# Patient Record
Sex: FEMALE | Race: WHITE | NOT HISPANIC OR LATINO | Employment: OTHER | URBAN - METROPOLITAN AREA
[De-identification: names, ages, dates, MRNs, and addresses within clinical notes are randomized per-mention and may not be internally consistent; named-entity substitution may affect disease eponyms.]

---

## 2018-05-07 ENCOUNTER — TRANSCRIBE ORDERS (OUTPATIENT)
Dept: LAB | Facility: HOSPITAL | Age: 83
End: 2018-05-07

## 2018-05-07 DIAGNOSIS — E03.9 HYPOTHYROIDISM (ACQUIRED): ICD-10-CM

## 2018-05-07 DIAGNOSIS — E55.9 VITAMIN D DEFICIENCY: Primary | ICD-10-CM

## 2018-05-07 DIAGNOSIS — I15.8 OTHER SECONDARY HYPERTENSION: ICD-10-CM

## 2018-05-08 ENCOUNTER — APPOINTMENT (OUTPATIENT)
Dept: LAB | Facility: HOSPITAL | Age: 83
End: 2018-05-08
Payer: MEDICARE

## 2018-05-08 DIAGNOSIS — E55.9 VITAMIN D DEFICIENCY: ICD-10-CM

## 2018-05-08 DIAGNOSIS — E03.9 HYPOTHYROIDISM (ACQUIRED): ICD-10-CM

## 2018-05-08 DIAGNOSIS — I15.8 OTHER SECONDARY HYPERTENSION: ICD-10-CM

## 2018-05-08 LAB
25(OH)D3 SERPL-MCNC: 14.2 NG/ML (ref 30–100)
ALBUMIN SERPL BCP-MCNC: 3.6 G/DL (ref 3.5–5)
ALP SERPL-CCNC: 83 U/L (ref 46–116)
ALT SERPL W P-5'-P-CCNC: 17 U/L (ref 12–78)
ANION GAP SERPL CALCULATED.3IONS-SCNC: 9 MMOL/L (ref 4–13)
AST SERPL W P-5'-P-CCNC: 13 U/L (ref 5–45)
BASOPHILS # BLD AUTO: 0.02 THOUSANDS/ΜL (ref 0–0.1)
BASOPHILS NFR BLD AUTO: 0 % (ref 0–1)
BILIRUB SERPL-MCNC: 0.48 MG/DL (ref 0.2–1)
BUN SERPL-MCNC: 18 MG/DL (ref 5–25)
CALCIUM SERPL-MCNC: 9 MG/DL (ref 8.3–10.1)
CHLORIDE SERPL-SCNC: 110 MMOL/L (ref 100–108)
CO2 SERPL-SCNC: 25 MMOL/L (ref 21–32)
CREAT SERPL-MCNC: 1.24 MG/DL (ref 0.6–1.3)
EOSINOPHIL # BLD AUTO: 0.1 THOUSAND/ΜL (ref 0–0.61)
EOSINOPHIL NFR BLD AUTO: 2 % (ref 0–6)
ERYTHROCYTE [DISTWIDTH] IN BLOOD BY AUTOMATED COUNT: 14.7 % (ref 11.6–15.1)
GFR SERPL CREATININE-BSD FRML MDRD: 39 ML/MIN/1.73SQ M
GLUCOSE P FAST SERPL-MCNC: 79 MG/DL (ref 65–99)
HCT VFR BLD AUTO: 39.2 % (ref 34.8–46.1)
HGB BLD-MCNC: 12.7 G/DL (ref 11.5–15.4)
LDLC SERPL DIRECT ASSAY-MCNC: 112 MG/DL (ref 0–100)
LYMPHOCYTES # BLD AUTO: 1.57 THOUSANDS/ΜL (ref 0.6–4.47)
LYMPHOCYTES NFR BLD AUTO: 32 % (ref 14–44)
MCH RBC QN AUTO: 31.2 PG (ref 26.8–34.3)
MCHC RBC AUTO-ENTMCNC: 32.4 G/DL (ref 31.4–37.4)
MCV RBC AUTO: 96 FL (ref 82–98)
MONOCYTES # BLD AUTO: 0.44 THOUSAND/ΜL (ref 0.17–1.22)
MONOCYTES NFR BLD AUTO: 9 % (ref 4–12)
NEUTROPHILS # BLD AUTO: 2.72 THOUSANDS/ΜL (ref 1.85–7.62)
NEUTS SEG NFR BLD AUTO: 57 % (ref 43–75)
NRBC BLD AUTO-RTO: 0 /100 WBCS
PLATELET # BLD AUTO: 241 THOUSANDS/UL (ref 149–390)
PMV BLD AUTO: 10.5 FL (ref 8.9–12.7)
POTASSIUM SERPL-SCNC: 4.3 MMOL/L (ref 3.5–5.3)
PROT SERPL-MCNC: 6.5 G/DL (ref 6.4–8.2)
RBC # BLD AUTO: 4.07 MILLION/UL (ref 3.81–5.12)
SODIUM SERPL-SCNC: 144 MMOL/L (ref 136–145)
TSH SERPL DL<=0.05 MIU/L-ACNC: 1.39 UIU/ML (ref 0.36–3.74)
VENIPUNCTURE: NORMAL
WBC # BLD AUTO: 4.85 THOUSAND/UL (ref 4.31–10.16)

## 2018-05-08 PROCEDURE — 85025 COMPLETE CBC W/AUTO DIFF WBC: CPT

## 2018-05-08 PROCEDURE — 36415 COLL VENOUS BLD VENIPUNCTURE: CPT

## 2018-05-08 PROCEDURE — 83721 ASSAY OF BLOOD LIPOPROTEIN: CPT

## 2018-05-08 PROCEDURE — 82306 VITAMIN D 25 HYDROXY: CPT

## 2018-05-08 PROCEDURE — 80053 COMPREHEN METABOLIC PANEL: CPT

## 2018-05-08 PROCEDURE — 84443 ASSAY THYROID STIM HORMONE: CPT

## 2018-06-28 ENCOUNTER — HOSPITAL ENCOUNTER (INPATIENT)
Facility: HOSPITAL | Age: 83
LOS: 3 days | Discharge: HOME HEALTH | DRG: 064 | End: 2018-07-02
Attending: EMERGENCY MEDICINE | Admitting: FAMILY MEDICINE
Payer: MEDICARE

## 2018-06-28 ENCOUNTER — APPOINTMENT (EMERGENCY)
Dept: RADIOLOGY | Facility: HOSPITAL | Age: 83
DRG: 064 | End: 2018-06-28
Payer: MEDICARE

## 2018-06-28 ENCOUNTER — APPOINTMENT (EMERGENCY)
Dept: CT IMAGING | Facility: HOSPITAL | Age: 83
DRG: 064 | End: 2018-06-28
Payer: MEDICARE

## 2018-06-28 ENCOUNTER — APPOINTMENT (EMERGENCY)
Dept: ULTRASOUND IMAGING | Facility: HOSPITAL | Age: 83
DRG: 064 | End: 2018-06-28
Payer: MEDICARE

## 2018-06-28 DIAGNOSIS — G25.81 RESTLESS LEG SYNDROME: Chronic | ICD-10-CM

## 2018-06-28 DIAGNOSIS — R47.89 GARBLED SPEECH: Primary | ICD-10-CM

## 2018-06-28 DIAGNOSIS — R79.89 ELEVATED TSH: ICD-10-CM

## 2018-06-28 DIAGNOSIS — I63.9 ACUTE EMBOLIC STROKE (HCC): ICD-10-CM

## 2018-06-28 DIAGNOSIS — R41.81 AGE-RELATED COGNITIVE DECLINE: ICD-10-CM

## 2018-06-28 LAB
ALBUMIN SERPL BCP-MCNC: 3.7 G/DL (ref 3.5–5)
ALP SERPL-CCNC: 88 U/L (ref 46–116)
ALT SERPL W P-5'-P-CCNC: 21 U/L (ref 12–78)
ANION GAP BLD CALC-SCNC: 17 MMOL/L (ref 4–13)
ANION GAP SERPL CALCULATED.3IONS-SCNC: 10 MMOL/L (ref 4–13)
APTT PPP: 32 SECONDS (ref 24–36)
AST SERPL W P-5'-P-CCNC: 16 U/L (ref 5–45)
BASOPHILS # BLD AUTO: 0.03 THOUSANDS/ΜL (ref 0–0.1)
BASOPHILS NFR BLD AUTO: 1 % (ref 0–1)
BILIRUB SERPL-MCNC: 0.4 MG/DL (ref 0.2–1)
BILIRUB UR QL STRIP: NEGATIVE
BUN BLD-MCNC: 16 MG/DL (ref 5–25)
BUN SERPL-MCNC: 15 MG/DL (ref 5–25)
CA-I BLD-SCNC: 1.13 MMOL/L (ref 1.12–1.32)
CALCIUM SERPL-MCNC: 9.1 MG/DL (ref 8.3–10.1)
CHLORIDE BLD-SCNC: 104 MMOL/L (ref 100–108)
CHLORIDE SERPL-SCNC: 105 MMOL/L (ref 100–108)
CLARITY UR: NORMAL
CO2 SERPL-SCNC: 27 MMOL/L (ref 21–32)
COLOR UR: YELLOW
CREAT BLD-MCNC: 1.3 MG/DL (ref 0.6–1.3)
CREAT SERPL-MCNC: 1.22 MG/DL (ref 0.6–1.3)
EOSINOPHIL # BLD AUTO: 0.04 THOUSAND/ΜL (ref 0–0.61)
EOSINOPHIL NFR BLD AUTO: 1 % (ref 0–6)
ERYTHROCYTE [DISTWIDTH] IN BLOOD BY AUTOMATED COUNT: 14.6 % (ref 11.6–15.1)
GFR SERPL CREATININE-BSD FRML MDRD: 37 ML/MIN/1.73SQ M
GFR SERPL CREATININE-BSD FRML MDRD: 40 ML/MIN/1.73SQ M
GLUCOSE SERPL-MCNC: 100 MG/DL (ref 65–140)
GLUCOSE SERPL-MCNC: 91 MG/DL (ref 65–140)
GLUCOSE SERPL-MCNC: 94 MG/DL (ref 65–140)
GLUCOSE UR STRIP-MCNC: NEGATIVE MG/DL
HCT VFR BLD AUTO: 39.3 % (ref 34.8–46.1)
HCT VFR BLD CALC: 37 % (ref 34.8–46.1)
HGB BLD-MCNC: 13 G/DL (ref 11.5–15.4)
HGB BLDA-MCNC: 12.6 G/DL (ref 11.5–15.4)
HGB UR QL STRIP.AUTO: NEGATIVE
INR PPP: 1.03 (ref 0.86–1.17)
KETONES UR STRIP-MCNC: NEGATIVE MG/DL
LACTATE SERPL-SCNC: 1.2 MMOL/L (ref 0.5–2)
LEUKOCYTE ESTERASE UR QL STRIP: NEGATIVE
LYMPHOCYTES # BLD AUTO: 1.83 THOUSANDS/ΜL (ref 0.6–4.47)
LYMPHOCYTES NFR BLD AUTO: 32 % (ref 14–44)
MAGNESIUM SERPL-MCNC: 1.9 MG/DL (ref 1.6–2.6)
MCH RBC QN AUTO: 31.6 PG (ref 26.8–34.3)
MCHC RBC AUTO-ENTMCNC: 33.1 G/DL (ref 31.4–37.4)
MCV RBC AUTO: 95 FL (ref 82–98)
MONOCYTES # BLD AUTO: 0.61 THOUSAND/ΜL (ref 0.17–1.22)
MONOCYTES NFR BLD AUTO: 11 % (ref 4–12)
NEUTROPHILS # BLD AUTO: 3.21 THOUSANDS/ΜL (ref 1.85–7.62)
NEUTS SEG NFR BLD AUTO: 56 % (ref 43–75)
NITRITE UR QL STRIP: NEGATIVE
NT-PROBNP SERPL-MCNC: 3205 PG/ML
PCO2 BLD: 24 MMOL/L (ref 21–32)
PH UR STRIP.AUTO: 7 [PH] (ref 4.5–8)
PLATELET # BLD AUTO: 184 THOUSANDS/UL (ref 149–390)
PMV BLD AUTO: 11.1 FL (ref 8.9–12.7)
POTASSIUM BLD-SCNC: 4.5 MMOL/L (ref 3.5–5.3)
POTASSIUM SERPL-SCNC: 4.5 MMOL/L (ref 3.5–5.3)
PROT SERPL-MCNC: 6.6 G/DL (ref 6.4–8.2)
PROT UR STRIP-MCNC: NEGATIVE MG/DL
PROTHROMBIN TIME: 13 SECONDS (ref 11.8–14.2)
RBC # BLD AUTO: 4.12 MILLION/UL (ref 3.81–5.12)
SODIUM BLD-SCNC: 140 MMOL/L (ref 136–145)
SODIUM SERPL-SCNC: 142 MMOL/L (ref 136–145)
SP GR UR STRIP.AUTO: 1.01 (ref 1–1.03)
SPECIMEN SOURCE: ABNORMAL
TROPONIN I SERPL-MCNC: 0.05 NG/ML
TSH SERPL DL<=0.05 MIU/L-ACNC: 4.87 UIU/ML (ref 0.36–3.74)
UROBILINOGEN UR QL STRIP.AUTO: 0.2 E.U./DL
WBC # BLD AUTO: 5.72 THOUSAND/UL (ref 4.31–10.16)

## 2018-06-28 PROCEDURE — 84484 ASSAY OF TROPONIN QUANT: CPT | Performed by: EMERGENCY MEDICINE

## 2018-06-28 PROCEDURE — 70496 CT ANGIOGRAPHY HEAD: CPT

## 2018-06-28 PROCEDURE — 83735 ASSAY OF MAGNESIUM: CPT | Performed by: EMERGENCY MEDICINE

## 2018-06-28 PROCEDURE — 99292 CRITICAL CARE ADDL 30 MIN: CPT

## 2018-06-28 PROCEDURE — 36415 COLL VENOUS BLD VENIPUNCTURE: CPT | Performed by: EMERGENCY MEDICINE

## 2018-06-28 PROCEDURE — 84443 ASSAY THYROID STIM HORMONE: CPT | Performed by: EMERGENCY MEDICINE

## 2018-06-28 PROCEDURE — 71045 X-RAY EXAM CHEST 1 VIEW: CPT

## 2018-06-28 PROCEDURE — 80053 COMPREHEN METABOLIC PANEL: CPT | Performed by: EMERGENCY MEDICINE

## 2018-06-28 PROCEDURE — 70498 CT ANGIOGRAPHY NECK: CPT

## 2018-06-28 PROCEDURE — 81003 URINALYSIS AUTO W/O SCOPE: CPT | Performed by: EMERGENCY MEDICINE

## 2018-06-28 PROCEDURE — 83605 ASSAY OF LACTIC ACID: CPT | Performed by: EMERGENCY MEDICINE

## 2018-06-28 PROCEDURE — 85730 THROMBOPLASTIN TIME PARTIAL: CPT | Performed by: EMERGENCY MEDICINE

## 2018-06-28 PROCEDURE — 82948 REAGENT STRIP/BLOOD GLUCOSE: CPT

## 2018-06-28 PROCEDURE — 85610 PROTHROMBIN TIME: CPT | Performed by: EMERGENCY MEDICINE

## 2018-06-28 PROCEDURE — 83880 ASSAY OF NATRIURETIC PEPTIDE: CPT | Performed by: EMERGENCY MEDICINE

## 2018-06-28 PROCEDURE — 85014 HEMATOCRIT: CPT

## 2018-06-28 PROCEDURE — 93005 ELECTROCARDIOGRAM TRACING: CPT

## 2018-06-28 PROCEDURE — 80047 BASIC METABLC PNL IONIZED CA: CPT

## 2018-06-28 PROCEDURE — 93970 EXTREMITY STUDY: CPT

## 2018-06-28 PROCEDURE — 85025 COMPLETE CBC W/AUTO DIFF WBC: CPT | Performed by: EMERGENCY MEDICINE

## 2018-06-28 PROCEDURE — 70450 CT HEAD/BRAIN W/O DYE: CPT

## 2018-06-28 RX ORDER — OXYCODONE HYDROCHLORIDE AND ACETAMINOPHEN 5; 325 MG/1; MG/1
1 TABLET ORAL EVERY 4 HOURS PRN
COMMUNITY
End: 2019-03-20

## 2018-06-28 RX ORDER — ASPIRIN 81 MG/1
81 TABLET ORAL DAILY
COMMUNITY
End: 2018-12-21

## 2018-06-28 RX ORDER — FUROSEMIDE 20 MG/1
40 TABLET ORAL DAILY
COMMUNITY
End: 2019-03-20

## 2018-06-28 RX ORDER — ASPIRIN 81 MG/1
324 TABLET, CHEWABLE ORAL ONCE
Status: COMPLETED | OUTPATIENT
Start: 2018-06-28 | End: 2018-06-28

## 2018-06-28 RX ADMIN — ASPIRIN 81 MG 324 MG: 81 TABLET ORAL at 23:50

## 2018-06-28 RX ADMIN — SODIUM CHLORIDE 500 ML: 0.9 INJECTION, SOLUTION INTRAVENOUS at 23:11

## 2018-06-28 RX ADMIN — IOHEXOL 100 ML: 350 INJECTION, SOLUTION INTRAVENOUS at 22:00

## 2018-06-29 ENCOUNTER — APPOINTMENT (OUTPATIENT)
Dept: MRI IMAGING | Facility: HOSPITAL | Age: 83
DRG: 064 | End: 2018-06-29
Payer: MEDICARE

## 2018-06-29 ENCOUNTER — APPOINTMENT (OUTPATIENT)
Dept: NON INVASIVE DIAGNOSTICS | Facility: HOSPITAL | Age: 83
DRG: 064 | End: 2018-06-29
Payer: MEDICARE

## 2018-06-29 PROBLEM — R79.89 ELEVATED TSH: Status: ACTIVE | Noted: 2018-06-29

## 2018-06-29 PROBLEM — R47.01 APHASIA: Status: ACTIVE | Noted: 2018-06-29

## 2018-06-29 PROBLEM — R41.82 ALTERED MENTAL STATUS: Status: ACTIVE | Noted: 2018-06-29

## 2018-06-29 PROBLEM — R41.81 AGE-RELATED COGNITIVE DECLINE: Status: ACTIVE | Noted: 2018-06-29

## 2018-06-29 LAB
ALBUMIN SERPL BCP-MCNC: 3.4 G/DL (ref 3.5–5)
ALP SERPL-CCNC: 82 U/L (ref 46–116)
ALT SERPL W P-5'-P-CCNC: 18 U/L (ref 12–78)
ANION GAP SERPL CALCULATED.3IONS-SCNC: 11 MMOL/L (ref 4–13)
APTT PPP: 30 SECONDS (ref 24–36)
AST SERPL W P-5'-P-CCNC: 17 U/L (ref 5–45)
BILIRUB SERPL-MCNC: 0.4 MG/DL (ref 0.2–1)
BUN SERPL-MCNC: 13 MG/DL (ref 5–25)
CALCIUM SERPL-MCNC: 8.8 MG/DL (ref 8.3–10.1)
CHLORIDE SERPL-SCNC: 106 MMOL/L (ref 100–108)
CHOLEST SERPL-MCNC: 214 MG/DL (ref 50–200)
CO2 SERPL-SCNC: 24 MMOL/L (ref 21–32)
CREAT SERPL-MCNC: 1.27 MG/DL (ref 0.6–1.3)
ERYTHROCYTE [DISTWIDTH] IN BLOOD BY AUTOMATED COUNT: 14.7 % (ref 11.6–15.1)
EST. AVERAGE GLUCOSE BLD GHB EST-MCNC: 108 MG/DL
GFR SERPL CREATININE-BSD FRML MDRD: 38 ML/MIN/1.73SQ M
GLUCOSE SERPL-MCNC: 86 MG/DL (ref 65–140)
HBA1C MFR BLD: 5.4 % (ref 4.2–6.3)
HCT VFR BLD AUTO: 37.6 % (ref 34.8–46.1)
HDLC SERPL-MCNC: 80 MG/DL (ref 40–60)
HGB BLD-MCNC: 12.1 G/DL (ref 11.5–15.4)
INR PPP: 1.04 (ref 0.86–1.17)
LDLC SERPL CALC-MCNC: 118 MG/DL (ref 0–100)
MAGNESIUM SERPL-MCNC: 1.9 MG/DL (ref 1.6–2.6)
MCH RBC QN AUTO: 30.9 PG (ref 26.8–34.3)
MCHC RBC AUTO-ENTMCNC: 32.2 G/DL (ref 31.4–37.4)
MCV RBC AUTO: 96 FL (ref 82–98)
PHOSPHATE SERPL-MCNC: 3.5 MG/DL (ref 2.3–4.1)
PLATELET # BLD AUTO: 180 THOUSANDS/UL (ref 149–390)
PLATELET # BLD AUTO: 185 THOUSANDS/UL (ref 149–390)
PMV BLD AUTO: 11 FL (ref 8.9–12.7)
PMV BLD AUTO: 11.1 FL (ref 8.9–12.7)
POTASSIUM SERPL-SCNC: 3.8 MMOL/L (ref 3.5–5.3)
PROT SERPL-MCNC: 6.2 G/DL (ref 6.4–8.2)
PROTHROMBIN TIME: 13.1 SECONDS (ref 11.8–14.2)
RBC # BLD AUTO: 3.91 MILLION/UL (ref 3.81–5.12)
SODIUM SERPL-SCNC: 141 MMOL/L (ref 136–145)
T3FREE SERPL-MCNC: 2.4 PG/ML (ref 2.3–4.2)
T4 FREE SERPL-MCNC: 1.12 NG/DL (ref 0.76–1.46)
TRIGL SERPL-MCNC: 78 MG/DL
TROPONIN I SERPL-MCNC: 0.05 NG/ML
TROPONIN I SERPL-MCNC: 0.06 NG/ML
WBC # BLD AUTO: 6.05 THOUSAND/UL (ref 4.31–10.16)

## 2018-06-29 PROCEDURE — G8978 MOBILITY CURRENT STATUS: HCPCS | Performed by: PHYSICAL THERAPIST

## 2018-06-29 PROCEDURE — G8996 SWALLOW CURRENT STATUS: HCPCS | Performed by: SPEECH-LANGUAGE PATHOLOGIST

## 2018-06-29 PROCEDURE — G8997 SWALLOW GOAL STATUS: HCPCS | Performed by: SPEECH-LANGUAGE PATHOLOGIST

## 2018-06-29 PROCEDURE — 97535 SELF CARE MNGMENT TRAINING: CPT

## 2018-06-29 PROCEDURE — 93306 TTE W/DOPPLER COMPLETE: CPT | Performed by: INTERNAL MEDICINE

## 2018-06-29 PROCEDURE — 97167 OT EVAL HIGH COMPLEX 60 MIN: CPT

## 2018-06-29 PROCEDURE — G8987 SELF CARE CURRENT STATUS: HCPCS

## 2018-06-29 PROCEDURE — 99219 PR INITIAL OBSERVATION CARE/DAY 50 MINUTES: CPT | Performed by: FAMILY MEDICINE

## 2018-06-29 PROCEDURE — 83036 HEMOGLOBIN GLYCOSYLATED A1C: CPT | Performed by: PHYSICIAN ASSISTANT

## 2018-06-29 PROCEDURE — 83735 ASSAY OF MAGNESIUM: CPT | Performed by: PHYSICIAN ASSISTANT

## 2018-06-29 PROCEDURE — G8998 SWALLOW D/C STATUS: HCPCS | Performed by: SPEECH-LANGUAGE PATHOLOGIST

## 2018-06-29 PROCEDURE — 92610 EVALUATE SWALLOWING FUNCTION: CPT | Performed by: SPEECH-LANGUAGE PATHOLOGIST

## 2018-06-29 PROCEDURE — 84484 ASSAY OF TROPONIN QUANT: CPT | Performed by: PHYSICIAN ASSISTANT

## 2018-06-29 PROCEDURE — 84100 ASSAY OF PHOSPHORUS: CPT | Performed by: PHYSICIAN ASSISTANT

## 2018-06-29 PROCEDURE — 80061 LIPID PANEL: CPT | Performed by: PHYSICIAN ASSISTANT

## 2018-06-29 PROCEDURE — 97116 GAIT TRAINING THERAPY: CPT | Performed by: PHYSICAL THERAPIST

## 2018-06-29 PROCEDURE — 70551 MRI BRAIN STEM W/O DYE: CPT

## 2018-06-29 PROCEDURE — 84481 FREE ASSAY (FT-3): CPT | Performed by: PHYSICIAN ASSISTANT

## 2018-06-29 PROCEDURE — G8979 MOBILITY GOAL STATUS: HCPCS | Performed by: PHYSICAL THERAPIST

## 2018-06-29 PROCEDURE — 85610 PROTHROMBIN TIME: CPT | Performed by: PHYSICIAN ASSISTANT

## 2018-06-29 PROCEDURE — 93306 TTE W/DOPPLER COMPLETE: CPT

## 2018-06-29 PROCEDURE — 97163 PT EVAL HIGH COMPLEX 45 MIN: CPT | Performed by: PHYSICAL THERAPIST

## 2018-06-29 PROCEDURE — 80053 COMPREHEN METABOLIC PANEL: CPT | Performed by: PHYSICIAN ASSISTANT

## 2018-06-29 PROCEDURE — 93970 EXTREMITY STUDY: CPT | Performed by: SURGERY

## 2018-06-29 PROCEDURE — 85049 AUTOMATED PLATELET COUNT: CPT | Performed by: PHYSICIAN ASSISTANT

## 2018-06-29 PROCEDURE — 99291 CRITICAL CARE FIRST HOUR: CPT

## 2018-06-29 PROCEDURE — G8988 SELF CARE GOAL STATUS: HCPCS

## 2018-06-29 PROCEDURE — 85730 THROMBOPLASTIN TIME PARTIAL: CPT | Performed by: PHYSICIAN ASSISTANT

## 2018-06-29 PROCEDURE — 84439 ASSAY OF FREE THYROXINE: CPT | Performed by: PHYSICIAN ASSISTANT

## 2018-06-29 PROCEDURE — 85027 COMPLETE CBC AUTOMATED: CPT | Performed by: PHYSICIAN ASSISTANT

## 2018-06-29 RX ORDER — ASPIRIN 81 MG/1
81 TABLET, CHEWABLE ORAL DAILY
Status: DISCONTINUED | OUTPATIENT
Start: 2018-06-29 | End: 2018-06-30

## 2018-06-29 RX ORDER — OXYCODONE HYDROCHLORIDE AND ACETAMINOPHEN 5; 325 MG/1; MG/1
1 TABLET ORAL EVERY 4 HOURS PRN
Status: DISCONTINUED | OUTPATIENT
Start: 2018-06-29 | End: 2018-07-02 | Stop reason: HOSPADM

## 2018-06-29 RX ORDER — ONDANSETRON 2 MG/ML
4 INJECTION INTRAMUSCULAR; INTRAVENOUS EVERY 6 HOURS PRN
Status: DISCONTINUED | OUTPATIENT
Start: 2018-06-29 | End: 2018-07-02 | Stop reason: HOSPADM

## 2018-06-29 RX ORDER — ROPINIROLE 1 MG/1
2 TABLET, FILM COATED ORAL 2 TIMES DAILY
Status: DISCONTINUED | OUTPATIENT
Start: 2018-06-29 | End: 2018-06-30

## 2018-06-29 RX ORDER — FUROSEMIDE 40 MG/1
40 TABLET ORAL DAILY
Status: DISCONTINUED | OUTPATIENT
Start: 2018-06-29 | End: 2018-07-02 | Stop reason: HOSPADM

## 2018-06-29 RX ORDER — ASPIRIN 81 MG/1
81 TABLET ORAL DAILY
Status: DISCONTINUED | OUTPATIENT
Start: 2018-06-29 | End: 2018-06-29

## 2018-06-29 RX ORDER — ATORVASTATIN CALCIUM 40 MG/1
40 TABLET, FILM COATED ORAL EVERY EVENING
Status: DISCONTINUED | OUTPATIENT
Start: 2018-06-29 | End: 2018-07-02 | Stop reason: HOSPADM

## 2018-06-29 RX ORDER — PREGABALIN 50 MG/1
100 CAPSULE ORAL 3 TIMES DAILY
Status: DISCONTINUED | OUTPATIENT
Start: 2018-06-29 | End: 2018-07-02 | Stop reason: HOSPADM

## 2018-06-29 RX ORDER — LABETALOL HYDROCHLORIDE 5 MG/ML
5 INJECTION, SOLUTION INTRAVENOUS ONCE
Status: COMPLETED | OUTPATIENT
Start: 2018-06-29 | End: 2018-06-29

## 2018-06-29 RX ADMIN — LABETALOL 20 MG/4 ML (5 MG/ML) INTRAVENOUS SYRINGE 5 MG: at 02:18

## 2018-06-29 RX ADMIN — ROPINIROLE HYDROCHLORIDE 2 MG: 1 TABLET, FILM COATED ORAL at 17:28

## 2018-06-29 RX ADMIN — ASPIRIN 81 MG 81 MG: 81 TABLET ORAL at 08:21

## 2018-06-29 RX ADMIN — PREGABALIN 100 MG: 50 CAPSULE ORAL at 21:38

## 2018-06-29 RX ADMIN — PREGABALIN 100 MG: 50 CAPSULE ORAL at 08:21

## 2018-06-29 RX ADMIN — PREGABALIN 100 MG: 50 CAPSULE ORAL at 17:28

## 2018-06-29 RX ADMIN — FUROSEMIDE 40 MG: 40 TABLET ORAL at 08:21

## 2018-06-29 RX ADMIN — ENOXAPARIN SODIUM 30 MG: 30 INJECTION SUBCUTANEOUS at 08:21

## 2018-06-29 RX ADMIN — ATORVASTATIN CALCIUM 40 MG: 40 TABLET, FILM COATED ORAL at 17:28

## 2018-06-29 RX ADMIN — ROPINIROLE HYDROCHLORIDE 2 MG: 1 TABLET, FILM COATED ORAL at 08:21

## 2018-06-29 NOTE — CASE MANAGEMENT
Initial Clinical Review    Admission: Date/Time/Statement: 06/28/2018 @  23:23  Observation    Orders Placed This Encounter   Procedures    Place in Observation (expected length of stay for this patient is less than two midnights)     Standing Status:   Standing     Number of Occurrences:   1     Order Specific Question:   Admitting Physician     Answer:   Marie Noel     Order Specific Question:   Level of Care     Answer:   Med Surg [16]         ED: Date/Time/Mode of Arrival:   ED Arrival Information     Expected Arrival Acuity Means of Arrival Escorted By Service Admission Type    - 6/28/2018 21:01 Emergent Wheelchair Family Member General Medicine Emergency    Arrival Complaint    shortness of breath, altered mental status          Chief Complaint:   Chief Complaint   Patient presents with    Altered Mental Status     Patient was seen by family around 36pm and was speaking words that were not making sense  Patient refused to come to the ED at this time  Patient is shaking more than usual  Patient was then found by neighbor to be trying to get out of her house and not making sense  History of Illness: 72-year-old female last known well at 1730 hours today right-hand-dominant had her family bring over dinner which they do on a daily basis she was "talking funny"  She had no slurred speech but they could not understand her words it was as if she was speaking [place]  (word salad) She refused come to the hospital   The notice no problems with her gait she mostly uses a wheelchair and then occasionally a walker there is no history of any falls or trauma she had no facial droop she she currently is denying any numbness or tingling she was brought in by family now because neighbors found her wandering outside  She is slightly confused    Has been shakey - new symptom this evening    ED Vital Signs:   ED Triage Vitals [06/28/18 2108]   Temperature Pulse Respirations Blood Pressure SpO2   99 1 °F (37 3 °C) 78 20 (!) 193/85 98 %      Temp Source Heart Rate Source Patient Position - Orthostatic VS BP Location FiO2 (%)   Temporal Monitor Lying Left arm --      Pain Score       No Pain        Wt Readings from Last 1 Encounters:   06/29/18 63 5 kg (139 lb 15 9 oz)       Vital Signs (abnormal):   Date and Time Temp Pulse SpO2 Resp BP   06/28/18 2350 -- 82 97 % 20  179/71   06/28/18 2320 -- 80 98 % 18  179/71   06/28/18 2250 -- 76 98 % 22  174/72   06/28/18 2235 -- 77 97 % 22  180/74   06/28/18 2220 -- 80 99 % 17  205/87   06/28/18 2205 -- 71 99 % 16  175/79   06/28/18 2150 -- 70 100 % 14  176/77   06/28/18 2135 -- 84 98 % 12  199/84   06/28/18 2120 -- 78 98 % 20  193/85         Abnormal Labs/Diagnostic Test Results: Troponin 0 05, TSH 3rd Generation 4 874, NT-pro BNP 3,205    CT Stroke Alert Brain: 1   Chronic left parietal infarct and multiple chronic subcortical lacunar infarcts, limiting sensitivity of the exam for detection of acute stroke   Within this limitation, no evidence of acute territorial infarction  ED Treatment:   Medication Administration from 06/28/2018 2101 to 06/29/2018 0059       Date/Time Order Dose Route Action Action by Comments     06/28/2018 2200 iohexol (OMNIPAQUE) 350 MG/ML injection (SINGLE-DOSE) 100 mL 100 mL Intravenous Given Bay Cerrato      06/29/2018 0028 sodium chloride 0 9 % bolus 500 mL 0 mL Intravenous Stopped Kristy Grimes RN      06/28/2018 2311 sodium chloride 0 9 % bolus 500 mL 500 mL Intravenous Lamont Espinal RN      06/28/2018 2350 aspirin chewable tablet 324 mg 324 mg Oral Given Kristy Grimes RN         Physical Exam  Physical Exam   Constitutional: She is oriented to person, place, and time  She appears well-developed  No distress  Slight shakey   TM pale   Cardiovascular: Normal rate, regular rhythm, normal heart sounds and intact distal pulses  Pulmonary/Chest: Effort normal and breath sounds normal  No respiratory distress     Musculoskeletal: Normal range of motion  She exhibits edema (bilateral )  She exhibits no tenderness or deformity  Past Medical/Surgical History: Active Ambulatory Problems     Diagnosis Date Noted    Syncope 08/23/2016    Renal failure, acute ([facility] [address] ) 08/23/2016    Peripheral edema 08/23/2016    Cardiac disease 08/23/2016    Elevated troponin 08/23/2016    Restless leg syndrome 08/23/2016    Traumatic rhabdomyolysis ([facility] [address] ) 08/23/2016    Anemia 08/23/2016     Resolved Ambulatory Problems     Diagnosis Date Noted    No Resolved Ambulatory Problems     Past Medical History:   Diagnosis Date    Cardiac disease     Dropfoot     Restless leg syndrome        Admitting Diagnosis: Altered mental status [R41 82]  Elevated TSH [R94 6]  Garbled speech [R47 89]    Age/Sex: 80 y o  female    Assessment/Plan:     * Altered mental status   Assessment & Plan     -Sudden onset while at home  -Was found by her neighbor trying to leave the house and appeared confused  -Episodes of aphasia  Son reports being unable to understand what she was saying "she seem t be speaking in [place]"  -Son reports that she became confused and was refusing to come to hospital  -Stroke alert called upon arrival in ER  NIH of zero  -Dr Scottie Temple (neurology) determined  patient is not TPA candidate  -CTA head and neck- No hemodynamically significant stenosis, dissection or occlusion of the carotid or vertebral arteries  No hemodynamic significant stenosis or occlusion of the major vessels of the [place]   -Chest X-ray shows- No acute cardiopulmonary disease  -CT head shows-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           Aphasia   Assessment & Plan     -Onset while at home  -reported by her son  -Improved upon arrival to ER          Elevated troponin   Assessment & Plan     -Troponin elevate at 0 05  -No reports of chest pain or discomfort  -Review of troponin  dating back to 2016 shows levels of 0 06-0 07  -EKG shows -sinus rhythm at rate of 69, left axis deviation  -Continue telemetry monitoring  -Will repeat troponin through 3 sets          Elevated TSH   Assessment & Plan     -TSH at 4 874  -Will check free T3,T4             Restless leg syndrome   Assessment & Plan     -Continue Requip            VTE Prophylaxis: Enoxaparin (Lovenox)  / sequential compression device   Code Status: Level 1 - full code  POLST: POLST is not applicable to this patient  Discussion with family: None     Anticipated Length of Stay:  Patient will be admitted on an Observation basis with an anticipated length of stay of  < 2 midnights     Justification for Hospital Stay: Altered mental status, with possible aphasia R/O TIA ,          Admission Orders:  Observation/Tele  Continuous Cardiac Monitoring  Serial Cardiac Enzymes q3h x 3   Bilateral Sequential Compression Device  OT/PT Consult  Echocardiogram  MRI of Brain  Carotid Dopplar  Dysphagia evaluation  Neuro Check q4h    Scheduled Meds:   Current Facility-Administered Medications:  aspirin 81 mg Oral Daily   atorvastatin 40 mg Oral QPM   enoxaparin 30 mg Subcutaneous Daily   furosemide 40 mg Oral Daily   pregabalin 100 mg Oral TID   rOPINIRole 2 mg Oral BID

## 2018-06-29 NOTE — ED PROVIDER NOTES
History  Chief Complaint   Patient presents with    Altered Mental Status     Patient was seen by family around 36pm and was speaking words that were not making sense  Patient refused to come to the ED at this time  Patient is shaking more than usual  Patient was then found by neighbor to be trying to get out of her house and not making sense  43-year-old female last known well at 1730 hours today right-hand-dominant had her family bring over dinner which they do on a daily basis she was "talking funny"  She had no slurred speech but they could not understand her words it was as if she was speaking [place]  (word salad) She refused come to the hospital   The notice no problems with her gait she mostly uses a wheelchair and then occasionally a walker there is no history of any falls or trauma she had no facial droop she she currently is denying any numbness or tingling she was brought in by family now because neighbors found her wandering outside  She is slightly confused  She is denying any headache or visual disturbance there is no further word salad on or slurred speech no facial droop no numbness tingling no weakness on 1 side or the other she has had no fever chills no cough or upper respiratory complaints no abdominal or back pain  No chest pain no dysuria or increased urinary frequency she has chronic edema to the lower extremities  There is no prior history of strokes or TIAs  Has been shakey - new symptom this evening  No new medications            Prior to Admission Medications   Prescriptions Last Dose Informant Patient Reported?  Taking?   aspirin (ECOTRIN LOW STRENGTH) 81 mg EC tablet  Family Member Yes Yes   Sig: Take 81 mg by mouth daily   furosemide (LASIX) 20 mg tablet  Family Member Yes Yes   Sig: Take 40 mg by mouth daily   oxyCODONE-acetaminophen (PERCOCET) 5-325 mg per tablet  Family Member Yes Yes   Sig: Take 1 tablet by mouth every 4 (four) hours as needed for moderate pain pregabalin (LYRICA) 25 mg capsule   Yes Yes   Sig: Take 100 mg by mouth 3 (three) times a day  rOPINIRole (REQUIP) 0 5 mg tablet   Yes Yes   Sig: Take 2 mg by mouth 2 (two) times a day Indications: Restless Leg Syndrome  Facility-Administered Medications: None       Past Medical History:   Diagnosis Date    Cardiac disease     valve replacement    Dropfoot     right    Restless leg syndrome        Past Surgical History:   Procedure Laterality Date    AORTIC VALVULOPLASTY  11/25/2002    Bovine Pericardial heart valve    BACK SURGERY      FRACTURE SURGERY      right hand    HAND SURGERY      right hand    HYSTERECTOMY      ROTATOR CUFF REPAIR Right        History reviewed  No pertinent family history  I have reviewed and agree with the history as documented  Social History   Substance Use Topics    Smoking status: Never Smoker    Smokeless tobacco: Never Used    Alcohol use No        Review of Systems   Constitutional: Positive for activity change  Negative for appetite change, chills and fever  HENT: Negative for congestion, ear pain, rhinorrhea, sneezing and sore throat  Eyes: Negative for discharge  Respiratory: Negative for cough and shortness of breath  Cardiovascular: Negative for chest pain and leg swelling  Gastrointestinal: Negative for abdominal pain, blood in stool, diarrhea, nausea and vomiting  Endocrine: Negative for polyuria  Genitourinary: Negative for dysuria, frequency and urgency  Musculoskeletal: Negative for back pain and myalgias  Skin: Negative for rash  Neurological: Positive for tremors (shakey) and speech difficulty (at 1730 resovled)  Negative for dizziness, weakness, light-headedness, numbness and headaches  Hematological: Negative for adenopathy  Psychiatric/Behavioral: Positive for confusion  All other systems reviewed and are negative  Physical Exam  Physical Exam   Constitutional: She is oriented to person, place, and time  She appears well-developed  No distress  Slight shakey   HENT:   Head: Normocephalic and atraumatic  Right Ear: External ear normal    Left Ear: External ear normal    Nose: Nose normal    Mouth/Throat: Oropharynx is clear and moist  No oropharyngeal exudate  TM pale   Eyes: Conjunctivae and EOM are normal  Pupils are equal, round, and reactive to light  Right eye exhibits no discharge  Left eye exhibits no discharge  Neck: Normal range of motion  Neck supple  No midline or paraspinous tenderness   Cardiovascular: Normal rate, regular rhythm, normal heart sounds and intact distal pulses  Pulmonary/Chest: Effort normal and breath sounds normal  No respiratory distress  Abdominal: Soft  Bowel sounds are normal  She exhibits no distension and no mass  There is no tenderness  There is no rebound and no guarding  Back no midline or CVA tenderness   Musculoskeletal: Normal range of motion  She exhibits edema (bilateral )  She exhibits no tenderness or deformity  Lymphadenopathy:     She has no cervical adenopathy  Neurological: She is alert and oriented to person, place, and time  She displays normal reflexes  No cranial nerve deficit or sensory deficit  She exhibits normal muscle tone  Coordination normal    GCS 15   Skin: Skin is warm and dry  She is not diaphoretic  Psychiatric: She has a normal mood and affect  Vitals reviewed        Vital Signs  ED Triage Vitals [06/28/18 2108]   Temperature Pulse Respirations Blood Pressure SpO2   99 1 °F (37 3 °C) 78 20 (!) 193/85 98 %      Temp Source Heart Rate Source Patient Position - Orthostatic VS BP Location FiO2 (%)   Temporal Monitor Lying Left arm --      Pain Score       No Pain           Vitals:    06/28/18 2235 06/28/18 2250 06/28/18 2320 06/28/18 2350   BP: (!) 180/74 (!) 174/72 (!) 179/71 (!) 179/71   Pulse: 77 76 80 82   Patient Position - Orthostatic VS: Lying Lying Lying Lying       Visual Acuity  Visual Acuity      Most Recent Value   L Pupil Size (mm)  3   R Pupil Size (mm)  3          ED Medications  Medications   iohexol (OMNIPAQUE) 350 MG/ML injection (SINGLE-DOSE) 100 mL (100 mL Intravenous Given 6/28/18 2200)   sodium chloride 0 9 % bolus 500 mL (500 mL Intravenous New Bag 6/28/18 2311)   aspirin chewable tablet 324 mg (324 mg Oral Given 6/28/18 2350)       Diagnostic Studies  Results Reviewed     Procedure Component Value Units Date/Time    UA w Reflex to Microscopic w Reflex to Culture [[id number]] Collected:  06/28/18 2322    Lab Status:  Final result Specimen:  Urine from Urine, Other Updated:  06/28/18 2328     Color, UA Yellow     Clarity, UA Slightly Cloudy     Specific [place], UA 1 010     pH, UA 7 0     Leukocytes, UA Negative     Nitrite, UA Negative     Protein, UA Negative mg/dl      Glucose, UA Negative mg/dl      Ketones, UA Negative mg/dl      Urobilinogen, UA 0 2 E U /dl      Bilirubin, UA Negative     Blood, UA Negative    Troponin I [[id number]]  (Abnormal) Collected:  06/28/18 2153    Lab Status:  Final result Specimen:  Blood from Arm, Right Updated:  06/28/18 2246     Troponin I 0 05 (HH) ng/mL     [place] draw [[id number]] Collected:  06/28/18 2157    Lab Status:  Final result Specimen:  Blood Updated:  06/28/18 2236    Narrative: The following orders were created for panel order [place] draw  Procedure                               Abnormality         Status                     ---------                               -----------         ------                     Glendell Geraldine Top 7ml on MXTT[[id number]]                          Final result                 Please view results for these tests on the individual orders      TSH [[id number]]  (Abnormal) Collected:  06/28/18 2153    Lab Status:  Final result Specimen:  Blood from Arm, Right Updated:  06/28/18 2235     TSH 3RD GENERATON 4 874 (H) uIU/mL     Narrative:         Patients undergoing fluorescein dye angiography may retain small amounts of fluorescein in the body for 48-72 hours post procedure  Samples containing fluorescein can produce falsely depressed TSH values  If the patient had this procedure,a specimen should be resubmitted post fluorescein clearance  The recommended reference ranges for TSH during pregnancy are as follows:  First trimester 0 1 to 2 5 uIU/mL  Second trimester  0 2 to 3 0 uIU/mL  Third trimester 0 3 to 3 0 uIU/m      Comprehensive metabolic panel [[id number]] Collected:  06/28/18 2153    Lab Status:  Final result Specimen:  Blood from Arm, Right Updated:  06/28/18 2235     Sodium 142 mmol/L      Potassium 4 5 mmol/L      Chloride 105 mmol/L      CO2 27 mmol/L      Anion Gap 10 mmol/L      BUN 15 mg/dL      Creatinine 1 22 mg/dL      Glucose 91 mg/dL      Calcium 9 1 mg/dL      AST 16 U/L      ALT 21 U/L      Alkaline Phosphatase 88 U/L      Total Protein 6 6 g/dL      Albumin 3 7 g/dL      Total Bilirubin 0 40 mg/dL      eGFR 40 ml/min/1 73sq m     Narrative:         National Kidney Disease Education Program recommendations are as follows:  GFR calculation is accurate only with a steady state creatinine  Chronic Kidney disease less than 60 ml/min/1 73 sq  meters  Kidney failure less than 15 ml/min/1 73 sq  meters  B-type natriuretic peptide [[id number]]  (Abnormal) Collected:  06/28/18 2153    Lab Status:  Final result Specimen:  Blood from Arm, Right Updated:  06/28/18 2235     NT-proBNP 3,205 (H) pg/mL     Magnesium [[id number]]  (Normal) Collected:  06/28/18 2153    Lab Status:  Final result Specimen:  Blood from Arm, Right Updated:  06/28/18 2235     Magnesium 1 9 mg/dL     Lactic acid, plasma [[id number]]  (Normal) Collected:  06/28/18 2153    Lab Status:  Final result Specimen:  Blood from Arm, Right Updated:  06/28/18 2230     LACTIC ACID 1 2 mmol/L     Narrative:         Result may be elevated if tourniquet was used during collection      Lyndsey Espinal [[id number]]  (Normal) Collected:  06/28/18 2153    Lab Status:  Final result Specimen:  Blood from Arm, Right Updated:  06/28/18 2213     Protime 13 0 seconds      INR 1 03    APTT [[id number]]  (Normal) Collected:  06/28/18 2153    Lab Status:  Final result Specimen:  Blood from Arm, Right Updated:  06/28/18 2213     PTT 32 seconds     CBC and differential [[id number]]  (Normal) Collected:  06/28/18 2153    Lab Status:  Final result Specimen:  Blood from Arm, Right Updated:  06/28/18 2205     WBC 5 72 Thousand/uL      RBC 4 12 Million/uL      Hemoglobin 13 0 g/dL      Hematocrit 39 3 %      MCV 95 fL      MCH 31 6 pg      MCHC 33 1 g/dL      RDW 14 6 %      MPV 11 1 fL      Platelets 220 Thousands/uL      Neutrophils Relative 56 %      Lymphocytes Relative 32 %      Monocytes Relative 11 %      Eosinophils Relative 1 %      Basophils Relative 1 %      Neutrophils Absolute 3 21 Thousands/µL      Lymphocytes Absolute 1 83 Thousands/µL      Monocytes Absolute 0 61 Thousand/µL      Eosinophils Absolute 0 04 Thousand/µL      Basophils Absolute 0 03 Thousands/µL     POCT Chem 8+ [[id number]]  (Abnormal) Collected:  06/28/18 2127    Lab Status:  Final result Updated:  06/28/18 2132     SODIUM, I-STAT 140 mmol/l      Potassium, i-STAT 4 5 mmol/L      Chloride, istat 104 mmol/L      CO2, i-STAT 24 mmol/L      Anion Gap, Istat 17 (H) mmol/L      Calcium, Ionized i-STAT 1 13 mmol/L      BUN, I-STAT 16 mg/dl      Creatinine, i-STAT 1 3 mg/dl      eGFR 37 ml/min/1 73sq m      Glucose, i-STAT 94 mg/dl      Hct, i-STAT 37 %      Hgb, i-STAT 12 6 g/dl      Specimen Type VENOUS    Fingerstick Glucose (POCT) [[id number]]  (Normal) Collected:  06/28/18 2122    Lab Status:  Final result Updated:  06/28/18 2125     POC Glucose 100 mg/dl                  CT stroke alert brain   Final Result by Jolynn Gowers, MD (06/28 2228)         1  Chronic left parietal infarct and multiple chronic subcortical lacunar infarcts, limiting sensitivity of the exam for detection of acute stroke    Within this limitation, no evidence of acute territorial infarction  2   No acute intracranial hemorrhage, mass effect or extra-axial collection  I personally discussed this study with Garry Collin on 6/28/2018 at 9:46 PM             Workstation performed: [id number]         CTA stroke alert (head/neck)   Final Result by Sky Mclean MD (06/28 2227)         1  No hemodynamically significant stenosis, dissection or occlusion of the carotid or vertebral arteries  2   No hemodynamic significant stenosis or occlusion of the major vessels of the [place]  I personally discussed this study with Marco Antoniohu Polanco on 6/28/2018 at 9:58 PM                      Workstation performed: [id number]         XR stroke alert portable chest   Final Result by Tere Perez MD (06/28 2215)      No acute cardiopulmonary disease  Workstation performed: [id number]         VAS lower limb venous duplex study, complete bilateral    (Results Pending)              Procedures  ECG 12 Lead Documentation  Date/Time: 6/28/2018 10:12 PM  Performed by: Chetan Lemus  Authorized by: Chetan Lemus     Indications / Diagnosis:  Garbled speech  ECG reviewed by me, the ED Provider: yes    Patient location:  ED  Previous ECG:     Previous ECG:  Compared to current    Comparison ECG info:  8/22/16    Similarity:  No change  Rate:     ECG rate:  79  Rhythm:     Rhythm: sinus rhythm    QRS:     QRS axis:  Left  Comments:      LAFB; RBBB no acute ischemic changes           Phone Contacts  ED Phone Contact    ED Course  ED Course as of Jun 29 0023   Thu Jun 28, 2018 2129 2125 Dr Krupa Zheng returns page secondary to NIH zero not a TPA candiate    2242 Prelim bilateral lower ext doppler u/s no DVT    2303 Prior trop 1 year ago 0 06-0 07 todays value c/w baseline    2319 Case reviewed with Homa Yanes, PAC will admin aspirin for possible TIA  U/A is pending   Recommends Obs tele          HEART Risk Score      Most Recent Value   History  0 Filed at: 06/28/2018 2320   ECG  1 Filed at: 06/28/2018 2320   Age  2 Filed at: 06/28/2018 2320   Risk Factors  1 Filed at: 06/28/2018 2320   Troponin  1 Filed at: 06/28/2018 2320   Heart Score Risk Calculator   History  0 Filed at: 06/28/2018 2320   ECG  1 Filed at: 06/28/2018 2320   Age  2 Filed at: 06/28/2018 2320   Risk Factors  1 Filed at: 06/28/2018 2320   Troponin  1 Filed at: 06/28/2018 2320   HEART Score  5 Filed at: 06/28/2018 2320   HEART Score  5 Filed at: 06/28/2018 2320                Stroke Assessment     Row Name 06/28/18 2115             NIH Stroke Scale    Interval Baseline      Level of Consciousness (1a ) 0      LOC Questions (1b ) 0      LOC Commands (1c ) 0      Best Gaze (2 ) 0      Visual (3 ) 0      Facial Palsy (4 ) 0      Motor Arm, Left (5a ) 0      Motor Arm, Right (5b ) 0      Motor Leg, Left (6a ) 0      Motor Leg, Right (6b ) 0      Limb Ataxia (7 ) 0      Sensory (8 ) 0      Best Language (9 ) 0      Dysarthria (10 ) 0      Extinction and Inattention (11 ) (Formerly Neglect) 0      Total 0                          MDM  Number of Diagnoses or Management Options  Diagnosis management comments: Mdm: stroke alert called secondary to h/o expressive aphasia within 4 hours   accucheck 100 will also iniate workup for infectious cause, metabolic abnormality    CritCare Time    Disposition  Final diagnoses:   Garbled speech - resolved; TIA vs  other   Elevated TSH     Time reflects when diagnosis was documented in both MDM as applicable and the Disposition within this note     Time User Action Codes Description Comment    6/28/2018 11:22 PM Александр Sell Add [R47 89] Garbled speech     6/28/2018 11:23 PM Александр Sell Modify [R47 89] Garbled speech resolved; TIA vs  other    6/29/2018 12:13 AM [place], [address] [R94 6] Elevated TSH       ED Disposition     ED Disposition Condition Comment    Admit  Case was discussed with Ardyth Nageotte, PAC  and the patient's admission status was agreed to be Admission Status: observation status to the service of Dr Velasco Prior   Follow-up Information    None         Patient's Medications   Discharge Prescriptions    No medications on file     No discharge procedures on file      ED Provider  Electronically Signed by           Nash Dubin, MD  06/29/18 5384       Nash Dubin, MD  06/29/18 2943

## 2018-06-29 NOTE — PLAN OF CARE
Problem: Potential for Falls  Goal: Patient will remain free of falls  INTERVENTIONS:  - Assess patient frequently for physical needs  -  Identify cognitive and physical deficits and behaviors that affect risk of falls    -  [place] fall precautions as indicated by assessment   - Educate patient/family on patient safety including physical limitations  - Instruct patient to call for assistance with activity based on assessment  - Modify environment to reduce risk of injury  - Consider OT/PT consult to assist with strengthening/mobility   Outcome: Progressing      Problem: Prexisting or High Potential for Compromised Skin Integrity  Goal: Skin integrity is maintained or improved  INTERVENTIONS:  - Identify patients at risk for skin breakdown  - Assess and monitor skin integrity  - Assess and monitor nutrition and hydration status  - Monitor labs (i e  albumin)  - Assess for incontinence   - Turn and reposition patient  - Assist with mobility/ambulation  - Relieve pressure over bony prominences  - Avoid friction and shearing  - Provide appropriate hygiene as needed including keeping skin clean and dry  - Evaluate need for skin moisturizer/barrier cream  - Collaborate with interdisciplinary team (i e  Nutrition, Rehabilitation, etc )   - Patient/family teaching   Outcome: Progressing      Problem: PAIN - ADULT  Goal: Verbalizes/displays adequate comfort level or baseline comfort level  Interventions:  - Encourage patient to monitor pain and request assistance  - Assess pain using appropriate pain scale  - Administer analgesics based on type and severity of pain and evaluate response  - Implement non-pharmacological measures as appropriate and evaluate response  - Consider cultural and social influences on pain and pain management  - Notify physician/advanced practitioner if interventions unsuccessful or patient reports new pain  Outcome: Progressing      Problem: INFECTION - ADULT  Goal: Absence or prevention of progression during hospitalization  INTERVENTIONS:  - Assess and monitor for signs and symptoms of infection  - Monitor lab/diagnostic results  - [place] appropriate cooling/warming therapies per order  - Administer medications as ordered  - Instruct and encourage patient and family to use good hand hygiene technique  - Identify and instruct in appropriate isolation precautions for identified infection/condition  Outcome: Progressing      Problem: SAFETY ADULT  Goal: Maintain or return to baseline ADL function  INTERVENTIONS:  -  Assess patient's ability to carry out ADLs; assess patient's baseline for ADL function and identify physical deficits which impact ability to perform ADLs (bathing, care of mouth/teeth, toileting, grooming, dressing, etc )  - Assess/evaluate cause of self-care deficits   - Assess range of motion  - Assess patient's mobility; develop plan if impaired  - Assess patient's need for assistive devices and provide as appropriate  - Encourage maximum independence but intervene and supervise when necessary  ¯ Involve family in performance of ADLs  ¯ Assess for home care needs following discharge   ¯ Request OT consult to assist with ADL evaluation and planning for discharge  ¯ Provide patient education as appropriate  Outcome: Progressing    Goal: Maintain or return mobility status to optimal level  INTERVENTIONS:  - Assess patient's baseline mobility status (ambulation, transfers, stairs, etc )    - Identify cognitive and physical deficits and behaviors that affect mobility  - Identify mobility aids required to assist with transfers and/or ambulation (gait belt, sit-to-stand, lift, walker, cane, etc )  - [place] fall precautions as indicated by assessment  - Record patient progress and toleration of activity level on Mobility SBAR; progress patient to next Phase/Stage  - Instruct patient to call for assistance with activity based on assessment  - Request Rehabilitation consult to assist with strengthening/weightbearing, etc   Outcome: Progressing      Problem: DISCHARGE PLANNING  Goal: Discharge to home or other facility with appropriate resources  INTERVENTIONS:  - Identify barriers to discharge w/patient and caregiver  - Arrange for needed discharge resources and transportation as appropriate  - Identify discharge learning needs (meds, wound care, etc )  - Arrange for interpretive services to assist at discharge as needed  - Refer to Case Management Department for coordinating discharge planning if the patient needs post-hospital services based on physician/advanced practitioner order or complex needs related to functional status, cognitive ability, or social support system  Outcome: Progressing      Problem: Knowledge Deficit  Goal: Patient/family/caregiver demonstrates understanding of disease process, treatment plan, medications, and discharge instructions  Complete learning assessment and assess knowledge base    Interventions:  - Provide teaching at level of understanding  - Provide teaching via preferred learning methods  Outcome: Progressing

## 2018-06-29 NOTE — SPEECH THERAPY NOTE
Speech Language/Pathology  Speech/Language Pathology  Assessment    Patient Name: Whitney CHAN Date: 6/29/2018     Problem List  Patient Active Problem List   Diagnosis    Syncope    Renal failure, acute ([address] )    Peripheral edema    Cardiac disease    Elevated troponin    Restless leg syndrome    Traumatic rhabdomyolysis (HCC)    Anemia    Aphasia    Altered mental status    Elevated TSH    Age-related cognitive decline     Past Medical History  Past Medical History:   Diagnosis Date    Cardiac disease     valve replacement    Dropfoot     right    Restless leg syndrome      Past Surgical History  Past Surgical History:   Procedure Laterality Date    AORTIC VALVULOPLASTY  11/25/2002    Bovine Pericardial heart valve    BACK SURGERY      FRACTURE SURGERY      right hand    HAND SURGERY      right hand    HYSTERECTOMY      ROTATOR CUFF REPAIR Right         06/29/18 1259   Swallow Information   Current Risks for Dysphagia & Aspiration New Neuro event   Current Symptoms/Concerns Difficulty chewing   Current Diet Regular; Thin liquid   Baseline Diet Dyphagia advanced; Thin liquids   Baseline Assessment   Behavior/Cognition Alert; Cooperative; Interactive   Speech/Language Status WFL   Patient Positioning Upright in bed   Swallow Mechanism Exam   Labial Symmetry WFL   Labial Strength WFL   Labial ROM WFL   Labial Sensation WFL   Facial Symmetry WFL   Facial Strength WFL   Facial ROM WFL   Facial Sensation WFL   Lingual Symmetry WFL   Lingual Strength WFL   Lingual ROM WFL   Lingual Sensation WFL   Velum WFL   Gag WFL   Mandible Impaired  (mild level weakness)   Dentition Dentures top;Dentures bottom   Volitional Cough Strong   Consistencies Assessed and Performance   Materials Admnistered Puree/Level 1;Regular/Solid;Soft/Level 3; Thin liquid  (patient reports she would like her meat ground up )   Materials Adminstered Comment Nursing and pt report she has no difficulty with whole pills with thin liquids  Oral Stage Mild impaired; With limited texture   Oral Stage Comment Mildly reduced mastication with hard solid (meat)  Otherwise WFL with puree and soft food and thin liquids via straw  Phargngeal Stage WFL   Pharyngeal Stage Comment No s/s or suspect   Swallow Mechanics WFL;Swallow initation; Appears prompt;Good [place] rise   Esophageal Concerns No s/s reported   Summary   Swallow Summary Patient is seen for dysphagia evaluation after admission with altered mental status and "garbled" speech  The patient is verbally clear and interactive with the SLP  She reports that the only difficulty she has is with chewing meats  Her oral mech is only positive for mildly weak mastication with regular solids  The patient is [facility] with soft foods and mechanical soft meats  She is able to receive thin liquids via straw and without oral or pharyngeal dysphagia  Patient would benefit from level 3 soft diet, but ground meats, and thin liquids  She will need help with set up  Recommendations   Recommendations Consider oral diet   Diet Solid Recommendation Level 3 Dysphagia/ advanced/ soft to chew  (ground meats)   Diet Liquid Recommendation Thin liquid   Recommended Form of Meds As desired; As tolerated   General Precautions Feed only when alert;Upright as possible for all oral intake;Assist with feeding   Further Evaluations Dietician   Results Reviewed with RN;PT/Family/Caregiver

## 2018-06-29 NOTE — OCCUPATIONAL THERAPY NOTE
Occupational Therapy Evaluation     Patient Name: Hitesh Steinberg  [id number] Date: 6/29/2018  Problem List  Patient Active Problem List   Diagnosis    Syncope    Renal failure, acute ([address] )    Peripheral edema    Cardiac disease    Elevated troponin    Restless leg syndrome    Traumatic rhabdomyolysis (HCC)    Anemia    Aphasia    Altered mental status    Elevated TSH    Age-related cognitive decline     Past Medical History  Past Medical History:   Diagnosis Date    Cardiac disease     valve replacement    Dropfoot     right    Restless leg syndrome      Past Surgical History  Past Surgical History:   Procedure Laterality Date    AORTIC VALVULOPLASTY  11/25/2002    Bovine Pericardial heart valve    BACK SURGERY      FRACTURE SURGERY      right hand    HAND SURGERY      right hand    HYSTERECTOMY      ROTATOR CUFF REPAIR Right            06/29/18 0956   Note Type   Note type Eval/Treat   Restrictions/Precautions   Weight Bearing Precautions Per Order No   Other Precautions Multiple lines;Telemetry; Fall Risk;Bed Alarm   Pain Assessment   Pain Assessment No/denies pain   Home Living   Additional Comments see PT evaluation for details    Prior Function   Level of [place] Independent with ADLs and functional mobility; Needs assistance with IADLs   Lives With Alone   Receives Help From Family   ADL Assistance Independent   IADLs Needs assistance   Falls in the last 6 months 0   Comments pt's family drives    Psychosocial   Psychosocial (WDL) WDL   ADL   Where Assessed Other (Comment)  (bathroom)   LB Dressing Assistance 4  Minimal Assistance   LB Dressing Deficit Don/doff R sock; Don/doff L sock   Toileting Assistance  5  Supervision/Setup   Toileting Deficit Verbal cueing;Supervison/safety; Increased time to complete   Bed Mobility   Supine to Sit 5  Supervision   Additional items Bedrails; Increased time required   Sit to Supine (seated in w/c at end of session for transport)   Transfers   Sit to Stand 5  Supervision   Additional items (RW)   Stand to Sit 5  Supervision   Additional items (RW)   Functional Mobility   Functional Mobility 5  Supervision   Additional Comments pt performed FM in hallway with RW at (S) level; pt limited this session by fatigue   Additional items Rolling walker   Balance   Static Sitting Good   Dynamic Sitting Good   Static Standing Fair +   Dynamic Standing Fair   Ambulatory Fair   Activity Tolerance   Activity Tolerance Patient limited by fatigue   RUE Assessment   RUE Assessment X  (3+/5 grossly; WFL AROM )   LUE Assessment   LUE Assessment X  (3+/5 grossly; WFL AROM)   Hand Function   Gross Motor Coordination Functional   Fine Motor Coordination Functional   Sensation   Light Touch No apparent deficits   Sharp/Dull No apparent deficits   Cognition   Overall Cognitive Status Impaired   Arousal/Participation Alert   Attention Within functional limits   Orientation Level Oriented to person;Oriented to place;Oriented to situation   Memory Decreased short term memory;Decreased long term memory   Following Commands Follows all commands and directions without difficulty   Assessment   Limitation Decreased ADL status; Decreased UE strength;Decreased Safe judgement during ADL;Decreased endurance;Decreased self-care trans;Decreased high-level ADLs; Decreased cognition   Assessment pt presents at evaluation perforrming at overall (S) level with use of RW during session; recommend continued OT intervention and home with home health on d/c   Goals   Short Term Goal  pt will perform UE strengthening exercises    Long Term Goal #1 pt will perform UB/LB bathing and grooming tasks at min (A) level    Long Term Goal #2 pt will perform FM c RW at mod (I) level    Long Term Goal pt will perform toilet transfers and dominik hygiene at (I) level   Plan   Treatment Interventions ADL retraining;Functional transfer training;UE strengthening/ROM; Endurance training;Patient/family training; Activityengagement   Goal Expiration Date 07/13/18   OT Frequency 3-5x/wk   Recommendation   OT Discharge Recommendation Home Health Agency   OT - OK to Discharge No   [place] Index   Feeding 10   Bathing 0   Grooming Score 0   Dressing Score 5   Bladder Score 10   Bowels Score 10   Toilet Use Score 5   Transfers (Bed/Chair) Score 10   Mobility (Level Surface) Score 10   Stairs Score 0   B[place] Index Score 60       Pt will benefit from continued OT services in order to maximize (I) c ADL performance, FM c RW, and improve overall endurance/strength required to complete functional tasks in preparation for d/c

## 2018-06-29 NOTE — ASSESSMENT & PLAN NOTE
-Sudden onset while at home  -Was found by her neighbor trying to leave the house and appeared confused  -Episodes of aphasia  Son reports being unable to understand what she was saying "she seem t be speaking in [place]"  -Son reports that she became confused and was refusing to come to hospital  -Stroke alert called upon arrival in ER  NIH of zero  -Dr Pasha Almodovar (neurology) determined  patient is not TPA candidate  -CTA head and neck- No hemodynamically significant stenosis, dissection or occlusion of the carotid or vertebral arteries  No hemodynamic significant stenosis or occlusion of the major vessels of the [place]   -Chest X-ray shows- No acute cardiopulmonary disease  -CT head shows-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

## 2018-06-29 NOTE — PHYSICAL THERAPY NOTE
PT Evaluation     06/29/18 1022   Note Type   Note type Eval/Treat   Pain Assessment   Pain Assessment No/denies pain   Pain Score No Pain   Home Living   Type of Home House   Home Layout Multi-level; Able to live on main level with bedroom/bathroom; Performs ADLs on one level;Stairs to enter without rails  (3 REENA, hospital bed on 1st floor)   Bathroom Shower/Tub Walk-in shower   [facility] Walker;Cane;Wheelchair-manual;Hospital bed   Additional Comments pt states her son lives nearby and checks on her regularly   Prior Function   Level of [place] Independent with ADLs and functional mobility  ((I) ambulation with RW)   Lives With Alone   Receives Help From Family   ADL Assistance Independent   IADLs Needs assistance   Comments family drives   Restrictions/Precautions   Other Precautions Telemetry;Multiple lines; Fall Risk;Bed Alarm   General   Family/Caregiver Present No   Cognition   Arousal/Participation Alert   Orientation Level Oriented to person;Oriented to place  (verbal cues for month, pt did know year)   Following Commands Follows all commands and directions without difficulty   RLE Assessment   RLE Assessment WFL  (except R ankle foot drop, 4- to 4/5, pf 4-/5)   LLE Assessment   LLE Assessment WFL  (4/5)   Coordination   Sensation WFL   Light Touch   RLE Light Touch Grossly intact   LLE Light Touch Grossly intact   Bed Mobility   Supine to Sit 5  Supervision   Additional items Bedrails; Increased time required;Verbal cues   Sit to Supine (seated in w/c for transport to MRI)   Additional Comments Pt requiring verbal cues for safety and direction throughout session  Pt with no complaint of lightheadedness dizziness or SOB during evaluation and ambulation  Transfers   Sit to Stand (CGA with RW)   Additional items Verbal cues; Bedrails   Stand to Sit (CGA with RW)   Additional items Verbal cues   Stand pivot (CGA with RW)   Additional items Verbal cues   Additional Comments Pt with fair balance and stability during transfers and ambulation  Pt with increased R hip flex during gait due to R foot drop with decreased foot clearance but no LOB  Pt with limited ambulation tolerance due to fatigue  Ambulation/Elevation   Gait pattern Decreased foot clearance; Forward Flexion  (increased hip flex R LE due to R foot drop)   Gait Assistance (CGA)   Assistive Device Rolling walker   Distance 80ft with RW CGA limited by fatigue and altered gait  No LOB during ambulation but verbal cues required for safety  Balance   Static Sitting Good   Dynamic Sitting Good   Static Standing Fair +  (with RW)   Dynamic Standing Fair  (with RW)   Ambulatory Fair  (with RW)   Endurance Deficit   Endurance Deficit Yes   Endurance Deficit Description Pt with limited ambulation tolerance due to fatigue    Activity Tolerance   Activity Tolerance Patient limited by fatigue   Assessment   Prognosis Good   Problem List Decreased strength;Decreased endurance; Impaired balance;Decreased mobility; Decreased safety awareness   Assessment Pt is an 80year old female presenting with decreased strength balance endurance and functional mobility performing all bed mobility transfers and ambulation at a CGA to (S) level with RW  Pt requiring verbal cues for safety and  direction  Pt is in need of continued activity in PT to improve strength balance endurance mobility transfers ambulation and stair negotiation  Pt will be safe to return home with assist of family and home health care services  Goals   Patient Goals To feel better and return home   LTG Expiration Date 07/13/18   Long Term Goal #1 (I) with all bed mobility and transfers with RW   Long Term Goal #2 Pt will ambulate 200ft with RW (S) and will ascend/descend 4 steps with HR (S)   Plan   Treatment/Interventions Functional transfer training;LE strengthening/ROM; Elevations; Therapeutic exercise; Endurance training;Patient/family training;Bed mobility;Gait training   PT Frequency (3-5x/wk)   Recommendation   Recommendation Home PT; Home with family support   PT - OK to Discharge Yes  (when medically stable for discharge)   Pt with SCD's on when PT entered room  SCD's not reapplied due to pt seated in w/c for transport to MRI

## 2018-06-29 NOTE — H&P
[facility] Internal Medicine  H&P- Hitesh Steinberg 7/3/1931, 80 y o  female MRN: [id number]    Unit/Bed#: 593-25 Encounter: [id number]    Primary Care Provider: Minerva Tatum DO   Date and time admitted to hospital: 6/28/2018  9:04 PM        * Altered mental status   Assessment & Plan    -Sudden onset while at home  -Was found by her neighbor trying to leave the house and appeared confused  -Episodes of aphasia  Son reports being unable to understand what she was saying "she seem t be speaking in [place]"  -Son reports that she became confused and was refusing to come to hospital  -Stroke alert called upon arrival in ER  NIH of zero  -Dr Trinh Otero (neurology) determined  patient is not TPA candidate  -CTA head and neck- No hemodynamically significant stenosis, dissection or occlusion of the carotid or vertebral arteries  No hemodynamic significant stenosis or occlusion of the major vessels of the [place]   -Chest X-ray shows- No acute cardiopulmonary disease  -CT head shows-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         Aphasia   Assessment & Plan    -Onset while at home  -reported by her son  -Improved upon arrival to ER        Elevated troponin   Assessment & Plan    -Troponin elevate at 0 05  -No reports of chest pain or discomfort  -Review of troponin  dating back to 2016 shows levels of 0 06-0 07  -EKG shows -sinus rhythm at rate of 69, left axis deviation  -Continue telemetry monitoring  -Will repeat troponin through 3 sets        Elevated TSH   Assessment & Plan    -TSH at 4 874  -Will check free T3,T4          Restless leg syndrome   Assessment & Plan    -Continue Requip                      VTE Prophylaxis: Enoxaparin (Lovenox)  / sequential compression device   Code Status: Level 1 - full code  POLST: POLST is not applicable to this patient  Discussion with family: None    Anticipated Length of Stay:  Patient will be admitted on an Observation basis with an anticipated length of stay of  < 2 midnights  Justification for Hospital Stay: Altered mental status, with possible aphasia R/O TIA ,     Total Time for Visit, including Counseling / Coordination of Care: 30 minutes  Greater than 50% of this total time spent on direct patient counseling and coordination of care  Chief Complaint:   Altered mental status    History of Present Illness:    Norman Quiroz is a 80 y o  female who presents to the ER brought in by her son with complaints of altered mental status and reports of slurred speech while at home  Patient's son reports that patient was found by neighbor trying to leave the house and appeared confused  Son states that patient appeared more confused and was not able to understand what she was saying  Upon arrival to ER her reported speech impairment had resolved  She had no facial droop, no numbness or tingling, no headaches or visual disturbances  Labs completed in ER with results as shown below  CTA head and neck, CT head  with results as shown below  Stroke alert called in ER  NIH score of Zero DR Renae Das (neurology ) determined that patient is not TPA candidate  At bedside patient appears comfortable, states that she feels better than when she came to the ER  Patient is being admitted on observation status med surg level of care for further workup and management of Altered mental status with episode of aphasia R/O TIA           Review of Systems:    Review of Systems   Constitutional: Negative for activity change, appetite change, fatigue and fever  HENT: Negative for congestion, sore throat, tinnitus and trouble swallowing  Eyes: Negative for photophobia, pain, discharge and visual disturbance  Respiratory: Negative for choking, chest tightness, wheezing and stridor  Cardiovascular: Negative for chest pain, palpitations and leg swelling  Gastrointestinal: Negative for abdominal distention, abdominal pain, diarrhea, nausea and vomiting  Genitourinary: Negative for flank pain, hematuria and urgency  Musculoskeletal: Negative for arthralgias, back pain, gait problem, myalgias and neck stiffness  Skin: Negative for color change, pallor and rash  Allergic/Immunologic: Negative  Neurological: Negative for dizziness, syncope, weakness and headaches  Hematological: Negative  Psychiatric/Behavioral: Positive for confusion  Negative for agitation and sleep disturbance  The patient is not nervous/anxious  Past Medical and Surgical History:     Past Medical History:   Diagnosis Date    Cardiac disease     valve replacement    Dropfoot     right    Restless leg syndrome        Past Surgical History:   Procedure Laterality Date    AORTIC VALVULOPLASTY  11/25/2002    Bovine Pericardial heart valve    BACK SURGERY      FRACTURE SURGERY      right hand    HAND SURGERY      right hand    HYSTERECTOMY      ROTATOR CUFF REPAIR Right        Meds/Allergies:    Prior to Admission medications    Medication Sig Start Date End Date Taking?  Authorizing Provider   aspirin (ECOTRIN LOW STRENGTH) 81 mg EC tablet Take 81 mg by mouth daily   Yes Historical Provider, MD   furosemide (LASIX) 20 mg tablet Take 40 mg by mouth daily   Yes Historical Provider, MD   oxyCODONE-acetaminophen (PERCOCET) 5-325 mg per tablet Take 1 tablet by mouth every 4 (four) hours as needed for moderate pain   Yes Historical Provider, MD   pregabalin (LYRICA) 25 mg capsule Take 100 mg by mouth 3 (three) times a day  Yes Historical Provider, MD   rOPINIRole (REQUIP) 0 5 mg tablet Take 2 mg by mouth 2 (two) times a day Indications: Restless Leg Syndrome  Yes Historical Provider, MD     I have reviewed home medications with patient personally  Allergies: No Known Allergies    Social History:     Marital Status:    Occupation: Retired   Patient Pre-hospital Living Situation: lives alone  Patient Pre-hospital Level of Mobility: active   Patient Pre-hospital Diet Restrictions: None reported  Substance Use History:   History   Alcohol Use No     History   Smoking Status    Never Smoker   Smokeless Tobacco    Never Used     History   Drug Use No       Family History:    non-contributory    Physical Exam:     Vitals:   Blood Pressure: 142/80 (06/29/18 0415)  Pulse: 70 (06/29/18 0415)  Temperature: (!) 97 3 °F (36 3 °C) (06/29/18 0415)  Temp Source: Temporal (06/29/18 0415)  Respirations: 18 (06/29/18 0415)  Height: 5' 1" (154 9 cm) (06/29/18 0114)  Weight - Scale: 63 5 kg (139 lb 15 9 oz) (06/29/18 0114)  SpO2: 97 % (06/29/18 0200)    Physical Exam   Constitutional: She is oriented to person, place, and time  No distress  HENT:   Head: Normocephalic and atraumatic  Eyes: Conjunctivae and EOM are normal  Pupils are equal, round, and reactive to light  Right eye exhibits no discharge  Left eye exhibits no discharge  Neck: Normal range of motion  Neck supple  No JVD present  Cardiovascular: Normal rate, regular rhythm and intact distal pulses  Exam reveals no friction rub  No murmur heard  Pulmonary/Chest: Effort normal  No stridor  No respiratory distress  She has no wheezes  She has no rales  Abdominal: Soft  Bowel sounds are normal  She exhibits no distension  There is no tenderness  There is no rebound  Musculoskeletal: Normal range of motion  She exhibits edema  She exhibits no tenderness or deformity     Bilateral lower extremity edema   Lymphadenopathy: She has no cervical adenopathy  Neurological: She is oriented to person, place, and time  No cranial nerve deficit  Coordination normal    Skin: Skin is warm and dry  No rash noted  She is not diaphoretic  No erythema  No pallor  Psychiatric: She has a normal mood and affect  Vitals reviewed  Additional Data:     Lab Results: I have personally reviewed pertinent reports  Results from last 7 days  Lab Units 06/29/18  0435 06/28/18  2153   WBC Thousand/uL 6 05 5 72   HEMOGLOBIN g/dL 12 1 13 0   HEMATOCRIT % 37 6 39 3   PLATELETS Thousands/uL 180 184   NEUTROS PCT %  --  56   LYMPHS PCT %  --  32   MONOS PCT %  --  11   EOS PCT %  --  1       Results from last 7 days  Lab Units 06/29/18  0435   SODIUM mmol/L 141   POTASSIUM mmol/L 3 8   CHLORIDE mmol/L 106   CO2 mmol/L 24   BUN mg/dL 13   CREATININE mg/dL 1 27   CALCIUM mg/dL 8 8   TOTAL PROTEIN g/dL 6 2*   BILIRUBIN TOTAL mg/dL 0 40   ALK PHOS U/L 82   ALT U/L 18   AST U/L 17   GLUCOSE RANDOM mg/dL 86       Results from last 7 days  Lab Units 06/29/18  0434   INR  1 04       Results from last 7 days  Lab Units 06/28/18  2122   POC GLUCOSE mg/dl 100           Imaging: I have personally reviewed pertinent reports  CT stroke alert brain   Final Result by Manjula Philippe MD (06/28 2228)         1  Chronic left parietal infarct and multiple chronic subcortical lacunar infarcts, limiting sensitivity of the exam for detection of acute stroke  Within this limitation, no evidence of acute territorial infarction  2   No acute intracranial hemorrhage, mass effect or extra-axial collection  I personally discussed this study with Michelle Hernandez on 6/28/2018 at 9:46 PM             Workstation performed: [id number]         CTA stroke alert (head/neck)   Final Result by Manjula Philippe MD (06/28 2227)         1  No hemodynamically significant stenosis, dissection or occlusion of the carotid or vertebral arteries     2   No hemodynamic significant stenosis or occlusion of the major vessels of the [place]  I personally discussed this study with Keisha Crowder on 6/28/2018 at 9:58 PM                      Workstation performed: [id number]         XR stroke alert portable chest   Final Result by Hiwot Shannon MD (06/28 2215)      No acute cardiopulmonary disease  Workstation performed: [id number]         VAS lower limb venous duplex study, complete bilateral    (Results Pending)   MRI Inpatient Order    (Results Pending)       EKG, Pathology, and Other Studies Reviewed on Admission:   · EKG: Sinus rhythm at rate of 69 bpm, left axis deviation    Allscripts / Epic Records Reviewed: Yes     ** Please Note: This note has been constructed using a voice recognition system   **

## 2018-06-29 NOTE — ASSESSMENT & PLAN NOTE
-Troponin elevate at 0 05  -No reports of chest pain or discomfort  -Review of troponin  dating back to 2016 shows levels of 0 06-0 07  -EKG shows -sinus rhythm at rate of 69, left axis deviation  -Continue telemetry monitoring  -Will repeat troponin through 3 sets

## 2018-06-29 NOTE — SOCIAL WORK
Met with pt to discuss role as  in helping pt to develop discharge plan and to help pt carry out their plan  Pt lives in a house alone  Pt has 3 REENA with a railing  Pt has everything set up on the first floor  Pt has a bedside commode, hospital bed, walker, cane ,wheel chair  Pt is independent with Adl's  Pt uses the rolling walker to ambulate short distances then gets in the wheel chair  Pt's son lives a half block away  Pt does the cooking  Pt's son and grand daughter [facility] [employer]  Pt has never had home care or services in the home  Pt might benefit from home care or short term rehab on discharge  I spoke with pt's son Eder Pratt ([phone])  And his plan is that  Pt is going to go  and live at pt's sons house when she is discharged

## 2018-06-30 ENCOUNTER — APPOINTMENT (INPATIENT)
Dept: CT IMAGING | Facility: HOSPITAL | Age: 83
DRG: 064 | End: 2018-06-30
Payer: MEDICARE

## 2018-06-30 PROBLEM — I16.1 HYPERTENSIVE EMERGENCY: Status: ACTIVE | Noted: 2018-06-30

## 2018-06-30 PROBLEM — I63.9 ACUTE EMBOLIC STROKE (HCC): Status: ACTIVE | Noted: 2018-06-29

## 2018-06-30 LAB
ALBUMIN SERPL BCP-MCNC: 2.9 G/DL (ref 3.5–5)
ALP SERPL-CCNC: 72 U/L (ref 46–116)
ALT SERPL W P-5'-P-CCNC: 18 U/L (ref 12–78)
ANION GAP SERPL CALCULATED.3IONS-SCNC: 10 MMOL/L (ref 4–13)
ANION GAP SERPL CALCULATED.3IONS-SCNC: 9 MMOL/L (ref 4–13)
ARTERIAL PATENCY WRIST A: YES
AST SERPL W P-5'-P-CCNC: 20 U/L (ref 5–45)
ATRIAL RATE: 79 BPM
BASE EXCESS BLDA CALC-SCNC: 0.1 MMOL/L
BASOPHILS # BLD AUTO: 0.04 THOUSANDS/ΜL (ref 0–0.1)
BASOPHILS # BLD AUTO: 0.04 THOUSANDS/ΜL (ref 0–0.1)
BASOPHILS NFR BLD AUTO: 1 % (ref 0–1)
BASOPHILS NFR BLD AUTO: 1 % (ref 0–1)
BILIRUB SERPL-MCNC: 0.4 MG/DL (ref 0.2–1)
BUN SERPL-MCNC: 15 MG/DL (ref 5–25)
BUN SERPL-MCNC: 16 MG/DL (ref 5–25)
CALCIUM SERPL-MCNC: 8.1 MG/DL (ref 8.3–10.1)
CALCIUM SERPL-MCNC: 8.3 MG/DL (ref 8.3–10.1)
CHLORIDE SERPL-SCNC: 105 MMOL/L (ref 100–108)
CHLORIDE SERPL-SCNC: 106 MMOL/L (ref 100–108)
CO2 SERPL-SCNC: 22 MMOL/L (ref 21–32)
CO2 SERPL-SCNC: 23 MMOL/L (ref 21–32)
CREAT SERPL-MCNC: 1.3 MG/DL (ref 0.6–1.3)
CREAT SERPL-MCNC: 1.34 MG/DL (ref 0.6–1.3)
EOSINOPHIL # BLD AUTO: 0.12 THOUSAND/ΜL (ref 0–0.61)
EOSINOPHIL # BLD AUTO: 0.17 THOUSAND/ΜL (ref 0–0.61)
EOSINOPHIL NFR BLD AUTO: 2 % (ref 0–6)
EOSINOPHIL NFR BLD AUTO: 3 % (ref 0–6)
ERYTHROCYTE [DISTWIDTH] IN BLOOD BY AUTOMATED COUNT: 15 % (ref 11.6–15.1)
ERYTHROCYTE [DISTWIDTH] IN BLOOD BY AUTOMATED COUNT: 15.1 % (ref 11.6–15.1)
GFR SERPL CREATININE-BSD FRML MDRD: 36 ML/MIN/1.73SQ M
GFR SERPL CREATININE-BSD FRML MDRD: 37 ML/MIN/1.73SQ M
GLUCOSE SERPL-MCNC: 70 MG/DL (ref 65–140)
GLUCOSE SERPL-MCNC: 80 MG/DL (ref 65–140)
HCO3 BLDA-SCNC: 24.7 MMOL/L (ref 22–28)
HCT VFR BLD AUTO: 40 % (ref 34.8–46.1)
HCT VFR BLD AUTO: 41.2 % (ref 34.8–46.1)
HGB BLD-MCNC: 12.9 G/DL (ref 11.5–15.4)
HGB BLD-MCNC: 13.3 G/DL (ref 11.5–15.4)
LYMPHOCYTES # BLD AUTO: 1.73 THOUSANDS/ΜL (ref 0.6–4.47)
LYMPHOCYTES # BLD AUTO: 2.13 THOUSANDS/ΜL (ref 0.6–4.47)
LYMPHOCYTES NFR BLD AUTO: 28 % (ref 14–44)
LYMPHOCYTES NFR BLD AUTO: 43 % (ref 14–44)
MCH RBC QN AUTO: 31.4 PG (ref 26.8–34.3)
MCH RBC QN AUTO: 31.6 PG (ref 26.8–34.3)
MCHC RBC AUTO-ENTMCNC: 32.3 G/DL (ref 31.4–37.4)
MCHC RBC AUTO-ENTMCNC: 32.3 G/DL (ref 31.4–37.4)
MCV RBC AUTO: 97 FL (ref 82–98)
MCV RBC AUTO: 98 FL (ref 82–98)
MONOCYTES # BLD AUTO: 0.52 THOUSAND/ΜL (ref 0.17–1.22)
MONOCYTES # BLD AUTO: 0.69 THOUSAND/ΜL (ref 0.17–1.22)
MONOCYTES NFR BLD AUTO: 10 % (ref 4–12)
MONOCYTES NFR BLD AUTO: 11 % (ref 4–12)
NEUTROPHILS # BLD AUTO: 2.14 THOUSANDS/ΜL (ref 1.85–7.62)
NEUTROPHILS # BLD AUTO: 3.52 THOUSANDS/ΜL (ref 1.85–7.62)
NEUTS SEG NFR BLD AUTO: 43 % (ref 43–75)
NEUTS SEG NFR BLD AUTO: 58 % (ref 43–75)
NON VENT TYPE- NRB: ABNORMAL
O2 CT BLDA-SCNC: 18.4 ML/DL (ref 16–23)
OXYHGB MFR BLDA: 99 % (ref 94–97)
P AXIS: -12 DEGREES
PCO2 BLDA: 39.7 MM HG (ref 36–44)
PH BLDA: 7.41 [PH] (ref 7.35–7.45)
PLATELET # BLD AUTO: 168 THOUSANDS/UL (ref 149–390)
PLATELET # BLD AUTO: 209 THOUSANDS/UL (ref 149–390)
PMV BLD AUTO: 10.7 FL (ref 8.9–12.7)
PMV BLD AUTO: 10.8 FL (ref 8.9–12.7)
PO2 BLDA: 420.9 MM HG (ref 75–129)
POTASSIUM SERPL-SCNC: 4.5 MMOL/L (ref 3.5–5.3)
POTASSIUM SERPL-SCNC: 4.8 MMOL/L (ref 3.5–5.3)
PR INTERVAL: 224 MS
PROLACTIN SERPL-MCNC: 4.8 NG/ML
PROT SERPL-MCNC: 5.9 G/DL (ref 6.4–8.2)
QRS AXIS: -59 DEGREES
QRSD INTERVAL: 138 MS
QT INTERVAL: 408 MS
QTC INTERVAL: 467 MS
RBC # BLD AUTO: 4.11 MILLION/UL (ref 3.81–5.12)
RBC # BLD AUTO: 4.21 MILLION/UL (ref 3.81–5.12)
SODIUM SERPL-SCNC: 137 MMOL/L (ref 136–145)
SODIUM SERPL-SCNC: 138 MMOL/L (ref 136–145)
SPECIMEN SOURCE: ABNORMAL
T WAVE AXIS: 10 DEGREES
TROPONIN I SERPL-MCNC: 0.02 NG/ML
VENTRICULAR RATE: 79 BPM
WBC # BLD AUTO: 5 THOUSAND/UL (ref 4.31–10.16)
WBC # BLD AUTO: 6.1 THOUSAND/UL (ref 4.31–10.16)

## 2018-06-30 PROCEDURE — 84146 ASSAY OF PROLACTIN: CPT | Performed by: FAMILY MEDICINE

## 2018-06-30 PROCEDURE — 36600 WITHDRAWAL OF ARTERIAL BLOOD: CPT

## 2018-06-30 PROCEDURE — 85025 COMPLETE CBC W/AUTO DIFF WBC: CPT | Performed by: FAMILY MEDICINE

## 2018-06-30 PROCEDURE — 70450 CT HEAD/BRAIN W/O DYE: CPT

## 2018-06-30 PROCEDURE — 99233 SBSQ HOSP IP/OBS HIGH 50: CPT | Performed by: FAMILY MEDICINE

## 2018-06-30 PROCEDURE — 82805 BLOOD GASES W/O2 SATURATION: CPT | Performed by: FAMILY MEDICINE

## 2018-06-30 PROCEDURE — 93010 ELECTROCARDIOGRAM REPORT: CPT | Performed by: INTERNAL MEDICINE

## 2018-06-30 PROCEDURE — 80053 COMPREHEN METABOLIC PANEL: CPT | Performed by: FAMILY MEDICINE

## 2018-06-30 PROCEDURE — 93005 ELECTROCARDIOGRAM TRACING: CPT

## 2018-06-30 PROCEDURE — 84484 ASSAY OF TROPONIN QUANT: CPT | Performed by: FAMILY MEDICINE

## 2018-06-30 PROCEDURE — 80048 BASIC METABOLIC PNL TOTAL CA: CPT | Performed by: FAMILY MEDICINE

## 2018-06-30 RX ADMIN — PREGABALIN 100 MG: 50 CAPSULE ORAL at 10:16

## 2018-06-30 RX ADMIN — ATORVASTATIN CALCIUM 40 MG: 40 TABLET, FILM COATED ORAL at 18:56

## 2018-06-30 RX ADMIN — FUROSEMIDE 40 MG: 40 TABLET ORAL at 10:17

## 2018-06-30 RX ADMIN — APIXABAN 2.5 MG: 2.5 TABLET, FILM COATED ORAL at 18:57

## 2018-06-30 RX ADMIN — APIXABAN 2.5 MG: 2.5 TABLET, FILM COATED ORAL at 10:16

## 2018-06-30 RX ADMIN — PREGABALIN 100 MG: 50 CAPSULE ORAL at 18:57

## 2018-06-30 RX ADMIN — LEVETIRACETAM 1000 MG: 100 INJECTION, SOLUTION, CONCENTRATE INTRAVENOUS at 13:10

## 2018-06-30 RX ADMIN — ROPINIROLE HYDROCHLORIDE 2 MG: 1 TABLET, FILM COATED ORAL at 10:17

## 2018-06-30 NOTE — ASSESSMENT & PLAN NOTE
Sudden onset while at home for which she was initially admitted with bizarre behavior and garbled speech  The patient had an episode of unresponsiveness today during lunch, a rapid response was called and the patient is transferred to critical care unit  There is evidence of acute versus subacute strokes on MRI, evidence of old strokes, suspicious for embolic etiology  Patient received Eliquis this morning  Proceed with neuro checks with vital signs

## 2018-06-30 NOTE — ASSESSMENT & PLAN NOTE
-patient has chronically elevated troponin mildly 0 06-0 07  -this time troponins are flat and at baseline at 0 05  -she has history of aortic valve replacement  -troponin level is repeated due to episode of unresponsiveness status post rapid response today

## 2018-06-30 NOTE — ASSESSMENT & PLAN NOTE
Per family patient has been having gradually worsening memory issues mostly with orientation to time  Patient currently with sudden mental status change status post rapid response  Patient was admitted initially with sudden mental status changes, confusion and garbled speech, was found to have acute versus subacute stroke with evidence of prior strokes  She may have to be discharged to an SNF versus family care

## 2018-06-30 NOTE — RAPID RESPONSE
Progress Note - Rapid Response   Kelly Ramos 80 y o  female MRN: [id number]    Time Called ( Time): 3370  Date Called: 6/30  Level of Care: MS  Room#: 803  [id number] Time ( Time): 6922  Event End Time ( Time): 8373  MEWS score at time of Rapid Response: 3 (unresponsive)  Primary reason for call: Acute change in mental status  Interventions:  Airway/Breathing:  O2 Mask/Nasal  Circulation: N/A  Other Treatments: refer to plan below       Assessment:   1  Acute mental status change in setting of acute stroke with prior multiple strokes a most likely embolic etiology  -vital signs obtained in stable  -patient initially unresponsive but after several seconds started following verbal command  -need to rule out recurrence of stroke, versus seizure, versus metabolic disturbance    Plan:   · ABG  · Troponin, CBC, BMP, Prolactin  · STAT CT brain without contrast  · Keppra 1000 mg x1       HPI/Chief Complaint (Background/Situation):   Kelly Ramos is a 80y o  year old female who presents with sudden unresponsiveness during consumption of her lunch  She was eating and having a conversation with her family when suddenly dropped her fork and was unable to pick it up and then became unresponsive per family  Rapid response was called by CNA  On arrival VS were: T 97 3, /57, HR 56  On recycling SBP was 137 then 150's  Patient regained consciousness in several seconds after she was repositioned from chair back to bed  She was following command such as moving her and and toes    She was answering questions but did not open her eyes on request    Historical Information   Past Medical History:   Diagnosis Date    Cardiac disease     valve replacement    Dropfoot     right    Restless leg syndrome      Past Surgical History:   Procedure Laterality Date    AORTIC VALVULOPLASTY  11/25/2002    Bovine Pericardial heart valve    BACK SURGERY      FRACTURE SURGERY      right hand    HAND SURGERY right hand    HYSTERECTOMY      ROTATOR CUFF REPAIR Right      Social History   History   Alcohol Use No     History   Drug Use No     History   Smoking Status    Never Smoker   Smokeless Tobacco    Never Used     Family History: History reviewed  No pertinent family history      Meds/Allergies     Current Facility-Administered Medications:  apixaban 2 5 mg Oral BID Kelly Donis MD   atorvastatin 40 mg Oral QPM Gatito Schwarz PA-C   furosemide 40 mg Oral Daily Gatito Schwarz PA-C   ondansetron 4 mg Intravenous Q6H PRN Gatito Schwarz PA-C   oxyCODONE-acetaminophen 1 tablet Oral Q4H PRN Gatito Schwarz PA-C   pregabalin 100 mg Oral TID Gatito Schwarz PA-C   rOPINIRole 2 mg Oral BID Gatito Schwarz PA-C            No Known Allergies    ROS: unable to obtain due to altered mental status    Physical Exam:  Gen:  Initially unresponsive then followed commands; the end of rapid response was able to speak  HEENT:  Pupils reactive  Neck:  No JVD, no stridor  Chest:  Clear to auscultation  Cor:  Regular rate and rhythm, bradycardia  Abd:  Soft, nondistended, normal bowel sounds  Ext:  No pitting edema  Neuro:  Initially unresponsive, then able to follow commands, then became responsive, and alert and oriented to self, confused to place and time, baseline right foot drop  Skin:  Warm and dry      Intake/Output Summary (Last 24 hours) at 06/30/18 1329  Last data filed at 06/30/18 1130   Gross per 24 hour   Intake              540 ml   Output             1750 ml   Net            -1210 ml       Respiratory    Lab Data (Last 4 hours)      06/30 1251            pH, Arterial       7 411           pCO2, Arterial       39 7           pO2, Arterial       ([phone] 9           HCO3, Arterial       24 7           Base Excess, Arterial       0 1                O2/Vent Data     None              Invasive Devices     Peripheral Intravenous Line            Peripheral IV 06/28/18 Right Antecubital 1 day                DIAGNOSTIC DATA:    Lab: I have personally reviewed pertinent lab results  CBC:     Results from last 7 days  Lab Units 06/30/18  0653   WBC Thousand/uL 5 00   HEMOGLOBIN g/dL 12 9   HEMATOCRIT % 40 0   PLATELETS Thousands/uL 168     CMP:     Results from last 7 days  Lab Units 06/30/18  0653 06/29/18  0435 06/28/18  2153   SODIUM mmol/L 137 141 142   POTASSIUM mmol/L 4 5 3 8 4 5   CHLORIDE mmol/L 105 106 105   CO2 mmol/L 23 24 27   BUN mg/dL 15 13 15   CREATININE mg/dL 1 34* 1 27 1 22   CALCIUM mg/dL 8 3 8 8 9 1   TOTAL PROTEIN g/dL 5 9* 6 2* 6 6   BILIRUBIN TOTAL mg/dL 0 40 0 40 0 40   ALK PHOS U/L 72 82 88   ALT U/L 18 18 21   AST U/L 20 17 16   GLUCOSE RANDOM mg/dL 80 86 91     PT/INR:   No results found for: PT, INR,   Magnesium: No results found for: MAG,   Phosphorous: No results found for: PHOS    Microbiology:  No results found for: [place], SPUTUMCULTUR      OUTCOME:   Transferred to Critical Care Unit  Family notified of transfer: yes  Family member contacted: Son and daughter-in-law  Code Status: Level 1 - Full Code  Critical Care Time: Total Critical Care time spent 30 minutes excluding procedures, teaching and family updates

## 2018-06-30 NOTE — ASSESSMENT & PLAN NOTE
In setting of acute versus subacute stroke, resolved  No medication was given due to stroke pathway allowing for permissive hypertension  Given recurrence of mental status change and sudden drop in blood pressure status post rapid response patient transferred to critical care unit

## 2018-06-30 NOTE — PROGRESS NOTES
Progress Note - Walter Jauregui 7/3/1931, 80 y o  female MRN: [id number]    Unit/Bed#:  Encounter: [id number]    Primary Care Provider: Pan Way DO   Date and time admitted to hospital: 6/28/2018  9:04 PM        Acute embolic stroke [facility])   Assessment & Plan    Per MRI report "Foci of acute to subacute infarct in the right occipital lobe and right parietal lobe"  Evidence of old strokes on CT scan of the brain, CTA negative  Findings concerning for an embolic event  2D echo unremarkable, patient may eventually need a transesophageal cardiogram  Patient was started on Eliquis today, age adjusted at 2 5 mg b i d    Due to recurrence of sudden unresponsiveness which is now resolved, which occurred today status post rapid response, patient transferred to critical care  Will repeat a CT scan of the brain without contrast  Proceed with frequent neuro checks        * Altered mental status   Assessment & Plan    Sudden onset while at home for which she was initially admitted with bizarre behavior and garbled speech  The patient had an episode of unresponsiveness today during lunch, a rapid response was called and the patient is transferred to critical care unit  There is evidence of acute versus subacute strokes on MRI, evidence of old strokes, suspicious for embolic etiology  Patient received Eliquis this morning  Proceed with neuro checks with vital signs          Hypertensive emergency   Assessment & Plan    In setting of acute versus subacute stroke, resolved  No medication was given due to stroke pathway allowing for permissive hypertension  Given recurrence of mental status change and sudden drop in blood pressure status post rapid response patient transferred to critical care unit        Age-related cognitive decline   Assessment & Plan    Per family patient has been having gradually worsening memory issues mostly with orientation to time  Patient currently with sudden mental status change status post rapid response  Patient was admitted initially with sudden mental status changes, confusion and garbled speech, was found to have acute versus subacute stroke with evidence of prior strokes  She may have to be discharged to an SNF versus family care        Elevated TSH   Assessment & Plan    -TSH at 4 874  -Normal T3-T4  -recommend repeating TSH in 4 weeks          Restless leg syndrome   Assessment & Plan    -Question if Requip contributed to episode of unresponsiveness as patient had dose this morning and potential side effects include syncope and hypotension  -This medication may need to be discontinued, will stop for now        Elevated troponin   Assessment & Plan    -patient has chronically elevated troponin mildly 0 06-0 07  -this time troponins are flat and at baseline at 0 05  -she has history of aortic valve replacement  -troponin level is repeated due to episode of unresponsiveness status post rapid response today          VTE Pharmacologic Prophylaxis:   Pharmacologic: Apixaban (Eliquis)  Mechanical VTE Prophylaxis in Place: Yes    Patient Centered Rounds: I have performed bedside rounds with nursing staff today  Discussions with Specialists or Other Care Team Provider: Neurology    Education and Discussions with Family / Patient: Son and daughter-in-law    Time Spent for Care: 1 hour  More than 50% of total time spent on counseling and coordination of care as described above  Current Length of Stay: 1 day(s)    Current Patient Status: Inpatient   Certification Statement: The patient will continue to require additional inpatient hospital stay due to need for ICU monitoring, frequent neurochecks, diagnostic workup    Discharge Plan: TBD    Code Status: Level 1 - Full Code      Subjective:   Patient seen and examined at and after Rapid Response called at [phone] today   The patient was apparently doing well this morning, during lunch consumption she dropped her fork becoming unresponsive for at least several seconds  After being placed on bed she started being able to follow commands but did not open her eyes until about 10 minutes later  She was oriented to self and understood that she was in the hospital but appeared confused to situation  She did not have specific complaints  No o/n events  Objective:     Vitals:   Temp (24hrs), Av 5 °F (36 4 °C), Min:97 °F (36 1 °C), Max:98 °F (36 7 °C)    HR:  [49-73] 73  Resp:  [18-24] 24  BP: (110-159)/(52-73) 159/69  SpO2:  [92 %-100 %] 94 %  Body mass index is 24 54 kg/m²  Input and Output Summary (last 24 hours): Intake/Output Summary (Last 24 hours) at 18 1401  Last data filed at 18 1130   Gross per 24 hour   Intake              540 ml   Output             1500 ml   Net             -960 ml       Physical Exam:     Physical Exam   HENT:   Head: Normocephalic and atraumatic  Eyes: Conjunctivae are normal    Neck: No JVD present  Cardiovascular: Normal rate and regular rhythm  No murmur heard  Pulmonary/Chest: Effort normal  No respiratory distress  She has no wheezes  She has no rales  Abdominal: Soft  She exhibits no distension  There is no tenderness  There is no guarding  Musculoskeletal: She exhibits no edema  Neurological: She exhibits normal muscle tone  Chronic R foot drop   Skin: Skin is warm and dry  She is not diaphoretic  Psychiatric: Her mood appears anxious  Cognition and memory are impaired         Additional Data:     Labs:      Results from last 7 days  Lab Units 18  1321   WBC Thousand/uL 6 10   HEMOGLOBIN g/dL 13 3   HEMATOCRIT % 41 2   PLATELETS Thousands/uL 209   NEUTROS PCT % 58   LYMPHS PCT % 28   MONOS PCT % 11   EOS PCT % 2       Results from last 7 days  Lab Units 18  1310 18  0653   SODIUM mmol/L 138 137   POTASSIUM mmol/L 4 8 4 5   CHLORIDE mmol/L 106 105   CO2 mmol/L 22 23   BUN mg/dL 16 15   CREATININE mg/dL 1 30 1 34*   CALCIUM mg/dL 8 1* 8 3   TOTAL PROTEIN g/dL  -- 5 9*   BILIRUBIN TOTAL mg/dL  --  0 40   ALK PHOS U/L  --  72   ALT U/L  --  18   AST U/L  --  20   GLUCOSE RANDOM mg/dL 70 80       Results from last 7 days  Lab Units 06/29/18  0434   INR  1 04       Results from last 7 days  Lab Units 06/28/18  2122   POC GLUCOSE mg/dl 100       Results from last 7 days  Lab Units 06/29/18  0435   HEMOGLOBIN A1C % 5 4         * I Have Reviewed All Lab Data Listed Above  * Additional Pertinent Lab Tests Reviewed: [address] Admission Reviewed    Imaging:    Imaging Reports Reviewed Today Include: MRI brain     Recent Cultures (last 7 days):           Last 24 Hours Medication List:     Current Facility-Administered Medications:  apixaban 2 5 mg Oral BID Durga Ramsey MD   atorvastatin 40 mg Oral QPM Gatito Schwarz PA-C   furosemide 40 mg Oral Daily Gatito Schwarz PA-C   ondansetron 4 mg Intravenous Q6H PRN Gatito Schwarz PA-C   oxyCODONE-acetaminophen 1 tablet Oral Q4H PRN Gatito Schwarz PA-C   pregabalin 100 mg Oral TID Gatito Schwarz PA-C   rOPINIRole 2 mg Oral BID Gatito Schwarz PA-C        Today, Patient Was Seen By: Pan Koch MD    ** Please Note: Dictation voice to text software may have been used in the creation of this document   **

## 2018-06-30 NOTE — ASSESSMENT & PLAN NOTE
-Question if Requip contributed to episode of unresponsiveness as patient had dose this morning and potential side effects include syncope and hypotension  -This medication may need to be discontinued, will stop for now

## 2018-06-30 NOTE — ASSESSMENT & PLAN NOTE
Per MRI report "Foci of acute to subacute infarct in the right occipital lobe and right parietal lobe"  Evidence of old strokes on CT scan of the brain, CTA negative  Findings concerning for an embolic event  2D echo unremarkable, patient may eventually need a transesophageal cardiogram  Patient was started on Eliquis today, age adjusted at 2 5 mg b i d    Due to recurrence of sudden unresponsiveness which is now resolved, which occurred today status post rapid response, patient transferred to critical care  Will repeat a CT scan of the brain without contrast  Proceed with frequent neuro checks

## 2018-06-30 NOTE — CODE DOCUMENTATION
EKG, labwork, ABGs Ct Scan Head completed  Placed on non-rebreather mask then N/C at 3L/min  Transferred to CCU with monitor and accompanied by RN

## 2018-06-30 NOTE — CASE MANAGEMENT
Initial Clinical Review    Admission: Date/Time/Statement: OBS 06/28/2018 @  23:23  UPGRADED TO INPT [email or web address] D/T POSSIBLE embolic etiology in which case would need AC, continue telemetry monitoring, MRI BRAIN SHOWS Foci of acute to subacute infarct in the right occipital lobe and right parietal lobe         ED: Date/Time/Mode of Arrival:   ED Arrival Information     Expected Arrival Acuity Means of Arrival Escorted By Service Admission Type    - 6/28/2018 21:01 Emergent Wheelchair Family Member General Medicine Emergency    Arrival Complaint    shortness of breath, altered mental status          Chief Complaint:   Chief Complaint   Patient presents with    Altered Mental Status     Patient was seen by family around 36pm and was speaking words that were not making sense  Patient refused to come to the ED at this time  Patient is shaking more than usual  Patient was then found by neighbor to be trying to get out of her house and not making sense  History of Illness: 49-year-old female last known well at 1730 hours today right-hand-dominant had her family bring over dinner which they do on a daily basis she was "talking funny"  She had no slurred speech but they could not understand her words it was as if she was speaking [place]  (word salad) She refused come to the hospital   The notice no problems with her gait she mostly uses a wheelchair and then occasionally a walker there is no history of any falls or trauma she had no facial droop she she currently is denying any numbness or tingling she was brought in by family now because neighbors found her wandering outside  She is slightly confused    Has been shakey - new symptom this evening    ED Vital Signs:   ED Triage Vitals [06/28/18 2108]   Temperature Pulse Respirations Blood Pressure SpO2   99 1 °F (37 3 °C) 78 20 (!) 193/85 98 %      Temp Source Heart Rate Source Patient Position - Orthostatic VS BP Location FiO2 (%)   Temporal Monitor Lying Left arm --      Pain Score       No Pain          Wt Readings from Last 1 Encounters:   06/30/18 58 9 kg (129 lb 13 6 oz)       Vital Signs (abnormal):   Date and Time Temp Pulse SpO2 Resp BP   06/28/18 2350 -- 82 97 % 20  179/71   06/28/18 2320 -- 80 98 % 18  179/71   06/28/18 2250 -- 76 98 % 22  174/72   06/28/18 2235 -- 77 97 % 22  180/74   06/28/18 2220 -- 80 99 % 17  205/87   06/28/18 2205 -- 71 99 % 16  175/79   06/28/18 2150 -- 70 100 % 14  176/77   06/28/18 2135 -- 84 98 % 12  199/84   06/28/18 2120 -- 78 98 % 20  193/85         Abnormal Labs/Diagnostic Test Results: Troponin 0 05, TSH 3rd Generation 4 874, NT-pro BNP 3,205    CT Stroke Alert Brain: 1   Chronic left parietal infarct and multiple chronic subcortical lacunar infarcts, limiting sensitivity of the exam for detection of acute stroke   Within this limitation, no evidence of acute territorial infarction  MRI BRAIN:Foci of acute to subacute infarct in the right occipital lobe and right parietal lobe as above      Volume loss/atrophy and microangiopathy          ED Treatment:   Medication Administration from 06/28/2018 2101 to 06/29/2018 0059       Date/Time Order Dose Route Action Action by Comments     06/28/2018 2200 iohexol (OMNIPAQUE) 350 MG/ML injection (SINGLE-DOSE) 100 mL 100 mL Intravenous Given Javan Longo      06/29/2018 0028 sodium chloride 0 9 % bolus 500 mL 0 mL Intravenous Stopped Kristy Grimes RN      06/28/2018 2311 sodium chloride 0 9 % bolus 500 mL 500 mL Intravenous New Bag Arnel Orellana RN      06/28/2018 2350 aspirin chewable tablet 324 mg 324 mg Oral Given Kristy Grimes RN         Physical Exam  Physical Exam   Constitutional: She is oriented to person, place, and time  She appears well-developed  No distress  Slight shakey   TM pale   Cardiovascular: Normal rate, regular rhythm, normal heart sounds and intact distal pulses  Pulmonary/Chest: Effort normal and breath sounds normal  No respiratory distress  Musculoskeletal: Normal range of motion  She exhibits edema (bilateral )  She exhibits no tenderness or deformity  Past Medical/Surgical History: Active Ambulatory Problems     Diagnosis Date Noted    Syncope 08/23/2016    Renal failure, acute ([facility] [address] ) 08/23/2016    Peripheral edema 08/23/2016    Cardiac disease 08/23/2016    Elevated troponin 08/23/2016    Restless leg syndrome 08/23/2016    Traumatic rhabdomyolysis ([facility] [address] ) 08/23/2016    Anemia 08/23/2016     Resolved Ambulatory Problems     Diagnosis Date Noted    No Resolved Ambulatory Problems     Past Medical History:   Diagnosis Date    Cardiac disease     Dropfoot     Restless leg syndrome        Admitting Diagnosis: Altered mental status [R41 82]  Elevated TSH [R94 6]  Garbled speech [R47 89]    Age/Sex: 80 y o  female    Assessment/Plan:     * Altered mental status   Assessment & Plan     -Sudden onset while at home  -Was found by her neighbor trying to leave the house and appeared confused  -Episodes of aphasia  Son reports being unable to understand what she was saying "she seem t be speaking in [place]"  -Son reports that she became confused and was refusing to come to hospital  -Stroke alert called upon arrival in ER  NIH of zero  -Dr Robin Castellano (neurology) determined  patient is not TPA candidate  -CTA head and neck- No hemodynamically significant stenosis, dissection or occlusion of the carotid or vertebral arteries  No hemodynamic significant stenosis or occlusion of the major vessels of the [place]   -Chest X-ray shows- No acute cardiopulmonary disease  -CT head shows-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           Aphasia   Assessment & Plan     -Onset while at home  -reported by her son  -Improved upon arrival to ER          Elevated troponin   Assessment & Plan     -Troponin elevate at 0 05  -No reports of chest pain or discomfort  -Review of troponin  dating back to 2016 shows levels of 0 06-0 07  -EKG shows -sinus rhythm at rate of 69, left axis deviation  -Continue telemetry monitoring  -Will repeat troponin through 3 sets          Elevated TSH   Assessment & Plan     -TSH at 4 874  -Will check free T3,T4             Restless leg syndrome   Assessment & Plan     -Continue Requip            VTE Prophylaxis: Enoxaparin (Lovenox)  / sequential compression device   Code Status: Level 1 - full code  POLST: POLST is not applicable to this patient  Discussion with family: None     Anticipated Length of Stay:  Patient will be admitted on an Observation basis with an anticipated length of stay of  < 2 midnights     Justification for Hospital Stay: Altered mental status, with possible aphasia R/O TIA ,          Admission Orders:  INPT  TELE  Continuous Cardiac Monitoring  Serial Cardiac Enzymes q3h x 3   Bilateral Sequential Compression Device  OT/PT Consult  Echocardiogram  MRI of Brain  Carotid Dopplar  Dysphagia evaluation  Neuro Check q4h  REG DIET  Scheduled Meds:   Current Facility-Administered Medications:  aspirin 81 mg Oral Daily   atorvastatin 40 mg Oral QPM   enoxaparin 30 mg Subcutaneous Daily   furosemide 40 mg Oral Daily   pregabalin 100 mg Oral TID   rOPINIRole 2 mg Oral BID

## 2018-07-01 PROBLEM — G93.40 ACUTE ENCEPHALOPATHY: Status: ACTIVE | Noted: 2018-06-29

## 2018-07-01 LAB
ALBUMIN SERPL BCP-MCNC: 2.9 G/DL (ref 3.5–5)
ALP SERPL-CCNC: 75 U/L (ref 46–116)
ALT SERPL W P-5'-P-CCNC: 14 U/L (ref 12–78)
ANION GAP SERPL CALCULATED.3IONS-SCNC: 10 MMOL/L (ref 4–13)
AST SERPL W P-5'-P-CCNC: 25 U/L (ref 5–45)
BASOPHILS # BLD AUTO: 0.02 THOUSANDS/ΜL (ref 0–0.1)
BASOPHILS NFR BLD AUTO: 0 % (ref 0–1)
BILIRUB SERPL-MCNC: 0.4 MG/DL (ref 0.2–1)
BUN SERPL-MCNC: 19 MG/DL (ref 5–25)
CALCIUM SERPL-MCNC: 8.1 MG/DL (ref 8.3–10.1)
CHLORIDE SERPL-SCNC: 107 MMOL/L (ref 100–108)
CO2 SERPL-SCNC: 26 MMOL/L (ref 21–32)
CREAT SERPL-MCNC: 1.28 MG/DL (ref 0.6–1.3)
EOSINOPHIL # BLD AUTO: 0.14 THOUSAND/ΜL (ref 0–0.61)
EOSINOPHIL NFR BLD AUTO: 3 % (ref 0–6)
ERYTHROCYTE [DISTWIDTH] IN BLOOD BY AUTOMATED COUNT: 14.9 % (ref 11.6–15.1)
GFR SERPL CREATININE-BSD FRML MDRD: 38 ML/MIN/1.73SQ M
GLUCOSE SERPL-MCNC: 76 MG/DL (ref 65–140)
HCT VFR BLD AUTO: 39.2 % (ref 34.8–46.1)
HGB BLD-MCNC: 12.4 G/DL (ref 11.5–15.4)
LYMPHOCYTES # BLD AUTO: 1.72 THOUSANDS/ΜL (ref 0.6–4.47)
LYMPHOCYTES NFR BLD AUTO: 32 % (ref 14–44)
MCH RBC QN AUTO: 31.1 PG (ref 26.8–34.3)
MCHC RBC AUTO-ENTMCNC: 31.6 G/DL (ref 31.4–37.4)
MCV RBC AUTO: 98 FL (ref 82–98)
MONOCYTES # BLD AUTO: 0.65 THOUSAND/ΜL (ref 0.17–1.22)
MONOCYTES NFR BLD AUTO: 12 % (ref 4–12)
NEUTROPHILS # BLD AUTO: 2.93 THOUSANDS/ΜL (ref 1.85–7.62)
NEUTS SEG NFR BLD AUTO: 54 % (ref 43–75)
PLATELET # BLD AUTO: 180 THOUSANDS/UL (ref 149–390)
PMV BLD AUTO: 11.1 FL (ref 8.9–12.7)
POTASSIUM SERPL-SCNC: 4.5 MMOL/L (ref 3.5–5.3)
PROT SERPL-MCNC: 5.7 G/DL (ref 6.4–8.2)
RBC # BLD AUTO: 3.99 MILLION/UL (ref 3.81–5.12)
SODIUM SERPL-SCNC: 143 MMOL/L (ref 136–145)
WBC # BLD AUTO: 5.46 THOUSAND/UL (ref 4.31–10.16)

## 2018-07-01 PROCEDURE — 99232 SBSQ HOSP IP/OBS MODERATE 35: CPT | Performed by: FAMILY MEDICINE

## 2018-07-01 PROCEDURE — 85025 COMPLETE CBC W/AUTO DIFF WBC: CPT | Performed by: FAMILY MEDICINE

## 2018-07-01 PROCEDURE — 80053 COMPREHEN METABOLIC PANEL: CPT | Performed by: FAMILY MEDICINE

## 2018-07-01 RX ADMIN — APIXABAN 2.5 MG: 2.5 TABLET, FILM COATED ORAL at 09:10

## 2018-07-01 RX ADMIN — PREGABALIN 100 MG: 50 CAPSULE ORAL at 09:09

## 2018-07-01 RX ADMIN — PREGABALIN 100 MG: 50 CAPSULE ORAL at 23:30

## 2018-07-01 RX ADMIN — PREGABALIN 100 MG: 50 CAPSULE ORAL at 16:27

## 2018-07-01 RX ADMIN — APIXABAN 2.5 MG: 2.5 TABLET, FILM COATED ORAL at 19:58

## 2018-07-01 RX ADMIN — ATORVASTATIN CALCIUM 40 MG: 40 TABLET, FILM COATED ORAL at 19:58

## 2018-07-01 RX ADMIN — FUROSEMIDE 40 MG: 40 TABLET ORAL at 09:09

## 2018-07-01 NOTE — PROGRESS NOTES
Pt c/o minimal discomfort when palpating lower abd/suprapubic area, frequent urinating with outputs of 50-250ml  Pt was bladder scanned post void for 436 and 507  Dr Reyes Cyr aware and will follow with PRN bladder scans

## 2018-07-01 NOTE — ASSESSMENT & PLAN NOTE
Sudden onset while at home for which she was initially admitted with bizarre behavior and garbled speech  The patient had an episode of unresponsiveness 6/30, a rapid response was called and the patient is transferred to critical care unit - seizure unlikely, no seizure activity was noted and prolactin normal  There is evidence of acute versus subacute strokes on MRI, evidence of old strokes, suspicious for embolic etiology  Repeat CT head negative for acute findings  Patient returned to baseline MS  Will continue Eliquis  Hold Requip for now as may be contributing to recurrent changes in mental state as hypotension/syncope potential adverse effects especially in geriatric patient with dementia  Proceed with neuro checks with vital signs

## 2018-07-01 NOTE — PROGRESS NOTES
Progress Note - Kareem Court 7/3/1931, 80 y o  female MRN: [id number]    Unit/Bed#:  Encounter: [id number]    Primary Care Provider: Raphael Uribe DO   Date and time admitted to hospital: 6/28/2018  9:04 PM        Acute embolic stroke [facility])   Assessment & Plan    Per MRI report "Foci of acute to subacute infarct in the right occipital lobe and right parietal lobe"  Evidence of old strokes on CT scan of the brain, CTA negative  Findings concerning for an embolic event  2D echo unremarkable, patient may eventually need a transesophageal cardiogram  Patient was started on Eliquis 6/30 age adjusted at 2 5 mg b i d   S/p RR 6/30 due to recurrence of sudden unresponsiveness which is now resolved with no new neurologic changes and normal CT head   Patient was transferred to critical care and after remains stable for 24 hrs, will anticipate [facility] to [facility] later today  Continue with frequent neuro checks        * Altered mental status   Assessment & Plan    Sudden onset while at home for which she was initially admitted with bizarre behavior and garbled speech  The patient had an episode of unresponsiveness 6/30, a rapid response was called and the patient is transferred to critical care unit - seizure unlikely, no seizure activity was noted and prolactin normal  There is evidence of acute versus subacute strokes on MRI, evidence of old strokes, suspicious for embolic etiology  Repeat CT head negative for acute findings  Patient returned to baseline MS  Will continue Eliquis  Hold Requip for now as may be contributing to recurrent changes in mental state as hypotension/syncope potential adverse effects especially in geriatric patient with dementia  Proceed with neuro checks with vital signs          Hypertensive emergency   Assessment & Plan    In setting of acute versus subacute stroke, resolved  No medication was given due to stroke pathway allowing for permissive hypertension  Continue to monitor blood pressure trend        Age-related cognitive decline   Assessment & Plan    Per family patient has been having gradually worsening memory issues mostly with orientation to time  Had a sudden mental status change status post rapid response yesterday, resolved with MS at baseline  Patient was admitted initially with sudden mental status changes, confusion and garbled speech, was found to have acute versus subacute stroke with evidence of prior strokes  She will need full mental status eval upon discharge        Elevated TSH   Assessment & Plan    -TSH at 4 874  -Normal T3-T4  -recommend repeating TSH in 4 weeks          Restless leg syndrome   Assessment & Plan    -Question if Requip contributed to episodes of unresponsiveness as potential side effects include syncope and hypotension  -This medication may need to be discontinued, will continue to hold for now        Elevated troponin   Assessment & Plan    -Type 2 NSTEMI due to acute vs subacute stroke, hypertensive urgency  -Last troponin normal          VTE Pharmacologic Prophylaxis:   Pharmacologic: Apixaban (Eliquis)  Mechanical VTE Prophylaxis in Place: Yes    Patient Centered Rounds: I have performed bedside rounds with nursing staff today  Discussions with Specialists or Other Care Team Provider: Case management    Education and Discussions with Family / Patient: ongoing update of family    Time Spent for Care: 30 minutes  More than 50% of total time spent on counseling and coordination of care as described above  Current Length of Stay: 2 day(s)    Current Patient Status: Inpatient   Certification Statement: The patient will continue to require additional inpatient hospital stay due to need for close monitoring    Discharge Plan: TBD    Code Status: Level 1 - Full Code      Subjective:   Patient seen and examined  She is pleasant and cooperative  She states that she feels wonderful  She denies any pain or discomfort    She states that she slept well   She denies focal deficits  She denies chest pain, shortness of breath or palpitations  She denies nausea, vomiting, diarrhea, constipation or abdominal pain  There were no overnight events  Objective:     Vitals:   Temp (24hrs), Av 7 °F (36 5 °C), Min:97 1 °F (36 2 °C), Max:98 2 °F (36 8 °C)    HR:  [56-73] 72  Resp:  [13-24] 18  BP: ()/(45-69) 146/66  SpO2:  [92 %-100 %] 97 %  Body mass index is 21 45 kg/m²  Input and Output Summary (last 24 hours): Intake/Output Summary (Last 24 hours) at 18 0916  Last data filed at 18 0856   Gross per 24 hour   Intake             1240 ml   Output             2250 ml   Net            -1010 ml       Physical Exam:     Physical Exam   Constitutional: No distress  HENT:   Head: Normocephalic and atraumatic  Eyes: Conjunctivae are normal    Neck: No JVD present  Cardiovascular: Normal rate and regular rhythm  No murmur heard  Neurological: She is alert  No cranial nerve deficit  Oriented to self and place; oriented to age and birthday, month but not year    RLE weakness (baseline)   Skin: Skin is warm and dry  Psychiatric: She has a normal mood and affect          Additional Data:     Labs:      Results from last 7 days  Lab Units 18  0535   WBC Thousand/uL 5 46   HEMOGLOBIN g/dL 12 4   HEMATOCRIT % 39 2   PLATELETS Thousands/uL 180   NEUTROS PCT % 54   LYMPHS PCT % 32   MONOS PCT % 12   EOS PCT % 3       Results from last 7 days  Lab Units 18  0535   SODIUM mmol/L 143   POTASSIUM mmol/L 4 5   CHLORIDE mmol/L 107   CO2 mmol/L 26   BUN mg/dL 19   CREATININE mg/dL 1 28   CALCIUM mg/dL 8 1*   TOTAL PROTEIN g/dL 5 7*   BILIRUBIN TOTAL mg/dL 0 40   ALK PHOS U/L 75   ALT U/L 14   AST U/L 25   GLUCOSE RANDOM mg/dL 76       Results from last 7 days  Lab Units 18  0434   INR  1 04       Results from last 7 days  Lab Units 18  2122   POC GLUCOSE mg/dl 100       Results from last 7 days  Lab Units 18  0430 HEMOGLOBIN A1C % 5 4         * I Have Reviewed All Lab Data Listed Above  * Additional Pertinent Lab Tests Reviewed: [place] [address] Admission Reviewed    Imaging:    Imaging Reports Reviewed Today Include: CT brain    Recent Cultures (last 7 days):           Last 24 Hours Medication List:     Current Facility-Administered Medications:  apixaban 2 5 mg Oral BID Luis Gee MD   atorvastatin 40 mg Oral QPM Gatito Schwarz PA-C   furosemide 40 mg Oral Daily Gatito Schwarz PA-C   ondansetron 4 mg Intravenous Q6H PRN Gatito Schwarz PA-C   oxyCODONE-acetaminophen 1 tablet Oral Q4H PRN Gatito Schwarz PA-C   pregabalin 100 mg Oral TID Gatito Schwarz PA-C        Today, Patient Was Seen By: Mia Penny MD    ** Please Note: Dictation voice to text software may have been used in the creation of this document   **

## 2018-07-01 NOTE — ASSESSMENT & PLAN NOTE
In setting of acute versus subacute stroke, resolved  No medication was given due to stroke pathway allowing for permissive hypertension  Continue to monitor blood pressure trend

## 2018-07-01 NOTE — ASSESSMENT & PLAN NOTE
-Question if Requip contributed to episodes of unresponsiveness as potential side effects include syncope and hypotension  -This medication may need to be discontinued, will continue to hold for now

## 2018-07-01 NOTE — ASSESSMENT & PLAN NOTE
Per MRI report "Foci of acute to subacute infarct in the right occipital lobe and right parietal lobe"  Evidence of old strokes on CT scan of the brain, CTA negative  Findings concerning for an embolic event  2D echo unremarkable, patient may eventually need a transesophageal cardiogram  Patient was started on Eliquis 6/30 age adjusted at 2 5 mg b i d   S/p RR 6/30 due to recurrence of sudden unresponsiveness which is now resolved with no new neurologic changes and normal CT head   Patient was transferred to critical care and after remains stable for 24 hrs, will anticipate transfer to [facility] later today  Continue with frequent neuro checks

## 2018-07-01 NOTE — ASSESSMENT & PLAN NOTE
Per family patient has been having gradually worsening memory issues mostly with orientation to time  Had a sudden mental status change status post rapid response yesterday, resolved with MS at baseline  Patient was admitted initially with sudden mental status changes, confusion and garbled speech, was found to have acute versus subacute stroke with evidence of prior strokes  She will need full mental status eval upon discharge

## 2018-07-02 VITALS
TEMPERATURE: 98.9 F | HEIGHT: 61 IN | RESPIRATION RATE: 18 BRPM | BODY MASS INDEX: 21.44 KG/M2 | OXYGEN SATURATION: 93 % | DIASTOLIC BLOOD PRESSURE: 67 MMHG | HEART RATE: 78 BPM | SYSTOLIC BLOOD PRESSURE: 143 MMHG | WEIGHT: 113.54 LBS

## 2018-07-02 PROBLEM — N18.30 CKD (CHRONIC KIDNEY DISEASE) STAGE 3, GFR 30-59 ML/MIN (HCC): Status: ACTIVE | Noted: 2018-07-02

## 2018-07-02 LAB
ANION GAP SERPL CALCULATED.3IONS-SCNC: 9 MMOL/L (ref 4–13)
BASOPHILS # BLD AUTO: 0.02 THOUSANDS/ΜL (ref 0–0.1)
BASOPHILS NFR BLD AUTO: 0 % (ref 0–1)
BUN SERPL-MCNC: 30 MG/DL (ref 5–25)
CALCIUM SERPL-MCNC: 8.7 MG/DL (ref 8.3–10.1)
CHLORIDE SERPL-SCNC: 107 MMOL/L (ref 100–108)
CO2 SERPL-SCNC: 27 MMOL/L (ref 21–32)
CREAT SERPL-MCNC: 1.43 MG/DL (ref 0.6–1.3)
EOSINOPHIL # BLD AUTO: 0.14 THOUSAND/ΜL (ref 0–0.61)
EOSINOPHIL NFR BLD AUTO: 2 % (ref 0–6)
ERYTHROCYTE [DISTWIDTH] IN BLOOD BY AUTOMATED COUNT: 14.7 % (ref 11.6–15.1)
GFR SERPL CREATININE-BSD FRML MDRD: 33 ML/MIN/1.73SQ M
GLUCOSE SERPL-MCNC: 82 MG/DL (ref 65–140)
HCT VFR BLD AUTO: 40.4 % (ref 34.8–46.1)
HGB BLD-MCNC: 12.9 G/DL (ref 11.5–15.4)
LYMPHOCYTES # BLD AUTO: 1.9 THOUSANDS/ΜL (ref 0.6–4.47)
LYMPHOCYTES NFR BLD AUTO: 32 % (ref 14–44)
MCH RBC QN AUTO: 31.3 PG (ref 26.8–34.3)
MCHC RBC AUTO-ENTMCNC: 31.9 G/DL (ref 31.4–37.4)
MCV RBC AUTO: 98 FL (ref 82–98)
MONOCYTES # BLD AUTO: 0.62 THOUSAND/ΜL (ref 0.17–1.22)
MONOCYTES NFR BLD AUTO: 11 % (ref 4–12)
NEUTROPHILS # BLD AUTO: 3.21 THOUSANDS/ΜL (ref 1.85–7.62)
NEUTS SEG NFR BLD AUTO: 55 % (ref 43–75)
PLATELET # BLD AUTO: 186 THOUSANDS/UL (ref 149–390)
PMV BLD AUTO: 11.3 FL (ref 8.9–12.7)
POTASSIUM SERPL-SCNC: 5 MMOL/L (ref 3.5–5.3)
RBC # BLD AUTO: 4.12 MILLION/UL (ref 3.81–5.12)
SODIUM SERPL-SCNC: 143 MMOL/L (ref 136–145)
WBC # BLD AUTO: 5.89 THOUSAND/UL (ref 4.31–10.16)

## 2018-07-02 PROCEDURE — 99239 HOSP IP/OBS DSCHRG MGMT >30: CPT | Performed by: FAMILY MEDICINE

## 2018-07-02 PROCEDURE — 85025 COMPLETE CBC W/AUTO DIFF WBC: CPT | Performed by: FAMILY MEDICINE

## 2018-07-02 PROCEDURE — 80048 BASIC METABOLIC PNL TOTAL CA: CPT | Performed by: FAMILY MEDICINE

## 2018-07-02 RX ORDER — ATORVASTATIN CALCIUM 40 MG/1
40 TABLET, FILM COATED ORAL EVERY EVENING
Qty: 30 TABLET | Refills: 0 | Status: SHIPPED | OUTPATIENT
Start: 2018-07-02 | End: 2018-08-09 | Stop reason: SDUPTHER

## 2018-07-02 RX ORDER — SODIUM CHLORIDE 9 MG/ML
75 INJECTION, SOLUTION INTRAVENOUS CONTINUOUS
Status: DISCONTINUED | OUTPATIENT
Start: 2018-07-02 | End: 2018-07-02 | Stop reason: HOSPADM

## 2018-07-02 RX ORDER — ROPINIROLE 0.5 MG/1
0.5 TABLET, FILM COATED ORAL
Qty: 15 TABLET | Refills: 0 | Status: SHIPPED | OUTPATIENT
Start: 2018-07-02 | End: 2019-03-22 | Stop reason: HOSPADM

## 2018-07-02 RX ORDER — ASPIRIN 81 MG/1
81 TABLET ORAL DAILY
Status: DISCONTINUED | OUTPATIENT
Start: 2018-07-02 | End: 2018-07-02 | Stop reason: HOSPADM

## 2018-07-02 RX ADMIN — PREGABALIN 100 MG: 50 CAPSULE ORAL at 09:25

## 2018-07-02 RX ADMIN — SODIUM CHLORIDE 75 ML/HR: 0.9 INJECTION, SOLUTION INTRAVENOUS at 09:00

## 2018-07-02 RX ADMIN — APIXABAN 2.5 MG: 2.5 TABLET, FILM COATED ORAL at 09:25

## 2018-07-02 RX ADMIN — FUROSEMIDE 40 MG: 40 TABLET ORAL at 09:25

## 2018-07-02 NOTE — OCCUPATIONAL THERAPY NOTE
Occupational Therapy         Patient Name: Kaelyn Valencia  ZCVCL'U Date: 7/2/2018        Pt transferred to ICU for higher level of care; will require new OT orders when pt is medically appropriate for OT evaluation

## 2018-07-02 NOTE — ASSESSMENT & PLAN NOTE
Sudden onset while at home for which she was initially admitted with bizarre behavior and garbled speech  The patient had an episode of unresponsiveness 6/30, a rapid response was called and the patient is transferred to critical care unit - seizure unlikely, no seizure activity was noted and prolactin normal - etiology may have been secondary to episode of hypotension which may be related to liquid in setting of chronic and acute versus subacute stroke  Repeat CT head negative for acute findings  Patient returned to and has remained at baseline MS  Will continue Eliquis  Resume Requip at low dose of 0 5 mg at bedtime  Patient being discharged under family support, ER precautions discussed

## 2018-07-02 NOTE — NURSING NOTE
Patient discharged to home in stable condition via wheelchair  AVS reviewed with both of her sons and all questions answered and was reminded to follow up with her outpatient providers

## 2018-07-02 NOTE — ASSESSMENT & PLAN NOTE
-resume Requip but at low dose of 0 5 mg at bedtime only, patient has been on the medication for years and has significant difficulty with sleep without the medication  -may have contributed to episode of hypotension

## 2018-07-02 NOTE — SOCIAL WORK
[facility] said medicare approved hospital bed   They will deliver the hospital bed this afternoon or tomorrow  I notified the pt's son of same  Erynr  [facility] and awaiting if they can accept the pt

## 2018-07-02 NOTE — DISCHARGE SUMMARY
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[facility])   Assessment & Plan    Per MRI report "Foci of acute to subacute infarct in the right occipital lobe and right parietal lobe"  Evidence of old strokes on CT scan of the brain, CTA negative  Findings concerning for an embolic event  2D echo unremarkable, patient may eventually need a transesophageal cardiogram  Patient was started on Eliquis 6/30 age adjusted at 2 5 mg b i d   S/p RR 6/30 due to recurrence of sudden unresponsiveness which is now resolved with no new neurologic changes and normal CT head   Patient returned to baseline neurological status, she is discharged home with VNA with family support to follow up with Neurology and Cardiology for consideration for implantable loop recorder        * Acute encephalopathy   Assessment & Plan    Sudden onset while at home for which she was initially admitted with bizarre behavior and garbled speech  The patient had an episode of unresponsiveness 6/30, a rapid response was called and the patient is transferred to critical care unit - seizure unlikely, no seizure activity was noted and prolactin normal - etiology may have been secondary to episode of hypotension which may be related to liquid in setting of chronic and acute versus subacute stroke  Repeat CT head negative for acute findings  Patient returned to and has remained at baseline MS  Will continue Eliquis  Resume Requip at low dose of 0 5 mg at bedtime  Patient being discharged under family support, ER precautions discussed          CKD (chronic kidney disease) stage 3, GFR 30-59 ml/min   Assessment & Plan    Present on admission  Baseline creatinine appears to be 1 1-1 3  Creatinine mildly elevated at 1 43 will give gentle IV hydration prior to discharge  BMP in 1 week        Hypertensive emergency   Assessment & Plan    In setting of acute versus subacute stroke, resolved  Patient is not on blood pressure medication        Age-related cognitive decline   Assessment & Plan    Per family patient has been having gradually worsening memory issues mostly with orientation to time  Had a sudden mental status change status post rapid response yesterday, resolved with MS at baseline  Patient was admitted initially with sudden mental status changes, confusion and garbled speech, was found to have acute versus subacute stroke with evidence of prior strokes  She will need full mental status eval upon discharge, outpatient Neurology office contact information provided and patient should schedule a follow-up appointment        Elevated TSH   Assessment & Plan    -TSH at 4 874  -Normal T3-T4  -recommend repeating TSH in 4 weeks          Restless leg syndrome   Assessment & Plan    -resume Requip but at low dose of 0 5 mg at bedtime only, patient has been on the medication for years and has significant difficulty with sleep without the medication  -may have contributed to episode of hypotension        Elevated troponin   Assessment & Plan    -Type 2 NSTEMI due to acute vs subacute stroke, hypertensive urgency  -Last troponin normal          Discharging Physician / Practitioner: David Jones MD  PCP: Ailin Strauss DO  Admission Date:   Admission Orders     Ordered        06/29/18 1602  Inpatient Admission  Once         06/28/18 2322  Place in Observation (expected length of stay for this patient is less than two midnights)  Once             Discharge Date: 07/02/18    Resolved Problems  Date Reviewed: 7/2/2018    None          Consultations During Hospital Stay:  · Telephone discussion with Neurology, PT/OT, case management    Procedures Performed:     · CT head without contrast 6/30  Impression:     No acute intracranial hemorrhage or mass effect    Chronic microangiopathic ischemic disease, most pronounced in the anterior right frontal and lateral left parietal regions    Similar to prior study     · MRI brain without contrast 6/29  Impression:       Foci of acute to subacute infarct in the right occipital lobe and right parietal lobe as above  Volume loss/atrophy and microangiopathy  · CT stroke alert 6/28  Impression:         1   No hemodynamically significant stenosis, dissection or occlusion of the carotid or vertebral arteries  2   No hemodynamic significant stenosis or occlusion of the major vessels of the [place]  Significant Findings / Test Results:     · As above  · Creatinine 1 43  · Cholesterol total 214, triglycerides 78, HDL 80,   · Troponin 0 06, 0 05, 0 02  · Hemoglobin A1c 5 4  · Free T4 1 12, free T3 2 40, TSH 4 874    Incidental Findings:   · None    Test Results Pending at Discharge (will require follow up): · None     Outpatient Tests Requested:  · None    Complications:  None    Reason for Admission:  Change of mental status    Hospital Course:     Norman Quiroz is a 80 y o  female patient who originally presented to the hospital on 6/28/2018 due to change in mental status and garbled speech  The patient was found to have acute versus subacute stroke in setting of history of prior strokes not previously known  On admission, as stroke alert was called  The patient returned to baseline mental status on the day of admission and remained in normal mental status initially  After her MRI was obtained and revealed evidence of stroke with suspicion for embolic etiology, the patient was started on Eliquis b i d     The case was discussed with Neurology  Later that day the patient again developed a change in mental status and a rapid response was called  A CT scan of the brain was obtained without obvious changes    The blood pressure at the time of the rapid response was low with systolic blood pressure at 100 which was most likely the etiology of acute mental status change  The patient was advised to decrease her Requip dose after discharge as this may be contributing to episodes of hypotension  The patient was discharged on her home aspirin and atorvastatin with the addition of Eliquis  Discharge instructions were discussed with the patient and her sons  Physical therapy deemed patient a safe discharge in the [facility] with home VN A  Case management was assisting with arrangements for discharge planning  Please see above list of diagnoses and related plan for additional information  Condition at Discharge: good     Discharge Day Visit / Exam:     Subjective:  Patient seen and examined  She states that she wants to go home and does not want to stay here in the hospital any longer  She states that she feels well, she denies new focal deficits  She has been at her baseline mental status for the past 48 hr  She denies headache, lightheadedness or dizziness  She denies chest pain, shortness of breath or palpitations  Denies nausea, vomiting, diarrhea or abdominal pain  Vitals: Blood Pressure: 127/67 (07/01/18 2334)  Pulse: 83 (07/01/18 2334)  Temperature: 97 9 °F (36 6 °C) (07/01/18 2334)  Temp Source: Temporal (07/01/18 2334)  Respirations: 20 (07/01/18 2334)  Height: 5' 1" (154 9 cm) (06/29/18 0114)  Weight - Scale: 51 5 kg (113 lb 8 6 oz) (07/01/18 0536)  SpO2: 98 % (07/01/18 1238)  Exam:     Physical Exam   Constitutional: No distress  HENT:   Head: Normocephalic and atraumatic  Eyes: Conjunctivae are normal    Neck: No JVD present  Cardiovascular: Normal rate and regular rhythm  No murmur heard  Pulmonary/Chest: Effort normal  No respiratory distress  She has no wheezes  She has no rales  Abdominal: Soft  She exhibits no distension  There is no tenderness  There is no guarding  Musculoskeletal: She exhibits no edema  Neurological: She is alert  She exhibits normal muscle tone     R foot drop, chronic Skin: Skin is warm and dry  Psychiatric: She has a normal mood and affect  Discussion with Family: Son    Discharge instructions/Information to patient and family:   See after visit summary for information provided to patient and family  Provisions for Follow-Up Care:  See after visit summary for information related to follow-up care and any pertinent home health orders  Disposition:     Home with VNA Services (Reminder: Complete face to face encounter)    For Discharges to [facility] SNF:   · Not Applicable to this Patient - Not Applicable to this Patient    Planned Readmission: No     Discharge Statement:  I spent 40 minutes discharging the patient  This time was spent on the day of discharge  I had direct contact with the patient on the day of discharge  Greater than 50% of the total time was spent examining patient, answering all patient questions, arranging and discussing plan of care with patient as well as directly providing post-discharge instructions  Additional time then spent on discharge activities  Discharge Medications:  See after visit summary for reconciled discharge medications provided to patient and family        ** Please Note: This note has been constructed using a voice recognition system **

## 2018-07-02 NOTE — SOCIAL WORK
[facility] unable to accept the pt  I will make a referral to [address] [place] [address]

## 2018-07-02 NOTE — SOCIAL WORK
[facility] accepted the pt   Discharge instructions faxed to them   Called pt at home and notified them of same

## 2018-07-02 NOTE — ASSESSMENT & PLAN NOTE
Per family patient has been having gradually worsening memory issues mostly with orientation to time  Had a sudden mental status change status post rapid response yesterday, resolved with MS at baseline  Patient was admitted initially with sudden mental status changes, confusion and garbled speech, was found to have acute versus subacute stroke with evidence of prior strokes  She will need full mental status eval upon discharge, outpatient Neurology office contact information provided and patient should schedule a follow-up appointment

## 2018-07-02 NOTE — ASSESSMENT & PLAN NOTE
Per MRI report "Foci of acute to subacute infarct in the right occipital lobe and right parietal lobe"  Evidence of old strokes on CT scan of the brain, CTA negative  Findings concerning for an embolic event  2D echo unremarkable, patient may eventually need a transesophageal cardiogram  Patient was started on Eliquis 6/30 age adjusted at 2 5 mg b i d   S/p RR 6/30 due to recurrence of sudden unresponsiveness which is now resolved with no new neurologic changes and normal CT head   Patient returned to baseline neurological status, she is discharged home with VNA with family support to follow up with Neurology and Cardiology for consideration for implantable loop recorder

## 2018-07-02 NOTE — SOCIAL WORK
Pt is being discharged today  Pt will need Eliquis on discharge  I will send the RX to [facility] in [address] [place] [address] so they can run the cost for me  Pt will need home care   I will make a referral to [facility]  Pt will need a hospital bed so I sent the referral to [facility]   Case Management reviewed discharge planning process including the following: identifying help that is needed at home, pt's preference for discharge needs and Meds at [facility]  Reviewed with Pt that any member of the healthcare team can answer questions regarding : medications, jmportance of recognizing  Signs and symptoms of any  medical problems  Case Management also encouraged pt to follow up with all recommended appointments after discharge

## 2018-07-02 NOTE — ASSESSMENT & PLAN NOTE
Present on admission  Baseline creatinine appears to be 1 1-1 3  Creatinine mildly elevated at 1 43 will give gentle IV hydration prior to discharge  BMP in 1 week

## 2018-07-02 NOTE — SOCIAL WORK
Pt requires the head of the bed to be elevated more than 30 degrees most of the time due to problems with Aspiration related to acute embolic stroke and shortness of breath  Pt requires frequent changes in body position and has an immediate need for a change in body position

## 2018-07-03 LAB
ATRIAL RATE: 58 BPM
P AXIS: 66 DEGREES
PR INTERVAL: 182 MS
QRS AXIS: -58 DEGREES
QRSD INTERVAL: 152 MS
QT INTERVAL: 472 MS
QTC INTERVAL: 463 MS
T WAVE AXIS: 13 DEGREES
VENTRICULAR RATE: 58 BPM

## 2018-07-03 PROCEDURE — 93010 ELECTROCARDIOGRAM REPORT: CPT | Performed by: INTERNAL MEDICINE

## 2018-07-19 ENCOUNTER — HOSPITAL ENCOUNTER (EMERGENCY)
Facility: HOSPITAL | Age: 83
Discharge: HOME | End: 2018-07-19
Attending: EMERGENCY MEDICINE | Admitting: EMERGENCY MEDICINE
Payer: MEDICARE

## 2018-07-19 ENCOUNTER — APPOINTMENT (EMERGENCY)
Dept: CT IMAGING | Facility: HOSPITAL | Age: 83
End: 2018-07-19
Payer: MEDICARE

## 2018-07-19 VITALS
TEMPERATURE: 98.4 F | HEIGHT: 61 IN | WEIGHT: 150.13 LBS | OXYGEN SATURATION: 100 % | RESPIRATION RATE: 18 BRPM | DIASTOLIC BLOOD PRESSURE: 84 MMHG | BODY MASS INDEX: 28.35 KG/M2 | HEART RATE: 59 BPM | SYSTOLIC BLOOD PRESSURE: 176 MMHG

## 2018-07-19 DIAGNOSIS — M51.36 DEGENERATIVE DISC DISEASE, LUMBAR: ICD-10-CM

## 2018-07-19 DIAGNOSIS — R10.32 LEFT LOWER QUADRANT PAIN: Primary | ICD-10-CM

## 2018-07-19 DIAGNOSIS — K42.9 UMBILICAL HERNIA: ICD-10-CM

## 2018-07-19 DIAGNOSIS — K40.90 LEFT INGUINAL HERNIA: ICD-10-CM

## 2018-07-19 LAB
ALBUMIN SERPL BCP-MCNC: 2.9 G/DL (ref 3.5–5)
ALP SERPL-CCNC: 66 U/L (ref 46–116)
ALT SERPL W P-5'-P-CCNC: 23 U/L (ref 12–78)
ANION GAP SERPL CALCULATED.3IONS-SCNC: 5 MMOL/L (ref 4–13)
AST SERPL W P-5'-P-CCNC: 19 U/L (ref 5–45)
BASOPHILS # BLD AUTO: 0.05 THOUSANDS/ΜL (ref 0–0.1)
BASOPHILS NFR BLD AUTO: 1 % (ref 0–1)
BILIRUB SERPL-MCNC: 0.3 MG/DL (ref 0.2–1)
BILIRUB UR QL STRIP: NEGATIVE
BUN SERPL-MCNC: 31 MG/DL (ref 5–25)
CALCIUM SERPL-MCNC: 8.3 MG/DL (ref 8.3–10.1)
CHLORIDE SERPL-SCNC: 106 MMOL/L (ref 100–108)
CLARITY UR: CLEAR
CO2 SERPL-SCNC: 31 MMOL/L (ref 21–32)
COLOR UR: NORMAL
CREAT SERPL-MCNC: 1.54 MG/DL (ref 0.6–1.3)
EOSINOPHIL # BLD AUTO: 0.09 THOUSAND/ΜL (ref 0–0.61)
EOSINOPHIL NFR BLD AUTO: 2 % (ref 0–6)
ERYTHROCYTE [DISTWIDTH] IN BLOOD BY AUTOMATED COUNT: 14.3 % (ref 11.6–15.1)
GFR SERPL CREATININE-BSD FRML MDRD: 30 ML/MIN/1.73SQ M
GLUCOSE SERPL-MCNC: 88 MG/DL (ref 65–140)
GLUCOSE UR STRIP-MCNC: NEGATIVE MG/DL
HCT VFR BLD AUTO: 33.1 % (ref 34.8–46.1)
HGB BLD-MCNC: 10.6 G/DL (ref 11.5–15.4)
HGB UR QL STRIP.AUTO: NEGATIVE
HOLD SPECIMEN: NORMAL
KETONES UR STRIP-MCNC: NEGATIVE MG/DL
LACTATE SERPL-SCNC: 1 MMOL/L (ref 0.5–2)
LEUKOCYTE ESTERASE UR QL STRIP: NEGATIVE
LYMPHOCYTES # BLD AUTO: 1.8 THOUSANDS/ΜL (ref 0.6–4.47)
LYMPHOCYTES NFR BLD AUTO: 33 % (ref 14–44)
MCH RBC QN AUTO: 31.5 PG (ref 26.8–34.3)
MCHC RBC AUTO-ENTMCNC: 32 G/DL (ref 31.4–37.4)
MCV RBC AUTO: 98 FL (ref 82–98)
MONOCYTES # BLD AUTO: 0.54 THOUSAND/ΜL (ref 0.17–1.22)
MONOCYTES NFR BLD AUTO: 10 % (ref 4–12)
NEUTROPHILS # BLD AUTO: 2.97 THOUSANDS/ΜL (ref 1.85–7.62)
NEUTS SEG NFR BLD AUTO: 55 % (ref 43–75)
NITRITE UR QL STRIP: NEGATIVE
PH UR STRIP.AUTO: 7 [PH] (ref 4.5–8)
PLATELET # BLD AUTO: 206 THOUSANDS/UL (ref 149–390)
PMV BLD AUTO: 10.4 FL (ref 8.9–12.7)
POTASSIUM SERPL-SCNC: 3.7 MMOL/L (ref 3.5–5.3)
PROT SERPL-MCNC: 6.1 G/DL (ref 6.4–8.2)
PROT UR STRIP-MCNC: NEGATIVE MG/DL
RBC # BLD AUTO: 3.37 MILLION/UL (ref 3.81–5.12)
SODIUM SERPL-SCNC: 142 MMOL/L (ref 136–145)
SP GR UR STRIP.AUTO: 1.01 (ref 1–1.03)
UROBILINOGEN UR QL STRIP.AUTO: 0.2 E.U./DL
WBC # BLD AUTO: 5.45 THOUSAND/UL (ref 4.31–10.16)

## 2018-07-19 PROCEDURE — 85025 COMPLETE CBC W/AUTO DIFF WBC: CPT | Performed by: PHYSICIAN ASSISTANT

## 2018-07-19 PROCEDURE — 81003 URINALYSIS AUTO W/O SCOPE: CPT | Performed by: PHYSICIAN ASSISTANT

## 2018-07-19 PROCEDURE — 74176 CT ABD & PELVIS W/O CONTRAST: CPT

## 2018-07-19 PROCEDURE — 83605 ASSAY OF LACTIC ACID: CPT | Performed by: PHYSICIAN ASSISTANT

## 2018-07-19 PROCEDURE — 99284 EMERGENCY DEPT VISIT MOD MDM: CPT

## 2018-07-19 PROCEDURE — 96374 THER/PROPH/DIAG INJ IV PUSH: CPT

## 2018-07-19 PROCEDURE — 80053 COMPREHEN METABOLIC PANEL: CPT | Performed by: PHYSICIAN ASSISTANT

## 2018-07-19 PROCEDURE — 36415 COLL VENOUS BLD VENIPUNCTURE: CPT | Performed by: PHYSICIAN ASSISTANT

## 2018-07-19 RX ORDER — FENTANYL CITRATE 50 UG/ML
50 INJECTION, SOLUTION INTRAMUSCULAR; INTRAVENOUS ONCE
Status: COMPLETED | OUTPATIENT
Start: 2018-07-19 | End: 2018-07-19

## 2018-07-19 RX ADMIN — IOHEXOL 50 ML: 240 INJECTION, SOLUTION INTRATHECAL; INTRAVASCULAR; INTRAVENOUS; ORAL at 10:18

## 2018-07-19 RX ADMIN — FENTANYL CITRATE 50 MCG: 50 INJECTION, SOLUTION INTRAMUSCULAR; INTRAVENOUS at 10:14

## 2018-07-19 NOTE — DISCHARGE INSTRUCTIONS
Abdominal Pain   WHAT YOU NEED TO KNOW:   Abdominal pain can be dull, achy, or sharp  You may have pain in one area of your abdomen, or in your entire abdomen  Your pain may be caused by a condition such as constipation, food sensitivity or poisoning, infection, or a blockage  Abdominal pain can also be from a hernia, appendicitis, or an ulcer  Liver, gallbladder, or kidney conditions can also cause abdominal pain  The cause of your abdominal pain may be unknown  DISCHARGE INSTRUCTIONS:   Return to the emergency department if:   · You have new chest pain or shortness of breath  · You have pulsing pain in your upper abdomen or lower back that suddenly becomes constant  · Your pain is in the right lower abdominal area and worsens with movement  · You have a fever over 100 4°F (38°C) or shaking chills  · You are vomiting and cannot keep food or liquids down  · Your pain does not improve or gets worse over the next 8 to 12 hours  · You see blood in your vomit or bowel movements, or they look black and tarry  · Your skin or the whites of your eyes turn yellow  · You are a woman and have a large amount of vaginal bleeding that is not your monthly period  Contact your healthcare provider if:   · You have pain in your lower back  · You are a man and have pain in your testicles  · You have pain when you urinate  · You have questions or concerns about your condition or care  Follow up with your healthcare provider within 24 hours or as directed:  Write down your questions so you remember to ask them during your visits  Medicines:   · Medicines  may be given to calm your stomach and prevent vomiting or to decrease pain  Ask how to take pain medicine safely  · Take your medicine as directed  Contact your healthcare provider if you think your medicine is not helping or if you have side effects  Tell him of her if you are allergic to any medicine   Keep a list of the medicines, vitamins, and herbs you take  Include the amounts, and when and why you take them  Bring the list or the pill bottles to follow-up visits  Carry your medicine list with you in case of an emergency  © 2017 [employer] Information is for End User's use only and may not be sold, redistributed or otherwise used for commercial purposes  All illustrations and images included in CareNotes® are the copyrighted property of A D A M , Inc  or Gigi Chavis  The above information is an  only  It is not intended as medical advice for individual conditions or treatments  Talk to your doctor, nurse or pharmacist before following any medical regimen to see if it is safe and effective for you  Inguinal Hernia   WHAT YOU NEED TO KNOW:   An inguinal hernia happens when organs or abdominal tissue push through a weak spot in the abdominal wall  The abdominal wall is made of fat and muscle  It holds the intestines in place  The hernia may contain fluid, tissue from the abdomen, or part of an organ (such as an intestine)  DISCHARGE INSTRUCTIONS:   Return to the emergency department if:   · You have severe abdominal pain with nausea and vomiting  · Your abdomen is larger than usual      · Your hernia gets bigger or is purple or blue  · You see blood in your bowel movements  · You feel weak, dizzy, or faint  Contact your healthcare provider if:   · You have a fever  · You have questions or concerns about your condition or care  Medicine: You may  need the following:  · NSAIDs , such as ibuprofen, help decrease swelling, pain, and fever  NSAIDs can cause stomach bleeding or kidney problems in certain people  If you take blood thinner medicine, always ask your healthcare provider if NSAIDs are safe for you  Always read the medicine label and follow directions  · Take your medicine as directed    Contact your healthcare provider if you think your medicine is not helping or if you have side effects  Tell him or her if you are allergic to any medicine  Keep a list of the medicines, vitamins, and herbs you take  Include the amounts, and when and why you take them  Bring the list or the pill bottles to follow-up visits  Carry your medicine list with you in case of an emergency  Follow up with your healthcare provider as directed:  Write down your questions so you remember to ask them during your visits  Manage your symptoms and prevent another hernia:   · Do not lift anything heavy  Heavy lifting can make your hernia worse or cause another hernia  Ask your healthcare provider how much is safe for you to lift  · Drink liquids as directed  Liquids may prevent constipation and straining during a bowel movement  Ask how much liquid to drink each day and which liquids are best for you  · Eat foods high in fiber  Fiber may prevent constipation and straining during a bowel movement  Foods that contain fiber include fruits, vegetables, beans, lentils, and whole grains  · Maintain a healthy weight  If you are overweight, weight loss may prevent your hernia from getting worse  It may also prevent another hernia  Talk to your healthcare provider about exercise and how to lose weight safely if you are overweight  · Do not smoke  Nicotine and other chemicals in cigarettes and cigars can weaken the abdominal wall  This may increase your risk for another hernia  Ask your healthcare provider for information if you currently smoke and need help to quit  E-cigarettes or smokeless tobacco still contain nicotine  Talk to your healthcare provider before you use these products  © 2017 [employer]  Information is for End User's use only and may not be sold, redistributed or otherwise used for commercial purposes  All illustrations and images included in CareNotes® are the copyrighted property of [employer]  or Gigi Chavis    The above information is an  only  It is not intended as medical advice for individual conditions or treatments  Talk to your doctor, nurse or pharmacist before following any medical regimen to see if it is safe and effective for you

## 2018-07-19 NOTE — ED NOTES
Pt placed on the bedpan at this time   Call bell within reach     Charlene Whitley, JSOE  07/19/18 8062

## 2018-07-19 NOTE — ED NOTES
Pt comfortable in bed  Call bell within reach  Family at bedside       Bertha Kwan RN  07/19/18 6065

## 2018-07-20 NOTE — ED PROVIDER NOTES
History  Chief Complaint   Patient presents with    Hip Pain     Left hip pain starting this morning, denies injury      80 yr female presents with her family by pov for evaluation of initial though of left hip pain but on exam it actually is abdominal pain  Onset: gradual  Duration: 1 day (today) but actually on and off for 3-4 weeks  Location: above left hip (LLQ abdomen)  Frequency: intermiteent, not daily  Character: unable to qualify  Severity: mild/moderate  Better with: nothing  Worse with: palpation  Tx prior to arrival with: none tried  Assoc Sx: pt felt possibly left hip pain s/t stretching exercises done yesterday with [facility]  Native hip joints  Chronic right drop foot  Walks with walker  History provided by:  Patient      Prior to Admission Medications   Prescriptions Last Dose Informant Patient Reported? Taking? apixaban (ELIQUIS) 2 5 mg   No Yes   Sig: Take 1 tablet (2 5 mg total) by mouth 2 (two) times a day   aspirin (ECOTRIN LOW STRENGTH) 81 mg EC tablet  Family Member Yes Yes   Sig: Take 81 mg by mouth daily   atorvastatin (LIPITOR) 40 mg tablet   No Yes   Sig: Take 1 tablet (40 mg total) by mouth every evening   furosemide (LASIX) 20 mg tablet  Family Member Yes Yes   Sig: Take 40 mg by mouth daily   oxyCODONE-acetaminophen (PERCOCET) 5-325 mg per tablet  Family Member Yes Yes   Sig: Take 1 tablet by mouth every 4 (four) hours as needed for moderate pain   pregabalin (LYRICA) 25 mg capsule   Yes Yes   Sig: Take 100 mg by mouth 3 (three) times a day       rOPINIRole (REQUIP) 0 5 mg tablet   No Yes   Sig: Take 1 tablet (0 5 mg total) by mouth daily at bedtime      Facility-Administered Medications: None       Past Medical History:   Diagnosis Date    Cardiac disease     valve replacement    Dropfoot     right    Restless leg syndrome     Stroke [facility])        Past Surgical History:   Procedure Laterality Date    AORTIC VALVULOPLASTY  11/25/2002    Bovine Pericardial heart valve    BACK SURGERY      FRACTURE SURGERY      right hand    HAND SURGERY      right hand    HYSTERECTOMY      ROTATOR CUFF REPAIR Right        History reviewed  No pertinent family history  I have reviewed and agree with the history as documented  Social History   Substance Use Topics    Smoking status: Never Smoker    Smokeless tobacco: Never Used    Alcohol use No        Review of Systems   Constitutional: Negative for activity change, appetite change, chills, diaphoresis and unexpected weight change  HENT: Negative for postnasal drip and sinus pressure  Eyes: Negative for pain, discharge and redness  Respiratory: Negative for chest tightness  Cardiovascular: Negative for palpitations and leg swelling  Gastrointestinal: Negative for abdominal distention, anal bleeding, blood in stool, constipation and rectal pain  Genitourinary: Negative for difficulty urinating, dysuria, flank pain, frequency, hematuria and urgency  Musculoskeletal: Positive for gait problem (baseline ambulatory with rolling walker)  Negative for arthralgias, back pain, joint swelling and neck pain  Skin: Negative for color change and wound  Allergic/Immunologic: Negative for immunocompromised state  Neurological: Negative for dizziness, tremors, syncope, weakness, light-headedness and numbness  Physical Exam  Physical Exam   Constitutional: She is oriented to person, place, and time  Vital signs are normal  She appears well-developed and well-nourished  Non-toxic appearance  She does not appear ill  No distress  HENT:   Head: Normocephalic and atraumatic  Right Ear: Tympanic membrane and external ear normal    Left Ear: Tympanic membrane and external ear normal    Nose: Nose normal  No rhinorrhea  Mouth/Throat: Uvula is midline and oropharynx is clear and moist    Eyes: Conjunctivae, EOM and lids are normal  Pupils are equal, round, and reactive to light  Right eye exhibits no discharge   Left eye exhibits no discharge  Neck: Normal range of motion and full passive range of motion without pain  Neck supple  No JVD present  No thyromegaly present  Cardiovascular: Normal rate, regular rhythm, normal heart sounds and intact distal pulses  No murmur heard  Pulmonary/Chest: Effort normal and breath sounds normal  No accessory muscle usage  No tachypnea  No respiratory distress  She has no wheezes  She has no rales  She exhibits no tenderness  Abdominal: Soft  Normal appearance and bowel sounds are normal  She exhibits no distension and no mass  There is no hepatosplenomegaly  There is tenderness in the suprapubic area and left lower quadrant  There is no rigidity, no rebound, no guarding and no CVA tenderness  No hernia  Musculoskeletal: Normal range of motion  She exhibits edema  She exhibits no tenderness  Right hip: She exhibits normal range of motion, normal strength, no tenderness and no bony tenderness  Left hip: She exhibits normal range of motion, normal strength, no tenderness, no bony tenderness, no swelling, no crepitus, no deformity and no laceration  Left knee: She exhibits normal range of motion  No tenderness found  Lumbar back: She exhibits normal range of motion and no tenderness  Lymphadenopathy:     She has no cervical adenopathy  Neurological: She is alert and oriented to person, place, and time  No cranial nerve deficit or sensory deficit  Skin: Skin is warm, dry and intact  Capillary refill takes less than 2 seconds  No rash (no rash on abdomen/back/flank to suggest shingles) noted  She is not diaphoretic  No erythema  No pallor  Psychiatric: She has a normal mood and affect  Her speech is normal and behavior is normal    Nursing note and vitals reviewed        Vital Signs  ED Triage Vitals [07/19/18 0914]   Temperature Pulse Respirations Blood Pressure SpO2   98 4 °F (36 9 °C) 60 18 (!) 171/74 98 %      Temp Source Heart Rate Source Patient Position - Orthostatic VS BP Location FiO2 (%)   Temporal Monitor Lying Left arm --      Pain Score       5           Vitals:    07/19/18 1202 07/19/18 1300 07/19/18 1330 07/19/18 1421   BP: (!) 181/79 148/68 143/63 (!) 176/84   Pulse: 57 62 57 59   Patient Position - Orthostatic VS: Lying Lying Lying Lying       Visual Acuity      ED Medications  Medications   fentanyl citrate (PF) 100 MCG/2ML 50 mcg (50 mcg Intravenous Given 7/19/18 1014)   iohexol (OMNIPAQUE) 240 MG/ML solution 50 mL (50 mL Oral Given 7/19/18 1018)       Diagnostic Studies  Results Reviewed     Procedure Component Value Units Date/Time    [place] draw [[id number]] Collected:  07/19/18 1007    Lab Status:  Final result Specimen:  Blood Updated:  07/19/18 1202    Narrative: The following orders were created for panel order Parmelee draw  Procedure                               Abnormality         Status                     ---------                               -----------         ------                     K[place] Top on TRPF[[id number]]                            Final result               Green / Black tube on YDLU[[id number]]                        Final result                 Please view results for these tests on the individual orders  Lactic acid, plasma [[id number]]  (Normal) Collected:  07/19/18 1007    Lab Status:  Final result Specimen:  Blood from Arm, Left Updated:  07/19/18 1039     LACTIC ACID 1 0 mmol/L     Narrative:         Result may be elevated if tourniquet was used during collection      Comprehensive metabolic panel [[id number]]  (Abnormal) Collected:  07/19/18 1007    Lab Status:  Final result Specimen:  Blood from Arm, Left Updated:  07/19/18 1034     Sodium 142 mmol/L      Potassium 3 7 mmol/L      Chloride 106 mmol/L      CO2 31 mmol/L      Anion Gap 5 mmol/L      BUN 31 (H) mg/dL      Creatinine 1 54 (H) mg/dL      Glucose 88 mg/dL      Calcium 8 3 mg/dL      AST 19 U/L      ALT 23 U/L      Alkaline Phosphatase 66 U/L Total Protein 6 1 (L) g/dL      Albumin 2 9 (L) g/dL      Total Bilirubin 0 30 mg/dL      eGFR 30 ml/min/1 73sq m     Narrative:         National Kidney Disease Education Program recommendations are as follows:  GFR calculation is accurate only with a steady state creatinine  Chronic Kidney disease less than 60 ml/min/1 73 sq  meters  Kidney failure less than 15 ml/min/1 73 sq  meters  UA w Reflex to Microscopic w Reflex to Culture [[id number]] Collected:  07/19/18 1011    Lab Status:  Final result Specimen:  Urine from Urine, Clean Catch Updated:  07/19/18 1018     Color, UA Light Yellow     Clarity, UA Clear     Specific Gravity, UA 1 010     pH, UA 7 0     Leukocytes, UA Negative     Nitrite, UA Negative     Protein, UA Negative mg/dl      Glucose, UA Negative mg/dl      Ketones, UA Negative mg/dl      Urobilinogen, UA 0 2 E U /dl      Bilirubin, UA Negative     Blood, UA Negative    CBC and differential [[id number]]  (Abnormal) Collected:  07/19/18 1007    Lab Status:  Final result Specimen:  Blood from Arm, Left Updated:  07/19/18 1017     WBC 5 45 Thousand/uL      RBC 3 37 (L) Million/uL      Hemoglobin 10 6 (L) g/dL      Hematocrit 33 1 (L) %      MCV 98 fL      MCH 31 5 pg      MCHC 32 0 g/dL      RDW 14 3 %      MPV 10 4 fL      Platelets 188 Thousands/uL      Neutrophils Relative 55 %      Lymphocytes Relative 33 %      Monocytes Relative 10 %      Eosinophils Relative 2 %      Basophils Relative 1 %      Neutrophils Absolute 2 97 Thousands/µL      Lymphocytes Absolute 1 80 Thousands/µL      Monocytes Absolute 0 54 Thousand/µL      Eosinophils Absolute 0 09 Thousand/µL      Basophils Absolute 0 05 Thousands/µL                  CT abdomen pelvis wo contrast   Final Result by MIESHA Medina MD (07/19 2651)      Small fat-containing umbilical hernia  Status post cardiac valve replacement surgery/midline sternotomy  Hiatal hernia  See above additional nonacute findings                 Workstation performed: [id number]                    Procedures  Procedures       Phone Contacts  ED Phone Contact    ED Course  ED Course as of Jul 20 1437   Thu Jul 19, 2018   1045 Color, UA: Light Yellow   1045 Specific Gravity, UA: 1 010   1045 pH, UA: 7 0   1045 Leukocytes, UA: Negative   1045 Nitrite, UA: Negative   1045 Ketones, UA: Negative   1045 Blood, UA: Negative   1045 LACTIC ACID: 1 0   1045 Sodium: 142   1045 Potassium: 3 7   1045 Chloride: 106   1045 CO2: 31   1045 Anion Gap: 5   1045 BUN: (!) 31   1045 Creatinine: (!) 1 54   1045 Glucose: 88   1045 eGFR: 30   1045 WBC: 5 45   1045 Hemoglobin: (!) 10 6   1045 Hematocrit: (!) 33 1   1045 Platelets: 039                               MDM  Number of Diagnoses or Management Options  Degenerative disc disease, lumbar:   Left inguinal hernia: new and requires workup  Left lower quadrant pain: new and requires workup  Umbilical hernia: new and requires workup     Amount and/or Complexity of Data Reviewed  Clinical lab tests: ordered and reviewed  Tests in the radiology section of CPT®: ordered and reviewed  Obtain history from someone other than the patient: yes  Review and summarize past medical records: yes  Discuss the patient with other providers: yes  Independent visualization of images, tracings, or specimens: yes    Risk of Complications, Morbidity, and/or Mortality  Presenting problems: high  Diagnostic procedures: moderate  Management options: moderate    Patient Progress  Patient progress: stable    CritCare Time    Disposition  Final diagnoses:   Left lower quadrant pain   Umbilical hernia   Left inguinal hernia   Degenerative disc disease, lumbar     Time reflects when diagnosis was documented in both MDM as applicable and the Disposition within this note     Time User Action Codes Description Comment    7/19/2018  2:01 PM Steff Gonzalez Add [R10 32] Left lower quadrant pain     7/19/2018  2:01 PM Steff Gonzalez Add [H73 1] Umbilical hernia     5/44/5727 2:02 PM Fior Junior Add [K40 90] Left inguinal hernia     7/19/2018  2:02 PM Fior Junior Add [M51 36] Degenerative disc disease, lumbar       ED Disposition     ED Disposition Condition Comment    Discharge  Jesus Madsen discharge to home/self care  Condition at discharge: Good        Follow-up Information     Follow up With Specialties Details Why [facility],  Family Medicine Schedule an appointment as soon as possible for a visit in 3 days ER followup [address] [place] [address]  [phone]            Discharge Medication List as of 7/19/2018  2:03 PM      CONTINUE these medications which have NOT CHANGED    Details   apixaban (ELIQUIS) 2 5 mg Take 1 tablet (2 5 mg total) by mouth 2 (two) times a day, Starting Mon 7/2/2018, Print      aspirin (ECOTRIN LOW STRENGTH) 81 mg EC tablet Take 81 mg by mouth daily, Historical Med      atorvastatin (LIPITOR) 40 mg tablet Take 1 tablet (40 mg total) by mouth every evening, Starting Mon 7/2/2018, Normal      furosemide (LASIX) 20 mg tablet Take 40 mg by mouth daily, Historical Med      oxyCODONE-acetaminophen (PERCOCET) 5-325 mg per tablet Take 1 tablet by mouth every 4 (four) hours as needed for moderate pain, Historical Med      pregabalin (LYRICA) 25 mg capsule Take 100 mg by mouth 3 (three) times a day   , Until Discontinued, Historical Med      rOPINIRole (REQUIP) 0 5 mg tablet Take 1 tablet (0 5 mg total) by mouth daily at bedtime, Starting Mon 7/2/2018, Normal           No discharge procedures on file      ED Provider  Electronically Signed by           Gold Granger PA-C  07/20/18 2032

## 2018-08-09 ENCOUNTER — OFFICE VISIT (OUTPATIENT)
Dept: NEUROLOGY | Facility: CLINIC | Age: 83
End: 2018-08-09
Payer: MEDICARE

## 2018-08-09 VITALS
WEIGHT: 150 LBS | SYSTOLIC BLOOD PRESSURE: 130 MMHG | HEART RATE: 79 BPM | DIASTOLIC BLOOD PRESSURE: 60 MMHG | BODY MASS INDEX: 28.34 KG/M2

## 2018-08-09 DIAGNOSIS — G25.81 RESTLESS LEG SYNDROME: Chronic | ICD-10-CM

## 2018-08-09 DIAGNOSIS — I63.9 ACUTE EMBOLIC STROKE (HCC): Primary | ICD-10-CM

## 2018-08-09 PROCEDURE — 99205 OFFICE O/P NEW HI 60 MIN: CPT | Performed by: PSYCHIATRY & NEUROLOGY

## 2018-08-09 RX ORDER — ATORVASTATIN CALCIUM 40 MG/1
40 TABLET, FILM COATED ORAL EVERY EVENING
Qty: 30 TABLET | Refills: 0 | Status: ON HOLD | OUTPATIENT
Start: 2018-08-09 | End: 2019-04-02 | Stop reason: CLARIF

## 2018-08-09 NOTE — PROGRESS NOTES
Patient ID: Janann Nyhan is a 80 y o  female  Assessment/Plan: This is a 81 y/o Female who is here as a new patient of    For history of right occipital punctate strokes  Other suspicion that the strokes are embolic in etiology on my read did review the CTA head and neck which did not show any evidence of any atherosclerotic changes  Echo was done which showed biatrial dilatation,  And hence suspicion for PAF is very high  It is likely that this patient had a cardioembolic stroke  Plan:  -For secondary stroke prevention, will continue with combination of  Aspirin, Eliquis and Lipitor,  I did send her refills of Lipitor to her pharmacy  She is on reduced dose of Eliquis 2 5 milligrams b i d  Because of her age and her creatinine   -Continue  with BP control, goal < 130/80,   Her blood pressure today is at goal   -Defer to PCP regarding BP management  -uses a walker because she has a drop foot since 2002 for a back surgery  Patient not smoking  I would like to follow up in 6-8 months, I would be happy to see her sooner if the need arises  Patient/Guardian was advised to the call the office if they have any questions and concerns in the meantime  Patient/Guardian does understand that if they have any new stroke like symptoms such as facial droop on one side, weakness/paralysis on either side, speech trouble, numbness on one side, balance issues, any vision changes, or any new headache, to call 9-1-1 immediately or to proceed to the nearest ER immediately  Problem List Items Addressed This Visit     Restless leg syndrome (Chronic)    Acute embolic stroke (HCC) - Primary    Relevant Medications    atorvastatin (LIPITOR) 40 mg tablet             Subjective:    HPI    This is a  26-year-old female with hypertension, hyperlipidemia who is here as a new patient  Referred by Dr Mark Fitzgerald DO     I Personally reviewed the hospital records and imaging of MRI brain CTA and CT that was done in while in the hospital  Patient was initially admitted on 07/02/2018  with this are behavior and garbled speech  She had an episode of confusion and tight chest   Patient was found to have an episode of unresponsiveness on 06/30 rapid response was called and patient was transferred to critical care unit no seizure-like activity was noted at this time  This was thought to be related to her low BP  This was at [address]     MRI brain was done which showed which I personally reviewed acute to subacute infarct in the right occipital lobe and right parietal lobe  Patient was started on Eliquis for stroke  She was then discharged  She did not have any symptoms from the stroke  She is here today  She has no new stroke like symptoms  She is recovered fully from the stroke  She is compliant with aspirin, eliquis and atorvastatin  She takes requip for RLS  The following portions of the patient's history were reviewed and updated as appropriate:   She  has a past medical history of Cardiac disease; Dropfoot; Restless leg syndrome; and Stroke ([facility] [address] )  She   Patient Active Problem List    Diagnosis Date Noted    CKD (chronic kidney disease) stage 3, GFR 30-59 ml/min 07/02/2018    Hypertensive emergency 27/02/3602    Acute embolic stroke ([facility] [address] ) 07/74/9377    Acute encephalopathy 06/29/2018    Elevated TSH 06/29/2018    Age-related cognitive decline 06/29/2018    Syncope 08/23/2016    Renal failure, acute ([address] ) 08/23/2016    Peripheral edema 08/23/2016    Cardiac disease 08/23/2016    Elevated troponin 08/23/2016    Restless leg syndrome 08/23/2016    Traumatic rhabdomyolysis ([facility] [address] ) 08/23/2016    Anemia 08/23/2016     She  has a past surgical history that includes Hysterectomy; Rotator cuff repair (Right); Aortic valvuloplasty (11/25/2002); Fracture surgery; Back surgery; and Hand surgery  Her family history is not on file  She  reports that she has never smoked   She has never used smokeless tobacco  She reports that she does not drink alcohol or use drugs  Current Outpatient Prescriptions   Medication Sig Dispense Refill    apixaban (ELIQUIS) 2 5 mg Take 1 tablet (2 5 mg total) by mouth 2 (two) times a day 60 tablet 0    aspirin (ECOTRIN LOW STRENGTH) 81 mg EC tablet Take 81 mg by mouth daily      atorvastatin (LIPITOR) 40 mg tablet Take 1 tablet (40 mg total) by mouth every evening 30 tablet 0    furosemide (LASIX) 20 mg tablet Take 40 mg by mouth daily      oxyCODONE-acetaminophen (PERCOCET) 5-325 mg per tablet Take 1 tablet by mouth every 4 (four) hours as needed for moderate pain      pregabalin (LYRICA) 25 mg capsule Take 100 mg by mouth 3 (three) times a day   rOPINIRole (REQUIP) 0 5 mg tablet Take 1 tablet (0 5 mg total) by mouth daily at bedtime 15 tablet 0     No current facility-administered medications for this visit  Current Outpatient Prescriptions on File Prior to Visit   Medication Sig    apixaban (ELIQUIS) 2 5 mg Take 1 tablet (2 5 mg total) by mouth 2 (two) times a day    aspirin (ECOTRIN LOW STRENGTH) 81 mg EC tablet Take 81 mg by mouth daily    furosemide (LASIX) 20 mg tablet Take 40 mg by mouth daily    oxyCODONE-acetaminophen (PERCOCET) 5-325 mg per tablet Take 1 tablet by mouth every 4 (four) hours as needed for moderate pain    pregabalin (LYRICA) 25 mg capsule Take 100 mg by mouth 3 (three) times a day   rOPINIRole (REQUIP) 0 5 mg tablet Take 1 tablet (0 5 mg total) by mouth daily at bedtime    [DISCONTINUED] atorvastatin (LIPITOR) 40 mg tablet Take 1 tablet (40 mg total) by mouth every evening     No current facility-administered medications on file prior to visit  She has No Known Allergies            Objective:    Blood pressure 130/60, pulse 79, weight 68 kg (150 lb), not currently breastfeeding  Physical Exam  General: Patient is not in any acute distress    HEENT: normocephalic, atraumatic  Neck: supple  Heart: regular heart rate and rhythm, no murmurs, rubs and or gallops  Chest: Clear to auscultation bilaterally  Abdomen: soft and non-tender  Skin: no lesions  Musculosketal: no bony abnormalities  Neurologic Examination:   Mental status: alert, awake, and following commands  Speech: Speech is fluent without any dysarthria, no aphasia noted  Cranial Nerves: Pupils equal round reactive to light and extraocular movements intact  Visual fields full to confrontation  Fundus exam - normal, no papilledema noted  Facial sensation intact to soft touch in V1, V2 and V3 distributions  Face symmetric  Tongue midline, able to move side to side  Palate elevation symmetric uvula midline, no deviation noted  Shoulder shrug strong  Motor: Strength 5/5 in all 4 extremities  No drift  Normal rapid alternating movements  Normal tone  Sensory: Sensation intact to soft touch  Cerebellar: Finger-to-nose intact, normal heel to shin  Reflexes: 2+ in all 4 extremities, downgoing toes bilaterally  Gait: Normal gait, tandem walking, normal arm to swing  Neurological Exam      ROS:    Review of Systems   Constitutional: Negative  Negative for appetite change and fever  HENT: Negative  Negative for hearing loss, tinnitus, trouble swallowing and voice change  Eyes: Negative  Negative for photophobia and pain  Respiratory: Negative  Negative for shortness of breath  Cardiovascular: Negative  Negative for palpitations  Gastrointestinal: Negative  Negative for nausea and vomiting  Endocrine: Negative  Negative for cold intolerance and heat intolerance  Genitourinary: Negative  Negative for dysuria, frequency and urgency  Musculoskeletal: Negative  Negative for myalgias and neck pain  Skin: Negative  Negative for rash  Neurological: Negative  Negative for dizziness, tremors, seizures, syncope, facial asymmetry, speech difficulty, weakness, light-headedness, numbness and headaches  Hematological: Negative    Does not bruise/bleed easily  Psychiatric/Behavioral: Negative  Negative for confusion, hallucinations and sleep disturbance

## 2018-12-21 ENCOUNTER — OFFICE VISIT (OUTPATIENT)
Dept: CARDIOLOGY CLINIC | Facility: CLINIC | Age: 83
End: 2018-12-21
Payer: MEDICARE

## 2018-12-21 VITALS
BODY MASS INDEX: 30.02 KG/M2 | HEIGHT: 61 IN | SYSTOLIC BLOOD PRESSURE: 130 MMHG | HEART RATE: 92 BPM | WEIGHT: 159 LBS | DIASTOLIC BLOOD PRESSURE: 80 MMHG

## 2018-12-21 DIAGNOSIS — I48.0 PAROXYSMAL ATRIAL FIBRILLATION (HCC): Primary | ICD-10-CM

## 2018-12-21 DIAGNOSIS — Z95.2 H/O PROSTHETIC AORTIC VALVE REPLACEMENT: ICD-10-CM

## 2018-12-21 DIAGNOSIS — E78.5 DYSLIPIDEMIA: ICD-10-CM

## 2018-12-21 DIAGNOSIS — R60.0 LOCALIZED EDEMA: ICD-10-CM

## 2018-12-21 DIAGNOSIS — I63.439 CEREBROVASCULAR ACCIDENT (CVA) DUE TO EMBOLISM OF POSTERIOR CEREBRAL ARTERY, UNSPECIFIED BLOOD VESSEL LATERALITY (HCC): ICD-10-CM

## 2018-12-21 PROCEDURE — 93000 ELECTROCARDIOGRAM COMPLETE: CPT | Performed by: INTERNAL MEDICINE

## 2018-12-21 PROCEDURE — 99214 OFFICE O/P EST MOD 30 MIN: CPT | Performed by: INTERNAL MEDICINE

## 2018-12-21 NOTE — PATIENT INSTRUCTIONS

## 2018-12-21 NOTE — PROGRESS NOTES
Subjective:        Patient ID: Mary Ann Davila is a 80 y o  female  Chief Complaint:  Francois Mendoza is here for routine cardiac follow-up  As you know she suffered a CVA, neurology and others highly suspicious of embolic CVA and probable PA F  Only complaint today is redness around the white of her left eye  This is classic subconjunctival hemorrhage  She denies any trauma  No eye pain no vision loss  Her EKG today indeed shows atrial fibrillation with controlled ventricular response  She has chronic right bundle branch block  No acute ST changes  She has no symptoms of AFib  Denies any palpitations, lightheadedness, presyncope, syncope, TIA or claudication like symptoms  She has had no unusual edema taking Lasix daily  No concerning breathlessness  No fevers or chills  Echocardiogram in the summer revealed normal prosthetic aortic valve function  I hear no diastolic or significant systolic murmur  Normotensive, euvolemic  Said she had blood work with you in November, home draw, says all was well  I reviewed her earlier this year blood work from the summer, stable  The following portions of the patient's history were reviewed and updated as appropriate: allergies, current medications, past family history, past medical history, past social history, past surgical history and problem list   Review of Systems   Constitution: Negative for chills, diaphoresis, malaise/fatigue and weight gain  HENT: Negative for nosebleeds and stridor  Eyes: Negative for double vision, vision loss in left eye, vision loss in right eye and visual disturbance  Cardiovascular: Negative for chest pain, claudication, cyanosis, dyspnea on exertion, irregular heartbeat, leg swelling, near-syncope, orthopnea, palpitations, paroxysmal nocturnal dyspnea and syncope  Respiratory: Negative for cough, shortness of breath, snoring and wheezing  Endocrine: Negative for polydipsia, polyphagia and polyuria  Hematologic/Lymphatic: Negative for bleeding problem  Does not bruise/bleed easily  Skin: Negative for flushing and rash  Musculoskeletal: Positive for arthritis and stiffness  Negative for falls and myalgias  Gastrointestinal: Negative for abdominal pain, heartburn, hematemesis, hematochezia, melena and nausea  Genitourinary: Negative for hematuria  Neurological: Negative for brief paralysis, dizziness, focal weakness, headaches, light-headedness, loss of balance and vertigo  Psychiatric/Behavioral: Negative for altered mental status and substance abuse  Allergic/Immunologic: Negative for hives  Objective:      /80   Pulse 92   Ht 5' 1" (1 549 m)   Wt 72 1 kg (159 lb)   LMP  (LMP Unknown)   BMI 30 04 kg/m²   Physical Exam   Constitutional: She is oriented to person, place, and time  She appears well-developed and well-nourished  HENT:   Head: Normocephalic and atraumatic  Eyes: Pupils are equal, round, and reactive to light  EOM are normal    Neck: Normal range of motion  Neck supple  No JVD present  No thyromegaly present  Cardiovascular: Normal rate, regular rhythm and intact distal pulses  Exam reveals no gallop and no friction rub  Murmur (Grade 1/6 short outflow murmur ) heard  Pulmonary/Chest: Effort normal and breath sounds normal  No stridor  No respiratory distress  She has no wheezes  She has no rales  Abdominal: Soft  Bowel sounds are normal  She exhibits no mass  There is no tenderness  Musculoskeletal: Normal range of motion  She exhibits edema (Trivial bilateral lower extremity edema)  Lymphadenopathy:     She has no cervical adenopathy  Neurological: She is alert and oriented to person, place, and time  Skin: Skin is warm and dry  No rash noted  No pallor  Psychiatric: She has a normal mood and affect  Her behavior is normal  Judgment and thought content normal    Nursing note and vitals reviewed        Lab Review:   Admission on 07/19/2018, Discharged on 07/19/2018   Component Date Value    WBC 07/19/2018 5 45     RBC 07/19/2018 3 37*    Hemoglobin 07/19/2018 10 6*    Hematocrit 07/19/2018 33 1*    MCV 07/19/2018 98     MCH 07/19/2018 31 5     MCHC 07/19/2018 32 0     RDW 07/19/2018 14 3     MPV 07/19/2018 10 4     Platelets 13/80/6530 206     Neutrophils Relative 07/19/2018 55     Lymphocytes Relative 07/19/2018 33     Monocytes Relative 07/19/2018 10     Eosinophils Relative 07/19/2018 2     Basophils Relative 07/19/2018 1     Neutrophils Absolute 07/19/2018 2 97     Lymphocytes Absolute 07/19/2018 1 80     Monocytes Absolute 07/19/2018 0 54     Eosinophils Absolute 07/19/2018 0 09     Basophils Absolute 07/19/2018 0 05     Sodium 07/19/2018 142     Potassium 07/19/2018 3 7     Chloride 07/19/2018 106     CO2 07/19/2018 31     ANION GAP 07/19/2018 5     BUN 07/19/2018 31*    Creatinine 07/19/2018 1 54*    Glucose 07/19/2018 88     Calcium 07/19/2018 8 3     AST 07/19/2018 19     ALT 07/19/2018 23     Alkaline Phosphatase 07/19/2018 66     Total Protein 07/19/2018 6 1*    Albumin 07/19/2018 2 9*    Total Bilirubin 07/19/2018 0 30     eGFR 07/19/2018 30     LACTIC ACID 07/19/2018 1 0     Color, UA 07/19/2018 Light Yellow     Clarity, UA 07/19/2018 Clear     Specific Gravity, UA 07/19/2018 1 010     pH, UA 07/19/2018 7 0     Leukocytes, UA 07/19/2018 Negative     Nitrite, UA 07/19/2018 Negative     Protein, UA 07/19/2018 Negative     Glucose, UA 07/19/2018 Negative     Ketones, UA 07/19/2018 Negative     Urobilinogen, UA 07/19/2018 0 2     Bilirubin, UA 07/19/2018 Negative     Blood, UA 07/19/2018 Negative     Extra Tube 07/19/2018 Hold for add-ons  Admission on 06/28/2018, Discharged on 07/02/2018   No results displayed because visit has over 200 results  No results found  Assessment:       1  Paroxysmal atrial fibrillation (HCC)     2   Cerebrovascular accident (CVA) due to embolism of posterior cerebral artery, unspecified blood vessel laterality ([address] )     3  H/O prosthetic aortic valve replacement     4  Dyslipidemia     5  Localized edema          Plan:       She has no history of CAD, in light of spontaneous subconjunctival hemorrhage I see no reason continue baby aspirin therapy daily  I told her she could stop this  She needs to continue Eliquis 2 5 mg b i d  likely indefinitely as long as no major bleeding occurs  She had no melena hematochezia or falls  She likely has a degree of mild sick sinus syndrome, rate controlled in AFib without any negative chronotropic  Told to give a call or let us know immediately should she develop any lightheadedness dizziness or certainly any tachy palpitations  All of these currently absent  I would use any additional negative chronotropic agents with extreme caution  Meds otherwise ideal, advised no changes  Subacute bacterial endocarditis antibiotic prophylaxis for applies  I will see her back in 6 months, sooner as needed, asked her to call with any potentially concerning cardiac symptoms

## 2019-03-18 ENCOUNTER — APPOINTMENT (OUTPATIENT)
Dept: RADIOLOGY | Facility: CLINIC | Age: 84
End: 2019-03-18
Payer: MEDICARE

## 2019-03-18 ENCOUNTER — OFFICE VISIT (OUTPATIENT)
Dept: URGENT CARE | Facility: CLINIC | Age: 84
End: 2019-03-18
Payer: MEDICARE

## 2019-03-18 VITALS
SYSTOLIC BLOOD PRESSURE: 149 MMHG | WEIGHT: 159 LBS | HEART RATE: 94 BPM | TEMPERATURE: 98.1 F | RESPIRATION RATE: 18 BRPM | OXYGEN SATURATION: 99 % | HEIGHT: 61 IN | DIASTOLIC BLOOD PRESSURE: 84 MMHG | BODY MASS INDEX: 30.02 KG/M2
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
## 2019-03-18 DIAGNOSIS — M62.838 MUSCLE SPASM: Primary | ICD-10-CM

## 2019-03-18 DIAGNOSIS — M62.838 MUSCLE SPASM: ICD-10-CM

## 2019-03-18 PROCEDURE — 99213 OFFICE O/P EST LOW 20 MIN: CPT | Performed by: NURSE PRACTITIONER

## 2019-03-18 PROCEDURE — 73010 X-RAY EXAM OF SHOULDER BLADE: CPT

## 2019-03-18 PROCEDURE — 73030 X-RAY EXAM OF SHOULDER: CPT

## 2019-03-18 PROCEDURE — G0463 HOSPITAL OUTPT CLINIC VISIT: HCPCS | Performed by: NURSE PRACTITIONER

## 2019-03-18 RX ORDER — AMITRIPTYLINE HYDROCHLORIDE 25 MG/1
25 TABLET, FILM COATED ORAL
Refills: 0 | COMMUNITY
Start: 2019-03-03 | End: 2019-03-20

## 2019-03-18 RX ORDER — FUROSEMIDE 40 MG/1
40 TABLET ORAL
Refills: 0 | Status: ON HOLD | COMMUNITY
Start: 2019-01-05 | End: 2019-03-22 | Stop reason: SDUPTHER

## 2019-03-18 RX ORDER — ROPINIROLE 2 MG/1
0.5 TABLET, FILM COATED ORAL
Refills: 0 | COMMUNITY
Start: 2019-03-12 | End: 2019-03-22 | Stop reason: HOSPADM

## 2019-03-18 NOTE — PROGRESS NOTES
[address]        NAME: Noe Lin is a 80 y o  female  : 7/3/1931    MRN: [id number]  DATE: 2019  TIME: 1:30 PM    Assessment and Plan   Muscle spasm [M62 838]  1  Muscle spasm  XR shoulder 2+ vw left    XR scapula left    Ambulatory referral to Physical Therapy    Ambulatory referral to Sports Medicine   2  Fall, initial encounter  XR shoulder 2+ vw left    XR scapula left    Ambulatory referral to Physical Therapy    Ambulatory referral to Sports Medicine         Patient Instructions   Your x-rays do not shoulder fracture or anything acute  They do she shows some age-related arthritis  You have a muscle spasm of the trapezius muscle  Take the baclofen as needed, 10 mg by mouth up to 3 times per day as needed for muscle pain or spasm  I would like to see you follow up with Sports Medicine to make sure we get your shoulder mobility back to your baseline  I also put in for physical therapy referral   Over-the-counter pain medication may also be used in addition to the baclofen as needed  Follow up with PCP in 3-5 days  Proceed to  ER if symptoms worsen  Chief Complaint     Chief Complaint   Patient presents with    Arm Pain     Pt c/o left arm pain x 3 weeks  History of Present Illness       Patient reports she has been having left shoulder pain for the last 3 weeks  She notes that she fell a few weeks ago, approximately the same time  She states that she landed on her knees and then fell back on her buttocks  She has not sought any treatment prior to today  She presents with her granddaughter who encouraged her to be evaluated      Review of Systems   Review of Systems   Constitutional: Negative for activity change, chills and fever  HENT: Negative for congestion and sore throat  Eyes: Negative for discharge  Respiratory: Negative for shortness of breath  Cardiovascular: Negative for chest pain  Gastrointestinal: Negative for abdominal pain  Musculoskeletal: Positive for arthralgias, myalgias, neck pain and neck stiffness  Negative for back pain, gait problem and joint swelling  Skin: Negative for color change and pallor  Neurological: Negative for dizziness, syncope, weakness and light-headedness  All other systems reviewed and are negative  Current Medications       Current Outpatient Medications:     amitriptyline (ELAVIL) 25 mg tablet, 25 mg, Disp: , Rfl: 0    apixaban (ELIQUIS) 2 5 mg, Take 1 tablet (2 5 mg total) by mouth 2 (two) times a day, Disp: 60 tablet, Rfl: 0    atorvastatin (LIPITOR) 40 mg tablet, Take 1 tablet (40 mg total) by mouth every evening, Disp: 30 tablet, Rfl: 0    furosemide (LASIX) 20 mg tablet, Take 40 mg by mouth daily, Disp: , Rfl:     furosemide (LASIX) 40 mg tablet, 40 mg, Disp: , Rfl: 0    oxyCODONE-acetaminophen (PERCOCET) 5-325 mg per tablet, Take 1 tablet by mouth every 4 (four) hours as needed for moderate pain, Disp: , Rfl:     pregabalin (LYRICA) 25 mg capsule, Take 100 mg by mouth 3 (three) times a day   , Disp: , Rfl:     rOPINIRole (REQUIP) 0 5 mg tablet, Take 1 tablet (0 5 mg total) by mouth daily at bedtime (Patient taking differently: Take 2 mg by mouth 2 (two) times a day  ), Disp: 15 tablet, Rfl: 0    rOPINIRole (REQUIP) 2 mg tablet, 0 5 mg, Disp: , Rfl: 0    Current Allergies     Allergies as of 03/18/2019    (No Known Allergies)            The following portions of the patient's history were reviewed and updated as appropriate: allergies, current medications, past family history, past medical history, past social history, past surgical history and problem list      Past Medical History:   Diagnosis Date    Cardiac disease     valve replacement    Dropfoot     right    History of echocardiogram 04/20/2016    EF 75%, Hyperdynamic LVSF  Mild concentric LVH  Normal functioning bioprosthetic AV  Trace MR  Mild pulm htn       HTN (hypertension)     Hyperlipidemia     Lymphedema     Restless leg syndrome     Right bundle branch block     Stroke ([address] )     Supraventricular tachycardia [facility])        Past Surgical History:   Procedure Laterality Date    AORTIC VALVE REPLACEMENT  11/25/2002    Tissue AVR #21    AORTIC VALVULOPLASTY  11/25/2002    Bovine Pericardial heart valve    BACK SURGERY      CARDIAC CATHETERIZATION  11/22/2002    Sever critical Aortic stenosis  Pulm Htn  Normal coronary arteries   FRACTURE SURGERY      right hand    HAND SURGERY      right hand    HYSTERECTOMY      ROTATOR CUFF REPAIR Right        No family history on file  Medications have been verified  Objective   /84   Pulse 94   Temp 98 1 °F (36 7 °C)   Resp 18   Ht 5' 1" (1 549 m)   Wt 72 1 kg (159 lb)   LMP  (LMP Unknown)   SpO2 99%   BMI 30 04 kg/m²        Physical Exam     Physical Exam   Constitutional: She is oriented to person, place, and time  She appears well-developed and well-nourished  No distress  HENT:   Head: Normocephalic and atraumatic  Right Ear: External ear normal    Left Ear: External ear normal    Eyes: Pupils are equal, round, and reactive to light  Neck: Normal range of motion  Neck supple  Cardiovascular: Normal rate, normal heart sounds and intact distal pulses  Pulmonary/Chest: Effort normal and breath sounds normal  No stridor  No respiratory distress  She has no wheezes  She has no rales  She exhibits no tenderness  Abdominal: Soft  Bowel sounds are normal  She exhibits no distension  There is no tenderness  Musculoskeletal: She exhibits tenderness  Left shoulder: She exhibits decreased range of motion, tenderness, bony tenderness, pain and spasm  She exhibits no swelling, no effusion, no crepitus, no deformity, no laceration, normal pulse and normal strength  Patient exhibits mild bony tenderness over clavicle and scapula on the left side    She has marked tenderness in the area of her left trapezoid muscle which is noted to be tightly spasmed   Neurological: She is alert and oriented to person, place, and time  Skin: Skin is warm and dry  Capillary refill takes less than 2 seconds  She is not diaphoretic  Psychiatric: She has a normal mood and affect  Her behavior is normal  Judgment and thought content normal    Nursing note and vitals reviewed  X-rays reviewed by myself--no acute osseous changes noted

## 2019-03-18 NOTE — PATIENT INSTRUCTIONS
Your x-rays do not shoulder fracture or anything acute  They do she shows some age-related arthritis  You have a muscle spasm of the trapezius muscle  Take the baclofen as needed, 10 mg by mouth up to 3 times per day as needed for muscle pain or spasm  I would like to see you follow up with Sports Medicine to make sure we get your shoulder mobility back to your baseline  I also put in for physical therapy referral   Over-the-counter pain medication may also be used in addition to the baclofen as needed  Muscle Spasm   WHAT YOU NEED TO KNOW:   A muscle spasm is a sudden contraction of any muscle or group of muscles  A muscle cramp is a painful muscle spasm  Muscle cramps commonly occur after intense exercise or during pregnancy  They may also be caused by certain medications, dehydration, low calcium or magnesium levels, or another medical condition  DISCHARGE INSTRUCTIONS:   Medicines: You may need the following:  · NSAIDs  help decrease swelling and pain or fever  This medicine is available with or without a doctor's order  NSAIDs can cause stomach bleeding or kidney problems in certain people  If you take blood thinner medicine, always ask your healthcare provider if NSAIDs are safe for you  Always read the medicine label and follow directions  · Take your medicine as directed  Contact your healthcare provider if you think your medicine is not helping or if you have side effects  Tell him of her if you are allergic to any medicine  Keep a list of the medicines, vitamins, and herbs you take  Include the amounts, and when and why you take them  Bring the list or the pill bottles to follow-up visits  Carry your medicine list with you in case of an emergency  Follow up with your healthcare provider as directed: You may need other tests or treatment  You may also be referred to a physical therapist or other specialist  Write down your questions so you remember to ask them during your visits  Self-care:   · Stretch  your muscle to help relieve the cramp  It may be helpful to keep your muscle in the stretched position until the cramp is gone  · Apply heat  to help decrease pain and muscle spasms  Apply heat on the area for 20 to 30 minutes every 2 hours for as many days as directed  · Apply ice  to help decrease swelling and pain  Ice may also help prevent tissue damage  Use an ice pack, or put crushed ice in a plastic bag  Cover it with a towel and place it on your muscle for 15 to 20 minutes every hour or as directed  · Drink more liquids  to help prevent muscle cramps caused by dehydration  Sports drinks may help replace electrolytes you lose through sweat during exercise  Ask your healthcare provider how much liquid to drink each day and which liquids are best for you  · Eat healthy foods , such as fruits, vegetables, whole grains, low-fat dairy products, and lean proteins (meat, beans, and fish)  If you are pregnant, ask your healthcare provider about foods that are high in magnesium and sodium  They may help to relieve cramps during pregnancy  · Massage your muscle  to help relieve the cramp  · Take frequent deep breaths  until the cramp feels better  Lie down while you take the deep breaths so you do not get dizzy or lightheaded  Contact your healthcare provider if:   · You have signs of dehydration, such as a headache, dark yellow urine, dry eyes or mouth, or a fast heartbeat  · You have questions or concerns about your condition or care  Return to the emergency department if:   · You have warmth, swelling, or redness in the cramping muscle  · You have frequent or unrelieved muscle cramps in several different muscles  · You have muscle cramps with numbness, tingling, and burning in your hands and feet  © 2017 [employer] Information is for End User's use only and may not be sold, redistributed or otherwise used for commercial purposes   All illustrations and images included in CareNotes® are the copyrighted property of [employer]  or [employer] Chavis  The above information is an  only  It is not intended as medical advice for individual conditions or treatments  Talk to your doctor, nurse or pharmacist before following any medical regimen to see if it is safe and effective for you

## 2019-03-20 ENCOUNTER — APPOINTMENT (EMERGENCY)
Dept: CT IMAGING | Facility: HOSPITAL | Age: 84
DRG: 064 | End: 2019-03-20
Payer: MEDICARE

## 2019-03-20 ENCOUNTER — APPOINTMENT (INPATIENT)
Dept: ULTRASOUND IMAGING | Facility: HOSPITAL | Age: 84
DRG: 064 | End: 2019-03-20
Payer: MEDICARE

## 2019-03-20 ENCOUNTER — HOSPITAL ENCOUNTER (INPATIENT)
Facility: HOSPITAL | Age: 84
LOS: 2 days | DRG: 064 | End: 2019-03-22
Attending: EMERGENCY MEDICINE | Admitting: INTERNAL MEDICINE
Payer: MEDICARE

## 2019-03-20 ENCOUNTER — APPOINTMENT (INPATIENT)
Dept: MRI IMAGING | Facility: HOSPITAL | Age: 84
DRG: 064 | End: 2019-03-20
Payer: MEDICARE

## 2019-03-20 DIAGNOSIS — G93.41 ACUTE METABOLIC ENCEPHALOPATHY: ICD-10-CM

## 2019-03-20 DIAGNOSIS — N17.9 AKI (ACUTE KIDNEY INJURY) (HCC): ICD-10-CM

## 2019-03-20 DIAGNOSIS — R60.9 PERIPHERAL EDEMA: ICD-10-CM

## 2019-03-20 DIAGNOSIS — R79.89 ELEVATED TSH: ICD-10-CM

## 2019-03-20 DIAGNOSIS — R74.8 ELEVATED CK: ICD-10-CM

## 2019-03-20 DIAGNOSIS — I48.20 CHRONIC ATRIAL FIBRILLATION (HCC): ICD-10-CM
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
## 2019-03-20 DIAGNOSIS — I63.529 ACUTE ISCHEMIC CEREBROVASCULAR ACCIDENT (CVA) INVOLVING ANTERIOR CEREBRAL ARTERY TERRITORY (HCC): ICD-10-CM

## 2019-03-20 DIAGNOSIS — R41.82 ALTERED MENTAL STATUS: ICD-10-CM

## 2019-03-20 DIAGNOSIS — I21.A1 MYOCARDIAL INFARCTION TYPE 2 (HCC): ICD-10-CM

## 2019-03-20 DIAGNOSIS — I63.9 ACUTE EMBOLIC STROKE (HCC): ICD-10-CM

## 2019-03-20 DIAGNOSIS — E87.0 HYPERNATREMIA: ICD-10-CM

## 2019-03-20 DIAGNOSIS — G25.81 RESTLESS LEG SYNDROME: Chronic | ICD-10-CM

## 2019-03-20 PROBLEM — I21.4 NSTEMI (NON-ST ELEVATED MYOCARDIAL INFARCTION) (HCC): Status: ACTIVE | Noted: 2019-03-20

## 2019-03-20 LAB
ALBUMIN SERPL BCP-MCNC: 3.6 G/DL (ref 3.5–5)
ALP SERPL-CCNC: 110 U/L (ref 46–116)
ALT SERPL W P-5'-P-CCNC: 44 U/L (ref 12–78)
ANION GAP SERPL CALCULATED.3IONS-SCNC: 12 MMOL/L (ref 4–13)
ANION GAP SERPL CALCULATED.3IONS-SCNC: 13 MMOL/L (ref 4–13)
APTT PPP: 34 SECONDS (ref 26–38)
APTT PPP: 34 SECONDS (ref 26–38)
AST SERPL W P-5'-P-CCNC: 45 U/L (ref 5–45)
BACTERIA UR QL AUTO: ABNORMAL /HPF
BASOPHILS # BLD AUTO: 0.04 THOUSANDS/ΜL (ref 0–0.1)
BASOPHILS NFR BLD AUTO: 1 % (ref 0–1)
BILIRUB SERPL-MCNC: 0.6 MG/DL (ref 0.2–1)
BILIRUB UR QL STRIP: NEGATIVE
BUN SERPL-MCNC: 41 MG/DL (ref 5–25)
BUN SERPL-MCNC: 44 MG/DL (ref 5–25)
CALCIUM SERPL-MCNC: 8.9 MG/DL (ref 8.3–10.1)
CALCIUM SERPL-MCNC: 9.1 MG/DL (ref 8.3–10.1)
CHLORIDE SERPL-SCNC: 108 MMOL/L (ref 100–108)
CHLORIDE SERPL-SCNC: 112 MMOL/L (ref 100–108)
CK MB SERPL-MCNC: 1.3 % (ref 0–2.5)
CK MB SERPL-MCNC: 8.6 NG/ML (ref 0–5)
CK MB SERPL-MCNC: 9.5 NG/ML (ref 0–5)
CK MB SERPL-MCNC: <1 % (ref 0–2.5)
CK SERPL-CCNC: 1064 U/L (ref 26–192)
CK SERPL-CCNC: 677 U/L (ref 26–192)
CLARITY UR: CLEAR
CO2 SERPL-SCNC: 25 MMOL/L (ref 21–32)
CO2 SERPL-SCNC: 28 MMOL/L (ref 21–32)
COLOR UR: YELLOW
CREAT SERPL-MCNC: 1.65 MG/DL (ref 0.6–1.3)
CREAT SERPL-MCNC: 1.89 MG/DL (ref 0.6–1.3)
EOSINOPHIL # BLD AUTO: 0.03 THOUSAND/ΜL (ref 0–0.61)
EOSINOPHIL NFR BLD AUTO: 0 % (ref 0–6)
ERYTHROCYTE [DISTWIDTH] IN BLOOD BY AUTOMATED COUNT: 14.1 % (ref 11.6–15.1)
ERYTHROCYTE [DISTWIDTH] IN BLOOD BY AUTOMATED COUNT: 14.4 % (ref 11.6–15.1)
GFR SERPL CREATININE-BSD FRML MDRD: 24 ML/MIN/1.73SQ M
GFR SERPL CREATININE-BSD FRML MDRD: 28 ML/MIN/1.73SQ M
GLUCOSE SERPL-MCNC: 107 MG/DL (ref 65–140)
GLUCOSE SERPL-MCNC: 82 MG/DL (ref 65–140)
GLUCOSE UR STRIP-MCNC: NEGATIVE MG/DL
HCT VFR BLD AUTO: 38.7 % (ref 34.8–46.1)
HCT VFR BLD AUTO: 40.1 % (ref 34.8–46.1)
HGB BLD-MCNC: 12.3 G/DL (ref 11.5–15.4)
HGB BLD-MCNC: 12.5 G/DL (ref 11.5–15.4)
HGB UR QL STRIP.AUTO: ABNORMAL
IMM GRANULOCYTES # BLD AUTO: 0.02 THOUSAND/UL (ref 0–0.2)
IMM GRANULOCYTES NFR BLD AUTO: 0 % (ref 0–2)
INR PPP: 1.17 (ref 0.86–1.17)
INR PPP: 1.2 (ref 0.86–1.17)
KETONES UR STRIP-MCNC: NEGATIVE MG/DL
LACTATE SERPL-SCNC: 2.2 MMOL/L (ref 0.5–2)
LACTATE SERPL-SCNC: 2.5 MMOL/L (ref 0.5–2)
LACTATE SERPL-SCNC: 2.9 MMOL/L (ref 0.5–2)
LEUKOCYTE ESTERASE UR QL STRIP: NEGATIVE
LIPASE SERPL-CCNC: 183 U/L (ref 73–393)
LYMPHOCYTES # BLD AUTO: 1.55 THOUSANDS/ΜL (ref 0.6–4.47)
LYMPHOCYTES NFR BLD AUTO: 18 % (ref 14–44)
MAGNESIUM SERPL-MCNC: 2.4 MG/DL (ref 1.6–2.6)
MCH RBC QN AUTO: 30.9 PG (ref 26.8–34.3)
MCH RBC QN AUTO: 31.2 PG (ref 26.8–34.3)
MCHC RBC AUTO-ENTMCNC: 31.2 G/DL (ref 31.4–37.4)
MCHC RBC AUTO-ENTMCNC: 31.8 G/DL (ref 31.4–37.4)
MCV RBC AUTO: 98 FL (ref 82–98)
MCV RBC AUTO: 99 FL (ref 82–98)
MONOCYTES # BLD AUTO: 0.87 THOUSAND/ΜL (ref 0.17–1.22)
MONOCYTES NFR BLD AUTO: 10 % (ref 4–12)
NEUTROPHILS # BLD AUTO: 6.05 THOUSANDS/ΜL (ref 1.85–7.62)
NEUTS SEG NFR BLD AUTO: 71 % (ref 43–75)
NITRITE UR QL STRIP: NEGATIVE
NON-SQ EPI CELLS URNS QL MICRO: ABNORMAL /HPF
NRBC BLD AUTO-RTO: 0 /100 WBCS
NT-PROBNP SERPL-MCNC: 4732 PG/ML
PH UR STRIP.AUTO: 6 [PH]
PLATELET # BLD AUTO: 206 THOUSANDS/UL (ref 149–390)
PLATELET # BLD AUTO: 214 THOUSANDS/UL (ref 149–390)
PMV BLD AUTO: 10 FL (ref 8.9–12.7)
PMV BLD AUTO: 9.6 FL (ref 8.9–12.7)
POTASSIUM SERPL-SCNC: 3.6 MMOL/L (ref 3.5–5.3)
POTASSIUM SERPL-SCNC: 3.6 MMOL/L (ref 3.5–5.3)
PROT SERPL-MCNC: 7 G/DL (ref 6.4–8.2)
PROT UR STRIP-MCNC: NEGATIVE MG/DL
PROTHROMBIN TIME: 14.3 SECONDS (ref 11.8–14.2)
PROTHROMBIN TIME: 14.6 SECONDS (ref 11.8–14.2)
RBC # BLD AUTO: 3.94 MILLION/UL (ref 3.81–5.12)
RBC # BLD AUTO: 4.05 MILLION/UL (ref 3.81–5.12)
RBC #/AREA URNS AUTO: ABNORMAL /HPF
SODIUM SERPL-SCNC: 148 MMOL/L (ref 136–145)
SODIUM SERPL-SCNC: 150 MMOL/L (ref 136–145)
SP GR UR STRIP.AUTO: 1.02 (ref 1–1.03)
TROPONIN I SERPL-MCNC: 0.08 NG/ML
TSH SERPL DL<=0.05 MIU/L-ACNC: 5.94 UIU/ML (ref 0.36–3.74)
UROBILINOGEN UR QL STRIP.AUTO: 0.2 E.U./DL
WBC # BLD AUTO: 8.54 THOUSAND/UL (ref 4.31–10.16)
WBC # BLD AUTO: 8.56 THOUSAND/UL (ref 4.31–10.16)
WBC #/AREA URNS AUTO: ABNORMAL /HPF

## 2019-03-20 PROCEDURE — 70551 MRI BRAIN STEM W/O DYE: CPT

## 2019-03-20 PROCEDURE — 80053 COMPREHEN METABOLIC PANEL: CPT | Performed by: EMERGENCY MEDICINE

## 2019-03-20 PROCEDURE — 74177 CT ABD & PELVIS W/CONTRAST: CPT

## 2019-03-20 PROCEDURE — 82553 CREATINE MB FRACTION: CPT | Performed by: INTERNAL MEDICINE

## 2019-03-20 PROCEDURE — 84443 ASSAY THYROID STIM HORMONE: CPT | Performed by: EMERGENCY MEDICINE

## 2019-03-20 PROCEDURE — 72125 CT NECK SPINE W/O DYE: CPT

## 2019-03-20 PROCEDURE — 71260 CT THORAX DX C+: CPT

## 2019-03-20 PROCEDURE — 80048 BASIC METABOLIC PNL TOTAL CA: CPT | Performed by: INTERNAL MEDICINE

## 2019-03-20 PROCEDURE — 82550 ASSAY OF CK (CPK): CPT | Performed by: INTERNAL MEDICINE

## 2019-03-20 PROCEDURE — 83880 ASSAY OF NATRIURETIC PEPTIDE: CPT | Performed by: EMERGENCY MEDICINE

## 2019-03-20 PROCEDURE — 93005 ELECTROCARDIOGRAM TRACING: CPT

## 2019-03-20 PROCEDURE — 87040 BLOOD CULTURE FOR BACTERIA: CPT | Performed by: EMERGENCY MEDICINE

## 2019-03-20 PROCEDURE — 85610 PROTHROMBIN TIME: CPT | Performed by: EMERGENCY MEDICINE

## 2019-03-20 PROCEDURE — 85027 COMPLETE CBC AUTOMATED: CPT | Performed by: NURSE PRACTITIONER

## 2019-03-20 PROCEDURE — 83605 ASSAY OF LACTIC ACID: CPT | Performed by: INTERNAL MEDICINE

## 2019-03-20 PROCEDURE — 99285 EMERGENCY DEPT VISIT HI MDM: CPT

## 2019-03-20 PROCEDURE — 85730 THROMBOPLASTIN TIME PARTIAL: CPT | Performed by: EMERGENCY MEDICINE

## 2019-03-20 PROCEDURE — 83735 ASSAY OF MAGNESIUM: CPT | Performed by: EMERGENCY MEDICINE

## 2019-03-20 PROCEDURE — 83605 ASSAY OF LACTIC ACID: CPT | Performed by: EMERGENCY MEDICINE

## 2019-03-20 PROCEDURE — 83690 ASSAY OF LIPASE: CPT | Performed by: EMERGENCY MEDICINE

## 2019-03-20 PROCEDURE — 93880 EXTRACRANIAL BILAT STUDY: CPT

## 2019-03-20 PROCEDURE — 85730 THROMBOPLASTIN TIME PARTIAL: CPT | Performed by: NURSE PRACTITIONER

## 2019-03-20 PROCEDURE — 85025 COMPLETE CBC W/AUTO DIFF WBC: CPT | Performed by: EMERGENCY MEDICINE

## 2019-03-20 PROCEDURE — 82553 CREATINE MB FRACTION: CPT | Performed by: EMERGENCY MEDICINE

## 2019-03-20 PROCEDURE — 82550 ASSAY OF CK (CPK): CPT | Performed by: EMERGENCY MEDICINE

## 2019-03-20 PROCEDURE — 84484 ASSAY OF TROPONIN QUANT: CPT | Performed by: EMERGENCY MEDICINE

## 2019-03-20 PROCEDURE — 36415 COLL VENOUS BLD VENIPUNCTURE: CPT | Performed by: EMERGENCY MEDICINE

## 2019-03-20 PROCEDURE — 70450 CT HEAD/BRAIN W/O DYE: CPT

## 2019-03-20 PROCEDURE — 96374 THER/PROPH/DIAG INJ IV PUSH: CPT

## 2019-03-20 PROCEDURE — 81001 URINALYSIS AUTO W/SCOPE: CPT | Performed by: EMERGENCY MEDICINE

## 2019-03-20 PROCEDURE — 99291 CRITICAL CARE FIRST HOUR: CPT | Performed by: INTERNAL MEDICINE

## 2019-03-20 PROCEDURE — 85610 PROTHROMBIN TIME: CPT | Performed by: INTERNAL MEDICINE

## 2019-03-20 RX ORDER — HEPARIN SODIUM 5000 [USP'U]/ML
5000 INJECTION, SOLUTION INTRAVENOUS; SUBCUTANEOUS EVERY 8 HOURS SCHEDULED
Status: DISCONTINUED | OUTPATIENT
Start: 2019-03-20 | End: 2019-03-20 | Stop reason: SDUPTHER

## 2019-03-20 RX ORDER — DEXTROSE AND SODIUM CHLORIDE 5; .45 G/100ML; G/100ML
100 INJECTION, SOLUTION INTRAVENOUS CONTINUOUS
Status: DISCONTINUED | OUTPATIENT
Start: 2019-03-20 | End: 2019-03-21

## 2019-03-20 RX ORDER — ASPIRIN 300 MG/1
300 SUPPOSITORY RECTAL DAILY
Status: DISCONTINUED | OUTPATIENT
Start: 2019-03-21 | End: 2019-03-22

## 2019-03-20 RX ORDER — HEPARIN SODIUM 10000 [USP'U]/100ML
3-20 INJECTION, SOLUTION INTRAVENOUS
Status: DISCONTINUED | OUTPATIENT
Start: 2019-03-20 | End: 2019-03-21

## 2019-03-20 RX ORDER — SODIUM CHLORIDE 9 MG/ML
125 INJECTION, SOLUTION INTRAVENOUS CONTINUOUS
Status: DISCONTINUED | OUTPATIENT
Start: 2019-03-20 | End: 2019-03-20

## 2019-03-20 RX ORDER — BACLOFEN 10 MG/1
10 TABLET ORAL 3 TIMES DAILY
COMMUNITY
End: 2019-03-22 | Stop reason: HOSPADM

## 2019-03-20 RX ORDER — LORAZEPAM 2 MG/ML
0.5 INJECTION INTRAMUSCULAR ONCE
Status: COMPLETED | OUTPATIENT
Start: 2019-03-20 | End: 2019-03-20

## 2019-03-20 RX ADMIN — LORAZEPAM 0.5 MG: 2 INJECTION, SOLUTION INTRAMUSCULAR; INTRAVENOUS at 11:49

## 2019-03-20 RX ADMIN — SODIUM CHLORIDE 125 ML/HR: 0.9 INJECTION, SOLUTION INTRAVENOUS at 11:50

## 2019-03-20 RX ADMIN — DEXTROSE AND SODIUM CHLORIDE 100 ML/HR: 5; .45 INJECTION, SOLUTION INTRAVENOUS at 20:36

## 2019-03-20 RX ADMIN — IODIXANOL 100 ML: 320 INJECTION, SOLUTION INTRAVASCULAR at 09:53

## 2019-03-20 RX ADMIN — HEPARIN SODIUM AND DEXTROSE 12 UNITS/KG/HR: 10000; 5 INJECTION INTRAVENOUS at 22:12

## 2019-03-20 NOTE — ASSESSMENT & PLAN NOTE
Creatinine is elevated, patient also with CPK elevation status post fall, suspect mild rhabdomyolysis, follow up repeat lab testing continue IV fluid

## 2019-03-20 NOTE — ASSESSMENT & PLAN NOTE
Patient with mild hyponatremia, suspect component of free water deficit, follow up repeat labs and continue to monitor    It is possible that this is contributing to her altered mental status currently

## 2019-03-20 NOTE — ASSESSMENT & PLAN NOTE
Possible acute CVA, initiate stroke pathway  Continue with step-down monitoring, currently patient is hemodynamically stable but has persistent altered mental status, is responsive to painful stimuli only, is moving all extremities but does not follow commands

## 2019-03-20 NOTE — H&P
H&P- Kari Brochure 7/3/1931, 80 y o  female MRN: [id number]    Unit/Bed#:  Encounter: [id number]    Primary Care Provider: Beryle Perry, DO   Date and time admitted to hospital: 3/20/2019  9:18 AM        * Acute metabolic encephalopathy  Assessment & Plan  Possible acute CVA, initiate stroke pathway  Continue with step-down monitoring, currently patient is hemodynamically stable but has persistent altered mental status, is responsive to painful stimuli only, is moving all extremities but does not follow commands  Acute kidney injury [facility])  Assessment & Plan  Creatinine is elevated, patient also with CPK elevation status post fall, suspect mild rhabdomyolysis, follow up repeat lab testing continue IV fluid  Hypernatremia  Assessment & Plan  Patient with mild hyponatremia, suspect component of free water deficit, follow up repeat labs and continue to monitor    It is possible that this is contributing to her altered mental status currently  CKD (chronic kidney disease) stage 3, GFR 30-59 ml/min ([facility])  Assessment & Plan  Avoid further nephrotoxins  Restless leg syndrome  Assessment & Plan  P o  Medication on hold    NSTEMI (non-ST elevated myocardial infarction) [facility])  Assessment & Plan  Mild troponin elevation, likely secondary to acute kidney injury and traumatic rhabdomyolysis    Traumatic rhabdomyolysis ([facility] [address] )  Assessment & Plan  IV fluid support, follow up repeat CPK        VTE Prophylaxis: Heparin  / sequential compression device       Anticipated Length of Stay:  Patient will be admitted on an Inpatient basis with an anticipated length of stay of  greater than 2 midnights  Justification for Hospital Stay:  Patient with severe acute metabolic encephalopathy, not conscious  Total Time for Visit, including Counseling / Coordination of Care:  Critical care time 45 minutes    Chief Complaint:   Altered mental status    History of Present Illness:     Kari Brochure is a 80 y o  female who presents with altered mental status, patient was last seen normal at approximately 19:00 on 3/19/2019, patient found this a m  At approximately 08:00 lying on the floor of her house, patient was difficult to arouse, normally is independent and able to ambulate independently  No recent illness, patient's family checks on her frequently, was seen earlier in the week at urgent care for left shoulder injury/sprain, only new medication was baclofen  Family reports that there is no possibility of missed dosing of the patient's baclofen because the family controls all medications and doses them individually  Patient cannot provide any history    Review of Systems:    Review of Systems   Unable to perform ROS: Mental status change       Past Medical and Surgical History:     Past Medical History:   Diagnosis Date    Cardiac disease     valve replacement    Dropfoot     right    History of echocardiogram 04/20/2016    EF 75%, Hyperdynamic LVSF  Mild concentric LVH  Normal functioning bioprosthetic AV  Trace MR  Mild pulm htn   HTN (hypertension)     Hyperlipidemia     Lymphedema     Restless leg syndrome     Right bundle branch block     Stroke ([facility] [address] )     Supraventricular tachycardia [facility])        Past Surgical History:   Procedure Laterality Date    AORTIC VALVE REPLACEMENT  11/25/2002    Tissue AVR #21    AORTIC VALVULOPLASTY  11/25/2002    Bovine Pericardial heart valve    BACK SURGERY      CARDIAC CATHETERIZATION  11/22/2002    Sever critical Aortic stenosis  Pulm Htn  Normal coronary arteries   FRACTURE SURGERY      right hand    HAND SURGERY      right hand    HYSTERECTOMY      ROTATOR CUFF REPAIR Right        Meds/Allergies:    Prior to Admission medications    Medication Sig Start Date End Date Taking?  Authorizing Provider   apixaban (ELIQUIS) 2 5 mg Take 1 tablet (2 5 mg total) by mouth 2 (two) times a day 7/2/18  Yes Zak Kent MD   atorvastatin (LIPITOR) 40 mg tablet Take 1 tablet (40 mg total) by mouth every evening 8/9/18  Yes Sesar Holloway MD   baclofen 10 mg tablet Take 10 mg by mouth 3 (three) times a day   Yes Historical Provider, MD   furosemide (LASIX) 40 mg tablet 40 mg Take 2 tabs  daily 1/5/19  Yes Historical Provider, MD   pregabalin (LYRICA) 25 mg capsule Take 50 mg by mouth 2 (two) times a day    Yes Historical Provider, MD   rOPINIRole (REQUIP) 0 5 mg tablet Take 1 tablet (0 5 mg total) by mouth daily at bedtime  Patient taking differently: Take 2 mg by mouth 2 (two) times a day   7/2/18  Yes Godfrey Willoughby MD   rOPINIRole (REQUIP) 2 mg tablet 0 5 mg 3/12/19   Historical Provider, MD   amitriptyline (ELAVIL) 25 mg tablet 25 mg 3/3/19 3/20/19  Historical Provider, MD   furosemide (LASIX) 20 mg tablet Take 40 mg by mouth daily  3/20/19  Historical Provider, MD   oxyCODONE-acetaminophen (PERCOCET) 5-325 mg per tablet Take 1 tablet by mouth every 4 (four) hours as needed for moderate pain  3/20/19  Historical Provider, MD     I have reviewed home medications with patient family member  Allergies: No Known Allergies    Social History:     Marital Status:    Substance Use History:   Social History     Substance and Sexual Activity   Alcohol Use Never    Frequency: Never     Social History     Tobacco Use   Smoking Status Never Smoker   Smokeless Tobacco Never Used     Social History     Substance and Sexual Activity   Drug Use No       Family History:    non-contributory    Physical Exam:     Vitals:   Blood Pressure: 137/71 (03/20/19 1703)  Pulse: 91 (03/20/19 1703)  Temperature: 98 4 °F (36 9 °C) (03/20/19 1703)  Temp Source: Tympanic (03/20/19 1703)  Respirations: 22 (03/20/19 1703)  Weight - Scale: 72 2 kg (159 lb 2 8 oz) (03/20/19 1703)  SpO2: 94 % (03/20/19 1703)    Physical Exam   Constitutional: No distress  Eyes: Pupils are equal, round, and reactive to light  Lids are normal  Right pupil is round and reactive  Left pupil is round and reactive   Pupils are equal    Cardiovascular:   Murmur heard  Pulmonary/Chest: No stridor  No respiratory distress  She has no wheezes  She has no rales  No respiratory distress, patient does have an abnormal breathing pattern   Abdominal: Soft  Bowel sounds are normal  She exhibits no distension  There is no tenderness  Neurological: She is unresponsive  She displays no atrophy and no tremor  She exhibits abnormal muscle tone (Decreased tone)  GCS eye subscore is 1  GCS verbal subscore is 2  GCS motor subscore is 4  She displays no Babinski's sign on the right side  She displays no Babinski's sign on the left side  Skin: She is not diaphoretic  There is erythema (Symmetric bilateral lower extremity erythema, patient's family states this is chronic)  Nursing note and vitals reviewed  Additional Data:     Lab Results: I have personally reviewed pertinent reports  Results from last 7 days   Lab Units 03/20/19  1013   WBC Thousand/uL 8 56   HEMOGLOBIN g/dL 12 3   HEMATOCRIT % 38 7   PLATELETS Thousands/uL 206   NEUTROS PCT % 71   LYMPHS PCT % 18   MONOS PCT % 10   EOS PCT % 0     Results from last 7 days   Lab Units 03/20/19  1013   POTASSIUM mmol/L 3 6   CHLORIDE mmol/L 108   CO2 mmol/L 28   BUN mg/dL 44*   CREATININE mg/dL 1 89*   CALCIUM mg/dL 9 1   ALK PHOS U/L 110   ALT U/L 44   AST U/L 45     Results from last 7 days   Lab Units 03/20/19  1013   INR  1 20*       Imaging: I have personally reviewed pertinent reports  Xr Scapula Left    Result Date: 3/18/2019  Narrative: LEFT SCAPULA INDICATION:   W19  XXXA: Unspecified fall, initial encounter Y31 427: Other muscle spasm  COMPARISON:  None VIEWS:  XR SCAPULA LEFT FINDINGS: There is no evidence of acute fracture  Advanced degenerative changes of the glenohumeral joint with marked joint space narrowing, subchondral sclerosis and prominent osteophytes  No lytic or blastic lesions are identified  Status post median sternotomy    Partially imaged aortic valve prosthesis in place  Impression: No acute osseous abnormality  Advanced glenohumeral osteoarthrosis  Workstation performed: [id number]     Xr Shoulder 2+ Vw Left    Result Date: 3/18/2019  Narrative: LEFT SHOULDER INDICATION:   W19  XXXA: Unspecified fall, initial encounter V66 451: Other muscle spasm  COMPARISON:  None VIEWS:  XR SHOULDER 2+ VW LEFT FINDINGS: There is no acute fracture or dislocation  Advanced degenerative changes of the glenohumeral joint with marked joint space narrowing, subchondral sclerosis and prominent osteophytes  Mild degenerative changes of the acromioclavicular joint  Narrowing of the subacromial space with superior migration of the humeral head, suggestive of chronic rotator cuff tear  No lytic or blastic lesions are seen  Status post median sternotomy  An aortic valve prosthesis is seen in place  Impression: No acute osseous abnormality  Advanced glenohumeral osteoarthrosis  Possible chronic rotator cuff tear  Workstation performed: [id number]     Ct Head Without Contrast    Result Date: 3/20/2019  Narrative: CT BRAIN - WITHOUT CONTRAST INDICATION:   fall on thinners  COMPARISON:  CT head 6/30/2018  TECHNIQUE:  CT examination of the brain was performed  In addition to axial images, coronal 2D reformatted images were created and submitted for interpretation  Radiation dose length product (DLP) for this visit:  871 44 mGy-cm   This examination, like all CT scans performed in the [place], was performed utilizing techniques to minimize radiation dose exposure, including the use of iterative  reconstruction and automated exposure control  IMAGE QUALITY:  Diagnostic  FINDINGS: PARENCHYMA: Decreased attenuation is noted in periventricular and subcortical white matter demonstrating an appearance that is statistically most likely to represent advanced small vessel ischemic change; these findings have progressed in the interim    Chronic lacunar infarctions in the centrum semiovale and basal ganglia are noted  No CT signs of acute infarction  No intracranial mass, mass effect or midline shift  No acute parenchymal hemorrhage  VENTRICLES AND EXTRA-AXIAL SPACES:  Ventricles and extra-axial CSF spaces are prominent commensurate with the degree of volume loss  No hydrocephalus  No acute extra-axial hemorrhage  VISUALIZED ORBITS AND PARANASAL SINUSES:  Unremarkable  CALVARIUM AND EXTRACRANIAL SOFT TISSUES:  Normal      Impression: 1  No acute intracranial abnormality  2   Progression of small vessel ischemic change  Workstation performed: [id number]     Ct Cervical Spine Without Contrast    Result Date: 3/20/2019  Narrative: CT CERVICAL SPINE - WITHOUT CONTRAST INDICATION:   fall  COMPARISON:  Cervical spine radiographs on 7/22/2008  TECHNIQUE:  CT examination of the cervical spine was performed without intravenous contrast   Contiguous axial images were obtained  Sagittal and coronal reconstructions were performed  Radiation dose length product (DLP) for this visit:  354 71 mGy-cm   This examination, like all CT scans performed in the [facility], was performed utilizing techniques to minimize radiation dose exposure, including the use of iterative  reconstruction and automated exposure control  IMAGE QUALITY:  Diagnostic  FINDINGS: ALIGNMENT:  Grade 1 anterolisthesis of C3 on C4  No evidence of traumatic malalignment  VERTEBRAL BODIES:  No fracture  DEGENERATIVE CHANGES:  Moderate multilevel cervical degenerative changes are noted, especially at C1-C2, C4-C5, C5-C6 and C6-C7  Multilevel mildly prominent disc osteophyte complex at C4-C5 with associated moderate spinal canal stenosis  No critical central canal stenosis  PREVERTEBRAL AND PARASPINAL SOFT TISSUES:  Unremarkable  THORACIC INLET:  Normal      Impression: No cervical spine fracture or traumatic malalignment    Workstation performed: [id number]     Ct Chest Abdomen Pelvis W Contrast    Result Date: 3/20/2019  Narrative: CT CHEST, ABDOMEN AND PELVIS WITH IV CONTRAST INDICATION:   fall on thinners  COMPARISON:  July 19, 2018 TECHNIQUE: CT examination of the chest, abdomen and pelvis was performed  Axial, sagittal, and coronal 2D reformatted images were created from the source data and submitted for interpretation  Radiation dose length product (DLP) for this visit:  557 3 mGy-cm   This examination, like all CT scans performed in the [facility], was performed utilizing techniques to minimize radiation dose exposure, including the use of iterative reconstruction and automated exposure control  IV Contrast:  100 mL of iodixanol (VISIPAQUE)   unfortunately, the contrast infiltrated in the left arm and therefore this study is unenhanced  Enteric Contrast: Enteric contrast was not administered  FINDINGS: CHEST LUNGS:  There is some motion artifact which mildly limits evaluation  There are no dominant lung masses  The airways are clear  There is a small area of opacity within the posterior right upper lobe, adjacent to the major fissure  Probably atelectasis as well  PLEURA:  There is no pneumothorax  No significant pleural fluid appreciated  HEART/GREAT VESSELS:  There are postop changes  Heart is mildly enlarged  Aortic valve prosthesis is present  No pericardial effusion  MEDIASTINUM AND NITIN:  There is hiatal hernia  No acute mediastinal injury appreciated  There is one pretracheal lymph node which seems abnormally large measuring 22 x 17 mm on image 17 series 2  The etiology is uncertain  CHEST WALL AND LOWER NECK:   Unremarkable  ABDOMEN LIVER/BILIARY TREE:  Limited by external artifacts  The extravasated contrast in the left arm creates beam hardening artifact  There does not appear to be any evidence of liver laceration or perihepatic fluid  No gross evidence of liver masses  GALLBLADDER:  No calcified gallstones  No pericholecystic inflammatory change  SPLEEN:  Normal size  Grossly intact PANCREAS:  Unremarkable  ADRENAL GLANDS:  Unremarkable  KIDNEYS/URETERS:  Unremarkable  No hydronephrosis  STOMACH AND BOWEL:  Unremarkable  APPENDIX:  No findings to suggest appendicitis  ABDOMINOPELVIC CAVITY:  No ascites or free intraperitoneal air  No lymphadenopathy  VESSELS:  Atherosclerotic changes are present  No evidence of aneurysm  PELVIS REPRODUCTIVE ORGANS:  Probable hysterectomy or atrophy of the uterus  URINARY BLADDER:  Unremarkable  ABDOMINAL WALL/INGUINAL REGIONS:  Unremarkable  OSSEOUS STRUCTURES:  There is osteoarthritis of the shoulder joints  There appears to be a left shoulder joint effusion  Posterior lumbar fusion hardware is present from L3 to S1  There is significant artifact related to the hardware which limits assessment of the spine in this area  There is severe disc degeneration in the lower thoracic and lumbar spine at multiple levels  There does not appear to be any definitive evidence of acute compression fractures  Pelvis and hips appear intact  Apparent cortical malalignment of both of the proximal humeral shafts is felt to be misregistration artifact secondary to motion of the patient  Similar finding seen involving both scapulae  Impression: Limited study without any intravenous contrast and with motion artifact noted  No definitive evidence of acute traumatic injuries of the chest, abdomen, or pelvis  Postop changes of the heart and lumbar spine  Extravasated contrast in the left upper extremity  Clinical management recommended  Small hiatal hernia  Workstation performed: Brook Coello / Epic Records Reviewed:  Yes

## 2019-03-20 NOTE — ED PROVIDER NOTES
History  Chief Complaint   Patient presents with    Fall     trauma evaluation  26-year-old female arrives by EMS from home with a same level fall and unresponsiveness  She had her she was last seen at 1900 hours last night she was found on the floor in her home today  Upon the EMS arrival she was answering only to her name Accu-Chemila was 98 vital signs heart rate  in AFib respiratory 16 with an end-tidal of 38 blood pressure of 178/100 she her initial sats were 80s on room air she was placed on 4 L nasal cannula and is on at 96%  Patient did complain of some left shoulder pain yesterday  Patient response to of voice she follows simple commands she is slightly confused patient was placed in C-collar and transported here for further evaluation she is anticoagulated on Eliquis for atrial fibrillation  Further history is not obtainable  Family arrives a further history is obtained patient was in her usual state of health last night last checked on at about 730  The entire family is involved in her care on and check on her night and 1st thing in the morning she has started melatonin at bedtime 2 weeks ago and a new medication baclofen 10 mg 3 times a day was started 2 days ago  Family sats up her medications she has been compliant with all her medications  Prior to Admission Medications   Prescriptions Last Dose Informant Patient Reported? Taking? apixaban (ELIQUIS) 2 5 mg 3/19/2019 at 1900  No Yes   Sig: Take 1 tablet (2 5 mg total) by mouth 2 (two) times a day   atorvastatin (LIPITOR) 40 mg tablet 3/19/2019 at 0800  No Yes   Sig: Take 1 tablet (40 mg total) by mouth every evening   baclofen 10 mg tablet 3/19/2019 at 1900  Yes Yes   Sig: Take 10 mg by mouth 3 (three) times a day   furosemide (LASIX) 40 mg tablet 3/19/2019 at 0800  Yes Yes   Si mg Take 2 tabs   daily   pregabalin (LYRICA) 25 mg capsule 3/19/2019 at 85541  Yes Yes   Sig: Take 50 mg by mouth 2 (two) times a day rOPINIRole (REQUIP) 0 5 mg tablet Not Taking at 1900  No No   Sig: Take 1 tablet (0 5 mg total) by mouth daily at bedtime   Patient not taking: Reported on 3/20/2019   rOPINIRole (REQUIP) 2 mg tablet 3/19/2019 at 1900  Yes Yes   Si 5 mg      Facility-Administered Medications: None       Past Medical History:   Diagnosis Date    Arthritis     L shoulder,fingers    Cardiac disease     valve replacement    Carpal tunnel syndrome, bilateral     Dropfoot     right    History of echocardiogram 2016    EF 75%, Hyperdynamic LVSF  Mild concentric LVH  Normal functioning bioprosthetic AV  Trace MR  Mild pulm htn   HTN (hypertension)     Hyperlipidemia     Lymphedema     Restless leg syndrome     Right bundle branch block     Stroke ([facility] [address] )     Supraventricular tachycardia [facility])        Past Surgical History:   Procedure Laterality Date    AORTIC VALVE REPLACEMENT  2002    Tissue AVR #21    AORTIC VALVULOPLASTY  2002    Bovine Pericardial heart valve    BACK SURGERY      CARDIAC CATHETERIZATION  2002    Sever critical Aortic stenosis  Pulm Htn  Normal coronary arteries   HAND SURGERY      right hand    HYSTERECTOMY      ROTATOR CUFF REPAIR Right     VASCULAR SURGERY      bilateral vein ligation & stripping       Family History   Problem Relation Age of Onset    No Known Problems Mother     No Known Problems Father     No Known Problems Sister     No Known Problems Brother     Hyperlipidemia Son     Hypertension Son     No Known Problems Sister     No Known Problems Brother     Hyperlipidemia Son     Hypertension Son     Hyperlipidemia Son     Hypertension Son      I have reviewed and agree with the history as documented      Social History     Tobacco Use    Smoking status: Never Smoker    Smokeless tobacco: Never Used   Substance Use Topics    Alcohol use: Never     Frequency: Never    Drug use: No        Review of Systems   Unable to perform ROS: Mental status change       Physical Exam  Physical Exam   Constitutional: She appears well-developed and well-nourished  No distress  HENT:   Head: Normocephalic and atraumatic  Right Ear: External ear normal    Left Ear: External ear normal    Nose: Nose normal    Mouth/Throat: Oropharynx is clear and moist  No oropharyngeal exudate  TMS pale   Eyes: Pupils are equal, round, and reactive to light  Conjunctivae and EOM are normal  Right eye exhibits no discharge  Left eye exhibits no discharge  No scleral icterus  Neck:   No midline or paraspinsous tenderness   Cardiovascular: Normal rate  afib   Pulmonary/Chest: Effort normal  She exhibits no tenderness  diminished   Abdominal: Soft  Bowel sounds are normal  She exhibits no distension and no mass  There is no tenderness  There is no rebound and no guarding  Back no midline T or L-spine tenderness no CVA tenderness no evidence of ecchymosis  Genitourinary: Rectal exam shows guaiac negative stool  Genitourinary Comments: Rectal chaperoned by Mo Maher an normal tone brown stool heme-negative controls intact   Musculoskeletal:   intially decreased movement of rt leg on recheck SHEN   Neurological: She displays normal reflexes  No cranial nerve deficit or sensory deficit  She exhibits normal muscle tone  Coordination normal    E3  V4 M6; somulent;    Skin: Capillary refill takes less than 2 seconds  Venous stasis changes to bilateral lowerextremities  Vitals reviewed        Vital Signs  ED Triage Vitals   Temperature Pulse Respirations Blood Pressure SpO2   03/20/19 0924 03/20/19 0924 03/20/19 0924 03/20/19 0929 03/20/19 0924   98 2 °F (36 8 °C) 81 18 129/87 95 %      Temp Source Heart Rate Source Patient Position - Orthostatic VS BP Location FiO2 (%)   03/20/19 0924 03/20/19 0924 03/20/19 0924 03/20/19 0929 --   Temporal Monitor Lying Left arm       Pain Score       03/20/19 0939       No Pain           Vitals:    03/20/19 1530 03/20/19 1600 03/20/19 1703 03/20/19 1955   BP: 144/67 136/64 137/71 150/93   Pulse: 80 67 91 92   Patient Position - Orthostatic VS: Lying Lying Lying Lying         Visual Acuity  Visual Acuity      Most Recent Value   L Pupil Size (mm)  3   R Pupil Size (mm)  3          ED Medications  Medications   dextrose 5 % and sodium chloride 0 45 % infusion (100 mL/hr Intravenous New Bag 3/20/19 2036)   aspirin rectal suppository 300 mg (has no administration in time range)   heparin (porcine) 25,000 units in 250 mL infusion (premix) (has no administration in time range)   iodixanol (VISIPAQUE) 320 MG/ML injection 100 mL (100 mL Intravenous Given 3/20/19 0972)   LORazepam (ATIVAN) 2 mg/mL injection 0 5 mg (0 5 mg Intravenous Given 3/20/19 1149)       Diagnostic Studies  Results Reviewed     Procedure Component Value Units Date/Time    Urine Microscopic [[id number]]  (Abnormal) Collected:  03/20/19 1205    Lab Status:  Final result Specimen:  Urine, Straight Cath Updated:  03/20/19 1224     RBC, UA 0-1 /hpf      WBC, UA None Seen /hpf      Epithelial Cells Occasional /hpf      Bacteria, UA Occasional /hpf     UA w Reflex to Microscopic w Reflex to Culture [[id number]]  (Abnormal) Collected:  03/20/19 1205    Lab Status:  Final result Specimen:  Urine, Straight Cath Updated:  03/20/19 1211     Color, UA Yellow     Clarity, UA Clear     Specific [place], UA 1 020     pH, UA 6 0     Leukocytes, UA Negative     Nitrite, UA Negative     Protein, UA Negative mg/dl      Glucose, UA Negative mg/dl      Ketones, UA Negative mg/dl      Urobilinogen, UA 0 2 E U /dl      Bilirubin, UA Negative     Blood, UA Trace-Intact    CKMB [[id number]]  (Abnormal) Collected:  03/20/19 1013    Lab Status:  Final result Specimen:  Blood from Arm, Right Updated:  03/20/19 1055     CK-MB Index 1 3 %      CK-MB 8 6 ng/mL     TSH [[id number]]  (Abnormal) Collected:  03/20/19 1013    Lab Status:  Final result Specimen:  Blood from Arm, Right Updated:  03/20/19 1055     TSH [facility] Evon Houser 5 937 uIU/mL     Narrative:       Patients undergoing fluorescein dye angiography may retain small amounts of fluorescein in the body for 48-72 hours post procedure  Samples containing fluorescein can produce falsely depressed TSH values  If the patient had this procedure,a specimen should be resubmitted post fluorescein clearance  Magnesium [[id number]]  (Normal) Collected:  03/20/19 1013    Lab Status:  Final result Specimen:  Blood from Arm, Right Updated:  03/20/19 1055     Magnesium 2 4 mg/dL     Lipase [[id number]]  (Normal) Collected:  03/20/19 1013    Lab Status:  Final result Specimen:  Blood from Arm, Right Updated:  03/20/19 1055     Lipase 183 u/L     CK Total with Reflex CKMB [[id number]]  (Abnormal) Collected:  03/20/19 1013    Lab Status:  Final result Specimen:  Blood from Arm, Right Updated:  03/20/19 1055     Total  U/L     B-type natriuretic peptide [[id number]]  (Abnormal) Collected:  03/20/19 1013    Lab Status:  Final result Specimen:  Blood from Arm, Right Updated:  03/20/19 1055     NT-proBNP 4,732 pg/mL     Lactic acid, plasma [[id number]]  (Abnormal) Collected:  03/20/19 1013    Lab Status:  Final result Specimen:  Blood from Arm, Right Updated:  03/20/19 1054     LACTIC ACID 2 2 mmol/L     Narrative:       Result may be elevated if tourniquet was used during collection      Troponin I [[id number]]  (Abnormal) Collected:  03/20/19 1013    Lab Status:  Final result Specimen:  Blood from Arm, Right Updated:  03/20/19 1053     Troponin I 0 08 ng/mL     Comprehensive metabolic panel [[id number]]  (Abnormal) Collected:  03/20/19 1013    Lab Status:  Final result Specimen:  Blood from Arm, Right Updated:  03/20/19 1037     Sodium 148 mmol/L      Potassium 3 6 mmol/L      Chloride 108 mmol/L      CO2 28 mmol/L      ANION GAP 12 mmol/L      BUN 44 mg/dL      Creatinine 1 89 mg/dL      Glucose 107 mg/dL      Calcium 9 1 mg/dL      AST 45 U/L      ALT 44 U/L      Alkaline Phosphatase 110 U/L Total Protein 7 0 g/dL      Albumin 3 6 g/dL      Total Bilirubin 0 60 mg/dL      eGFR 24 ml/min/1 73sq m     Narrative:       National Kidney Disease Education Program recommendations are as follows:  GFR calculation is accurate only with a steady state creatinine  Chronic Kidney disease less than 60 ml/min/1 73 sq  meters  Kidney failure less than 15 ml/min/1 73 sq  meters  Protime-INR [[id number]]  (Abnormal) Collected:  03/20/19 1013    Lab Status:  Final result Specimen:  Blood from Arm, Right Updated:  03/20/19 1031     Protime 14 6 seconds      INR 1 20    APTT [[id number]]  (Normal) Collected:  03/20/19 1013    Lab Status:  Final result Specimen:  Blood from Arm, Right Updated:  03/20/19 1031     PTT 34 seconds     CBC and differential [[id number]] Collected:  03/20/19 1013    Lab Status:  Final result Specimen:  Blood from Arm, Right Updated:  03/20/19 1022     WBC 8 56 Thousand/uL      RBC 3 94 Million/uL      Hemoglobin 12 3 g/dL      Hematocrit 38 7 %      MCV 98 fL      MCH 31 2 pg      MCHC 31 8 g/dL      RDW 14 1 %      MPV 10 0 fL      Platelets 815 Thousands/uL      nRBC 0 /100 WBCs      Neutrophils Relative 71 %      Immat GRANS % 0 %      Lymphocytes Relative 18 %      Monocytes Relative 10 %      Eosinophils Relative 0 %      Basophils Relative 1 %      Neutrophils Absolute 6 05 Thousands/µL      Immature Grans Absolute 0 02 Thousand/uL      Lymphocytes Absolute 1 55 Thousands/µL      Monocytes Absolute 0 87 Thousand/µL      Eosinophils Absolute 0 03 Thousand/µL      Basophils Absolute 0 04 Thousands/µL     Blood culture #1 [[id number]] Collected:  03/20/19 1013    Lab Status: In process Specimen:  Blood from Arm, Right Updated:  03/20/19 1017    Blood culture #2 [[id number]] Collected:  03/20/19 1013    Lab Status:   In process Specimen:  Blood from Hand, Right Updated:  03/20/19 1017                 MRI brain wo contrast   Final Result by Ruthann Avendaño MD (03/20 1945)      Bifrontal lobe regions of acute to subacute ischemia/infarct could be from embolic etiology  Volume loss/atrophy and microangiopathy  Findings were marked as "immediate"in Epic and will now be related to the ordering physician or covering clinical team by the radiology liaison  Workstation performed: [id number]         CT chest abdomen pelvis w contrast   Final Result by Doreen Partida MD (03/20 1032)      Limited study without any intravenous contrast and with motion artifact noted  No definitive evidence of acute traumatic injuries of the chest, abdomen, or pelvis  Postop changes of the heart and lumbar spine  Extravasated contrast in the left upper extremity  Clinical management recommended  Small hiatal hernia  Workstation performed: [id number]         CT head without contrast   Final Result by Min Paula MD (03/20 1024)      1  No acute intracranial abnormality  2   Progression of small vessel ischemic change  Workstation performed: [id number]         CT cervical spine without contrast   Final Result by Min Paula MD (03/20 1030)      No cervical spine fracture or traumatic malalignment                     Workstation performed: [id number]         VAS carotid complete study (*Order only if CTA has not been completed*)    (Results Pending)              Procedures  ECG 12 Lead Documentation  Date/Time: 3/20/2019 9:38 AM  Performed by: Dylan Mattson MD  Authorized by: Dylan Mattson MD     Indications / Diagnosis:  Fall decreased responsiveness  ECG reviewed by me, the ED Provider: yes    Patient location:  ED  Previous ECG:     Previous ECG:  Compared to current    Comparison ECG info:  6/30/18   Interpretation:     Interpretation: normal    Rate:     ECG rate:  81    ECG rate assessment: normal    Rhythm:     Rhythm: atrial fibrillation    QRS:     QRS axis:  Left  Comments:      LAFB; RBBB symmetric TWI anterior lead vs prev    CriticalCare Time  Performed by: Bailey Haque MD  Authorized by: Bailey Haque MD     Critical care provider statement:     Critical care time (minutes):  45    Critical care time was exclusive of:  Separately billable procedures and treating other patients    Critical care was necessary to treat or prevent imminent or life-threatening deterioration of the following conditions:  CNS failure or compromise    Critical care was time spent personally by me on the following activities:  Obtaining history from patient or surrogate, development of treatment plan with patient or surrogate, evaluation of patient's response to treatment, examination of patient, ordering and performing treatments and interventions, ordering and review of laboratory studies, ordering and review of radiographic studies, re-evaluation of patient's condition and review of old charts    I assumed direction of critical care for this patient from another provider in my specialty: no             Phone Contacts  ED Phone Contact    ED Course  ED Course as of Mar 20 2206   Wed Mar 20, 2019   1108 Reviewed code status form filled out; DNI BiPAP OK; d/w Yung son and daughter in room      [address] [facility] [address] Dina Cavanaugh  11:12; paged thru       1150 Dr Dina Cavanaugh recommends level 1 step down U/A pending      1154 On recheck slightly will administer lorazepam            HEART Risk Score      Most Recent Value   History  1 Filed at: 03/20/2019 1115   ECG  1 Filed at: 03/20/2019 1115   Age  2 Filed at: 03/20/2019 1115   Risk Factors  2 Filed at: 03/20/2019 1115   Troponin  1 Filed at: 03/20/2019 1115   Heart Score Risk Calculator   History  1 Filed at: 03/20/2019 1115   ECG  1 Filed at: 03/20/2019 1115   Age  2 Filed at: 03/20/2019 1115   Risk Factors  2 Filed at: 03/20/2019 1115   Troponin  1 Filed at: 03/20/2019 1115   HEART Score  7 Filed at: 03/20/2019 1115   HEART Score  7 Filed at: 03/20/2019 1115                            MDM  Number of Diagnoses or Management Options  [facility]ANT [facility]) ([facility] ): Altered mental status:   Elevated CK:   Elevated TSH:   Fall on same level:   Hypernatremia:   Myocardial infarction type 2 [facility]):   Diagnosis management comments: Mdm:  Elderly female of found down in the kitchen after last known well at 1900 hours anticoagulated on Eliquis with hypertensive vital signs normal blood sugar she is not currently a tPA candidate will evaluate for intracerebral bleed altered mental status syncope and trauma and re-evaluate    Code status reviewed with family other son and daughter defer to son Michael Beardu: DNI; BiPaP ok; full cardiac resusication      Disposition  Final diagnoses:   Fall on same level   Altered mental status   Hypernatremia   PRASHANT (acute kidney injury) ([facility] )   Myocardial infarction type 2 ([place] [address] )   Elevated CK   Elevated TSH     Time reflects when diagnosis was documented in both MDM as applicable and the Disposition within this note     Time User Action Codes Description Comment    3/20/2019 10:48 AM Axel Mccann Add Leann Hawthorne Fall on same level     3/20/2019 10:49 AM Axel Mccann Add [R41 82] Altered mental status     3/20/2019 10:49 AM Axel Mccann Add [E87 0] Hypernatremia     3/20/2019 10:49 AM Solis Sidhu Add [N17 9] PRASHANT (acute kidney injury) ([facility] )     3/20/2019 11:16 AM Axel Mccann Add [I21  A1] Myocardial infarction type 2 ([facility] )     3/20/2019 11:16 AM Axel Mccann Add [R74 8] Elevated CK     3/20/2019 11:16 AM Axel Mccann Add [R79 89] Elevated TSH     3/20/2019  6:40 PM Olita Taurus Modify [E87 0] Hypernatremia     3/20/2019  6:40 PM Elridmoses Spence P Add [L98 54] Acute metabolic encephalopathy     3/20/2019  6:40 PM Olita Taurus Modify [E87 0] Hypernatremia       ED Disposition     ED Disposition Condition Date/Time Comment    Admit Stable Wed Mar 20, 2019 11:51 AM Case was discussed with Dr Raeann Claire  and the patient's admission status was agreed to be Admission Status: inpatient status to the service of Dr Lida Pichardo   Follow-up Information    None         Current Discharge Medication List      CONTINUE these medications which have NOT CHANGED    Details   apixaban (ELIQUIS) 2 5 mg Take 1 tablet (2 5 mg total) by mouth 2 (two) times a day  Qty: 60 tablet, Refills: 0    Associated Diagnoses: Acute embolic stroke (HCC)      atorvastatin (LIPITOR) 40 mg tablet Take 1 tablet (40 mg total) by mouth every evening  Qty: 30 tablet, Refills: 0    Associated Diagnoses: Acute embolic stroke (HCC)      baclofen 10 mg tablet Take 10 mg by mouth 3 (three) times a day      furosemide (LASIX) 40 mg tablet 40 mg Take 2 tabs  daily  Refills: 0      pregabalin (LYRICA) 25 mg capsule Take 50 mg by mouth 2 (two) times a day       !! rOPINIRole (REQUIP) 2 mg tablet 0 5 mg  Refills: 0      !! rOPINIRole (REQUIP) 0 5 mg tablet Take 1 tablet (0 5 mg total) by mouth daily at bedtime  Qty: 15 tablet, Refills: 0    Associated Diagnoses: Restless leg syndrome       !! - Potential duplicate medications found  Please discuss with provider  No discharge procedures on file      ED Provider  Electronically Signed by           Diomedes Sun MD  03/20/19 7169

## 2019-03-21 ENCOUNTER — APPOINTMENT (INPATIENT)
Dept: NON INVASIVE DIAGNOSTICS | Facility: HOSPITAL | Age: 84
DRG: 064 | End: 2019-03-21
Payer: MEDICARE

## 2019-03-21 ENCOUNTER — APPOINTMENT (INPATIENT)
Dept: NEUROLOGY | Facility: HOSPITAL | Age: 84
DRG: 064 | End: 2019-03-21
Attending: INTERNAL MEDICINE
Payer: MEDICARE

## 2019-03-21 LAB
ALBUMIN SERPL BCP-MCNC: 3.1 G/DL (ref 3.5–5)
ALP SERPL-CCNC: 99 U/L (ref 46–116)
ALT SERPL W P-5'-P-CCNC: 40 U/L (ref 12–78)
ANION GAP SERPL CALCULATED.3IONS-SCNC: 12 MMOL/L (ref 4–13)
APTT PPP: 132 SECONDS (ref 26–38)
APTT PPP: 96 SECONDS (ref 26–38)
AST SERPL W P-5'-P-CCNC: 57 U/L (ref 5–45)
BILIRUB SERPL-MCNC: 0.7 MG/DL (ref 0.2–1)
BUN SERPL-MCNC: 35 MG/DL (ref 5–25)
CALCIUM SERPL-MCNC: 8.5 MG/DL (ref 8.3–10.1)
CHLORIDE SERPL-SCNC: 112 MMOL/L (ref 100–108)
CHOLEST SERPL-MCNC: 162 MG/DL (ref 50–200)
CO2 SERPL-SCNC: 26 MMOL/L (ref 21–32)
CREAT SERPL-MCNC: 1.53 MG/DL (ref 0.6–1.3)
ERYTHROCYTE [DISTWIDTH] IN BLOOD BY AUTOMATED COUNT: 14.3 % (ref 11.6–15.1)
EST. AVERAGE GLUCOSE BLD GHB EST-MCNC: 117 MG/DL
GFR SERPL CREATININE-BSD FRML MDRD: 30 ML/MIN/1.73SQ M
GLUCOSE SERPL-MCNC: 105 MG/DL (ref 65–140)
HBA1C MFR BLD: 5.7 % (ref 4.2–6.3)
HCT VFR BLD AUTO: 37 % (ref 34.8–46.1)
HDLC SERPL-MCNC: 80 MG/DL (ref 40–60)
HGB BLD-MCNC: 11.7 G/DL (ref 11.5–15.4)
LDLC SERPL CALC-MCNC: 70 MG/DL (ref 0–100)
MAGNESIUM SERPL-MCNC: 2.3 MG/DL (ref 1.6–2.6)
MCH RBC QN AUTO: 30.9 PG (ref 26.8–34.3)
MCHC RBC AUTO-ENTMCNC: 31.6 G/DL (ref 31.4–37.4)
MCV RBC AUTO: 98 FL (ref 82–98)
PHOSPHATE SERPL-MCNC: 3.3 MG/DL (ref 2.3–4.1)
PLATELET # BLD AUTO: 197 THOUSANDS/UL (ref 149–390)
PMV BLD AUTO: 10.3 FL (ref 8.9–12.7)
POTASSIUM SERPL-SCNC: 3.6 MMOL/L (ref 3.5–5.3)
PROT SERPL-MCNC: 6.3 G/DL (ref 6.4–8.2)
RBC # BLD AUTO: 3.79 MILLION/UL (ref 3.81–5.12)
SODIUM SERPL-SCNC: 150 MMOL/L (ref 136–145)
TRIGL SERPL-MCNC: 61 MG/DL
TROPONIN I SERPL-MCNC: 0.14 NG/ML
WBC # BLD AUTO: 8.19 THOUSAND/UL (ref 4.31–10.16)

## 2019-03-21 PROCEDURE — 93306 TTE W/DOPPLER COMPLETE: CPT

## 2019-03-21 PROCEDURE — 83036 HEMOGLOBIN GLYCOSYLATED A1C: CPT | Performed by: INTERNAL MEDICINE

## 2019-03-21 PROCEDURE — 97167 OT EVAL HIGH COMPLEX 60 MIN: CPT

## 2019-03-21 PROCEDURE — 80061 LIPID PANEL: CPT | Performed by: INTERNAL MEDICINE

## 2019-03-21 PROCEDURE — 83735 ASSAY OF MAGNESIUM: CPT | Performed by: INTERNAL MEDICINE

## 2019-03-21 PROCEDURE — 84484 ASSAY OF TROPONIN QUANT: CPT | Performed by: INTERNAL MEDICINE

## 2019-03-21 PROCEDURE — 95816 EEG AWAKE AND DROWSY: CPT | Performed by: PSYCHIATRY & NEUROLOGY

## 2019-03-21 PROCEDURE — 85027 COMPLETE CBC AUTOMATED: CPT | Performed by: INTERNAL MEDICINE

## 2019-03-21 PROCEDURE — 97116 GAIT TRAINING THERAPY: CPT | Performed by: PHYSICAL THERAPIST

## 2019-03-21 PROCEDURE — 99232 SBSQ HOSP IP/OBS MODERATE 35: CPT | Performed by: PSYCHIATRY & NEUROLOGY

## 2019-03-21 PROCEDURE — 97535 SELF CARE MNGMENT TRAINING: CPT

## 2019-03-21 PROCEDURE — G8978 MOBILITY CURRENT STATUS: HCPCS | Performed by: PHYSICAL THERAPIST

## 2019-03-21 PROCEDURE — 97163 PT EVAL HIGH COMPLEX 45 MIN: CPT | Performed by: PHYSICAL THERAPIST

## 2019-03-21 PROCEDURE — G8988 SELF CARE GOAL STATUS: HCPCS

## 2019-03-21 PROCEDURE — G8998 SWALLOW D/C STATUS: HCPCS

## 2019-03-21 PROCEDURE — 85730 THROMBOPLASTIN TIME PARTIAL: CPT | Performed by: NURSE PRACTITIONER

## 2019-03-21 PROCEDURE — 95816 EEG AWAKE AND DROWSY: CPT

## 2019-03-21 PROCEDURE — 99233 SBSQ HOSP IP/OBS HIGH 50: CPT | Performed by: INTERNAL MEDICINE

## 2019-03-21 PROCEDURE — G8997 SWALLOW GOAL STATUS: HCPCS

## 2019-03-21 PROCEDURE — 84100 ASSAY OF PHOSPHORUS: CPT | Performed by: INTERNAL MEDICINE

## 2019-03-21 PROCEDURE — G8987 SELF CARE CURRENT STATUS: HCPCS

## 2019-03-21 PROCEDURE — 93880 EXTRACRANIAL BILAT STUDY: CPT | Performed by: SURGERY

## 2019-03-21 PROCEDURE — 92610 EVALUATE SWALLOWING FUNCTION: CPT

## 2019-03-21 PROCEDURE — 93306 TTE W/DOPPLER COMPLETE: CPT | Performed by: INTERNAL MEDICINE

## 2019-03-21 PROCEDURE — G8979 MOBILITY GOAL STATUS: HCPCS | Performed by: PHYSICAL THERAPIST

## 2019-03-21 PROCEDURE — G8996 SWALLOW CURRENT STATUS: HCPCS

## 2019-03-21 PROCEDURE — 80053 COMPREHEN METABOLIC PANEL: CPT | Performed by: INTERNAL MEDICINE

## 2019-03-21 RX ORDER — DEXTROSE MONOHYDRATE 50 MG/ML
75 INJECTION, SOLUTION INTRAVENOUS CONTINUOUS
Status: DISCONTINUED | OUTPATIENT
Start: 2019-03-21 | End: 2019-03-21

## 2019-03-21 RX ORDER — ROPINIROLE 0.25 MG/1
0.5 TABLET, FILM COATED ORAL 2 TIMES DAILY
Status: DISCONTINUED | OUTPATIENT
Start: 2019-03-21 | End: 2019-03-22 | Stop reason: HOSPADM

## 2019-03-21 RX ORDER — ATORVASTATIN CALCIUM 40 MG/1
40 TABLET, FILM COATED ORAL EVERY EVENING
Status: DISCONTINUED | OUTPATIENT
Start: 2019-03-21 | End: 2019-03-22 | Stop reason: HOSPADM

## 2019-03-21 RX ORDER — LANOLIN ALCOHOL/MO/W.PET/CERES
3 CREAM (GRAM) TOPICAL
Status: DISCONTINUED | OUTPATIENT
Start: 2019-03-21 | End: 2019-03-22 | Stop reason: HOSPADM

## 2019-03-21 RX ORDER — ROPINIROLE 0.25 MG/1
0.5 TABLET, FILM COATED ORAL 2 TIMES DAILY
Status: DISCONTINUED | OUTPATIENT
Start: 2019-03-21 | End: 2019-03-21

## 2019-03-21 RX ORDER — DEXTROSE MONOHYDRATE 50 MG/ML
75 INJECTION, SOLUTION INTRAVENOUS ONCE
Status: COMPLETED | OUTPATIENT
Start: 2019-03-21 | End: 2019-03-21

## 2019-03-21 RX ADMIN — DEXTROSE 75 ML/HR: 5 SOLUTION INTRAVENOUS at 08:11

## 2019-03-21 RX ADMIN — APIXABAN 2.5 MG: 2.5 TABLET, FILM COATED ORAL at 16:21

## 2019-03-21 RX ADMIN — DEXTROSE 75 ML/HR: 5 SOLUTION INTRAVENOUS at 16:21

## 2019-03-21 RX ADMIN — ROPINIROLE HYDROCHLORIDE 0.5 MG: 0.25 TABLET, FILM COATED ORAL at 16:21

## 2019-03-21 RX ADMIN — ATORVASTATIN CALCIUM 40 MG: 40 TABLET, FILM COATED ORAL at 17:35

## 2019-03-21 RX ADMIN — DEXTROSE AND SODIUM CHLORIDE 100 ML/HR: 5; .45 INJECTION, SOLUTION INTRAVENOUS at 06:47

## 2019-03-21 RX ADMIN — MELATONIN TAB 3 MG 3 MG: 3 TAB at 22:35

## 2019-03-21 RX ADMIN — ASPIRIN 300 MG: 300 SUPPOSITORY RECTAL at 09:28

## 2019-03-21 NOTE — ASSESSMENT & PLAN NOTE
Mild troponin elevation, likely secondary to acute kidney injury and traumatic rhabdomyolysis    Non ST elevated myocardial infarction type 2 secondary to rhabdomyolysis

## 2019-03-21 NOTE — TELEMEDICINE
TeleConsultation - Neurology   Jose Bill 80 y o  female MRN: [id number]  Unit/Bed#:  Encounter: [id number]      REQUIRED DOCUMENTATION:     1  This service was provided via Telemedicine  2  Provider located at home  3  TeleMed provider: Tavia Hanna MD   4  Identify all parties in room with patient during tele consult:  Son Indio Ambrosio and his wife Marco Antonio Juan  RN  5  After connecting through televideo, patient was identified by name and date of birth and assistant checked wristband  Patient was then informed that this was a Telemedicine visit and that the exam was being conducted confidentially over secure lines  My office door was closed  No one else was in the room  Patient acknowledged consent and understanding of privacy and security of the Telemedicine visit, and gave us permission to have the assistant stay in the room in order to assist with the history and to conduct the exam   I informed the patient that I have reviewed their record in Epic and presented the opportunity for them to ask any questions regarding the visit today  The patient agreed to participate  Assessment/Plan   Assessment:  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, small  Etiology is likely cardio-embolic from atrial fibrillation  However, the fact they occurred on Eliquis makes a  hypercoagulability state not completely excluded  Metabolic Toxic encephalopathy  It is worth mentioning that the strokes are very small, and not acute (>7 days ago), so even if they contributed to the presentations, they do so indirectly (such as through reduce oral intake leading to PRASHANT few days later)  It appears patient was started on Baclofen 10 mg TID few days ago, which may have contributed to this encephalopathy  Plan:  Prior CTA suffice, and no need to repeat vessel imaging  Echocardiogram was done and showed biatrial dilatation, but no thrombi seen  Patient is already on Atorvastatin and Apixaban 2 5 mg BID    No need for Heparin, it can be discontinued  I do not suggest switching Apixaban yet, but rather prefer increasing the dose to 5 mg BID If creatinine drops to < 1 5 mg/dl as it seems her baseline is around 1 2 to 1 3  Patient is high risk for delirium, please maintain her on delirium protocol (I e  Bowel regimen, minimize blood draws, maintain circadian rhythm  Feli [place] etc)  PT/OT  Discontinue Baclofen  Consider obtaining EEG to look for epileptiform discharges  This can be done outpatient if difficult to obtain while being admitted  History of Present Illness     Reason for Consult / Principal Problem: Found unresponsive  CVA   HPI: Mala Gallo is a 80 y o  right handed female who was found home lying on the floor  She seemed confused at first as per the son  She could not tell why she was lying on the floor, or what had happened  When EMS arrived, she was not answering any questions at all  Patient started regaining orientation slowly, and now she is much better compared to before, but not fully back  Son stated that patient had a seizure while admitted last year for a stroke, but he never witnessed a seizure again  Of note, she had shoulder pain two days ago and was started on Baclofen 10 mg TID, she had a total of 3 pills so far  Inpatient consult to Neurology  Consult performed by: Jack Brown MD  Consult ordered by: Zoe Rico DO          Review of Systems  Complete ROS was done and is negative other than what is mentioned in HPI      Historical Information   Past Medical History:   Diagnosis Date    Arthritis     L shoulder,fingers    Cardiac disease     valve replacement    Carpal tunnel syndrome, bilateral     Dropfoot     right    History of echocardiogram 04/20/2016    EF 75%, Hyperdynamic LVSF  Mild concentric LVH  Normal functioning bioprosthetic AV  Trace MR  Mild pulm htn       HTN (hypertension)     Hyperlipidemia     Lymphedema     Restless leg syndrome     Right bundle branch block  Stroke ([facility] [address] )     Supraventricular tachycardia [facility])      Past Surgical History:   Procedure Laterality Date    AORTIC VALVE REPLACEMENT  2002    Tissue AVR #21    AORTIC VALVULOPLASTY  2002    Bovine Pericardial heart valve    BACK SURGERY      CARDIAC CATHETERIZATION  2002    Sever critical Aortic stenosis  Pulm Htn  Normal coronary arteries   HAND SURGERY      right hand    HYSTERECTOMY      ROTATOR CUFF REPAIR Right     VASCULAR SURGERY      bilateral vein ligation & stripping     Social History   Social History     Substance and Sexual Activity   Alcohol Use Never    Frequency: Never     Social History     Substance and Sexual Activity   Drug Use No     Social History     Tobacco Use   Smoking Status Never Smoker   Smokeless Tobacco Never Used     Family History: non-contributory    Review of previous medical records was completed  Meds/Allergies   PTA meds:   Prior to Admission Medications   Prescriptions Last Dose Informant Patient Reported? Taking? Melatonin 2 5 MG CHEW 3/19/2019 at 1900  Yes Yes   Sig: Chew 5 mg daily at bedtime as needed   apixaban (ELIQUIS) 2 5 mg 3/19/2019 at 1900  No Yes   Sig: Take 1 tablet (2 5 mg total) by mouth 2 (two) times a day   atorvastatin (LIPITOR) 40 mg tablet 3/19/2019 at 0800  No Yes   Sig: Take 1 tablet (40 mg total) by mouth every evening   baclofen 10 mg tablet 3/19/2019 at 1900  Yes Yes   Sig: Take 10 mg by mouth 3 (three) times a day   furosemide (LASIX) 40 mg tablet 3/19/2019 at 0800  Yes Yes   Si mg Take 2 tabs   daily   pregabalin (LYRICA) 25 mg capsule 3/19/2019 at 42046  Yes Yes   Sig: Take 50 mg by mouth 2 (two) times a day    rOPINIRole (REQUIP) 0 5 mg tablet   No No   Sig: Take 1 tablet (0 5 mg total) by mouth daily at bedtime   Patient taking differently: Take 0 5 mg by mouth 2 (two) times a day    rOPINIRole (REQUIP) 2 mg tablet 3/19/2019 at 1900  Yes Yes   Si 5 mg      Facility-Administered Medications: None No Known Allergies    Objective   Vitals:Blood pressure 161/70, pulse 90, temperature 99 1 °F (37 3 °C), resp  rate 20, height 5' 5" (1 651 m), weight 72 2 kg (159 lb 2 8 oz), SpO2 94 %, not currently breastfeeding  ,Body mass index is 26 49 kg/m²  Intake/Output Summary (Last 24 hours) at 3/21/2019 1104  Last data filed at 3/21/2019 0649  Gross per 24 hour   Intake 1321 23 ml   Output 1700 ml   Net -378 77 ml       Invasive Devices: Invasive Devices     Peripheral Intravenous Line            Peripheral IV 03/21/19 Left Hand less than 1 day                Physical Exam Sitting on chair  HEENT: ATNC  Chest: Breathing comfortably  Skin: no obvious lesions  Neurologic Exam:   Alert and oriented to self, place and year  No clear language impairment, but there is some reduced fluency  Thought content is disorganized  Reduced concentration and attention  Face is symmetric  Able to move all extremities against gravity  No dysmetria  No neglect      Lab Results:   CBC:   Results from last 7 days   Lab Units 03/21/19  0451 03/20/19  2143 03/20/19  1013   WBC Thousand/uL 8 19 8 54 8 56   RBC Million/uL 3 79* 4 05 3 94   HEMOGLOBIN g/dL 11 7 12 5 12 3   HEMATOCRIT % 37 0 40 1 38 7   MCV fL 98 99* 98   PLATELETS Thousands/uL 197 214 206   , BMP/CMP:   Results from last 7 days   Lab Units 03/21/19  0451 03/20/19  1830 03/20/19  1013   SODIUM mmol/L 150* 150* 148*   POTASSIUM mmol/L 3 6 3 6 3 6   CHLORIDE mmol/L 112* 112* 108   CO2 mmol/L 26 25 28   BUN mg/dL 35* 41* 44*   CREATININE mg/dL 1 53* 1 65* 1 89*   CALCIUM mg/dL 8 5 8 9 9 1   AST U/L 57*  --  45   ALT U/L 40  --  44   ALK PHOS U/L 99  --  110   EGFR ml/min/1 73sq m 30 28 24   , Vitamin B12:   , HgBA1C:   , TSH:   Results from last 7 days   Lab Units 03/20/19  1013   TSH 3RD GENERATON uIU/mL 5 937*   , Coagulation:   Results from last 7 days   Lab Units 03/20/19  2143   INR  1 17   , Lipid Profile:   Results from last 7 days   Lab Units 03/21/19  0451   HDL mg/dL 80*   LDL CALC mg/dL 70   TRIGLYCERIDES mg/dL 61   , Ammonia:   , Urinalysis:   Results from last 7 days   Lab Units 03/20/19  1205   COLOR UA  Yellow   CLARITY UA  Clear   SPEC GRAV UA  1 020   PH UA  6 0   LEUKOCYTES UA  Negative   NITRITE UA  Negative   GLUCOSE UA mg/dl Negative   KETONES UA mg/dl Negative   BILIRUBIN UA  Negative   BLOOD UA  Trace-Intact*     Imaging Studies: I have personally reviewed pertinent films in PACS  EKG, Pathology, and Other Studies: I have personally reviewed pertinent reports      VTE Prophylaxis: Heparin    Code Status: Level 3 - DNAR and DNI

## 2019-03-21 NOTE — PHYSICIAN ADVISOR
Current patient class: Inpatient  The patient is currently on Hospital Day: 2 at [address]      The patient was admitted to the hospital at [phone] on 3/20/19 for the following diagnosis:  Hypernatremia [E87 0]  Altered mental status [R41 82]  Weakness [R53 1]  Elevated TSH [R79 89]  Elevated CK [R74 8]  Change in mental status [R41 82]  PRASHANT (acute kidney injury) ([facility] [address] ) [N17 9]  Myocardial infarction type 2 ([facility] [address] ) Reida Gone  A1]       There is documentation in the medical record of an expected length of stay of at least 2 midnights  The patient is therefore expected to satisfy the 2 midnight benchmark and given the 2 midnight presumption is appropriate for INPATIENT ADMISSION  Given this expectation of a satisfying stay, CMS instructs us that the patient is most often appropriate for inpatient admission under part A provided medical necessity is documented in the chart  After review of the relevant documentation, labs, vital signs and test results, the patient is appropriate for INPATIENT ADMISSION  Admission to the hospital as an inpatient is a complex decision making process which requires the practitioner to consider the patients presenting complaint, history and physical examination and all relevant testing  With this in mind, in this case, the patient was deemed appropriate for INPATIENT ADMISSION  After review of the documentation and testing available at the time of the admission I concur with this clinical determination of medical necessity  Rationale is as follows: The patient is a 80 yrs old Female who presented to the ED at 3/20/2019  9:18 AM with a chief complaint of Fall (trauma evaluation )     Patient admitted with a report of altered mental status as she is difficult to arouse  She was placed on a stroke pathway  Abnormal labs include a BUN of 44, creatinine of 1 89, CK of 677 and lactic acid of 2 9    Imaging studies revealed multiple subacute B/L hemisphere infarcts probably of embolic origin  Neurology saw the patient and recommended Apixaban treatment  The patient's PRASHANT is improving  As ongoing acute care is noted, a two night admission status to the hospital would be considered appropriate for her  The patients vitals on arrival were ED Triage Vitals   Temperature Pulse Respirations Blood Pressure SpO2   03/20/19 0924 03/20/19 0924 03/20/19 0924 03/20/19 0929 03/20/19 0924   98 2 °F (36 8 °C) 81 18 129/87 95 %      Temp Source Heart Rate Source Patient Position - Orthostatic VS BP Location FiO2 (%)   03/20/19 0924 03/20/19 0924 03/20/19 0924 03/20/19 0929 --   Temporal Monitor Lying Left arm       Pain Score       03/20/19 0939       No Pain           Past Medical History:   Diagnosis Date    Arthritis     L shoulder,fingers    Cardiac disease     valve replacement    Carpal tunnel syndrome, bilateral     Dropfoot     right    History of echocardiogram 04/20/2016    EF 75%, Hyperdynamic LVSF  Mild concentric LVH  Normal functioning bioprosthetic AV  Trace MR  Mild pulm htn   HTN (hypertension)     Hyperlipidemia     Lymphedema     Restless leg syndrome     Right bundle branch block     Stroke ([facility] [address] )     Supraventricular tachycardia [facility])      Past Surgical History:   Procedure Laterality Date    AORTIC VALVE REPLACEMENT  11/25/2002    Tissue AVR #21    AORTIC VALVULOPLASTY  11/25/2002    Bovine Pericardial heart valve    BACK SURGERY      CARDIAC CATHETERIZATION  11/22/2002    Sever critical Aortic stenosis  Pulm Htn  Normal coronary arteries      HAND SURGERY      right hand    HYSTERECTOMY      ROTATOR CUFF REPAIR Right     VASCULAR SURGERY      bilateral vein ligation & stripping           Consults have been placed to:   IP CONSULT TO CASE MANAGEMENT  IP CONSULT TO NUTRITION SERVICES  IP CONSULT TO CASE MANAGEMENT  IP CONSULT TO NEUROLOGY    Vitals:    03/21/19 1219 03/21/19 1319 03/21/19 1419 03/21/19 1621   BP: 166/82 (!) 178/111 150/70 (!) 159/112   BP Location:    Right arm   Pulse: 99 98 (!) 113 (!) 113   Resp: 21 19 (!) 27 (!) 26   Temp:    98 7 °F (37 1 °C)   TempSrc:    Tympanic   SpO2: 96% 96% 96% 96%   Weight:       Height:           Most recent labs:    Recent Labs     03/20/19  1013 03/20/19  1830 03/20/19  2143 03/21/19  0451   WBC 8 56  --  8 54 8 19   HGB 12 3  --  12 5 11 7   HCT 38 7  --  40 1 37 0     --  214 197   K 3 6 3 6  --  3 6   CALCIUM 9 1 8 9  --  8 5   BUN 44* 41*  --  35*   CREATININE 1 89* 1 65*  --  1 53*   LIPASE 183  --   --   --    INR 1 20*  --  1 17  --    TROPONINI 0 08*  --   --  0 14*   CKTOTAL 677* 1,064*  --   --    AST 45  --   --  57*   ALT 44  --   --  40   ALKPHOS 110  --   --  99       Scheduled Meds:  Current Facility-Administered Medications:  apixaban 2 5 mg Oral BID Oscar Batemanna, DO   aspirin 300 mg Rectal Daily Oscar Sullivan, DO   atorvastatin 40 mg Oral QPM Oscar Sullivan, DO   melatonin 3 mg Oral HS Ja Mata, DO   rOPINIRole 0 5 mg Oral BID [place], DO     Continuous Infusions:   PRN Meds:      Surgical procedures (if appropriate):

## 2019-03-21 NOTE — ASSESSMENT & PLAN NOTE
Acute metabolic encephalopathy was present on admission, this is secondary to acute bilateral frontal lobe CVA  Due to multi territorial infarct, suspected embolic CVA, IV heparin was initiated yesterday evening  Continue to monitor telemetry  Patient currently in atrial fibrillation, follow-up echocardiogram   Continue IV heparin  Consult speech therapy

## 2019-03-21 NOTE — PLAN OF CARE
Problem: OCCUPATIONAL THERAPY ADULT  Goal: Performs self-care activities at highest level of function for planned discharge setting  See evaluation for individualized goals  Description  Treatment Interventions: ADL retraining, Functional transfer training, UE strengthening/ROM, Endurance training, Cognitive reorientation, Patient/family training, Activityengagement          See flowsheet documentation for full assessment, interventions and recommendations  Note:   Limitation: Decreased ADL status, Decreased UE ROM, Decreased UE strength, Decreased Safe judgement during ADL, Decreased cognition, Decreased endurance, Decreased self-care trans, Decreased high-level ADLs     Assessment: Pt is a 80 y o  female seen for OT evaluation s/p admit to [facility] on 7/26/7993 w/ Acute metabolic encephalopathy  Comorbidities affecting pt's functional performance at time of assessment include: HTN, obesity, dementia and arthritis, cardiac disease, RLS, CVA, lymphedema  drop foot R LE  Personal factors affecting pt at time of IE include:steps to enter environment, difficulty performing ADLS, difficulty performing IADLS , limited insight into deficits, compliance, decreased initiation and engagement  and health management   Prior to admission, pt was (A) with ADL and IADL performance with use of RW for short distances  Upon evaluation: Pt requires min-max (A) with use of RW during functional mobility 2* the following deficits impacting occupational performance: weakness, decreased ROM, decreased strength, decreased balance, decreased tolerance, impaired attention, impaired initiation, impaired memory, impaired sequencing, impaired problem solving, impulsivity, decreased safety awareness and impaired interpersonal skills  Pt to benefit from continued skilled OT tx while in the hospital to address deficits as defined above and maximize level of functional independence w ADL's and functional mobility   Occupational Performance areas to address include: grooming, bathing/shower, toilet hygiene, dressing, functional mobility, community mobility and clothing management  From OT standpoint, recommendation at time of d/c would be short term rehab         OT Discharge Recommendation: Short Term Rehab

## 2019-03-21 NOTE — PHYSICAL THERAPY NOTE
PHYSICAL THERAPY TREATMENT NOTE    Time In: 9:16    Time Out: 9:30  Total Time: 14 min  MRN: [id number]    S: Pt denies pain and states she feels okay  O: Therapeutic Activities:   Supine > sit with min A of 1 with bedrail;   Sit > stand with min(A)x2 with RW; stand > sit with Min(A)x2 with RW  Ambulation with RW with min(A)x2 30ft then 10 ft; ambulates with forward flex posture decreased safety awareness and increased falls risk    Vitals: Seated and Supine BP = 145/107 then 159/102, Heart Rate =  bpm, SpO2 = 93%  on RA  A: Pt with decreased safety awareness and decreased command following increasing fall risk  Pt with fatigue limiting ambulation and activity  Pt with elevated HR in a-fib 120-135 with activity  Spo2 remained at 93% before and after ambulation with RW  Pt unsafe to return home and is in need of continued activity in PT to improve all impairments and functional deficits  P: See initial evaluation for full PT POC  Patient returned to chair at bedside, chair alarm activated and (+) SCDs on bilateral LE's; patient positioned with all needs, including call bell, within reach      Andrei Nephkaylyn, PT, MPT

## 2019-03-21 NOTE — UTILIZATION REVIEW
Initial Clinical Review    Admission: Date/Time/Statement: 3/20/19 @ 1152   Orders Placed This Encounter   Procedures    Inpatient Admission (expected length of stay for this patient Order details is greater than two midnights)     Standing Status:   Standing     Number of Occurrences:   1     Order Specific Question:   Admitting Physician     Answer:   Leni Stanton     Order Specific Question:   Level of Care     Answer:   Level 1 Stepdown [13]     Order Specific Question:   Estimated length of stay     Answer:   More than 2 Midnights     Order Specific Question:   Certification     Answer:   I certify that inpatient services are medically necessary for this patient for a duration of greater than two midnights  See H&P and MD Progress Notes for additional information about the patient's course of treatment  ED: Date/Time/Mode of Arrival:   ED Arrival Information     Expected Arrival Acuity Means of Arrival Escorted By Service Admission Type    - 3/20/2019 09:18 Emergent Ambulance [facility] Hospitalist Emergency    Arrival Complaint    fall / weakness        Chief Complaint:   Chief Complaint   Patient presents with    Fall     trauma evaluation  Assessment/Plan: 80 yr old female presents to the ed with altered mental status - last seen normal at 7 pm on 3/19 and found this morning lying on the floor of her house and was difficult to arouse-- she is normally independent and ambulates independently  -- seen earlier this week for left shoulder injury and given baclofen    Acute metabolic encephalopathy- initiate stroke-pathway- has persistent altered mental status responds to pain only-- cr elevated -- suspect mild rehabdo -- iv lfuids and repeat labs, hyponatremia from free water deficit - monitor- mild trop elevations  ED Vital Signs:   ED Triage Vitals   Temperature Pulse Respirations Blood Pressure SpO2   03/20/19 0924 03/20/19 0924 03/20/19 0924 03/20/19 0929 03/20/19 0924   98 2 °F (36 8 °C) 81 18 129/87 95 %      Temp Source Heart Rate Source Patient Position - Orthostatic VS BP Location FiO2 (%)   03/20/19 0924 03/20/19 0924 03/20/19 0924 03/20/19 0929 --   Temporal Monitor Lying Left arm       Pain Score       03/20/19 0939       No Pain        Wt Readings from Last 1 Encounters:   03/21/19 72 1 kg (159 lb)     Vital Signs (abnormal):   Pertinent Labs/Diagnostic Test Results: trace blood in urine   ckmb- 8 6--tsh 5 937--ck 677--bnp 4732--lactic acid 2 2--trop 0 08  Bun 44- cr 1 89  ED Treatment:   Medication Administration from 03/20/2019 0918 to 03/20/2019 1657       Date/Time Order Dose Route Action Action by Comments     03/20/2019 0953 iodixanol (VISIPAQUE) 320 MG/ML injection 100 mL 100 mL Intravenous Given Trent Martínez      03/20/2019 1150 sodium chloride 0 9 % infusion 125 mL/hr Intravenous [address] Miranda Saint Page, RN      03/20/2019 1149 LORazepam (ATIVAN) 2 mg/mL injection 0 5 mg 0 5 mg Intravenous Given Miranda Saint Page, RN         Past Medical/Surgical History:    Active Ambulatory Problems     Diagnosis Date Noted    Syncope 08/23/2016    Renal failure, acute ([facility] [address] ) 08/23/2016    Peripheral edema 08/23/2016    Cardiac disease 08/23/2016    Elevated troponin 08/23/2016    Restless leg syndrome 08/23/2016    Traumatic rhabdomyolysis ([facility] [address] ) 08/23/2016    Anemia 57/77/9718    Acute embolic stroke ([facility] ) 37/68/2221    Acute encephalopathy 06/29/2018    Elevated TSH 06/29/2018    Age-related cognitive decline 06/29/2018    Hypertensive emergency 06/30/2018    CKD (chronic kidney disease) stage 3, GFR 30-59 ml/min ([facility]) 07/02/2018     Resolved Ambulatory Problems     Diagnosis Date Noted    No Resolved Ambulatory Problems     Past Medical History:   Diagnosis Date    Arthritis     Cardiac disease     Carpal tunnel syndrome, bilateral     Dropfoot     History of echocardiogram 04/20/2016    HTN (hypertension)     Hyperlipidemia     Lymphedema     Restless leg syndrome  Right bundle branch block     Stroke (HCC)     Supraventricular tachycardia (HCC)      Admitting Diagnosis: Hypernatremia [E87 0]  Altered mental status [R41 82]  Weakness [R53 1]  Elevated TSH [R79 89]  Elevated CK [R74 8]  Change in mental status [R41 82]  PRASHANT (acute kidney injury) ([facility] [address] ) [N17 9]  Myocardial infarction type 2 ([facility] ) Erlinda Forth  A1]  Age/Sex: 80 y o  female  Admission Orders:  Scheduled Meds:   Current Facility-Administered Medications:  apixaban 2 5 mg Oral BID Ochoa Narayan, DO   aspirin 300 mg Rectal Daily Ochoa Narayan, DO   atorvastatin 40 mg Oral QPM Ochoa Narayan, DO   dextrose 75 mL/hr Intravenous Once Ochoa Narayan, DO   melatonin 3 mg Oral HS Ochoa Narayan, DO   rOPINIRole 0 5 mg Oral BID Ochoa Narayan, DO     Continuous Infusions:    PRN Meds:     Neuro signs q1 x4 q2 x4 then q4  Dysphagia assess  Pt/ot /  Reg diet  Consult neurology  eeg  Na 150--cl 112--gap 35--cr 1 53--ast 57--t  Pro 6 3--alb 3 1    Mri of brain --Bifrontal lobe regions of acute to subacute ischemia/infarct could be from embolic etiology

## 2019-03-21 NOTE — ASSESSMENT & PLAN NOTE
Continue Aspirin, patient was previously prescribed Eliquis will need to clarify with family whether or not she was compliant

## 2019-03-21 NOTE — SOCIAL WORK
Met with pt and her son Hemanth Madera  to discuss role as  in helping pt to develop discharge plan and to help pt carry out their plan  Pt lives in a house alone  Pt has 3 REENA with a railing  Pt has everything set up on the first floor  Pt's son lives a half block away  Pt's family check on patient at least twice a day  Pt has a bedside commode, Hospital bed, walker, cane , and wheel chair at home  Pt uses the walker to ambulate a few steps then goes into wheel chair  Pt is independent with ADL's  Pt has aides 2 hours a day for 4 times a week through [facility]  Pt's family sets up the patients meds  [facility] the [facility]  Pt's PCP is Dr Ramirez Chauhan  Pt's cardiologist is Dr Martinez Nunez  Patient/caregiver received discharge checklist   Content reviewed  Patient/caregiver encouraged to participate in discharge plan of care prior to discharge home  Pt will need short term  Rehab  I did explain to patients son the benefits of using an acute rehab facility  Pt was interested In [address] [place] [address] in [place]  I did give the son some information on that Acute rehab facility  They are agreeable to me making a referral to [facility] in [place]  Referral made as requested

## 2019-03-21 NOTE — SPEECH THERAPY NOTE
Speech-Language Pathology Bedside Swallow Evaluation      Patient Name: Maureen Taylor    PPZGY'O Date: 3/21/2019     Problem List  Patient Active Problem List   Diagnosis    Syncope    [facility]    Peripheral edema    Cardiac disease    Elevated troponin    Restless leg syndrome    Traumatic rhabdomyolysis [facility] )    Anemia    [facility]olic stroke ([facility]    Acute encephalopathy    Elevated TSH    Age-related cognitive decline    Hypertensive emergency    CKD (chronic kidney disease) stage 3, GFR 30-59 ml/min ([facility])    Acute metabolic encephalopathy    Acute kidney injury ([facility] )    Hypernatremia    [facility]STEMI [facility]ST elevated [facility])       Past Medical History  Past Medical History:   Diagnosis Date    Arthritis     L shoulder,fingers    Cardiac disease     valve replacement    Carpal tunnel syndrome, bilateral     Dropfoot     right    History of echocardiogram 04/20/2016    EF 75%, Hyperdynamic LVSF  Mild concentric LVH  Normal functioning bioprosthetic AV  Trace MR  Mild pulm htn   HTN (hypertension)     Hyperlipidemia     Lymphedema     Restless leg syndrome     Right bundle branch block     Stroke ([place] [id number] )     [facility]raventricular tachycardia [facility])        Past Surgical History  Past Surgical History:   Procedure Laterality Date    AORTIC VALVE REPLACEMENT  11/25/2002    Tissue AVR #21    AORTIC VALVULOPLASTY  11/25/2002    Bovine Pericardial heart valve    BACK SURGERY      CARDIAC CATHETERIZATION  11/22/2002    Sever critical Aortic stenosis  Pulm Htn  Normal coronary arteries   HAND SURGERY      right hand    HYSTERECTOMY      ROTATOR CUFF REPAIR Right     VASCULAR SURGERY      bilateral vein ligation & stripping       Summary   Pt presented with functional appearing oral and pharyngeal stage swallowing skills with materials administered today      Risk for Aspiration: none observed today  Recommendations: regular diet and thin liquids Medication as tolerated     Speech/langauge/cognitive evalaution    Aspiration precautions given recent CVA    Current Medical Status per admitting H&P:   Pt is a 80 y o  female who presented to [address] [place] [address] with altered mental status, patient was last seen normal at approximately 19:00 on 3/19/2019, patient found this a m  At approximately 08:00 lying on the floor of her house, patient was difficult to arouse, normally is independent and able to ambulate independently  No recent illness, patient's family checks on her frequently, was seen earlier in the week at urgent care for left shoulder injury/sprain, only new medication was baclofen  Family reports that there is no possibility of missed dosing of the patient's baclofen because the family controls all medications and doses them individually  Pt was admitted on stroke pathway and swallow consult to follow, of not pt did pass her RN dysphagia screen and is tolerating thin water    Past medical history:    Please see H&P for details, hx of CVA but no residual swallowing or cognitive communication problems    Special Studies:  3/20/19: MRI brain:Bifrontal lobe regions of acute to subacute ischemia/infarct could be from embolic etiology  Volume loss/atrophy and microangiopathy  Social/Education/Vocational Hx:  Pt lives alone, family checks on her and is supportive  Swallow Information   Current Risks for Dysphagia & Aspiration: CVA     Current Symptoms/Concerns: r/o dysphagia, new neuro event    Current Diet: NPO      Baseline Diet: regular diet and thin liquids    Baseline Assessment   Behavior/Cognition: alert and confused to date/time    Speech/Language Status: able to participate in conversation, able to follow commands and has perseveration, word finding and ssuspected cognitive impairment    Patient Positioning: upright in chair    Pain Status/Interventions/Response to Interventions:   No report of or nonverbal indications of pain  Swallow Mechanism Exam   Oral-motor structures and function are WNL for symmetry, strength, ROM & coordination  Has well fit upper and lower dentures, voice and cough are adequate     Consistencies Assessed and Performance   Consistencies Administered: puree and hard solids and thin liquids  Specific materials administered included:pudding, gram crackers and thin water by straw     Oral Stage: WFL  Mastication was adequate with the materials administered today  Bolus formation and transfer were functional with no significant oral residue noted  No overt s/s reduced oral control  Pharyngeal Stage: WFL    Swallow Mechanics:  Swallowing initiation appeared prompt  Laryngeal rise was palpated and judged to be within functional limits  No coughing, throat clearing, change in vocal quality or respiratory status noted today with single or successive straw drinking of thin liquids  Esophageal Concerns: none observed today    Summary and Recommendations (see above)    Results Reviewed with: patient, RN and family     Pt/Family Education: initiated re: recommended full speech/language evaluation  Pt and caregivers would benefit from/require continued education  Ernestina Sanchez  [place], [place], CCC/SLP  [id number]  [facility] MEDICINE  [email or web address]  Available via tiger text

## 2019-03-21 NOTE — NURSING NOTE
Pt received from ER,pt identified,being placed on bedside monitoring;pt assumes R sided fetal position,non-verbal,slightly blowing respirations  Able to follow some simple commands like a weak hand grasp bilaterally,moved RLE also on command

## 2019-03-21 NOTE — ASSESSMENT & PLAN NOTE
Patient underlying chronic kidney disease stage 3, acute kidney injury has resolved  Avoid nephrotoxins

## 2019-03-21 NOTE — OCCUPATIONAL THERAPY NOTE
Occupational Therapy Evaluation     Patient Name: Mala Gallo  KXOSX'W Date: 3/21/2019  Problem List  Patient Active Problem List   Diagnosis    Syncope    [facility]    Peripheral edema    Cardiac disease    Elevated troponin    Restless leg syndrome    Traumatic rhabdom[facility]olysis [facility] )    Anemia    Acute [facility] stroke ([facility]    Acute encephalopathy    Elevated TSH    Age-related cognitive decline    Hypertensive emergency    CKD (chronic kidney disease) stage 3, GFR 30-59 ml/min ([facility])    Acute metabolic encephalopathy    [facility]    Hypernatremia    NSTEMI (non-ST elevated myocardial infarction[facility])     Past Medical History  Past Medical History:   Diagnosis Date    Arthritis     L shoulder,fingers    Cardiac disease     valve replacement    Carpal tunnel syndrome, bilateral     Dropfoot     right    History of echocardiogram 04/20/2016    EF 75%, Hyperdynamic LVSF  Mild concentric LVH  Normal functioning bioprosthetic AV  Trace MR  Mild pulm htn   HTN (hypertension)     Hyperlipidemia     Lymphedema     Restless leg syndrome     Right bundle branch block     Stroke ([facility] )     [facility])      Past Surgical History  Past Surgical History:   Procedure Laterality Date    AORTIC VALVE REPLACEMENT  11/25/2002    Tissue AVR #21    AORTIC VALVULOPLASTY  11/25/2002    Bovine Pericardial heart valve    BACK SURGERY      CARDIAC CATHETERIZATION  11/22/2002    Sever critical Aortic stenosis  Pulm Htn  Normal coronary arteries   HAND SURGERY      right hand    HYSTERECTOMY      ROTATOR CUFF REPAIR Right     VASCULAR SURGERY      bilateral vein ligation & stripping             03/21/19 0846   Note Type   Note type Eval/Treat   Restrictions/Precautions   Weight Bearing Precautions Per Order No   Other Precautions Chair Alarm; Bed Alarm;Multiple lines;Telemetry;O2;Fall Risk   Pain Assessment   Pain Assessment No/denies pain Home Living   Type of [facility] Multi-level;Performs ADLs on one level; Able to live on main level with bedroom/bathroom;Stairs to enter with rails  (3 REENA c HR; 1st setup )   Bathroom Shower/Tub Walk-in shower   Bathroom Toilet Standard   Bathroom Equipment Shower chair;Commode;Grab bars in shower;Grab bars around toilet   [address] Walker;Cane;Wheelchair-manual;Hospital bed;Grab bars   Additional Comments pt utilizes RW during functional mobility a short distance per son, who was present for evaluation   Prior Function   Level of [place] Needs assistance with ADLs and functional mobility; Needs assistance with IADLs   Lives With Alone   Receives Help From Family;Home health   ADL Assistance Needs assistance   IADLs Needs assistance   Falls in the last 6 months 0   Vocational Retired   Comments pt's family and home health provide near 24/7 (A) and (S) to the pt; family drives   Psychosocial   Psychosocial (WDL) WDL   Subjective   Subjective "I think I have to poop"   ADL   Where Assessed Other (Comment)  (bathroom)   Grooming Assistance 4  Minimal Assistance   Grooming Deficit Standing with assistive device; Wash/dry hands   LB Dressing Assistance 2  Maximal Assistance   LB Dressing Deficit Don/doff R sock; Don/doff L sock; Thread RLE into underwear; Thread LLE into underwear;Pull up over hips   Toileting Assistance  3  Moderate Assistance   Toileting Deficit Increased time to complete;Grab bar use;Clothing management up;Clothing management down;Perineal hygiene   Additional Comments pt utilizes toilet this session with mod-max (A) with LB dressing and dominik hygiene; pt able to pull pants over hips with cues in standing with RW   Bed Mobility   Supine to Sit 4  Minimal assistance   Additional items Assist x 1;Bedrails; Increased time required;Verbal cues   Sit to Supine   (seated in chair at end of session)   Additional Comments pt on RA throughout session with no complaints of SOB   Transfers   Sit to Stand 4  Minimal assistance   Additional items Assist x 2;Bedrails; Increased time required;Verbal cues  (RW)   Stand to Sit 4  Minimal assistance   Additional items Assist x 2; Increased time required;Verbal cues  (RW)   Stand pivot 3  Moderate assistance   Additional items Assist x 2;Verbal cues  (RW)   Toilet transfer 3  Moderate assistance   Additional items Assist x 1; Increased time required;Standard toilet;Verbal cues  (RW)   Additional Comments pt requires min-mod (A) x2 with functional transfers with physical and verbal cues required for all task completion   Functional Mobility   Functional Mobility 4  Minimal assistance   Additional Comments x2 with use of RW; pt demonstrates poor safety awareness and direction following at this time; requires consistent cues for safety and completion   Additional items Rolling walker   Balance   Static Sitting Good   Dynamic Sitting Fair +   Static Standing Fair   Dynamic Standing Fair -   Ambulatory Fair -   Activity Tolerance   Activity Tolerance Patient limited by fatigue   RUE Assessment   RUE Assessment X  (3+/5 grossly; limited AROM at should  flex)   LUE Assessment   LUE Assessment X  (4-/5 grossly; limited ROM at shoulder flex)   Hand Function   Gross Motor Coordination Functional   Fine Motor Coordination Functional   Sensation   Light Touch No apparent deficits   Sharp/Dull No apparent deficits   Cognition   Overall Cognitive Status Impaired   Arousal/Participation Alert   Attention Difficulty attending to directions   Orientation Level Oriented to person;Oriented to place; Disoriented to time;Disoriented to situation   Memory Decreased long term memory;Decreased short term memory;Decreased recall of recent events   Following Commands Follows one step commands with increased time or repetition   Assessment   Limitation Decreased ADL status; Decreased UE ROM; Decreased UE strength;Decreased Safe judgement during ADL;Decreased cognition;Decreased endurance;Decreased self-care trans;Decreased high-level ADLs   Assessment Pt is a 80 y o  female seen for OT evaluation s/p admit to [facility] on 0/51/8638 w/ Acute metabolic encephalopathy  Comorbidities affecting pt's functional performance at time of assessment include: HTN, obesity, dementia and arthritis, cardiac disease, RLS, CVA, lymphedema  drop foot R LE  Personal factors affecting pt at time of IE include:steps to enter environment, difficulty performing ADLS, difficulty performing IADLS , limited insight into deficits, compliance, decreased initiation and engagement  and health management   Prior to admission, pt was (A) with ADL and IADL performance with use of RW for short distances  Upon evaluation: Pt requires min-max (A) with use of RW during functional mobility 2* the following deficits impacting occupational performance: weakness, decreased ROM, decreased strength, decreased balance, decreased tolerance, impaired attention, impaired initiation, impaired memory, impaired sequencing, impaired problem solving, impulsivity, decreased safety awareness and impaired interpersonal skills  Pt to benefit from continued skilled OT tx while in the hospital to address deficits as defined above and maximize level of functional independence w ADL's and functional mobility  Occupational Performance areas to address include: grooming, bathing/shower, toilet hygiene, dressing, functional mobility, community mobility and clothing management  From OT standpoint, recommendation at time of d/c would be short term rehab       Goals   Patient Goals to feel better    Short Term Goal  pt will perform UE strengthening exercises to maximize (I) with ADL performance    Long Term Goal #1 pt will increase independence with LB dressing to min (A) level seated in chair or toilet    Long Term Goal #2 pt will increase toilet transfers and hygiene to min (A) level    Long Term Goal pt will increase independence with functional mobilty to (S) level with decreased cues for task completion   Plan   Treatment Interventions ADL retraining;Functional transfer training;UE strengthening/ROM; Endurance training;Cognitive reorientation;Patient/family training; Activityengagement   Goal Expiration Date 04/04/19   OT Frequency 3-5x/wk   Recommendation   OT Discharge Recommendation Short Term Rehab   Barthel Index   Feeding 5   Bathing 0   Grooming Score 0   Dressing Score 5   Bladder Score 5   Bowels Score 5   Toilet Use Score 5   Transfers (Bed/Chair) Score 10   Mobility (Level Surface) Score 10   Stairs Score 0   Barthel Index Score 45       Pt will benefit from continued OT services in order to maximize (I) c ADL performance, FM c RW, and improve overall endurance/strength required to complete functional tasks in preparation for d/c  Pt left seated in chair at end of session; all needs within reach; all lines intact; scds connected and turned on

## 2019-03-21 NOTE — PLAN OF CARE
Recommend 4gm Na (GUERRERO) diet with any texture modifications as recommended by SLP  Problem: Nutrition/Hydration-ADULT  Goal: Nutrient/Hydration intake appropriate for improving, restoring or maintaining nutritional needs  Description  Monitor and assess patient's nutrition/hydration status for malnutrition (ex- brittle hair, bruises, dry skin, pale skin and conjunctiva, muscle wasting, smooth red tongue, and disorientation)  Collaborate with interdisciplinary team and initiate plan and interventions as ordered  Monitor patient's weight and dietary intake as ordered or per policy  Utilize nutrition screening tool and intervene per policy  Determine patient's food preferences and provide high-protein, high-caloric foods as appropriate       INTERVENTIONS:  - Monitor oral intake, urinary output, labs, and treatment plans  - Assess nutrition and hydration status and recommend course of action  - Evaluate amount of meals eaten  - Assist patient with eating if necessary   - Allow adequate time for meals  - Recommend/ encourage appropriate diets, oral nutritional supplements, and vitamin/mineral supplements  - Order, calculate, and assess calorie counts as needed  - Recommend, monitor, and adjust tube feedings and TPN/PPN based on assessed needs  - Assess need for intravenous fluids  - Provide specific nutrition/hydration education as appropriate  - Include patient/family/caregiver in decisions related to nutrition  Outcome: Progressing

## 2019-03-21 NOTE — PLAN OF CARE
Problem: PHYSICAL THERAPY ADULT  Goal: Performs mobility at highest level of function for planned discharge setting  See evaluation for individualized goals  Outcome: Progressing  Note:   Prognosis: Good  Problem List: Decreased strength, Decreased endurance, Impaired balance, Decreased mobility, Decreased coordination, Decreased cognition, Impaired judgement, Decreased safety awareness  Assessment: Pt is an 80year old female with complex PMH as listed above presenting to [facility] after being found on floor minimally responsive at home  Pt found to have acute metablolic encephalopathy and an acute to subacute bifrontal lobe ischemic infarct  Pt seen for high complexity PT evaluation presenting with confusion and decreased safety awareness with decreased direction following decreased strength balance endurance mobility transfers and decreased ambulation tolerance with elevated HR fatigue and mild SOB but no drop in SpO2 below 93%  Pt is requiring min(A)x1-2 for all bed mobility transfers and ambulation with RW  Pt's son present for evaluation and treatment and states pt is not at her baseline that her direction following and safety awareness is decreased from baseline  Pt is in need of continued activity in PT to improve strength safety awareness balance endurance mobility transfers ambulation and stair negotiation  Pt is unsafe to return home and will require short term rehab on discharge  Barriers to Discharge: Decreased caregiver support     Recommendation: Short-term skilled PT     PT - OK to Discharge: No(when medically stable for discharge to next level of care)    See flowsheet documentation for full assessment

## 2019-03-21 NOTE — PHYSICAL THERAPY NOTE
Physical Therapy Evaluation    Patient Name: Sheeba Franklin    EJUNW'K Date: 3/21/2019     Problem List  Patient Active Problem List   Diagnosis    Syncope    [facility] failure, acute ([facility] )    Peripheral edema    Cardiac disease    Elevated troponin    Restless leg syndrome    [facility]    Anemia    Acute [facility]olic stroke ([facility])    Acute encephalopathy    Elevated TSH    Age-related cognitive decline    Hypertensive emergency    CKD (chronic kidney disease) stage 3, GFR 30-59 ml/min ([facility])    Acute metabolic encephalopathy    [facility] kidney injury ([facility]    Hypernatremia    NSTEMI (non-ST elevated myocardial infarction) [facility])        Past Medical History  Past Medical History:   Diagnosis Date    Arthritis     L shoulder,fingers    Cardiac disease     valve replacement    Carpal tunnel syndrome, bilateral     Dropfoot     right    History of echocardiogram 04/20/2016    EF 75%, Hyperdynamic LVSF  Mild concentric LVH  Normal functioning bioprosthetic AV  Trace MR  Mild pulm htn   HTN (hypertension)     Hyperlipidemia     Lymphedema     Restless leg syndrome     Right bundle branch block     St[facility]     Supraventricular tachycardia [facility])         Past Surgical History  Past Surgical History:   Procedure Laterality Date    AORTIC VALVE REPLACEMENT  11/25/2002    Tissue AVR #21    AORTIC VALVULOPLASTY  11/25/2002    Bovine Pericardial heart valve    BACK SURGERY      CARDIAC CATHETERIZATION  11/22/2002    Sever critical Aortic stenosis  Pulm Htn  Normal coronary arteries   HAND SURGERY      right hand    HYSTERECTOMY      ROTATOR CUFF REPAIR Right     VASCULAR SURGERY      bilateral vein ligation & stripping           03/21/19 0917   Note Type   Note type Eval/Treat   Pain Assessment   Pain Assessment No/denies pain   Pain Score No Pain   Home Living   Type of Home House   Home Layout Two level; Able to live on main level with bedroom/bathroom; Performs ADLs on one level;Stairs to enter with rails  (3 REENA with HR)   Bathroom Shower/Tub Walk-in shower   Bathroom Toilet Standard   Bathroom Equipment Commode; Shower chair   [address] Walker;Cane;Wheelchair-manual;Hospital bed   Additional Comments Pt has aide coming in 4 days per week for 2 hours and family lives next door and nearby checking on her multiple times per day   Prior Function   Level of [place] Needs assistance with ADLs and functional mobility  ((I) ambulation with RW )   Lives With Alone   Receives Help From Family;Personal care attendant   ADL Assistance Needs assistance  (assist with bathing and dressing)   IADLs Needs assistance  (family performs IADL's)   Falls in the last 6 months 1 to 4   Comments family drives   Restrictions/Precautions   Other Precautions Chair Alarm; Bed Alarm;Multiple lines;Telemetry; Fall Risk   General   Family/Caregiver Present Yes   Cognition   Arousal/Participation Alert   Orientation Level Oriented to person;Oriented to place   Following Commands Follows one step commands with increased time or repetition   Comments pt inconsistent with following commands including one step commands during strength testing  unable to follow commands for coordination testing  Pt also with poor memory recall   RUE Assessment   RUE Assessment   (See OT note for UE assessment)   RLE Assessment   RLE Assessment X  (R foot drop, hip flex 4-/5, knee 4/5 pf 4-/5)   LLE Assessment   LLE Assessment WFL  (hip flex 4-/5, knee 4-/5 ankle 4/5)   Coordination   Movements are Fluid and Coordinated   (unable to follow coordination testing)   Sensation [facility]   Light Touch   RLE Light Touch Grossly intact   LLE Light Touch Grossly intact   Bed Mobility   Supine to Sit 4  Minimal assistance   Additional items Assist x 1;Assist x 2;Bedrails; Increased time required;Verbal cues   Sit to Supine   (seated in chair at bedside, alarm on, bell in reach)   Additional Comments Pt with inconsistent command following during LE strength testing and ROM testing  Pt with decreased dynamic sitting balance with posterior lean with knee ext  Pt requiring increased assistance with bed mobility due to weakness  Vitals at rest HR 91-95 /107 SpO2 93%  After toileting and ambulation and with exertion -135 in a-fib /102 SpO2 93%  Transfers   Sit to Stand 4  Minimal assistance   Additional items Assist x 1;Bedrails; Increased time required;Verbal cues  (with RW)   Stand to Sit 4  Minimal assistance   Additional items Assist x 1;Assist x 2;Verbal cues; Increased time required;Armrests  (with RW)   Stand pivot 4  Minimal assistance   Additional items Assist x 2;Verbal cues; Increased time required  (with RW)   Additional Comments pt with poor safety awareness and direction following during all mobility trials and toileting  Pt with forward flexed posture during ambulation requiring assist at times with direction of RW and verbal cues for safety and direction  Pt at increased risk for falls and presents with decreased endurance with elevated HR in a-fib and mild SOB but no drop in spO2 below 93%   Ambulation/Elevation   Gait pattern Short stride;Decreased foot clearance; Foward flexed  (decreased gait speed with increased fall risk)   Gait Assistance 4  Minimal assist   Additional items Assist x 2   Assistive Device Rolling walker   Distance 30ft then 10 ft with RW min(A)x2 with limited ambulation tolerance and increased fall risk with poor safety awareness and inconsistent direction following which pt's son states is not pt's baseline   Balance   Static Sitting Good   Dynamic Sitting Fair +   Static Standing Fair  (with RW)   Dynamic Standing Fair -  (with RW)   Ambulatory Fair -  (with RW)   Endurance Deficit   Endurance Deficit Yes   Endurance Deficit Description limited ambulation tolerance and activity tolerance with elevated HR fatigue and increased fall risk   Activity Tolerance   Activity Tolerance Patient limited by fatigue;Treatment limited secondary to medical complications (Comment)   Assessment   Prognosis Good   Problem List Decreased strength;Decreased endurance; Impaired balance;Decreased mobility; Decreased coordination;Decreased cognition; Impaired judgement;Decreased safety awareness   Assessment Pt is an 80year old female with complex PMH as listed above presenting to [facility] after being found on floor minimally responsive at home  Pt found to have acute metablolic encephalopathy and an acute to subacute bifrontal lobe ischemic infarct  Pt seen for high complexity PT evaluation presenting with confusion and decreased safety awareness with decreased direction following decreased strength balance endurance mobility transfers and decreased ambulation tolerance with elevated HR fatigue and mild SOB but no drop in SpO2 below 93%  Pt is requiring min(A)x1-2 for all bed mobility transfers and ambulation with RW  Pt's son present for evaluation and treatment and states pt is not at her baseline that her direction following and safety awareness is decreased from baseline  Pt is in need of continued activity in PT to improve strength safety awareness balance endurance mobility transfers ambulation and stair negotiation  Pt is unsafe to return home and will require short term rehab on discharge  Barriers to Discharge Decreased caregiver support   Goals   Patient Goals To feel better and return home   LTG Expiration Date 04/04/19   Long Term Goal #1 improve bilateral LE strength by 1/2 muscle grade to improve all bed mobility and transfers with RW to (S)   Long Term Goal #2 improve standing and ambulatory balance to fair+ to RW to improve ambulation to 200ft with RW to (S) and to improve stair negotiation to 4 steps with HR (S) no LOB or drop in spO2   pt will follow all commands for safety needed to safely return home   Treatment Day 1 Plan   Treatment/Interventions Functional transfer training;LE strengthening/ROM; Elevations; Therapeutic exercise; Endurance training;Patient/family training;Bed mobility;Gait training   PT Frequency 5x/wk   Recommendation   Recommendation Short-term skilled PT   PT - OK to Discharge No  (when medically stable for discharge to next level of care)   Pt with SCD's on when PT entered room  SCD's reapplied and turned on with pt seated in chair at bedside with call bell in reach and chair alarm on

## 2019-03-22 ENCOUNTER — HOSPITAL ENCOUNTER (INPATIENT)
Facility: HOSPITAL | Age: 84
LOS: 12 days | Discharge: HOME | DRG: 057 | End: 2019-04-03
Attending: PHYSICAL MEDICINE & REHABILITATION | Admitting: PHYSICAL MEDICINE & REHABILITATION
Payer: MEDICARE

## 2019-03-22 VITALS
TEMPERATURE: 99.2 F | HEIGHT: 65 IN | WEIGHT: 159 LBS | HEART RATE: 92 BPM | RESPIRATION RATE: 18 BRPM | OXYGEN SATURATION: 96 % | DIASTOLIC BLOOD PRESSURE: 82 MMHG | BODY MASS INDEX: 26.49 KG/M2 | SYSTOLIC BLOOD PRESSURE: 148 MMHG

## 2019-03-22 DIAGNOSIS — N18.30 CKD (CHRONIC KIDNEY DISEASE) STAGE 3, GFR 30-59 ML/MIN (HCC): Primary | ICD-10-CM

## 2019-03-22 DIAGNOSIS — Z51.81 ANTICOAGULATION MANAGEMENT ENCOUNTER: ICD-10-CM

## 2019-03-22 DIAGNOSIS — D64.9 ANEMIA: ICD-10-CM

## 2019-03-22 DIAGNOSIS — R77.8 ELEVATED TROPONIN: ICD-10-CM

## 2019-03-22 DIAGNOSIS — I63.9 STROKE (CEREBRUM) (HCC): ICD-10-CM

## 2019-03-22 DIAGNOSIS — Z79.01 ANTICOAGULATION MANAGEMENT ENCOUNTER: ICD-10-CM

## 2019-03-22 DIAGNOSIS — R60.0 BILATERAL LOWER EXTREMITY EDEMA: ICD-10-CM

## 2019-03-22 PROBLEM — M21.371 RIGHT FOOT DROP: Status: ACTIVE | Noted: 2019-03-22

## 2019-03-22 PROBLEM — I48.20 ATRIAL FIBRILLATION, CHRONIC (HCC): Status: ACTIVE | Noted: 2019-03-22

## 2019-03-22 PROBLEM — M25.512 LEFT SHOULDER PAIN: Status: ACTIVE | Noted: 2019-03-22

## 2019-03-22 PROBLEM — E87.6 HYPOKALEMIA: Status: ACTIVE | Noted: 2019-03-22

## 2019-03-22 PROBLEM — I21.4 NSTEMI (NON-ST ELEVATED MYOCARDIAL INFARCTION) (HCC): Status: RESOLVED | Noted: 2019-03-20 | Resolved: 2019-03-22

## 2019-03-22 PROBLEM — E87.0 HYPERNATREMIA: Status: RESOLVED | Noted: 2019-03-20 | Resolved: 2019-03-22

## 2019-03-22 PROBLEM — N17.9 ACUTE KIDNEY INJURY (HCC): Status: RESOLVED | Noted: 2019-03-20 | Resolved: 2019-03-22

## 2019-03-22 PROBLEM — G93.41 ACUTE METABOLIC ENCEPHALOPATHY: Status: RESOLVED | Noted: 2019-03-20 | Resolved: 2019-03-22

## 2019-03-22 LAB
ALBUMIN SERPL BCP-MCNC: 2.7 G/DL (ref 3.5–5)
ALP SERPL-CCNC: 80 U/L (ref 46–116)
ALT SERPL W P-5'-P-CCNC: 43 U/L (ref 12–78)
ANION GAP SERPL CALCULATED.3IONS-SCNC: 9 MMOL/L (ref 4–13)
AST SERPL W P-5'-P-CCNC: 47 U/L (ref 5–45)
ATRIAL RATE: 70 BPM
BASOPHILS # BLD AUTO: 0.04 THOUSANDS/ΜL (ref 0–0.1)
BASOPHILS NFR BLD AUTO: 1 % (ref 0–1)
BILIRUB SERPL-MCNC: 0.9 MG/DL (ref 0.2–1)
BUN SERPL-MCNC: 33 MG/DL (ref 5–25)
CALCIUM SERPL-MCNC: 8.2 MG/DL (ref 8.3–10.1)
CHLORIDE SERPL-SCNC: 107 MMOL/L (ref 100–108)
CO2 SERPL-SCNC: 25 MMOL/L (ref 21–32)
CREAT SERPL-MCNC: 1.44 MG/DL (ref 0.6–1.3)
EOSINOPHIL # BLD AUTO: 0.11 THOUSAND/ΜL (ref 0–0.61)
EOSINOPHIL NFR BLD AUTO: 1 % (ref 0–6)
ERYTHROCYTE [DISTWIDTH] IN BLOOD BY AUTOMATED COUNT: 14.3 % (ref 11.6–15.1)
GFR SERPL CREATININE-BSD FRML MDRD: 33 ML/MIN/1.73SQ M
GLUCOSE SERPL-MCNC: 93 MG/DL (ref 65–140)
HCT VFR BLD AUTO: 32.8 % (ref 34.8–46.1)
HGB BLD-MCNC: 10.2 G/DL (ref 11.5–15.4)
IMM GRANULOCYTES # BLD AUTO: 0.01 THOUSAND/UL (ref 0–0.2)
IMM GRANULOCYTES NFR BLD AUTO: 0 % (ref 0–2)
INR PPP: 1.27 (ref 0.86–1.17)
LACTATE SERPL-SCNC: 0.9 MMOL/L (ref 0.5–2)
LYMPHOCYTES # BLD AUTO: 1.99 THOUSANDS/ΜL (ref 0.6–4.47)
LYMPHOCYTES NFR BLD AUTO: 26 % (ref 14–44)
MAGNESIUM SERPL-MCNC: 2.2 MG/DL (ref 1.6–2.6)
MCH RBC QN AUTO: 30.4 PG (ref 26.8–34.3)
MCHC RBC AUTO-ENTMCNC: 31.1 G/DL (ref 31.4–37.4)
MCV RBC AUTO: 98 FL (ref 82–98)
MONOCYTES # BLD AUTO: 0.85 THOUSAND/ΜL (ref 0.17–1.22)
MONOCYTES NFR BLD AUTO: 11 % (ref 4–12)
NEUTROPHILS # BLD AUTO: 4.6 THOUSANDS/ΜL (ref 1.85–7.62)
NEUTS SEG NFR BLD AUTO: 61 % (ref 43–75)
NRBC BLD AUTO-RTO: 0 /100 WBCS
PHOSPHATE SERPL-MCNC: 3.4 MG/DL (ref 2.3–4.1)
PLATELET # BLD AUTO: 164 THOUSANDS/UL (ref 149–390)
PMV BLD AUTO: 10.1 FL (ref 8.9–12.7)
POTASSIUM SERPL-SCNC: 3.3 MMOL/L (ref 3.5–5.3)
PROT SERPL-MCNC: 5.7 G/DL (ref 6.4–8.2)
PROTHROMBIN TIME: 15.3 SECONDS (ref 11.8–14.2)
QRS AXIS: -60 DEGREES
QRSD INTERVAL: 150 MS
QT INTERVAL: 416 MS
QTC INTERVAL: 483 MS
RBC # BLD AUTO: 3.36 MILLION/UL (ref 3.81–5.12)
SODIUM SERPL-SCNC: 141 MMOL/L (ref 136–145)
T WAVE AXIS: 80 DEGREES
TROPONIN I SERPL-MCNC: 0.08 NG/ML
VENTRICULAR RATE: 81 BPM
WBC # BLD AUTO: 7.6 THOUSAND/UL (ref 4.31–10.16)

## 2019-03-22 PROCEDURE — 93010 ELECTROCARDIOGRAM REPORT: CPT | Performed by: INTERNAL MEDICINE

## 2019-03-22 PROCEDURE — 85610 PROTHROMBIN TIME: CPT | Performed by: INTERNAL MEDICINE

## 2019-03-22 PROCEDURE — 85025 COMPLETE CBC W/AUTO DIFF WBC: CPT | Performed by: INTERNAL MEDICINE

## 2019-03-22 PROCEDURE — 99223 1ST HOSP IP/OBS HIGH 75: CPT | Performed by: PHYSICAL MEDICINE & REHABILITATION

## 2019-03-22 PROCEDURE — 83735 ASSAY OF MAGNESIUM: CPT | Performed by: INTERNAL MEDICINE

## 2019-03-22 PROCEDURE — 83605 ASSAY OF LACTIC ACID: CPT | Performed by: INTERNAL MEDICINE

## 2019-03-22 PROCEDURE — 99239 HOSP IP/OBS DSCHRG MGMT >30: CPT | Performed by: INTERNAL MEDICINE

## 2019-03-22 PROCEDURE — 80053 COMPREHEN METABOLIC PANEL: CPT | Performed by: INTERNAL MEDICINE

## 2019-03-22 PROCEDURE — 97530 THERAPEUTIC ACTIVITIES: CPT

## 2019-03-22 PROCEDURE — 1123F ACP DISCUSS/DSCN MKR DOCD: CPT | Performed by: PHYSICAL MEDICINE & REHABILITATION

## 2019-03-22 PROCEDURE — 97116 GAIT TRAINING THERAPY: CPT

## 2019-03-22 PROCEDURE — 84100 ASSAY OF PHOSPHORUS: CPT | Performed by: INTERNAL MEDICINE

## 2019-03-22 PROCEDURE — 84484 ASSAY OF TROPONIN QUANT: CPT | Performed by: INTERNAL MEDICINE

## 2019-03-22 RX ORDER — FUROSEMIDE 40 MG/1
40 TABLET ORAL DAILY
Refills: 0 | Status: ON HOLD
Start: 2019-03-22 | End: 2019-04-02 | Stop reason: CLARIF

## 2019-03-22 RX ORDER — ROPINIROLE 0.25 MG/1
0.5 TABLET, FILM COATED ORAL 2 TIMES DAILY
Status: DISCONTINUED | OUTPATIENT
Start: 2019-03-22 | End: 2019-03-25

## 2019-03-22 RX ORDER — BISACODYL 10 MG
10 SUPPOSITORY, RECTAL RECTAL DAILY PRN
Status: DISCONTINUED | OUTPATIENT
Start: 2019-03-22 | End: 2019-04-03 | Stop reason: HOSPADM

## 2019-03-22 RX ORDER — PREGABALIN 25 MG/1
25 CAPSULE ORAL 2 TIMES DAILY
Status: DISCONTINUED | OUTPATIENT
Start: 2019-03-23 | End: 2019-03-25

## 2019-03-22 RX ORDER — POTASSIUM CHLORIDE 20 MEQ/1
40 TABLET, EXTENDED RELEASE ORAL ONCE
Status: COMPLETED | OUTPATIENT
Start: 2019-03-22 | End: 2019-03-22

## 2019-03-22 RX ORDER — LANOLIN ALCOHOL/MO/W.PET/CERES
3 CREAM (GRAM) TOPICAL
Status: DISCONTINUED | OUTPATIENT
Start: 2019-03-22 | End: 2019-03-25

## 2019-03-22 RX ORDER — FUROSEMIDE 20 MG/1
20 TABLET ORAL DAILY
Status: DISCONTINUED | OUTPATIENT
Start: 2019-03-23 | End: 2019-03-25

## 2019-03-22 RX ORDER — ASPIRIN 81 MG/1
81 TABLET, CHEWABLE ORAL DAILY
Status: DISCONTINUED | OUTPATIENT
Start: 2019-03-22 | End: 2019-03-22 | Stop reason: HOSPADM

## 2019-03-22 RX ORDER — ROPINIROLE 0.5 MG/1
0.5 TABLET, FILM COATED ORAL 2 TIMES DAILY
Refills: 0 | Status: ON HOLD
Start: 2019-03-22 | End: 2019-04-02 | Stop reason: CLARIF

## 2019-03-22 RX ORDER — ATORVASTATIN CALCIUM 40 MG/1
40 TABLET, FILM COATED ORAL EVERY EVENING
Status: DISCONTINUED | OUTPATIENT
Start: 2019-03-22 | End: 2019-04-03 | Stop reason: HOSPADM

## 2019-03-22 RX ORDER — POLYETHYLENE GLYCOL 3350 17 G/17G
17 POWDER, FOR SOLUTION ORAL DAILY PRN
Status: DISCONTINUED | OUTPATIENT
Start: 2019-03-22 | End: 2019-04-03 | Stop reason: HOSPADM

## 2019-03-22 RX ORDER — ACETAMINOPHEN 325 MG/1
650 TABLET ORAL EVERY 6 HOURS PRN
Status: DISCONTINUED | OUTPATIENT
Start: 2019-03-22 | End: 2019-04-03 | Stop reason: HOSPADM

## 2019-03-22 RX ADMIN — Medication 3 MG: at 21:12

## 2019-03-22 RX ADMIN — ROPINIROLE HYDROCHLORIDE 0.5 MG: 0.25 TABLET, FILM COATED ORAL at 18:29

## 2019-03-22 RX ADMIN — ROPINIROLE HYDROCHLORIDE 0.5 MG: 0.25 TABLET, FILM COATED ORAL at 08:29

## 2019-03-22 RX ADMIN — ASPIRIN 81 MG 81 MG: 81 TABLET ORAL at 13:02

## 2019-03-22 RX ADMIN — APIXABAN 2.5 MG: 2.5 TABLET, FILM COATED ORAL at 08:29

## 2019-03-22 RX ADMIN — APIXABAN 5 MG: 5 TABLET, FILM COATED ORAL at 18:29

## 2019-03-22 RX ADMIN — POTASSIUM CHLORIDE 40 MEQ: 1500 TABLET, EXTENDED RELEASE ORAL at 21:12

## 2019-03-22 NOTE — PLAN OF CARE
Problem: Potential for Falls  Goal: Patient will remain free of falls  Description  INTERVENTIONS:  - Assess patient frequently for physical needs  -  Identify cognitive and physical deficits and behaviors that affect risk of falls    -  [place] fall precautions as indicated by assessment   - Educate patient/family on patient safety including physical limitations  - Instruct patient to call for assistance with activity based on assessment  - Modify environment to reduce risk of injury  - Consider OT/PT consult to assist with strengthening/mobility  Outcome: Adequate for Discharge     Problem: Prexisting or High Potential for Compromised Skin Integrity  Goal: Skin integrity is maintained or improved  Description  INTERVENTIONS:  - Identify patients at risk for skin breakdown  - Assess and monitor skin integrity  - Assess and monitor nutrition and hydration status  - Monitor labs (i e  albumin)  - Assess for incontinence   - Turn and reposition patient  - Assist with mobility/ambulation  - Relieve pressure over bony prominences  - Avoid friction and shearing  - Provide appropriate hygiene as needed including keeping skin clean and dry  - Evaluate need for skin moisturizer/barrier cream  - Collaborate with interdisciplinary team (i e  Nutrition, Rehabilitation, etc )   - Patient/family teaching  Outcome: Adequate for Discharge     Problem: PAIN - ADULT  Goal: Verbalizes/displays adequate comfort level or baseline comfort level  Description  Interventions:  - Encourage patient to monitor pain and request assistance  - Assess pain using appropriate pain scale  - Administer analgesics based on type and severity of pain and evaluate response  - Implement non-pharmacological measures as appropriate and evaluate response  - Consider cultural and social influences on pain and pain management  - Notify physician/advanced practitioner if interventions unsuccessful or patient reports new pain  Outcome: Adequate for Discharge     Problem: INFECTION - ADULT  Goal: Absence or prevention of progression during hospitalization  Description  INTERVENTIONS:  - Assess and monitor for signs and symptoms of infection  - Monitor lab/diagnostic results  - Monitor all insertion sites, i e  indwelling lines, tubes, and drains  - Monitor endotracheal (as able) and nasal secretions for changes in amount and color  - [place] appropriate cooling/warming therapies per order  - Administer medications as ordered  - Instruct and encourage patient and family to use good hand hygiene technique  - Identify and instruct in appropriate isolation precautions for identified infection/condition  Outcome: Adequate for Discharge  Goal: Absence of fever/infection during neutropenic period  Description  INTERVENTIONS:  - Monitor WBC  - Implement neutropenic guidelines  Outcome: Adequate for Discharge     Problem: SAFETY ADULT  Goal: Maintain or return to baseline ADL function  Description  INTERVENTIONS:  -  Assess patient's ability to carry out ADLs; assess patient's baseline for ADL function and identify physical deficits which impact ability to perform ADLs (bathing, care of mouth/teeth, toileting, grooming, dressing, etc )  - Assess/evaluate cause of self-care deficits   - Assess range of motion  - Assess patient's mobility; develop plan if impaired  - Assess patient's need for assistive devices and provide as appropriate  - Encourage maximum independence but intervene and supervise when necessary  ¯ Involve family in performance of ADLs  ¯ Assess for home care needs following discharge   ¯ Request OT consult to assist with ADL evaluation and planning for discharge  ¯ Provide patient education as appropriate  Outcome: Adequate for Discharge  Goal: Maintain or return mobility status to optimal level  Description  INTERVENTIONS:  - Assess patient's baseline mobility status (ambulation, transfers, stairs, etc )    - Identify cognitive and physical deficits and behaviors that affect mobility  - Identify mobility aids required to assist with transfers and/or ambulation (gait belt, sit-to-stand, lift, walker, cane, etc )  - [place] fall precautions as indicated by assessment  - Record patient progress and toleration of activity level on Mobility SBAR; progress patient to next Phase/Stage  - Instruct patient to call for assistance with activity based on assessment  - Request Rehabilitation consult to assist with strengthening/weightbearing, etc   Outcome: Adequate for Discharge     Problem: DISCHARGE PLANNING  Goal: Discharge to home or other facility with appropriate resources  Description  INTERVENTIONS:  - Identify barriers to discharge w/patient and caregiver  - Arrange for needed discharge resources and transportation as appropriate  - Identify discharge learning needs (meds, wound care, etc   - Refer to Case Management Department for coordinating discharge planning if the patient needs post-hospital services based on physician/advanced practitioner order or complex needs related to functional status, cognitive ability, or social support system   Outcome: Adequate for Discharge     Problem: Knowledge Deficit  Goal: Patient/family/caregiver demonstrates understanding of disease process, treatment plan, medications, and discharge instructions  Description  Complete learning assessment and assess knowledge base  Interventions:  - Provide teaching at level of understanding  - Provide teaching via preferred learning methods  Outcome: Adequate for Discharge     Problem: Nutrition/Hydration-ADULT  Goal: Nutrient/Hydration intake appropriate for improving, restoring or maintaining nutritional needs  Description  Monitor and assess patient's nutrition/hydration status for malnutrition (ex- brittle hair, bruises, dry skin, pale skin and conjunctiva, muscle wasting, smooth red tongue, and disorientation)   Collaborate with interdisciplinary team and initiate plan and interventions as ordered  Monitor patient's weight and dietary intake as ordered or per policy  Utilize nutrition screening tool and intervene per policy  Determine patient's food preferences and provide high-protein, high-caloric foods as appropriate       INTERVENTIONS:  - Monitor oral intake, urinary output, labs, and treatment plans  - Assess nutrition and hydration status and recommend course of action  - Evaluate amount of meals eaten  - Assist patient with eating if necessary   - Allow adequate time for meals  - Recommend/ encourage appropriate diets, oral nutritional supplements, and vitamin/mineral supplements  - Order, calculate, and assess calorie counts as needed  - Recommend, monitor, and adjust tube feedings and TPN/PPN based on assessed needs  - Assess need for intravenous fluids  - Provide specific nutrition/hydration education as appropriate  - Include patient/family/caregiver in decisions related to nutrition  Outcome: Adequate for Discharge

## 2019-03-22 NOTE — ASSESSMENT & PLAN NOTE
Acute kidney injury was present on admission, suspected that this is due to volume depletion, creatinine is improved today, baseline creatinine appears to be 1 3 to 1 4

## 2019-03-22 NOTE — PLAN OF CARE
Problem: PHYSICAL THERAPY ADULT  Goal: Performs mobility at highest level of function for planned discharge setting  See evaluation for individualized goals  Outcome: Progressing  Note:   Prognosis: Good  Problem List: Decreased strength, Decreased endurance, Impaired balance, Decreased mobility, Decreased safety awareness  Assessment: Demonstrates improving functional mobility  v  Barriers to Discharge: Decreased caregiver support     Recommendation: Short-term skilled PT     PT - OK to Discharge: No(when medically stable for discharge to next level of care)    See flowsheet documentation for full assessment

## 2019-03-22 NOTE — PHYSICAL THERAPY NOTE
PT treatment Note      03/22/19 1765   Restrictions/Precautions   Other Precautions   (Chair Alarm; Bed Alarm; Fall Risk)   General   Family/Caregiver Present Yes   Transfers   Sit to Stand 4  Minimal assistance   Additional items Assist x 1; Armrests   Stand to Sit 4  Minimal assistance   Additional items Assist x 1; Armrests   Stand pivot 4  Minimal assistance   Additional items Verbal cues   Toilet transfer 4  Minimal assistance   Additional items Assist x 1;Commode   Additional Comments assist with clothing and hygiene   Ambulation/Elevation   Gait Assistance 4  Minimal assist   Additional items Assist x 1;Verbal cues   Assistive Device Rolling walker   Distance 75'   Balance   Static Sitting Good   Dynamic Sitting Fair +   Static Standing Fair   Dynamic Standing Fair   Ambulatory Fair  (with RW)   Endurance Deficit   Endurance Deficit Yes   Activity Tolerance   Activity Tolerance Patient limited by fatigue   Assessment   Prognosis Good   Problem List Decreased strength;Decreased endurance; Impaired balance;Decreased mobility; Decreased safety awareness   Assessment Demonstrates improving functional mobility  v   Goals   Treatment Day 2   Plan   Treatment/Interventions Functional transfer training;LE strengthening/ROM; Therapeutic exercise; Endurance training;Bed mobility;Gait training   Progress Progressing toward goals   Recommendation   Recommendation Short-term skilled PT   Pt  OOB in chair   with call bell within reach at end of PT session  Discussed with Kelly Holcomb, PT today's treatment and patient's current level of function for care coordination

## 2019-03-22 NOTE — DISCHARGE SUMMARY
Discharge- Choco Quiñones 7/3/1931, 80 y o  female MRN: [id number]    Unit/Bed#:  Encounter: [id number]    Primary Care Provider: Charan Lentz DO   Date and time admitted to hospital: 3/20/2019  9:18 AM        * Acute metabolic encephalopathy  Assessment & Plan  Acute metabolic encephalopathy was present on admission, likely multifactorial, patient identified to have subacute CVA bifrontal lobes  Also component of suspected adverse reaction to baclofen  Possibility of seizure activity but EEG is negative, this was specifically discussed with Neurology team, no indication for initiation of anti epileptic drugs  No aspirin therapy due to high fall risk and high bleeding risk already on Eliquis  Discharge with continued medical management to acute rehab  Subacute stroke was likely cardioembolic, underlying atrial fibrillation  Echocardiogram unremarkable, no evidence of acute thrombus  Outpatient follow-up with Neurology    Acute kidney injury [facility])  Assessment & Plan  Acute kidney injury was present on admission, suspected that this is due to volume depletion, creatinine is improved today, baseline creatinine appears to be 1 3 to 1 4    Acute embolic stroke [facility])  Assessment & Plan  Plan as above    Hypernatremia  Assessment & Plan  Resolved, patient had a free water deficit of approximately 1 L    CKD (chronic kidney disease) stage 3, GFR 30-59 ml/min ([facility])  Assessment & Plan  Chronic kidney disease, continue outpatient follow-up  Restless leg syndrome  Assessment & Plan  Continue with Requip    NSTEMI (non-ST elevated myocardial infarction) [facility])  Assessment & Plan  Mild troponin elevation, likely secondary to acute kidney injury and traumatic rhabdomyolysis    Non ST elevated myocardial infarction type 2 secondary to rhabdomyolysis      Traumatic rhabdomyolysis [facility])  Assessment & Plan  Resolved      VTE Pharmacologic Prophylaxis:   Pharmacologic: Apixaban (Eliquis)  Mechanical VTE Prophylaxis in Place: Yes    Patient Centered Rounds: I have performed bedside rounds with nursing staff today  Discussions with Specialists or Other Care Team Provider:  Plan of care discussed today with Neurology    Education and Discussions with Family / Patient:  Plan of care discussed with patient and her family at bedside    Total time spent on discharge 40 minutes    Current Length of Stay: 2 day(s)    Current Patient Status: Inpatient   Certification Statement: Patient is being discharged today to acute rehab      Code Status: Level 3 - DNAR and DNI      Subjective:   Patient has no acute complaints today, improved left shoulder pain    Objective:     Vitals:   Temp (24hrs), Av 8 °F (37 1 °C), Min:98 7 °F (37 1 °C), Max:98 9 °F (37 2 °C)    Temp:  [98 7 °F (37 1 °C)-98 9 °F (37 2 °C)] 98 9 °F (37 2 °C)  HR:  [] 99  Resp:  [19-27] 24  BP: (134-178)/() 134/69  SpO2:  [94 %-96 %] 94 %  Body mass index is 26 46 kg/m²  Input and Output Summary (last 24 hours):        Intake/Output Summary (Last 24 hours) at 3/22/2019 1102  Last data filed at 3/22/2019 0910  Gross per 24 hour   Intake 1367 52 ml   Output 225 ml   Net 1142 52 ml       Physical Exam:     Physical Exam  No acute distress, limited range of motion of left upper extremity due to pain    Additional Data:     Labs:    Results from last 7 days   Lab Units 19  0524   WBC Thousand/uL 7 60   HEMOGLOBIN g/dL 10 2*   HEMATOCRIT % 32 8*   PLATELETS Thousands/uL 164   NEUTROS PCT % 61   LYMPHS PCT % 26   MONOS PCT % 11   EOS PCT % 1     Results from last 7 days   Lab Units 19  0524   POTASSIUM mmol/L 3 3*   CHLORIDE mmol/L 107   CO2 mmol/L 25   BUN mg/dL 33*   CREATININE mg/dL 1 44*   CALCIUM mg/dL 8 2*   ALK PHOS U/L 80   ALT U/L 43   AST U/L 47*     Results from last 7 days   Lab Units 19  0524   INR  1 27*        Today, Patient Was Seen By: Jose Eduardo Benitez DO

## 2019-03-22 NOTE — ASSESSMENT & PLAN NOTE
Acute metabolic encephalopathy was present on admission, likely multifactorial, patient identified to have subacute CVA bifrontal lobes  Also component of suspected adverse reaction to baclofen  Possibility of seizure activity but EEG is negative, this was specifically discussed with Neurology team, no indication for initiation of anti epileptic drugs  Discharge with continued medical management to acute rehab  Subacute stroke was likely cardioembolic, underlying atrial fibrillation  Echocardiogram unremarkable, no evidence of acute thrombus

## 2019-03-23 LAB
ANION GAP SERPL CALCULATED.3IONS-SCNC: 11 MMOL/L (ref 4–13)
BUN SERPL-MCNC: 31 MG/DL (ref 7–25)
CALCIUM SERPL-MCNC: 9 MG/DL (ref 8.6–10.5)
CHLORIDE SERPL-SCNC: 107 MMOL/L (ref 98–107)
CK MB SERPL-MCNC: 1.8 % (ref 0.6–6.3)
CK MB SERPL-MCNC: 6.7 NG/ML (ref 0.6–6.3)
CK SERPL-CCNC: 369 U/L (ref 30–192)
CO2 SERPL-SCNC: 22 MMOL/L (ref 21–31)
CREAT SERPL-MCNC: 1.36 MG/DL (ref 0.6–1.2)
GFR SERPL CREATININE-BSD FRML MDRD: 35 ML/MIN/1.73SQ M
GLUCOSE SERPL-MCNC: 93 MG/DL (ref 65–99)
POTASSIUM SERPL-SCNC: 3.8 MMOL/L (ref 3.5–5.5)
SODIUM SERPL-SCNC: 140 MMOL/L (ref 134–143)

## 2019-03-23 PROCEDURE — 97537 COMMUNITY/WORK REINTEGRATION: CPT

## 2019-03-23 PROCEDURE — 97166 OT EVAL MOD COMPLEX 45 MIN: CPT

## 2019-03-23 PROCEDURE — 97162 PT EVAL MOD COMPLEX 30 MIN: CPT

## 2019-03-23 PROCEDURE — 97116 GAIT TRAINING THERAPY: CPT

## 2019-03-23 PROCEDURE — 97110 THERAPEUTIC EXERCISES: CPT

## 2019-03-23 PROCEDURE — 97535 SELF CARE MNGMENT TRAINING: CPT

## 2019-03-23 PROCEDURE — 97530 THERAPEUTIC ACTIVITIES: CPT

## 2019-03-23 PROCEDURE — 82550 ASSAY OF CK (CPK): CPT | Performed by: PHYSICAL MEDICINE & REHABILITATION

## 2019-03-23 PROCEDURE — 82553 CREATINE MB FRACTION: CPT | Performed by: PHYSICAL MEDICINE & REHABILITATION

## 2019-03-23 PROCEDURE — 80048 BASIC METABOLIC PNL TOTAL CA: CPT | Performed by: PHYSICAL MEDICINE & REHABILITATION

## 2019-03-23 RX ADMIN — ACETAMINOPHEN 650 MG: 325 TABLET ORAL at 22:40

## 2019-03-23 RX ADMIN — APIXABAN 5 MG: 5 TABLET, FILM COATED ORAL at 18:24

## 2019-03-23 RX ADMIN — PREGABALIN 25 MG: 25 CAPSULE ORAL at 08:00

## 2019-03-23 RX ADMIN — Medication 3 MG: at 22:38

## 2019-03-23 RX ADMIN — ROPINIROLE HYDROCHLORIDE 0.5 MG: 0.25 TABLET, FILM COATED ORAL at 08:00

## 2019-03-23 RX ADMIN — FUROSEMIDE 20 MG: 20 TABLET ORAL at 08:01

## 2019-03-23 RX ADMIN — ATORVASTATIN CALCIUM 40 MG: 40 TABLET, FILM COATED ORAL at 18:25

## 2019-03-23 RX ADMIN — ROPINIROLE HYDROCHLORIDE 0.5 MG: 0.25 TABLET, FILM COATED ORAL at 18:25

## 2019-03-23 RX ADMIN — PREGABALIN 25 MG: 25 CAPSULE ORAL at 18:24

## 2019-03-23 RX ADMIN — APIXABAN 5 MG: 5 TABLET, FILM COATED ORAL at 08:00

## 2019-03-24 RX ADMIN — ATORVASTATIN CALCIUM 40 MG: 40 TABLET, FILM COATED ORAL at 17:39

## 2019-03-24 RX ADMIN — FUROSEMIDE 20 MG: 20 TABLET ORAL at 09:48

## 2019-03-24 RX ADMIN — ROPINIROLE HYDROCHLORIDE 0.5 MG: 0.25 TABLET, FILM COATED ORAL at 09:47

## 2019-03-24 RX ADMIN — APIXABAN 5 MG: 5 TABLET, FILM COATED ORAL at 17:39

## 2019-03-24 RX ADMIN — PREGABALIN 25 MG: 25 CAPSULE ORAL at 09:47

## 2019-03-24 RX ADMIN — Medication 3 MG: at 21:03

## 2019-03-24 RX ADMIN — PREGABALIN 25 MG: 25 CAPSULE ORAL at 17:39

## 2019-03-24 RX ADMIN — ROPINIROLE HYDROCHLORIDE 0.5 MG: 0.25 TABLET, FILM COATED ORAL at 17:39

## 2019-03-24 RX ADMIN — APIXABAN 5 MG: 5 TABLET, FILM COATED ORAL at 09:47

## 2019-03-25 ENCOUNTER — EPISODE CHANGES (OUTPATIENT)
Dept: CASE MANAGEMENT | Facility: HOSPITAL | Age: 84
End: 2019-03-25

## 2019-03-25 PROBLEM — E78.5 HYPERLIPIDEMIA: Status: ACTIVE | Noted: 2019-03-25

## 2019-03-25 PROBLEM — M21.371 RIGHT FOOT DROP: Chronic | Status: ACTIVE | Noted: 2019-03-22

## 2019-03-25 PROBLEM — G47.00 INSOMNIA: Status: ACTIVE | Noted: 2019-03-25

## 2019-03-25 PROBLEM — G47.00 INSOMNIA: Chronic | Status: ACTIVE | Noted: 2019-03-25

## 2019-03-25 PROBLEM — N18.30 CKD (CHRONIC KIDNEY DISEASE) STAGE 3, GFR 30-59 ML/MIN (HCC): Chronic | Status: ACTIVE | Noted: 2018-07-02

## 2019-03-25 PROBLEM — I48.20 ATRIAL FIBRILLATION, CHRONIC (HCC): Chronic | Status: ACTIVE | Noted: 2019-03-22

## 2019-03-25 PROBLEM — E78.5 HYPERLIPIDEMIA: Chronic | Status: ACTIVE | Noted: 2019-03-25

## 2019-03-25 LAB
ANION GAP SERPL CALCULATED.3IONS-SCNC: 6 MMOL/L (ref 4–13)
BACTERIA BLD CULT: NORMAL
BACTERIA BLD CULT: NORMAL
BUN SERPL-MCNC: 36 MG/DL (ref 7–25)
CALCIUM SERPL-MCNC: 8.6 MG/DL (ref 8.6–10.5)
CHLORIDE SERPL-SCNC: 109 MMOL/L (ref 98–107)
CK MB SERPL-MCNC: 2.2 % (ref 0.6–6.3)
CK MB SERPL-MCNC: 4.6 NG/ML (ref 0.6–6.3)
CK SERPL-CCNC: 207 U/L (ref 30–192)
CO2 SERPL-SCNC: 25 MMOL/L (ref 21–31)
CREAT SERPL-MCNC: 1.57 MG/DL (ref 0.6–1.2)
ERYTHROCYTE [DISTWIDTH] IN BLOOD BY AUTOMATED COUNT: 14.1 % (ref 11.5–14.5)
GFR SERPL CREATININE-BSD FRML MDRD: 29 ML/MIN/1.73SQ M
GLUCOSE SERPL-MCNC: 93 MG/DL (ref 65–99)
HCT VFR BLD AUTO: 33.4 % (ref 42–47)
HGB BLD-MCNC: 10.8 G/DL (ref 12–16)
MCH RBC QN AUTO: 30.8 PG (ref 26–34)
MCHC RBC AUTO-ENTMCNC: 32.5 G/DL (ref 31–37)
MCV RBC AUTO: 95 FL (ref 81–99)
PLATELET # BLD AUTO: 200 THOUSANDS/UL (ref 149–390)
PMV BLD AUTO: 8 FL (ref 8.6–11.7)
POTASSIUM SERPL-SCNC: 4.3 MMOL/L (ref 3.5–5.5)
RBC # BLD AUTO: 3.52 MILLION/UL (ref 3.9–5.2)
SODIUM SERPL-SCNC: 140 MMOL/L (ref 134–143)
WBC # BLD AUTO: 6.2 THOUSAND/UL (ref 4.8–10.8)

## 2019-03-25 PROCEDURE — 92610 EVALUATE SWALLOWING FUNCTION: CPT

## 2019-03-25 PROCEDURE — 80048 BASIC METABOLIC PNL TOTAL CA: CPT | Performed by: PHYSICAL MEDICINE & REHABILITATION

## 2019-03-25 PROCEDURE — 97530 THERAPEUTIC ACTIVITIES: CPT

## 2019-03-25 PROCEDURE — 99232 SBSQ HOSP IP/OBS MODERATE 35: CPT | Performed by: PHYSICAL MEDICINE & REHABILITATION

## 2019-03-25 PROCEDURE — 82550 ASSAY OF CK (CPK): CPT | Performed by: PHYSICAL MEDICINE & REHABILITATION

## 2019-03-25 PROCEDURE — 97110 THERAPEUTIC EXERCISES: CPT

## 2019-03-25 PROCEDURE — 97537 COMMUNITY/WORK REINTEGRATION: CPT

## 2019-03-25 PROCEDURE — 97116 GAIT TRAINING THERAPY: CPT

## 2019-03-25 PROCEDURE — 99221 1ST HOSP IP/OBS SF/LOW 40: CPT | Performed by: PHYSICIAN ASSISTANT

## 2019-03-25 PROCEDURE — 82553 CREATINE MB FRACTION: CPT | Performed by: PHYSICAL MEDICINE & REHABILITATION

## 2019-03-25 PROCEDURE — 85027 COMPLETE CBC AUTOMATED: CPT | Performed by: PHYSICAL MEDICINE & REHABILITATION

## 2019-03-25 PROCEDURE — 96125 COGNITIVE TEST BY HC PRO: CPT

## 2019-03-25 RX ORDER — ALBUTEROL SULFATE 90 UG/1
2 AEROSOL, METERED RESPIRATORY (INHALATION) EVERY 4 HOURS PRN
Status: DISCONTINUED | OUTPATIENT
Start: 2019-03-25 | End: 2019-04-03 | Stop reason: HOSPADM

## 2019-03-25 RX ORDER — MELATONIN 5 MG
5 LOZENGE ORAL
Status: DISCONTINUED | OUTPATIENT
Start: 2019-03-25 | End: 2019-04-03 | Stop reason: HOSPADM

## 2019-03-25 RX ORDER — PREGABALIN 50 MG/1
50 CAPSULE ORAL 2 TIMES DAILY
Status: DISCONTINUED | OUTPATIENT
Start: 2019-03-25 | End: 2019-04-03 | Stop reason: HOSPADM

## 2019-03-25 RX ORDER — WARFARIN SODIUM 5 MG/1
5 TABLET ORAL
Status: DISCONTINUED | OUTPATIENT
Start: 2019-03-25 | End: 2019-03-25

## 2019-03-25 RX ORDER — ROPINIROLE 1 MG/1
2 TABLET, FILM COATED ORAL 2 TIMES DAILY
Status: DISCONTINUED | OUTPATIENT
Start: 2019-03-25 | End: 2019-04-03 | Stop reason: HOSPADM

## 2019-03-25 RX ORDER — WARFARIN SODIUM 5 MG/1
5 TABLET ORAL
Status: COMPLETED | OUTPATIENT
Start: 2019-03-26 | End: 2019-03-26

## 2019-03-25 RX ORDER — FUROSEMIDE 40 MG/1
40 TABLET ORAL DAILY
Status: DISCONTINUED | OUTPATIENT
Start: 2019-03-26 | End: 2019-04-03 | Stop reason: HOSPADM

## 2019-03-25 RX ADMIN — APIXABAN 2.5 MG: 2.5 TABLET, FILM COATED ORAL at 18:19

## 2019-03-25 RX ADMIN — ROPINIROLE 2 MG: 1 TABLET, FILM COATED ORAL at 18:19

## 2019-03-25 RX ADMIN — ROPINIROLE HYDROCHLORIDE 0.5 MG: 0.25 TABLET, FILM COATED ORAL at 09:12

## 2019-03-25 RX ADMIN — PREGABALIN 25 MG: 25 CAPSULE ORAL at 09:12

## 2019-03-25 RX ADMIN — Medication 5 MG: at 23:13

## 2019-03-25 RX ADMIN — APIXABAN 5 MG: 5 TABLET, FILM COATED ORAL at 09:12

## 2019-03-25 RX ADMIN — ATORVASTATIN CALCIUM 40 MG: 40 TABLET, FILM COATED ORAL at 18:19

## 2019-03-25 RX ADMIN — FUROSEMIDE 20 MG: 20 TABLET ORAL at 09:13

## 2019-03-25 RX ADMIN — PREGABALIN 50 MG: 50 CAPSULE ORAL at 18:19

## 2019-03-26 ENCOUNTER — APPOINTMENT (INPATIENT)
Dept: RADIOLOGY | Facility: HOSPITAL | Age: 84
DRG: 057 | End: 2019-03-26
Payer: MEDICARE

## 2019-03-26 LAB
INR PPP: 1.31 (ref 0.9–1.5)
PROTHROMBIN TIME: 15.2 SECONDS (ref 10.2–13)

## 2019-03-26 PROCEDURE — 97537 COMMUNITY/WORK REINTEGRATION: CPT

## 2019-03-26 PROCEDURE — 97535 SELF CARE MNGMENT TRAINING: CPT

## 2019-03-26 PROCEDURE — 99233 SBSQ HOSP IP/OBS HIGH 50: CPT | Performed by: PHYSICAL MEDICINE & REHABILITATION

## 2019-03-26 PROCEDURE — 97110 THERAPEUTIC EXERCISES: CPT

## 2019-03-26 PROCEDURE — 97116 GAIT TRAINING THERAPY: CPT

## 2019-03-26 PROCEDURE — 85610 PROTHROMBIN TIME: CPT | Performed by: PHYSICAL MEDICINE & REHABILITATION

## 2019-03-26 PROCEDURE — G0515 COGNITIVE SKILLS DEVELOPMENT: HCPCS

## 2019-03-26 PROCEDURE — 92526 ORAL FUNCTION THERAPY: CPT

## 2019-03-26 PROCEDURE — 74230 X-RAY XM SWLNG FUNCJ C+: CPT

## 2019-03-26 PROCEDURE — 97530 THERAPEUTIC ACTIVITIES: CPT

## 2019-03-26 PROCEDURE — 92611 MOTION FLUOROSCOPY/SWALLOW: CPT

## 2019-03-26 RX ADMIN — Medication 5 MG: at 22:04

## 2019-03-26 RX ADMIN — WARFARIN SODIUM 5 MG: 5 TABLET ORAL at 18:27

## 2019-03-26 RX ADMIN — ROPINIROLE 2 MG: 1 TABLET, FILM COATED ORAL at 18:27

## 2019-03-26 RX ADMIN — ATORVASTATIN CALCIUM 40 MG: 40 TABLET, FILM COATED ORAL at 18:27

## 2019-03-26 RX ADMIN — ENOXAPARIN SODIUM 40 MG: 40 INJECTION SUBCUTANEOUS at 18:28

## 2019-03-26 RX ADMIN — APIXABAN 2.5 MG: 2.5 TABLET, FILM COATED ORAL at 08:54

## 2019-03-26 RX ADMIN — PREGABALIN 50 MG: 50 CAPSULE ORAL at 18:27

## 2019-03-26 RX ADMIN — FUROSEMIDE 40 MG: 40 TABLET ORAL at 08:54

## 2019-03-26 RX ADMIN — ROPINIROLE 2 MG: 1 TABLET, FILM COATED ORAL at 08:54

## 2019-03-26 RX ADMIN — PREGABALIN 50 MG: 50 CAPSULE ORAL at 08:54

## 2019-03-27 LAB
INR PPP: 1.14 (ref 0.9–1.5)
PROTHROMBIN TIME: 13.3 SECONDS (ref 10.2–13)

## 2019-03-27 PROCEDURE — G0515 COGNITIVE SKILLS DEVELOPMENT: HCPCS

## 2019-03-27 PROCEDURE — 92526 ORAL FUNCTION THERAPY: CPT

## 2019-03-27 PROCEDURE — 85610 PROTHROMBIN TIME: CPT | Performed by: PHYSICIAN ASSISTANT

## 2019-03-27 PROCEDURE — 97110 THERAPEUTIC EXERCISES: CPT

## 2019-03-27 PROCEDURE — 99232 SBSQ HOSP IP/OBS MODERATE 35: CPT | Performed by: PHYSICAL MEDICINE & REHABILITATION

## 2019-03-27 PROCEDURE — 97537 COMMUNITY/WORK REINTEGRATION: CPT

## 2019-03-27 PROCEDURE — 97530 THERAPEUTIC ACTIVITIES: CPT

## 2019-03-27 PROCEDURE — 97535 SELF CARE MNGMENT TRAINING: CPT

## 2019-03-27 PROCEDURE — 92507 TX SP LANG VOICE COMM INDIV: CPT

## 2019-03-27 PROCEDURE — 97116 GAIT TRAINING THERAPY: CPT

## 2019-03-27 RX ADMIN — ROPINIROLE 2 MG: 1 TABLET, FILM COATED ORAL at 08:03

## 2019-03-27 RX ADMIN — PREGABALIN 50 MG: 50 CAPSULE ORAL at 18:34

## 2019-03-27 RX ADMIN — PREGABALIN 50 MG: 50 CAPSULE ORAL at 08:03

## 2019-03-27 RX ADMIN — ENOXAPARIN SODIUM 40 MG: 40 INJECTION SUBCUTANEOUS at 08:03

## 2019-03-27 RX ADMIN — FUROSEMIDE 40 MG: 40 TABLET ORAL at 08:03

## 2019-03-27 RX ADMIN — WARFARIN SODIUM 7.5 MG: 2.5 TABLET ORAL at 18:34

## 2019-03-27 RX ADMIN — ATORVASTATIN CALCIUM 40 MG: 40 TABLET, FILM COATED ORAL at 18:34

## 2019-03-27 RX ADMIN — ROPINIROLE 2 MG: 1 TABLET, FILM COATED ORAL at 18:34

## 2019-03-27 RX ADMIN — Medication 5 MG: at 21:44

## 2019-03-28 ENCOUNTER — PATIENT OUTREACH (OUTPATIENT)
Dept: CASE MANAGEMENT | Facility: OTHER | Age: 84
End: 2019-03-28

## 2019-03-28 LAB
INR PPP: 1.12 (ref 0.9–1.5)
PROTHROMBIN TIME: 13 SECONDS (ref 10.2–13)

## 2019-03-28 PROCEDURE — G0515 COGNITIVE SKILLS DEVELOPMENT: HCPCS

## 2019-03-28 PROCEDURE — 97110 THERAPEUTIC EXERCISES: CPT

## 2019-03-28 PROCEDURE — 97537 COMMUNITY/WORK REINTEGRATION: CPT

## 2019-03-28 PROCEDURE — 85610 PROTHROMBIN TIME: CPT | Performed by: PHYSICIAN ASSISTANT

## 2019-03-28 PROCEDURE — 97530 THERAPEUTIC ACTIVITIES: CPT

## 2019-03-28 PROCEDURE — 97116 GAIT TRAINING THERAPY: CPT

## 2019-03-28 PROCEDURE — 92526 ORAL FUNCTION THERAPY: CPT

## 2019-03-28 PROCEDURE — 99232 SBSQ HOSP IP/OBS MODERATE 35: CPT | Performed by: PHYSICAL MEDICINE & REHABILITATION

## 2019-03-28 RX ADMIN — ROPINIROLE 2 MG: 1 TABLET, FILM COATED ORAL at 08:42

## 2019-03-28 RX ADMIN — ROPINIROLE 2 MG: 1 TABLET, FILM COATED ORAL at 17:33

## 2019-03-28 RX ADMIN — PREGABALIN 50 MG: 50 CAPSULE ORAL at 08:45

## 2019-03-28 RX ADMIN — ENOXAPARIN SODIUM 40 MG: 40 INJECTION SUBCUTANEOUS at 13:54

## 2019-03-28 RX ADMIN — ATORVASTATIN CALCIUM 40 MG: 40 TABLET, FILM COATED ORAL at 17:34

## 2019-03-28 RX ADMIN — ENOXAPARIN SODIUM 40 MG: 40 INJECTION SUBCUTANEOUS at 08:41

## 2019-03-28 RX ADMIN — Medication 5 MG: at 21:43

## 2019-03-28 RX ADMIN — WARFARIN SODIUM 7.5 MG: 2.5 TABLET ORAL at 17:34

## 2019-03-28 RX ADMIN — PREGABALIN 50 MG: 50 CAPSULE ORAL at 17:34

## 2019-03-28 NOTE — PROGRESS NOTES
3/28/19-per chart review, pt ongoing at [facility]  Per SW note, Pt resides alone in a home, 3 steps in, first floor living arrangements  Has 3 sons in close proximity who check on her several days per week & a caregiver 4 days per week  Has RW  [employer] DC date 4/3; Pt's son will transport her home at 1 PM  [facility]  Pt has a strong support system with her 3 sons & other family members, whom check on her throughout the day as well as aides in the home 4 days per week  Will continue to monitor

## 2019-03-29 LAB
INR PPP: 1.31 (ref 0.9–1.5)
PLATELET # BLD AUTO: 220 THOUSANDS/UL (ref 149–390)
PROTHROMBIN TIME: 15.2 SECONDS (ref 10.2–13)

## 2019-03-29 PROCEDURE — 97535 SELF CARE MNGMENT TRAINING: CPT

## 2019-03-29 PROCEDURE — 85610 PROTHROMBIN TIME: CPT | Performed by: PHYSICIAN ASSISTANT

## 2019-03-29 PROCEDURE — 85049 AUTOMATED PLATELET COUNT: CPT | Performed by: PHYSICIAN ASSISTANT

## 2019-03-29 PROCEDURE — 92526 ORAL FUNCTION THERAPY: CPT

## 2019-03-29 PROCEDURE — 97110 THERAPEUTIC EXERCISES: CPT

## 2019-03-29 PROCEDURE — G0515 COGNITIVE SKILLS DEVELOPMENT: HCPCS

## 2019-03-29 PROCEDURE — 97116 GAIT TRAINING THERAPY: CPT

## 2019-03-29 PROCEDURE — 97537 COMMUNITY/WORK REINTEGRATION: CPT

## 2019-03-29 PROCEDURE — 97530 THERAPEUTIC ACTIVITIES: CPT

## 2019-03-29 PROCEDURE — 99232 SBSQ HOSP IP/OBS MODERATE 35: CPT | Performed by: PHYSICAL MEDICINE & REHABILITATION

## 2019-03-29 RX ORDER — WARFARIN SODIUM 5 MG/1
10 TABLET ORAL
Status: DISCONTINUED | OUTPATIENT
Start: 2019-03-29 | End: 2019-03-31

## 2019-03-29 RX ADMIN — PREGABALIN 50 MG: 50 CAPSULE ORAL at 08:57

## 2019-03-29 RX ADMIN — ATORVASTATIN CALCIUM 40 MG: 40 TABLET, FILM COATED ORAL at 17:18

## 2019-03-29 RX ADMIN — Medication 5 MG: at 22:09

## 2019-03-29 RX ADMIN — ENOXAPARIN SODIUM 40 MG: 40 INJECTION SUBCUTANEOUS at 08:57

## 2019-03-29 RX ADMIN — PREGABALIN 50 MG: 50 CAPSULE ORAL at 17:18

## 2019-03-29 RX ADMIN — FUROSEMIDE 40 MG: 40 TABLET ORAL at 08:57

## 2019-03-29 RX ADMIN — ROPINIROLE 2 MG: 1 TABLET, FILM COATED ORAL at 08:57

## 2019-03-29 RX ADMIN — WARFARIN SODIUM 10 MG: 5 TABLET ORAL at 17:18

## 2019-03-29 RX ADMIN — ROPINIROLE 2 MG: 1 TABLET, FILM COATED ORAL at 17:18

## 2019-03-30 LAB
INR PPP: 2.02 (ref 0.9–1.5)
PLATELET # BLD AUTO: 224 THOUSANDS/UL (ref 149–390)
PROTHROMBIN TIME: 23.6 SECONDS (ref 10.2–13)

## 2019-03-30 PROCEDURE — 85049 AUTOMATED PLATELET COUNT: CPT | Performed by: PHYSICIAN ASSISTANT

## 2019-03-30 PROCEDURE — 85610 PROTHROMBIN TIME: CPT | Performed by: PHYSICIAN ASSISTANT

## 2019-03-30 RX ADMIN — ROPINIROLE 2 MG: 1 TABLET, FILM COATED ORAL at 17:35

## 2019-03-30 RX ADMIN — PREGABALIN 50 MG: 50 CAPSULE ORAL at 17:35

## 2019-03-30 RX ADMIN — ATORVASTATIN CALCIUM 40 MG: 40 TABLET, FILM COATED ORAL at 17:35

## 2019-03-30 RX ADMIN — FUROSEMIDE 40 MG: 40 TABLET ORAL at 09:33

## 2019-03-30 RX ADMIN — WARFARIN SODIUM 10 MG: 5 TABLET ORAL at 17:35

## 2019-03-30 RX ADMIN — ROPINIROLE 2 MG: 1 TABLET, FILM COATED ORAL at 09:33

## 2019-03-30 RX ADMIN — PREGABALIN 50 MG: 50 CAPSULE ORAL at 09:33

## 2019-03-30 RX ADMIN — Medication 5 MG: at 21:56

## 2019-03-30 RX ADMIN — ENOXAPARIN SODIUM 40 MG: 40 INJECTION SUBCUTANEOUS at 09:33

## 2019-03-31 LAB
INR PPP: 3.32 (ref 0.9–1.5)
PROTHROMBIN TIME: 38.7 SECONDS (ref 10.2–13)

## 2019-03-31 PROCEDURE — 85610 PROTHROMBIN TIME: CPT | Performed by: NURSE PRACTITIONER

## 2019-03-31 RX ORDER — WARFARIN SODIUM 5 MG/1
5 TABLET ORAL
Status: DISCONTINUED | OUTPATIENT
Start: 2019-04-01 | End: 2019-04-01

## 2019-03-31 RX ADMIN — ROPINIROLE 2 MG: 1 TABLET, FILM COATED ORAL at 09:34

## 2019-03-31 RX ADMIN — FUROSEMIDE 40 MG: 40 TABLET ORAL at 09:34

## 2019-03-31 RX ADMIN — PREGABALIN 50 MG: 50 CAPSULE ORAL at 17:21

## 2019-03-31 RX ADMIN — Medication 5 MG: at 21:39

## 2019-03-31 RX ADMIN — PREGABALIN 50 MG: 50 CAPSULE ORAL at 09:34

## 2019-03-31 RX ADMIN — ROPINIROLE 2 MG: 1 TABLET, FILM COATED ORAL at 17:21

## 2019-03-31 RX ADMIN — ATORVASTATIN CALCIUM 40 MG: 40 TABLET, FILM COATED ORAL at 17:21

## 2019-04-01 LAB
INR PPP: 4.37 (ref 0.9–1.5)
PROTHROMBIN TIME: 51.4 SECONDS (ref 10.2–13)

## 2019-04-01 PROCEDURE — 97110 THERAPEUTIC EXERCISES: CPT

## 2019-04-01 PROCEDURE — 97530 THERAPEUTIC ACTIVITIES: CPT

## 2019-04-01 PROCEDURE — 97116 GAIT TRAINING THERAPY: CPT

## 2019-04-01 PROCEDURE — 99232 SBSQ HOSP IP/OBS MODERATE 35: CPT | Performed by: PHYSICAL MEDICINE & REHABILITATION

## 2019-04-01 PROCEDURE — 97537 COMMUNITY/WORK REINTEGRATION: CPT

## 2019-04-01 PROCEDURE — G0515 COGNITIVE SKILLS DEVELOPMENT: HCPCS

## 2019-04-01 PROCEDURE — 85610 PROTHROMBIN TIME: CPT | Performed by: PHYSICIAN ASSISTANT

## 2019-04-01 PROCEDURE — 92526 ORAL FUNCTION THERAPY: CPT

## 2019-04-01 PROCEDURE — 97535 SELF CARE MNGMENT TRAINING: CPT

## 2019-04-01 RX ADMIN — FUROSEMIDE 40 MG: 40 TABLET ORAL at 08:36

## 2019-04-01 RX ADMIN — PREGABALIN 50 MG: 50 CAPSULE ORAL at 17:23

## 2019-04-01 RX ADMIN — ROPINIROLE 2 MG: 1 TABLET, FILM COATED ORAL at 08:36

## 2019-04-01 RX ADMIN — ROPINIROLE 2 MG: 1 TABLET, FILM COATED ORAL at 17:23

## 2019-04-01 RX ADMIN — ATORVASTATIN CALCIUM 40 MG: 40 TABLET, FILM COATED ORAL at 17:23

## 2019-04-01 RX ADMIN — PREGABALIN 50 MG: 50 CAPSULE ORAL at 08:36

## 2019-04-01 RX ADMIN — Medication 5 MG: at 21:12

## 2019-04-02 LAB
INR PPP: 3.85 (ref 0.9–1.5)
PROTHROMBIN TIME: 45.3 SECONDS (ref 10.2–13)

## 2019-04-02 PROCEDURE — 97537 COMMUNITY/WORK REINTEGRATION: CPT

## 2019-04-02 PROCEDURE — 97116 GAIT TRAINING THERAPY: CPT

## 2019-04-02 PROCEDURE — 97110 THERAPEUTIC EXERCISES: CPT

## 2019-04-02 PROCEDURE — 99233 SBSQ HOSP IP/OBS HIGH 50: CPT | Performed by: PHYSICAL MEDICINE & REHABILITATION

## 2019-04-02 PROCEDURE — 85610 PROTHROMBIN TIME: CPT | Performed by: INTERNAL MEDICINE

## 2019-04-02 PROCEDURE — 97530 THERAPEUTIC ACTIVITIES: CPT

## 2019-04-02 PROCEDURE — 97535 SELF CARE MNGMENT TRAINING: CPT

## 2019-04-02 PROCEDURE — 92507 TX SP LANG VOICE COMM INDIV: CPT

## 2019-04-02 PROCEDURE — 92526 ORAL FUNCTION THERAPY: CPT

## 2019-04-02 RX ORDER — FUROSEMIDE 40 MG/1
40 TABLET ORAL DAILY
Qty: 30 TABLET | Refills: 0 | Status: ON HOLD | OUTPATIENT
Start: 2019-04-04 | End: 2019-11-29

## 2019-04-02 RX ORDER — ATORVASTATIN CALCIUM 40 MG/1
40 TABLET, FILM COATED ORAL EVERY EVENING
COMMUNITY
End: 2020-07-27 | Stop reason: SDUPTHER

## 2019-04-02 RX ORDER — ROPINIROLE 2 MG/1
2 TABLET, FILM COATED ORAL DAILY
COMMUNITY
End: 2020-11-20 | Stop reason: SDUPTHER

## 2019-04-02 RX ORDER — FUROSEMIDE 80 MG
80 TABLET ORAL DAILY
Status: ON HOLD | COMMUNITY
End: 2019-04-02 | Stop reason: SDUPTHER

## 2019-04-02 RX ORDER — PREGABALIN 50 MG/1
50 CAPSULE ORAL 2 TIMES DAILY
COMMUNITY
End: 2019-11-29

## 2019-04-02 RX ADMIN — ATORVASTATIN CALCIUM 40 MG: 40 TABLET, FILM COATED ORAL at 18:14

## 2019-04-02 RX ADMIN — PREGABALIN 50 MG: 50 CAPSULE ORAL at 08:09

## 2019-04-02 RX ADMIN — ROPINIROLE 2 MG: 1 TABLET, FILM COATED ORAL at 08:09

## 2019-04-02 RX ADMIN — ROPINIROLE 2 MG: 1 TABLET, FILM COATED ORAL at 18:14

## 2019-04-02 RX ADMIN — Medication 5 MG: at 21:35

## 2019-04-02 RX ADMIN — PREGABALIN 50 MG: 50 CAPSULE ORAL at 18:14

## 2019-04-02 RX ADMIN — FUROSEMIDE 40 MG: 40 TABLET ORAL at 08:09

## 2019-04-03 VITALS
HEART RATE: 88 BPM | WEIGHT: 164 LBS | TEMPERATURE: 97.9 F | RESPIRATION RATE: 18 BRPM | OXYGEN SATURATION: 96 % | BODY MASS INDEX: 29.06 KG/M2 | SYSTOLIC BLOOD PRESSURE: 128 MMHG | HEIGHT: 63 IN | DIASTOLIC BLOOD PRESSURE: 68 MMHG

## 2019-04-03 LAB
INR PPP: 2.55 (ref 0.9–1.5)
PROTHROMBIN TIME: 29.9 SECONDS (ref 10.2–13)

## 2019-04-03 PROCEDURE — 85610 PROTHROMBIN TIME: CPT | Performed by: INTERNAL MEDICINE

## 2019-04-03 PROCEDURE — 99238 HOSP IP/OBS DSCHRG MGMT 30/<: CPT | Performed by: PHYSICAL MEDICINE & REHABILITATION

## 2019-04-03 RX ORDER — WARFARIN SODIUM 3 MG/1
3 TABLET ORAL
Status: DISCONTINUED | OUTPATIENT
Start: 2019-04-03 | End: 2019-04-03 | Stop reason: HOSPADM

## 2019-04-03 RX ORDER — WARFARIN SODIUM 3 MG/1
TABLET ORAL
Qty: 30 TABLET | Refills: 0 | Status: ON HOLD | OUTPATIENT
Start: 2019-04-03 | End: 2019-11-29

## 2019-04-03 RX ADMIN — ROPINIROLE 2 MG: 1 TABLET, FILM COATED ORAL at 08:25

## 2019-04-03 RX ADMIN — PREGABALIN 50 MG: 50 CAPSULE ORAL at 08:25

## 2019-04-03 RX ADMIN — FUROSEMIDE 40 MG: 40 TABLET ORAL at 08:25

## 2019-04-04 ENCOUNTER — PATIENT OUTREACH (OUTPATIENT)
Dept: CASE MANAGEMENT | Facility: OTHER | Age: 84
End: 2019-04-04

## 2019-04-05 ENCOUNTER — EPISODE CHANGES (OUTPATIENT)
Dept: CASE MANAGEMENT | Facility: OTHER | Age: 84
End: 2019-04-05

## 2019-04-08 ENCOUNTER — PATIENT OUTREACH (OUTPATIENT)
Dept: CASE MANAGEMENT | Facility: OTHER | Age: 84
End: 2019-04-08

## 2019-05-07 ENCOUNTER — PATIENT OUTREACH (OUTPATIENT)
Dept: CASE MANAGEMENT | Facility: OTHER | Age: 84
End: 2019-05-07

## 2019-06-06 ENCOUNTER — PATIENT OUTREACH (OUTPATIENT)
Dept: CASE MANAGEMENT | Facility: OTHER | Age: 84
End: 2019-06-06

## 2019-06-19 ENCOUNTER — PATIENT OUTREACH (OUTPATIENT)
Dept: CASE MANAGEMENT | Facility: OTHER | Age: 84
End: 2019-06-19

## 2019-07-24 ENCOUNTER — APPOINTMENT (OUTPATIENT)
Dept: LAB | Facility: MEDICAL CENTER | Age: 84
End: 2019-07-24
Payer: MEDICARE

## 2019-07-24 ENCOUNTER — OFFICE VISIT (OUTPATIENT)
Dept: CARDIOLOGY CLINIC | Facility: CLINIC | Age: 84
End: 2019-07-24
Payer: MEDICARE

## 2019-07-24 ENCOUNTER — APPOINTMENT (OUTPATIENT)
Dept: RADIOLOGY | Facility: MEDICAL CENTER | Age: 84
End: 2019-07-24
Payer: MEDICARE

## 2019-07-24 VITALS
DIASTOLIC BLOOD PRESSURE: 78 MMHG | SYSTOLIC BLOOD PRESSURE: 138 MMHG | BODY MASS INDEX: 32.25 KG/M2 | WEIGHT: 182 LBS | HEIGHT: 63 IN | HEART RATE: 80 BPM

## 2019-07-24 DIAGNOSIS — R06.00 DYSPNEA ON EXERTION: Primary | ICD-10-CM

## 2019-07-24 DIAGNOSIS — I48.20 ATRIAL FIBRILLATION, CHRONIC (HCC): Chronic | ICD-10-CM

## 2019-07-24 DIAGNOSIS — Z95.2 S/P AVR: ICD-10-CM

## 2019-07-24 DIAGNOSIS — R06.00 DYSPNEA ON EXERTION: ICD-10-CM

## 2019-07-24 DIAGNOSIS — R60.9 DEPENDENT EDEMA: ICD-10-CM

## 2019-07-24 LAB — NT-PROBNP SERPL-MCNC: 4250 PG/ML

## 2019-07-24 PROCEDURE — 1123F ACP DISCUSS/DSCN MKR DOCD: CPT | Performed by: INTERNAL MEDICINE

## 2019-07-24 PROCEDURE — 36415 COLL VENOUS BLD VENIPUNCTURE: CPT

## 2019-07-24 PROCEDURE — 83880 ASSAY OF NATRIURETIC PEPTIDE: CPT

## 2019-07-24 PROCEDURE — 71046 X-RAY EXAM CHEST 2 VIEWS: CPT

## 2019-07-24 PROCEDURE — 99214 OFFICE O/P EST MOD 30 MIN: CPT | Performed by: INTERNAL MEDICINE

## 2019-07-24 RX ORDER — WARFARIN SODIUM 5 MG/1
5 TABLET ORAL
Refills: 0 | COMMUNITY
Start: 2019-07-08 | End: 2020-07-27 | Stop reason: SDUPTHER

## 2019-07-24 RX ORDER — ERGOCALCIFEROL 1.25 MG/1
50000 CAPSULE ORAL WEEKLY
Refills: 0 | COMMUNITY
Start: 2019-07-22 | End: 2020-04-17

## 2019-07-24 RX ORDER — GABAPENTIN 100 MG/1
200 CAPSULE ORAL 3 TIMES DAILY
Refills: 0 | COMMUNITY
Start: 2019-07-09 | End: 2020-07-09

## 2019-07-24 NOTE — PATIENT INSTRUCTIONS
Dyspnea   WHAT YOU NEED TO KNOW:   What is dyspnea? Dyspnea is breathing difficulty or discomfort  You may have labored, painful, or shallow breathing  You may feel breathless or short of breath  Dyspnea can occur during rest or with activity  You may have dyspnea for a short time, or it might become chronic  Dyspnea is often a symptom of a disease or condition  What signs and symptoms can occur with dyspnea? · Chest tightness or pain    · A cough, or a coarse or high-pitched noise when you breathe    · Pale and sweaty, cool skin    · Confusion and tiredness    · Bluish-gray lips or nails  What increases my risk for dyspnea? · Swelling in the throat from an infection or allergic reaction    · Lung conditions such as asthma, COPD, cancer, infection, or a blood clot    · Heart conditions such as abnormal heartbeats, heart failure, or coronary artery disease    · Smoking, exposure to chemicals such as carbon monoxide, or too much aspirin    · A condition that affects your central nervous system, such as a spinal cord injury or nerve damage    · Enlarged abdomen because you are overweight, pregnant, or have ascites (fluid in the abdomen) from liver disease     · Anemia (low red blood cell count), anxiety, panic, or going to a high altitude  How is the cause of dyspnea diagnosed? Your healthcare provider may ask when your dyspnea began and what you were doing  Tell him or her how often you have dyspnea and what makes it worse or better  Tell your healthcare provider about medicines you take  Describe any other symptoms, such as pain or a fever  Your healthcare provider will listen to you breathe and watch for irregular breathing  You may also need the following:  · A pulse oximeter  is a device that measures the amount of oxygen in your blood  A cord with a clip or sticky strip is placed on your finger, ear, or toe  The other end of the cord is hooked to a machine       · Blood tests  may show your healthcare provider if you are at risk for blood clots or heart failure  Blood tests can also show if you have anemia or an infection  · X-ray  pictures may show signs of infection or fluid around your heart or lungs  · Exercise tests  help your healthcare provider learn if you have symptoms, along with dyspnea, that limit activity  Symptoms include leg pain, fatigue, and weakness  Exercise tests can also show if your dyspnea is caused by heart problems  · CT scan  pictures may show blood clots or an area of disease in your lungs  You may be given contract liquid to help your lungs show up better in the pictures  Tell the healthcare provider if you have ever had an allergic reaction to contrast liquid  · An echocardiogram  is a type of ultrasound  Sound waves are used to show the structure and function of your heart  · An EKG  is a test that measures the electrical activity of your heart  How is dyspnea treated? You may need treatment if your symptoms prevent you from doing your daily activities  The condition causing your dyspnea may need to be treated  You may also need the following to improve your symptoms:  · Oxygen therapy  may be used to help you breathe easier  You may need oxygen if your blood oxygen level is lower than it should be  · Medicines  may be used to treat the cause of your dyspnea  Medicines may reduce swelling in your airway or decrease extra fluid from around your heart or lungs  Other medicines may be used to decrease anxiety and help you feel calm and relaxed  · Pulmonary rehabilitation  is used to reduce your symptoms while keeping you active  You may learn breathing techniques, muscle strengthening, and how to pace yourself when you are active  How can I manage long-term dyspnea? · Create an action plan  You and your healthcare provider can work together to create a plan for how to handle episodes of dyspnea   The plan can include daily activities, treatment changes, and what to do if you have severe breathing problems  · Lean forward on your elbows when you sit  This helps your lungs expand and may make it easier to breathe  · Use pursed-lip breathing any time you feel short of breath  Breathe in through your nose and then slowly breathe out through your mouth with your lips slightly puckered  It should take you twice as long to breathe out as it did to breathe in  · Do not smoke  Nicotine and other chemicals in cigarettes and cigars can cause lung damage and make it harder to breathe  Ask your healthcare provider for information if you currently smoke and need help to quit  E-cigarettes or smokeless tobacco still contain nicotine  Talk to your healthcare provider before you use these products  · Reach or maintain a healthy weight  Your healthcare provider can help you create a safe weight loss plan if you are overweight  · Exercise as directed  Exercise can help your lungs work more easily  Exercise can also help you lose weight if needed  Try to get at least 30 minutes of exercise most days of the week  Your healthcare provider can help you create an exercise plan that is safe for you  When should I seek immediate care? · Your signs and symptoms are the same or worse within 24 hours of treatment  · You have shaking chills or a fever over 102°F      · You have new pain, pressure, or tightness in your chest      · You have a new or worse cough or wheezing, or you cough up blood  · You feel like you cannot get enough air  · The skin over your ribs or on your neck sinks in when you breathe  · You have a severe headache with vomiting and abdominal pain  · You feel confused or dizzy  When should I contact my healthcare provider? · You have questions or concerns about your condition or care  CARE AGREEMENT:   You have the right to help plan your care  Learn about your health condition and how it may be treated   Discuss treatment options with your caregivers to decide what care you want to receive  You always have the right to refuse treatment  The above information is an  only  It is not intended as medical advice for individual conditions or treatments  Talk to your doctor, nurse or pharmacist before following any medical regimen to see if it is safe and effective for you  © 2017 [employer] Information is for End User's use only and may not be sold, redistributed or otherwise used for commercial purposes  All illustrations and images included in CareNotes® are the copyrighted property of [employer]  or [employer]

## 2019-07-24 NOTE — PROGRESS NOTES
Subjective:        Patient ID: Dora Lion is a 80 y o  female  Chief Complaint:  Ronal Philippe is here for routine follow-up  Since I last saw her she had another spell diagnosed with encephalopathy in the hospital recently requiring some rehab  Watching her walk today suggests she is severely deconditioned  She is today walking in made her a bit more short of breath than she expected  Some audible upper respiratory wheezing is her but no rales  Her legs are actually markedly improved from prior edema per her  No chest pain or tightness, no palpitations  Heart rate controlled  Blood pressure quite good  She does not appear to be any distress at rest     She did skip her Lasix this morning due to going out  Recent INR 3 2, hemoglobin 10 9, white count 6 1, creatinine 1 4, TSH 3 4 and LDL 45  The following portions of the patient's history were reviewed and updated as appropriate: allergies, current medications, past family history, past medical history, past social history, past surgical history and problem list   Review of Systems   Constitution: Negative for chills, diaphoresis, malaise/fatigue and weight gain  HENT: Negative for nosebleeds and stridor  Eyes: Negative for double vision, vision loss in left eye, vision loss in right eye and visual disturbance  Cardiovascular: Positive for dyspnea on exertion  Negative for chest pain, claudication, cyanosis, irregular heartbeat, leg swelling, near-syncope, orthopnea, palpitations, paroxysmal nocturnal dyspnea and syncope  Respiratory: Positive for wheezing  Negative for cough, shortness of breath and snoring  Endocrine: Negative for polydipsia, polyphagia and polyuria  Hematologic/Lymphatic: Negative for bleeding problem  Does not bruise/bleed easily  Skin: Negative for flushing and rash  Musculoskeletal: Negative for falls and myalgias     Gastrointestinal: Negative for abdominal pain, heartburn, hematemesis, hematochezia, melena and nausea  Genitourinary: Negative for hematuria  Neurological: Negative for brief paralysis, dizziness, focal weakness, headaches, light-headedness, loss of balance and vertigo  Psychiatric/Behavioral: Negative for altered mental status and substance abuse  Allergic/Immunologic: Negative for hives  Objective:      /78   Pulse 80   Ht 5' 3" (1 6 m)   Wt 82 6 kg (182 lb)   LMP  (LMP Unknown)   BMI 32 24 kg/m²   Physical Exam   Constitutional: She is oriented to person, place, and time  She appears well-developed and well-nourished  HENT:   Head: Normocephalic and atraumatic  Eyes: Pupils are equal, round, and reactive to light  EOM are normal    Neck: Normal range of motion  Neck supple  No JVD present  No thyromegaly present  Cardiovascular: Normal rate and intact distal pulses  Exam reveals no gallop and no friction rub  Murmur (Short grade 1 outflow murmur at base preserved S2 no diastolic murmur) heard  Pulmonary/Chest: Effort normal  No stridor  No respiratory distress  She has wheezes (Faint end expiratory wheeze midline anteriorly)  She has no rales  Abdominal: Soft  Bowel sounds are normal  She exhibits no mass  There is no tenderness  Musculoskeletal: Normal range of motion  She exhibits edema (Soft legs tho markedly edematous which is chronic)  Lymphadenopathy:     She has no cervical adenopathy  Neurological: She is alert and oriented to person, place, and time  Skin: Skin is warm and dry  No rash noted  No pallor  Psychiatric: She has a normal mood and affect  Her behavior is normal  Judgment and thought content normal    Nursing note and vitals reviewed  Lab Review:   No visits with results within 2 Month(s) from this visit     Latest known visit with results is:   Admission on 03/22/2019, Discharged on 04/03/2019   Component Date Value    Total CK 03/23/2019 369*    Sodium 03/23/2019 140     Potassium 03/23/2019 3 8     Chloride 03/23/2019 107     CO2 2019 22     ANION GAP 2019 11     BUN 2019 31*    Creatinine 2019 1 36*    Glucose 2019 93     Calcium 2019 9 0     eGFR 2019 35     CK-MB Index 2019 1 8     CK-MB 2019 6 7*    WBC 2019 6 20     RBC 2019 3 52*    Hemoglobin 2019 10 8*    Hematocrit 2019 33 4*    MCV 2019 95     MCH 2019 30 8     MCHC 2019 32 5     RDW 2019 14 1     Platelets  200     MPV 2019 8 0*    Total CK 2019 207*    Sodium 2019 140     Potassium 2019 4 3     Chloride 2019 109*    CO2 2019 25     ANION GAP 2019 6     BUN 2019 36*    Creatinine 2019 1 57*    Glucose 2019 93     Calcium 2019 8 6     eGFR 2019 29     CK-MB Index 2019 2 2     CK-MB 2019 4 6     Protime 2019 15 2*    INR 2019 1 31     Protime 2019 13 3*    INR 2019 1 14     Protime 2019 13 0     INR 2019 1 12     Platelets  220     Protime 2019 15 2*    INR 2019 1 31     Protime 2019 23 6*    INR 2019 2 02*    Platelets  224     Protime 2019 38 7*    INR 2019 3 32*    Protime 2019 51 4*    INR 2019 4 37*    Protime 2019 45 3*    INR 2019 3 85*    Protime 2019 29 9*    INR 2019 2 55*     No results found    Transthoracic Echocardiogram  2D, M-mode, Doppler, and Color Doppler     Study date:  21-Mar-2019     Patient: Leyla Priest  MR number: [id number]  Account number: [de-identified]  : 1931  Age: 80 years  Gender: Female  Status: Inpatient  Location: Bedside  Height: 65 in  Weight: 159 lb  BP: 144/ 65 mmHg     Indications: cva     Diagnoses: 436 - CVA     Sonographer:  Babak Mcgraw RDCS, [facility]  Primary Physician:  Madison Dill DO  Referring Physician:  Asher Barrett DO  Group:  [facility]  Interpreting Physician:  DO MISTI Lr     LEFT VENTRICLE:  Systolic function was normal  Ejection fraction was estimated to be 60 %  Although no diagnostic regional wall motion abnormality was identified, this possibility cannot be completely excluded on the basis of this study  There was moderate asymmetrical hypertrophy involving focal basal septal segments consistent with sigmoid septum      VENTRICULAR SEPTUM:  There was dyssynergic motion      RIGHT VENTRICLE:  The ventricle was mildly dilated  Systolic function was normal      LEFT ATRIUM:  The atrium was moderately dilated      RIGHT ATRIUM:  The atrium was mildly dilated      MITRAL VALVE:  There was moderate annular calcification  There was mild regurgitation      AORTIC VALVE:  A bioprosthesis was present  It exhibited normal function  Mean transvalvular gradient 16 mmHg  There was no regurgitation  There was no significant perivalvular regurgitation      TRICUSPID VALVE:  There was moderate regurgitation  Estimated peak PA pressure was 47 mmHg  The findings suggest mild pulmonary hypertension  Atrial fibrillation with premature ventricular or aberrantly conducted complexes  Right bundle branch block  Left anterior fascicular block   Bifascicular block   Abnormal ECG  When compared with ECG of 30-JUN-2018 12:42,  Atrial fibrillation has replaced Sinus rhythm  Confirmed by Favio Needles ([id number]) on 3/22/2019 9:44:21 AM      Assessment:       1  Dyspnea on exertion  NT-BNP PRO    XR chest pa & lateral   2  Atrial fibrillation, chronic ([address] )     3  S/P AVR     4  Dependent edema          Plan:       I suspect her dyspnea is from severe deconditioning and mild bronchospasm  I have ordered a chest x-ray and BNP for completeness, due to mild renal insufficiency I would hate to escalate diuretic dosage unless there is a clear indication to do such  They are going to go get these tests done today      Her AFib is rate controlled on no rate control drugs and anticoagulated, goal INR 2 0-3 0 recommended in light of CVA and history of encephalopathy, fortunately she has not had any falls  Tolerating statin therapy without myalgias  I will see her back in 3-4 months unless something concerning is found on her chest x-ray and BNP today  For the wheeze I asked them to contact you should this persist or worsen

## 2019-07-25 DIAGNOSIS — I48.20 ATRIAL FIBRILLATION, CHRONIC (HCC): Chronic | ICD-10-CM

## 2019-07-25 DIAGNOSIS — N18.30 CKD (CHRONIC KIDNEY DISEASE) STAGE 3, GFR 30-59 ML/MIN (HCC): Chronic | ICD-10-CM

## 2019-07-25 DIAGNOSIS — E78.5 HYPERLIPIDEMIA, UNSPECIFIED HYPERLIPIDEMIA TYPE: Primary | Chronic | ICD-10-CM

## 2019-07-25 NOTE — RESULT ENCOUNTER NOTE
Spoke to Jeanna Gonzales she wanted me to call her granddaughter Johanna and give her the recs  Spoke to Johanna and told her to increase Lasix to 60 mg QD and get bloodwork done 2-3 weeks after the increase  She read instructions back to me and understands them

## 2019-11-26 ENCOUNTER — TRANSCRIBE ORDERS (OUTPATIENT)
Dept: ADMINISTRATIVE | Facility: HOSPITAL | Age: 84
End: 2019-11-26

## 2019-11-26 DIAGNOSIS — R10.84 ABDOMINAL PAIN, GENERALIZED: Primary | ICD-10-CM

## 2019-11-26 DIAGNOSIS — R10.32 LLQ PAIN: ICD-10-CM

## 2019-11-27 ENCOUNTER — HOSPITAL ENCOUNTER (OUTPATIENT)
Dept: ULTRASOUND IMAGING | Facility: HOSPITAL | Age: 84
Discharge: HOME | End: 2019-11-27
Payer: MEDICARE

## 2019-11-27 ENCOUNTER — APPOINTMENT (EMERGENCY)
Dept: CT IMAGING | Facility: HOSPITAL | Age: 84
End: 2019-11-27
Payer: MEDICARE

## 2019-11-27 ENCOUNTER — HOSPITAL ENCOUNTER (EMERGENCY)
Facility: HOSPITAL | Age: 84
Discharge: HOME | End: 2019-11-27
Attending: EMERGENCY MEDICINE
Payer: MEDICARE

## 2019-11-27 ENCOUNTER — APPOINTMENT (EMERGENCY)
Dept: RADIOLOGY | Facility: HOSPITAL | Age: 84
End: 2019-11-27
Payer: MEDICARE

## 2019-11-27 VITALS
DIASTOLIC BLOOD PRESSURE: 67 MMHG | SYSTOLIC BLOOD PRESSURE: 128 MMHG | OXYGEN SATURATION: 96 % | RESPIRATION RATE: 20 BRPM | HEART RATE: 82 BPM | TEMPERATURE: 97.1 F | BODY MASS INDEX: 30.07 KG/M2 | WEIGHT: 169.75 LBS

## 2019-11-27 DIAGNOSIS — R10.32 LLQ PAIN: ICD-10-CM

## 2019-11-27 DIAGNOSIS — R10.84 ABDOMINAL PAIN, GENERALIZED: ICD-10-CM

## 2019-11-27 DIAGNOSIS — R10.32 LEFT LOWER QUADRANT ABDOMINAL PAIN: Primary | ICD-10-CM

## 2019-11-27 LAB
ANION GAP SERPL CALCULATED.3IONS-SCNC: 9 MMOL/L (ref 4–13)
BASOPHILS # BLD AUTO: 0.04 THOUSANDS/ΜL (ref 0–0.1)
BASOPHILS NFR BLD AUTO: 1 % (ref 0–1)
BUN SERPL-MCNC: 28 MG/DL (ref 5–25)
CALCIUM SERPL-MCNC: 8.9 MG/DL (ref 8.3–10.1)
CHLORIDE SERPL-SCNC: 105 MMOL/L (ref 100–108)
CO2 SERPL-SCNC: 28 MMOL/L (ref 21–32)
CREAT SERPL-MCNC: 1.64 MG/DL (ref 0.6–1.3)
EOSINOPHIL # BLD AUTO: 0.1 THOUSAND/ΜL (ref 0–0.61)
EOSINOPHIL NFR BLD AUTO: 2 % (ref 0–6)
ERYTHROCYTE [DISTWIDTH] IN BLOOD BY AUTOMATED COUNT: 15.9 % (ref 11.6–15.1)
GFR SERPL CREATININE-BSD FRML MDRD: 28 ML/MIN/1.73SQ M
GLUCOSE SERPL-MCNC: 95 MG/DL (ref 65–140)
HCT VFR BLD AUTO: 39 % (ref 34.8–46.1)
HGB BLD-MCNC: 12.1 G/DL (ref 11.5–15.4)
IMM GRANULOCYTES # BLD AUTO: 0.01 THOUSAND/UL (ref 0–0.2)
IMM GRANULOCYTES NFR BLD AUTO: 0 % (ref 0–2)
INR PPP: 1.76 (ref 0.84–1.19)
LYMPHOCYTES # BLD AUTO: 1.76 THOUSANDS/ΜL (ref 0.6–4.47)
LYMPHOCYTES NFR BLD AUTO: 33 % (ref 14–44)
MCH RBC QN AUTO: 30.9 PG (ref 26.8–34.3)
MCHC RBC AUTO-ENTMCNC: 31 G/DL (ref 31.4–37.4)
MCV RBC AUTO: 100 FL (ref 82–98)
MONOCYTES # BLD AUTO: 0.62 THOUSAND/ΜL (ref 0.17–1.22)
MONOCYTES NFR BLD AUTO: 12 % (ref 4–12)
NEUTROPHILS # BLD AUTO: 2.84 THOUSANDS/ΜL (ref 1.85–7.62)
NEUTS SEG NFR BLD AUTO: 52 % (ref 43–75)
NRBC BLD AUTO-RTO: 0 /100 WBCS
PLATELET # BLD AUTO: 170 THOUSANDS/UL (ref 149–390)
PMV BLD AUTO: 9.7 FL (ref 8.9–12.7)
POTASSIUM SERPL-SCNC: 3.9 MMOL/L (ref 3.5–5.3)
PROTHROMBIN TIME: 20.7 SECONDS (ref 11.6–14.5)
RBC # BLD AUTO: 3.92 MILLION/UL (ref 3.81–5.12)
SODIUM SERPL-SCNC: 142 MMOL/L (ref 136–145)
WBC # BLD AUTO: 5.37 THOUSAND/UL (ref 4.31–10.16)

## 2019-11-27 PROCEDURE — 85025 COMPLETE CBC W/AUTO DIFF WBC: CPT | Performed by: EMERGENCY MEDICINE

## 2019-11-27 PROCEDURE — 80048 BASIC METABOLIC PNL TOTAL CA: CPT | Performed by: EMERGENCY MEDICINE

## 2019-11-27 PROCEDURE — 74176 CT ABD & PELVIS W/O CONTRAST: CPT

## 2019-11-27 PROCEDURE — 73564 X-RAY EXAM KNEE 4 OR MORE: CPT

## 2019-11-27 PROCEDURE — 76700 US EXAM ABDOM COMPLETE: CPT

## 2019-11-27 PROCEDURE — 36415 COLL VENOUS BLD VENIPUNCTURE: CPT | Performed by: EMERGENCY MEDICINE

## 2019-11-27 PROCEDURE — 99285 EMERGENCY DEPT VISIT HI MDM: CPT

## 2019-11-27 PROCEDURE — 85610 PROTHROMBIN TIME: CPT | Performed by: EMERGENCY MEDICINE

## 2019-11-27 PROCEDURE — 99284 EMERGENCY DEPT VISIT MOD MDM: CPT | Performed by: EMERGENCY MEDICINE

## 2019-11-27 PROCEDURE — 76856 US EXAM PELVIC COMPLETE: CPT

## 2019-11-27 RX ORDER — FERROUS SULFATE 325(65) MG
325 TABLET ORAL EVERY OTHER DAY
COMMUNITY
End: 2020-04-17

## 2019-11-27 RX ORDER — MORPHINE SULFATE 100 MG/5ML
5 SOLUTION ORAL ONCE
Status: COMPLETED | OUTPATIENT
Start: 2019-11-27 | End: 2019-11-27

## 2019-11-27 RX ADMIN — MORPHINE SULFATE 5 MG: 20 SOLUTION ORAL at 10:56

## 2019-11-27 NOTE — DISCHARGE INSTRUCTIONS
Brace your left lower abdominal wall whenever sitting up or flexing your abdominal muscles  Use Tylenol No   3 as prescribed by your primary care physician  You may use lidocaine patches to the area of maximal tenderness, 12 hours on, 12 hours off  You may apply Voltaren gel on the days when you do not use lidocaine patches  Follow up with Dr Iris Jacome in for re-evaluation of symptoms

## 2019-11-27 NOTE — ED PROVIDER NOTES
History  Chief Complaint   Patient presents with    Abdominal Pain     pain x 2 weeks, seen by dr Tyra Moran yesterday, was scheduled for ultrasound today     This is an 55-year-old female with history of hypertension, hyperlipidemia, chronic kidney disease, CAD who presents with left lower quadrant abdominal pain  The patient states that over the past 2 weeks, she has been experiencing left lower quadrant abdominal pain  She describes the pain as sharp, intermittent, nonradiating, without any associated symptoms  The patient states that her symptoms tend to dissipate throughout the day  Pain is exacerbated with engaging her abdominal muscles  Pain is alleviated with rest   The patient was seen by her family doctor yesterday who ultimately scheduled ultrasound to evaluate for hernia  She denies any history of abdominal surgeries, kidney stones, gallstones  Her last bowel movement was yesterday described as normal   The patient states that her pain became worse this morning so she decided to come to the emergency department for evaluation  Denies fever/chills, nausea/vomiting, lightheadedness/dizziness, numbness/weakness, headache, change in vision, URI symptoms, neck pain, chest pain, palpitations, shortness of breath, cough, back pain, flank pain, diarrhea, hematochezia, melena, dysuria, hematuria, abnormal vaginal discharge/bleeding  Of note, the patient is also complaining of mild left knee pain  Denies any inciting event  Prior to Admission Medications   Prescriptions Last Dose Informant Patient Reported? Taking?    Melatonin 5 MG CAPS   Yes Yes   Sig: Take 5 mg by mouth daily at bedtime   atorvastatin (LIPITOR) 40 mg tablet   Yes Yes   Sig: Take 40 mg by mouth every evening   ergocalciferol (VITAMIN D2) 50,000 units   Yes Yes   Sig: Take 50,000 Units by mouth once a week   ferrous sulfate 325 (65 Fe) mg tablet   Yes Yes   Sig: Take 325 mg by mouth daily with breakfast   furosemide (LASIX) 40 mg tablet   No Yes   Sig: Take 1 tablet (40 mg total) by mouth daily   Patient taking differently: Take 80 mg by mouth daily    gabapentin (NEURONTIN) 100 mg capsule   Yes Yes   Sig: Take 100 mg by mouth 3 (three) times a day   pregabalin (LYRICA) 50 mg capsule Not Taking at Unknown time  Yes No   Sig: Take 50 mg by mouth 2 (two) times a day   rOPINIRole (REQUIP) 2 mg tablet   Yes Yes   Sig: Take 2 mg by mouth 2 (two) times a day   warfarin (COUMADIN) 3 mg tablet Not Taking at Unknown time  No No   Sig: Take 1 tab (3mg) by mouth each evening   Patient not taking: Reported on 2019   warfarin (COUMADIN) 5 mg tablet   Yes Yes   Si mg Take as directed by Dr Nguyễn Milton      Facility-Administered Medications: None       Past Medical History:   Diagnosis Date    Arthritis     L shoulder,fingers    Cardiac disease     valve replacement    Carpal tunnel syndrome, bilateral     Dropfoot     right    History of echocardiogram 2016    EF 75%, Hyperdynamic LVSF  Mild concentric LVH  Normal functioning bioprosthetic AV  Trace MR  Mild pulm htn   HTN (hypertension)     Hyperlipidemia     Lymphedema     Restless leg syndrome     Right bundle branch block     Stroke ([address] )     Supraventricular tachycardia [facility])        Past Surgical History:   Procedure Laterality Date    AORTIC VALVE REPLACEMENT  2002    Tissue AVR #21    AORTIC VALVULOPLASTY  2002    Bovine Pericardial heart valve    BACK SURGERY      CARDIAC CATHETERIZATION  2002    Sever critical Aortic stenosis  Pulm Htn  Normal coronary arteries      HAND SURGERY      right hand    HYSTERECTOMY      ROTATOR CUFF REPAIR Right     VASCULAR SURGERY      bilateral vein ligation & stripping       Family History   Problem Relation Age of Onset    No Known Problems Mother     No Known Problems Father     No Known Problems Sister     No Known Problems Brother     Hyperlipidemia Son     Hypertension Son     No Known Problems Sister     No Known Problems Brother     Hyperlipidemia Son     Hypertension Son     Hyperlipidemia Son     Hypertension Son      I have reviewed and agree with the history as documented  Social History     Tobacco Use    Smoking status: Never Smoker    Smokeless tobacco: Never Used   Substance Use Topics    Alcohol use: Never     Frequency: Never    Drug use: No        Review of Systems   Constitutional: Negative for chills and fever  HENT: Negative for rhinorrhea, sore throat and trouble swallowing  Eyes: Negative for photophobia and visual disturbance  Respiratory: Negative for cough, chest tightness and shortness of breath  Cardiovascular: Negative for chest pain, palpitations and leg swelling  Gastrointestinal: Positive for abdominal pain  Negative for blood in stool, diarrhea, nausea and vomiting  Endocrine: Negative for polyuria  Genitourinary: Negative for dysuria, flank pain, hematuria, vaginal bleeding and vaginal discharge  Musculoskeletal: Positive for arthralgias  Negative for back pain and neck pain  Skin: Negative for color change and rash  Allergic/Immunologic: Negative for immunocompromised state  Neurological: Negative for dizziness, weakness, light-headedness, numbness and headaches  All other systems reviewed and are negative  Physical Exam  Physical Exam   Constitutional: Vital signs are normal  She appears well-developed  She is cooperative  No distress  HENT:   Mouth/Throat: Uvula is midline, oropharynx is clear and moist and mucous membranes are normal    Eyes: Pupils are equal, round, and reactive to light  Conjunctivae and EOM are normal    Neck: Trachea normal  No thyroid mass and no thyromegaly present  Cardiovascular: Normal rate, regular rhythm, normal heart sounds, intact distal pulses and normal pulses  No murmur heard  Pulmonary/Chest: Effort normal and breath sounds normal    Abdominal: Soft   Normal appearance and bowel sounds are normal  There is tenderness in the left lower quadrant  There is no rebound, no guarding and no CVA tenderness  Musculoskeletal:   No signs of swelling, trauma, ecchymosis, effusion to left knee  Minimal pain with passive range of motion  She is tender over the patellar area  Negative Lachman's  Chronic lymphedema skin changes bilateral lower extremities  Neurological: She is alert  Skin: Skin is warm, dry and intact  Psychiatric: She has a normal mood and affect   Her speech is normal and behavior is normal  Thought content normal        Vital Signs  ED Triage Vitals [11/27/19 0559]   Temperature Pulse Respirations BP SpO2   (!) 97 1 °F (36 2 °C) 84 18 -- 98 %      Temp Source Heart Rate Source Patient Position - Orthostatic VS BP Location FiO2 (%)   Temporal Monitor Lying Left arm --      Pain Score       8           Vitals:    11/27/19 0559   Pulse: 84   Patient Position - Orthostatic VS: Lying         Visual Acuity      ED Medications  Medications - No data to display    Diagnostic Studies  Results Reviewed     Procedure Component Value Units Date/Time    Protime-INR [[id number]]  (Abnormal) Collected:  11/27/19 0603    Lab Status:  Final result Specimen:  Blood from Arm, Right Updated:  11/27/19 0626     Protime 20 7 seconds      INR 1 73    Basic metabolic panel [[id number]]  (Abnormal) Collected:  11/27/19 0603    Lab Status:  Final result Specimen:  Blood from Arm, Right Updated:  11/27/19 0625     Sodium 142 mmol/L      Potassium 3 9 mmol/L      Chloride 105 mmol/L      CO2 28 mmol/L      ANION GAP 9 mmol/L      BUN 28 mg/dL      Creatinine 1 64 mg/dL      Glucose 95 mg/dL      Calcium 8 9 mg/dL      eGFR 28 ml/min/1 73sq m     Narrative:       [place] guidelines for Chronic Kidney Disease (CKD):     Stage 1 with normal or high GFR (GFR > 90 mL/min/1 73 square meters)    Stage 2 Mild CKD (GFR = 60-89 mL/min/1 73 square meters)    Stage 3A Moderate CKD (GFR = 45-59 mL/min/1 73 square meters)    Stage 3B Moderate CKD (GFR = 30-44 mL/min/1 73 square meters)    Stage 4 Severe CKD (GFR = 15-29 mL/min/1 73 square meters)    Stage 5 End Stage CKD (GFR <15 mL/min/1 73 square meters)  Note: GFR calculation is accurate only with a steady state creatinine    CBC and differential [[id number]]  (Abnormal) Collected:  11/27/19 0603    Lab Status:  Final result Specimen:  Blood from Arm, Right Updated:  11/27/19 0609     WBC 5 37 Thousand/uL      RBC 3 92 Million/uL      Hemoglobin 12 1 g/dL      Hematocrit 39 0 %       fL      MCH 30 9 pg      MCHC 31 0 g/dL      RDW 15 9 %      MPV 9 7 fL      Platelets 258 Thousands/uL      nRBC 0 /100 WBCs      Neutrophils Relative 52 %      Immat GRANS % 0 %      Lymphocytes Relative 33 %      Monocytes Relative 12 %      Eosinophils Relative 2 %      Basophils Relative 1 %      Neutrophils Absolute 2 84 Thousands/µL      Immature Grans Absolute 0 01 Thousand/uL      Lymphocytes Absolute 1 76 Thousands/µL      Monocytes Absolute 0 62 Thousand/µL      Eosinophils Absolute 0 10 Thousand/µL      Basophils Absolute 0 04 Thousands/µL                  XR knee 4+ views left injury   ED Interpretation by Gareth Bonilla MD (11/27 0700)   No acute osseous abnormality as interpreted by myself  Arthritic changes noted  CT abdomen pelvis wo contrast   Final Result by Juan Manuel Fatima DO (11/27 4124)   1  Somewhat limited examination due to patient motion artifact as well as lack of oral and intravenous contrast material    2   No acute abdominal pelvic abnormality  3   Colonic diverticulosis        Workstation performed: [id number]                    Procedures  Procedures       ED Course  ED Course as of Nov 27 0706 Wed Nov 27, 2019   0628 Stable creatinine   Creatinine(!): 1 64   0629 Subtherapeutic   INR(!): 1 76                               MDM  Number of Diagnoses or Management Options  Diagnosis management comments: Plan to check labs, CT abdomen/pelvis without contrast   X-ray left knee  Disposition pending results  Likely musculoskeletal pain  Disposition  Final diagnoses:   Left lower quadrant abdominal pain     Time reflects when diagnosis was documented in both MDM as applicable and the Disposition within this note     Time User Action Codes Description Comment    11/27/2019  7:00 AM Rodolfo Hurd Add [R10 32] Left lower quadrant abdominal pain       ED Disposition     ED Disposition Condition Date/Time Comment    Discharge Stable Wed Nov 27, 2019  7:00 AM Praneeth Galindo discharge to home/self care  Follow-up Information     Follow up With Specialties Details Why Contact Info Additional Information    Gerri Pedro DO Family Medicine Schedule an appointment as soon as possible for a visit   [address] [facility] [address] [place]  [address] Emergency Department Emergency Medicine Go to  If symptoms worsen [facility] [place] [address] [id number] [phone] [place] ED, [address], [address]          Patient's Medications   Discharge Prescriptions    No medications on file     No discharge procedures on file      ED Provider  Electronically Signed by           Galina Edwards MD  11/27/19 0738       Galina Edwards MD  11/27/19 2739

## 2019-11-29 ENCOUNTER — HOSPITAL ENCOUNTER (OUTPATIENT)
Facility: HOSPITAL | Age: 84
Setting detail: OBSERVATION
Discharge: HOME HEALTH | End: 2019-11-30
Attending: EMERGENCY MEDICINE | Admitting: INTERNAL MEDICINE
Payer: MEDICARE

## 2019-11-29 DIAGNOSIS — R26.2 AMBULATORY DYSFUNCTION: ICD-10-CM

## 2019-11-29 DIAGNOSIS — R10.32 LEFT LOWER QUADRANT ABDOMINAL PAIN: Primary | ICD-10-CM

## 2019-11-29 LAB
ALBUMIN SERPL BCP-MCNC: 3.5 G/DL (ref 3.5–5)
ALP SERPL-CCNC: 87 U/L (ref 46–116)
ALT SERPL W P-5'-P-CCNC: 27 U/L (ref 12–78)
ANION GAP SERPL CALCULATED.3IONS-SCNC: 7 MMOL/L (ref 4–13)
AST SERPL W P-5'-P-CCNC: 26 U/L (ref 5–45)
ATRIAL RATE: 68 BPM
BACTERIA UR QL AUTO: ABNORMAL /HPF
BASOPHILS # BLD AUTO: 0.03 THOUSANDS/ΜL (ref 0–0.1)
BASOPHILS NFR BLD AUTO: 0 % (ref 0–1)
BILIRUB SERPL-MCNC: 0.6 MG/DL (ref 0.2–1)
BILIRUB UR QL STRIP: NEGATIVE
BUN SERPL-MCNC: 23 MG/DL (ref 5–25)
CALCIUM SERPL-MCNC: 8.6 MG/DL (ref 8.3–10.1)
CHLORIDE SERPL-SCNC: 105 MMOL/L (ref 100–108)
CLARITY UR: ABNORMAL
CO2 SERPL-SCNC: 28 MMOL/L (ref 21–32)
COLOR UR: YELLOW
CREAT SERPL-MCNC: 1.48 MG/DL (ref 0.6–1.3)
EOSINOPHIL # BLD AUTO: 0.06 THOUSAND/ΜL (ref 0–0.61)
EOSINOPHIL NFR BLD AUTO: 1 % (ref 0–6)
ERYTHROCYTE [DISTWIDTH] IN BLOOD BY AUTOMATED COUNT: 15.9 % (ref 11.6–15.1)
GFR SERPL CREATININE-BSD FRML MDRD: 31 ML/MIN/1.73SQ M
GLUCOSE SERPL-MCNC: 95 MG/DL (ref 65–140)
GLUCOSE UR STRIP-MCNC: NEGATIVE MG/DL
HCT VFR BLD AUTO: 38.5 % (ref 34.8–46.1)
HGB BLD-MCNC: 12.1 G/DL (ref 11.5–15.4)
HGB UR QL STRIP.AUTO: NEGATIVE
HYALINE CASTS #/AREA URNS LPF: ABNORMAL /LPF
IMM GRANULOCYTES # BLD AUTO: 0.03 THOUSAND/UL (ref 0–0.2)
IMM GRANULOCYTES NFR BLD AUTO: 0 % (ref 0–2)
INR PPP: 2.16 (ref 0.84–1.19)
KETONES UR STRIP-MCNC: ABNORMAL MG/DL
LEUKOCYTE ESTERASE UR QL STRIP: NEGATIVE
LIPASE SERPL-CCNC: 212 U/L (ref 73–393)
LYMPHOCYTES # BLD AUTO: 1.05 THOUSANDS/ΜL (ref 0.6–4.47)
LYMPHOCYTES NFR BLD AUTO: 14 % (ref 14–44)
MCH RBC QN AUTO: 31.3 PG (ref 26.8–34.3)
MCHC RBC AUTO-ENTMCNC: 31.4 G/DL (ref 31.4–37.4)
MCV RBC AUTO: 100 FL (ref 82–98)
MONOCYTES # BLD AUTO: 0.67 THOUSAND/ΜL (ref 0.17–1.22)
MONOCYTES NFR BLD AUTO: 9 % (ref 4–12)
NEUTROPHILS # BLD AUTO: 5.45 THOUSANDS/ΜL (ref 1.85–7.62)
NEUTS SEG NFR BLD AUTO: 76 % (ref 43–75)
NITRITE UR QL STRIP: NEGATIVE
NON-SQ EPI CELLS URNS QL MICRO: ABNORMAL /HPF
NRBC BLD AUTO-RTO: 0 /100 WBCS
PH UR STRIP.AUTO: 5.5 [PH]
PLATELET # BLD AUTO: 168 THOUSANDS/UL (ref 149–390)
PMV BLD AUTO: 10.1 FL (ref 8.9–12.7)
POTASSIUM SERPL-SCNC: 4.1 MMOL/L (ref 3.5–5.3)
PROT SERPL-MCNC: 6.7 G/DL (ref 6.4–8.2)
PROT UR STRIP-MCNC: ABNORMAL MG/DL
PROTHROMBIN TIME: 24.3 SECONDS (ref 11.6–14.5)
QRS AXIS: -50 DEGREES
QRSD INTERVAL: 144 MS
QT INTERVAL: 416 MS
QTC INTERVAL: 474 MS
RBC # BLD AUTO: 3.86 MILLION/UL (ref 3.81–5.12)
RBC #/AREA URNS AUTO: ABNORMAL /HPF
SODIUM SERPL-SCNC: 140 MMOL/L (ref 136–145)
SP GR UR STRIP.AUTO: 1.02 (ref 1–1.03)
T WAVE AXIS: 16 DEGREES
UROBILINOGEN UR QL STRIP.AUTO: 0.2 E.U./DL
VENTRICULAR RATE: 78 BPM
WBC # BLD AUTO: 7.29 THOUSAND/UL (ref 4.31–10.16)
WBC #/AREA URNS AUTO: ABNORMAL /HPF

## 2019-11-29 PROCEDURE — 83690 ASSAY OF LIPASE: CPT | Performed by: EMERGENCY MEDICINE

## 2019-11-29 PROCEDURE — 99285 EMERGENCY DEPT VISIT HI MDM: CPT | Performed by: EMERGENCY MEDICINE

## 2019-11-29 PROCEDURE — 81001 URINALYSIS AUTO W/SCOPE: CPT | Performed by: EMERGENCY MEDICINE

## 2019-11-29 PROCEDURE — 99220 PR INITIAL OBSERVATION CARE/DAY 70 MINUTES: CPT | Performed by: NURSE PRACTITIONER

## 2019-11-29 PROCEDURE — 99285 EMERGENCY DEPT VISIT HI MDM: CPT

## 2019-11-29 PROCEDURE — 36415 COLL VENOUS BLD VENIPUNCTURE: CPT | Performed by: EMERGENCY MEDICINE

## 2019-11-29 PROCEDURE — 93010 ELECTROCARDIOGRAM REPORT: CPT | Performed by: INTERNAL MEDICINE

## 2019-11-29 PROCEDURE — 85610 PROTHROMBIN TIME: CPT | Performed by: EMERGENCY MEDICINE

## 2019-11-29 PROCEDURE — 80053 COMPREHEN METABOLIC PANEL: CPT | Performed by: EMERGENCY MEDICINE

## 2019-11-29 PROCEDURE — 85025 COMPLETE CBC W/AUTO DIFF WBC: CPT | Performed by: EMERGENCY MEDICINE

## 2019-11-29 PROCEDURE — 93005 ELECTROCARDIOGRAM TRACING: CPT

## 2019-11-29 RX ORDER — WARFARIN SODIUM 5 MG/1
5 TABLET ORAL
Status: DISCONTINUED | OUTPATIENT
Start: 2019-11-29 | End: 2019-11-30 | Stop reason: HOSPADM

## 2019-11-29 RX ORDER — GABAPENTIN 100 MG/1
100 CAPSULE ORAL 3 TIMES DAILY
Status: DISCONTINUED | OUTPATIENT
Start: 2019-11-29 | End: 2019-11-30 | Stop reason: HOSPADM

## 2019-11-29 RX ORDER — ACETAMINOPHEN 500 MG
500 TABLET ORAL EVERY 6 HOURS PRN
COMMUNITY

## 2019-11-29 RX ORDER — ACETAMINOPHEN 325 MG/1
650 TABLET ORAL ONCE
Status: COMPLETED | OUTPATIENT
Start: 2019-11-29 | End: 2019-11-29

## 2019-11-29 RX ORDER — ERGOCALCIFEROL 1.25 MG/1
50000 CAPSULE ORAL WEEKLY
Status: DISCONTINUED | OUTPATIENT
Start: 2019-11-29 | End: 2019-11-30 | Stop reason: HOSPADM

## 2019-11-29 RX ORDER — FUROSEMIDE 80 MG
80 TABLET ORAL DAILY
Status: DISCONTINUED | OUTPATIENT
Start: 2019-11-29 | End: 2019-11-30 | Stop reason: HOSPADM

## 2019-11-29 RX ORDER — FUROSEMIDE 80 MG
80 TABLET ORAL DAILY
Refills: 0 | COMMUNITY
Start: 2019-10-24 | End: 2020-11-20 | Stop reason: SDUPTHER

## 2019-11-29 RX ORDER — ATORVASTATIN CALCIUM 40 MG/1
40 TABLET, FILM COATED ORAL EVERY EVENING
Status: DISCONTINUED | OUTPATIENT
Start: 2019-11-29 | End: 2019-11-30 | Stop reason: HOSPADM

## 2019-11-29 RX ORDER — HEPARIN SODIUM 5000 [USP'U]/ML
5000 INJECTION, SOLUTION INTRAVENOUS; SUBCUTANEOUS EVERY 8 HOURS SCHEDULED
Status: DISCONTINUED | OUTPATIENT
Start: 2019-11-29 | End: 2019-11-30 | Stop reason: HOSPADM

## 2019-11-29 RX ORDER — FERROUS SULFATE 325(65) MG
325 TABLET ORAL
Status: DISCONTINUED | OUTPATIENT
Start: 2019-11-30 | End: 2019-11-30 | Stop reason: HOSPADM

## 2019-11-29 RX ORDER — POTASSIUM CHLORIDE 20 MEQ/1
20 TABLET, EXTENDED RELEASE ORAL DAILY
Refills: 0 | COMMUNITY
Start: 2019-10-07 | End: 2020-06-12

## 2019-11-29 RX ORDER — WARFARIN SODIUM 2.5 MG/1
2.5 TABLET ORAL 3 TIMES WEEKLY
Status: DISCONTINUED | OUTPATIENT
Start: 2019-11-30 | End: 2019-11-30 | Stop reason: HOSPADM

## 2019-11-29 RX ORDER — ROPINIROLE 1 MG/1
2 TABLET, FILM COATED ORAL 2 TIMES DAILY
Status: DISCONTINUED | OUTPATIENT
Start: 2019-11-29 | End: 2019-11-30 | Stop reason: HOSPADM

## 2019-11-29 RX ADMIN — ERGOCALCIFEROL 50000 UNITS: 1.25 CAPSULE ORAL at 15:47

## 2019-11-29 RX ADMIN — DICLOFENAC SODIUM 2 G: 10 GEL TOPICAL at 08:37

## 2019-11-29 RX ADMIN — ACETAMINOPHEN 650 MG: 325 TABLET, FILM COATED ORAL at 08:36

## 2019-11-29 RX ADMIN — ATORVASTATIN CALCIUM 40 MG: 40 TABLET, FILM COATED ORAL at 18:09

## 2019-11-29 RX ADMIN — FUROSEMIDE 80 MG: 80 TABLET ORAL at 15:47

## 2019-11-29 RX ADMIN — GABAPENTIN 100 MG: 100 CAPSULE ORAL at 15:47

## 2019-11-29 RX ADMIN — GABAPENTIN 100 MG: 100 CAPSULE ORAL at 22:44

## 2019-11-29 RX ADMIN — HEPARIN SODIUM 5000 UNITS: 5000 INJECTION, SOLUTION INTRAVENOUS; SUBCUTANEOUS at 22:45

## 2019-11-29 RX ADMIN — WARFARIN SODIUM 5 MG: 5 TABLET ORAL at 18:09

## 2019-11-29 RX ADMIN — HEPARIN SODIUM 5000 UNITS: 5000 INJECTION, SOLUTION INTRAVENOUS; SUBCUTANEOUS at 15:47

## 2019-11-29 RX ADMIN — ROPINIROLE HYDROCHLORIDE 2 MG: 1 TABLET, FILM COATED ORAL at 18:09

## 2019-11-29 NOTE — ED PROVIDER NOTES
History  Chief Complaint   Patient presents with    Groin Pain     pt  was here last week with left sided groin pain; pt  had  pain on the left side of her groin rating it an 8/10 this morning     This is an 78-year-old female with a history of hypertension, hyperlipidemia, chronic kidney disease, CAD who presents with left lower quadrant abdominal pain  The patient states over the past 2 weeks, she has been experiencing left lower quadrant abdominal pain  The pain is described as sharp, intermittent, nonradiating, without any associated symptoms  The pain typically resolves throughout the day  She denies any inciting event  The patient has been taking analgesia at home and applying a lidocaine patch without relief  Pain is made worse with movement and sitting up  Seems to be exacerbated by engaging her abdominal muscles  The patient has been seen by her family doctor who prescribed Lidoderm patches and sent her for an ultrasound of her abdomen  Preliminarily, the ultrasound of her abdomen appears unremarkable  However, the ultrasound has not been read yet  She does have a history of hysterectomy  Denies any history of kidney stones or gallstones  Her last bowel movement was 2 days ago described as normal   The patient states that her pain continues to worsen and has not dissipated today  So she decided to call EMS for evaluation  I did see the patient 2 days ago for the same complaint  She had an unremarkable workup, including a CT abdomen/pelvis without contrast   The patient states that it is difficult to walk due to the pain  She currently lives at home by herself, but family lives 5 doors away  They also have cameras in the house to watch the patient and make sure that she does not fall    Denies fever/chills, nausea/vomiting, lightheadedness/dizziness, numbness/weakness, headache, change in vision, URI symptoms, neck pain, chest pain, palpitations, shortness of breath, cough, back pain, flank pain, diarrhea, hematochezia, melena, dysuria, hematuria, abnormal vaginal discharge/bleeding  Prior to Admission Medications   Prescriptions Last Dose Informant Patient Reported? Taking? Melatonin 5 MG CAPS   Yes Yes   Sig: Take 5 mg by mouth daily at bedtime   atorvastatin (LIPITOR) 40 mg tablet   Yes Yes   Sig: Take 40 mg by mouth every evening   ergocalciferol (VITAMIN D2) 50,000 units   Yes Yes   Sig: Take 50,000 Units by mouth once a week   ferrous sulfate 325 (65 Fe) mg tablet   Yes Yes   Sig: Take 325 mg by mouth daily with breakfast   furosemide (LASIX) 40 mg tablet   No Yes   Sig: Take 1 tablet (40 mg total) by mouth daily   Patient taking differently: Take 80 mg by mouth daily    gabapentin (NEURONTIN) 100 mg capsule   Yes Yes   Sig: Take 100 mg by mouth 3 (three) times a day   rOPINIRole (REQUIP) 2 mg tablet   Yes Yes   Sig: Take 2 mg by mouth 2 (two) times a day   warfarin (COUMADIN) 3 mg tablet   No No   Sig: Take 1 tab (3mg) by mouth each evening   Patient not taking: Reported on 2019   warfarin (COUMADIN) 5 mg tablet   Yes Yes   Si mg Take as directed by Dr Jon Grijalva      Facility-Administered Medications: None       Past Medical History:   Diagnosis Date    Arthritis     L shoulder,fingers    Cardiac disease     valve replacement    Carpal tunnel syndrome, bilateral     Dropfoot     right    History of echocardiogram 2016    EF 75%, Hyperdynamic LVSF  Mild concentric LVH  Normal functioning bioprosthetic AV  Trace MR  Mild pulm htn       HTN (hypertension)     Hyperlipidemia     Lymphedema     Restless leg syndrome     Right bundle branch block     Stroke ([address] )     Supraventricular tachycardia [facility])        Past Surgical History:   Procedure Laterality Date    AORTIC VALVE REPLACEMENT  2002    Tissue AVR #21    AORTIC VALVULOPLASTY  2002    Bovine Pericardial heart valve    BACK SURGERY      CARDIAC CATHETERIZATION  2002    Sever critical Aortic stenosis  Pulm Htn  Normal coronary arteries   HAND SURGERY      right hand    HYSTERECTOMY      ROTATOR CUFF REPAIR Right     VASCULAR SURGERY      bilateral vein ligation & stripping       Family History   Problem Relation Age of Onset    No Known Problems Mother     No Known Problems Father     No Known Problems Sister     No Known Problems Brother     Hyperlipidemia Son     Hypertension Son     No Known Problems Sister     No Known Problems Brother     Hyperlipidemia Son     Hypertension Son     Hyperlipidemia Son     Hypertension Son      I have reviewed and agree with the history as documented  Social History     Tobacco Use    Smoking status: Never Smoker    Smokeless tobacco: Never Used   Substance Use Topics    Alcohol use: Never     Frequency: Never    Drug use: No        Review of Systems   Constitutional: Negative for chills and fever  HENT: Negative for rhinorrhea, sore throat and trouble swallowing  Eyes: Negative for photophobia and visual disturbance  Respiratory: Negative for cough, chest tightness and shortness of breath  Cardiovascular: Negative for chest pain, palpitations and leg swelling  Gastrointestinal: Positive for abdominal pain  Negative for blood in stool, diarrhea, nausea and vomiting  Endocrine: Negative for polyuria  Genitourinary: Negative for dysuria, flank pain, hematuria, vaginal bleeding and vaginal discharge  Musculoskeletal: Negative for back pain and neck pain  Skin: Negative for color change and rash  Allergic/Immunologic: Negative for immunocompromised state  Neurological: Negative for dizziness, weakness, light-headedness, numbness and headaches  All other systems reviewed and are negative  Physical Exam  Physical Exam   Constitutional: Vital signs are normal  She appears well-developed  She is cooperative  No distress     HENT:   Mouth/Throat: Uvula is midline, oropharynx is clear and moist and mucous membranes are normal    Eyes: Pupils are equal, round, and reactive to light  Conjunctivae and EOM are normal    Neck: Trachea normal  No thyroid mass and no thyromegaly present  Cardiovascular: Normal rate, regular rhythm, normal heart sounds, intact distal pulses and normal pulses  No murmur heard  Pulmonary/Chest: Effort normal and breath sounds normal    Abdominal: Soft  Normal appearance and bowel sounds are normal  There is tenderness in the left lower quadrant  There is no rebound, no guarding and no CVA tenderness  No rashes on her abdomen  Musculoskeletal:   Lymphedema of bilateral lower extremities  Neurological: She is alert  Skin: Skin is warm, dry and intact  Psychiatric: She has a normal mood and affect   Her speech is normal and behavior is normal  Thought content normal        Vital Signs  ED Triage Vitals   Temperature Pulse Respirations Blood Pressure SpO2   11/29/19 0758 11/29/19 0758 11/29/19 0758 11/29/19 0758 11/29/19 0758   (!) 97 4 °F (36 3 °C) (!) 110 20 169/90 96 %      Temp Source Heart Rate Source Patient Position - Orthostatic VS BP Location FiO2 (%)   11/29/19 0758 11/29/19 0845 11/29/19 0845 11/29/19 0758 --   Temporal Monitor Sitting Left arm       Pain Score       11/29/19 0758       8           Vitals:    11/29/19 0845 11/29/19 0915 11/29/19 1045 11/29/19 1100   BP: 162/85 152/87 142/90 (!) 166/114   Pulse: 81 71 90 97   Patient Position - Orthostatic VS: Sitting Lying Sitting Lying         Visual Acuity      ED Medications  Medications   acetaminophen (TYLENOL) tablet 650 mg (650 mg Oral Given 11/29/19 0836)   diclofenac sodium (VOLTAREN) 1 % topical gel 2 g (2 g Topical Given 11/29/19 0837)       Diagnostic Studies  Results Reviewed     Procedure Component Value Units Date/Time    Comprehensive metabolic panel [[id number]]  (Abnormal) Collected:  11/29/19 0817    Lab Status:  Final result Specimen:  Blood from Arm, Left Updated:  11/29/19 0840     Sodium 140 mmol/L Potassium 4 1 mmol/L      Chloride 105 mmol/L      CO2 28 mmol/L      ANION GAP 7 mmol/L      BUN 23 mg/dL      Creatinine 1 48 mg/dL      Glucose 95 mg/dL      Calcium 8 6 mg/dL      AST 26 U/L      ALT 27 U/L      Alkaline Phosphatase 87 U/L      Total Protein 6 7 g/dL      Albumin 3 5 g/dL      Total Bilirubin 0 60 mg/dL      eGFR 31 ml/min/1 73sq m     Narrative:       [employer] guidelines for Chronic Kidney Disease (CKD):     Stage 1 with normal or high GFR (GFR > 90 mL/min/1 73 square meters)    Stage 2 Mild CKD (GFR = 60-89 mL/min/1 73 square meters)    Stage 3A Moderate CKD (GFR = 45-59 mL/min/1 73 square meters)    Stage 3B Moderate CKD (GFR = 30-44 mL/min/1 73 square meters)    Stage 4 Severe CKD (GFR = 15-29 mL/min/1 73 square meters)    Stage 5 End Stage CKD (GFR <15 mL/min/1 73 square meters)  Note: GFR calculation is accurate only with a steady state creatinine    Lipase [[id number]]  (Normal) Collected:  11/29/19 0817    Lab Status:  Final result Specimen:  Blood from Arm, Left Updated:  11/29/19 0834     Lipase 212 u/L     Protime-INR [[id number]]  (Abnormal) Collected:  11/29/19 0817    Lab Status:  Final result Specimen:  Blood from Arm, Left Updated:  11/29/19 0833     Protime 24 3 seconds      INR 2 16    CBC and differential [[id number]]  (Abnormal) Collected:  11/29/19 0817    Lab Status:  Final result Specimen:  Blood from Arm, Left Updated:  11/29/19 0823     WBC 7 29 Thousand/uL      RBC 3 86 Million/uL      Hemoglobin 12 1 g/dL      Hematocrit 38 5 %       fL      MCH 31 3 pg      MCHC 31 4 g/dL      RDW 15 9 %      MPV 10 1 fL      Platelets 795 Thousands/uL      nRBC 0 /100 WBCs      Neutrophils Relative 76 %      Immat GRANS % 0 %      Lymphocytes Relative 14 %      Monocytes Relative 9 %      Eosinophils Relative 1 %      Basophils Relative 0 %      Neutrophils Absolute 5 45 Thousands/µL      Immature Grans Absolute 0 03 Thousand/uL      Lymphocytes Absolute 1 05 Thousands/µL      Monocytes Absolute 0 67 Thousand/µL      Eosinophils Absolute 0 06 Thousand/µL      Basophils Absolute 0 03 Thousands/µL     UA w Reflex to Microscopic w Reflex to Culture [[id number]]     Lab Status:  No result Specimen:  Urine                  No orders to display              Procedures  ECG 12 Lead Documentation Only  Date/Time: 11/29/2019 9:38 AM  Performed by: Laci Dhillon MD  Authorized by: Laci Dhillon MD     ECG reviewed by me, the ED Provider: yes    Patient location:  ED  Previous ECG:     Previous ECG:  Compared to current    Comparison ECG info:  4/29/19    Similarity:  No change    Comparison to cardiac monitor: Yes    Interpretation:     Interpretation: abnormal    Rate:     ECG rate:  78    ECG rate assessment: normal    Rhythm:     Rhythm: atrial fibrillation    Ectopy:     Ectopy: none    QRS:     QRS axis:  Left    QRS intervals:  Normal  Conduction:     Conduction: abnormal      Abnormal conduction: bifascicular block    ST segments:     ST segments:  Normal  T waves:     T waves: normal             ED Course  ED Course as of Nov 29 1122 Fri Nov 29, 2019   0844 Stable chronic kidney disease   Creatinine(!): 1 48   1037 Patient able to ambulate with her walker  I will speak with family  1110 I spoke with the patient and the family  The family is concerned about her pain and her ability to care for self  Family states that they were unable to transition her from the bed to the bedside commode this morning  They are concerned that she is going to fall at home  Will admit the patient to the hospital for physical therapy and pain management  MDM  Number of Diagnoses or Management Options  Diagnosis management comments: This is a well-appearing 77-year-old female presents with left lower quadrant abdominal pain  The patient had a recent CT scan and ultrasound, so will defer imaging at this time    Plan to recheck labs and urinalysis  Analgesia with Tylenol and diclofenac gel  If workup is unremarkable, will speak with the patient regarding admission for pain control and physical therapy  Disposition pending results and conversation  Disposition  Final diagnoses:   Left lower quadrant abdominal pain   Ambulatory dysfunction     Time reflects when diagnosis was documented in both MDM as applicable and the Disposition within this note     Time User Action Codes Description Comment    11/29/2019 11:14 AM Rush ROMERO Add [R10 32] Left lower quadrant abdominal pain     11/29/2019 11:14 AM Radha Gonzales Add [R26 2] Ambulatory dysfunction       ED Disposition     ED Disposition Condition Date/Time Comment    Admit Stable Fri Nov 29, 2019 11:14 AM         Follow-up Information    None         Patient's Medications   Discharge Prescriptions    No medications on file     No discharge procedures on file      ED Provider  Electronically Signed by           Conner Marley MD  11/29/19 0306

## 2019-11-29 NOTE — CASE MANAGEMENT
I self referred the patient to discuss resources that might be available to her  The patient denied needing any help at this time  The patient lives alone in a two story house  There is one flight of stairs in the home but she lives on the first floor  She has a cane, walker, and WC she uses  She has a Private duty Aid that comes in to her home  She does not receive meals on wheels  She needs some help with her ADL's  She does not drive her family drives her to where she needs to go  She uses [facility]  She has no trouble getting or paying for her medications  She has [facility] Figueredo  The patient did not call her PCP prior to coming to the ED today she stated they are closed because of the holiday

## 2019-11-29 NOTE — ED NOTES
Patient family leaving at this time  They were informed that patient will be going to room 427 this afternoon        Olayinka Archer RN  11/29/19 4230

## 2019-11-29 NOTE — PLAN OF CARE
Problem: Potential for Falls  Goal: Patient will remain free of falls  Description  INTERVENTIONS:  - Assess patient frequently for physical needs  -  Identify cognitive and physical deficits and behaviors that affect risk of falls    -  [place] fall precautions as indicated by assessment   - Educate patient/family on patient safety including physical limitations  - Instruct patient to call for assistance with activity based on assessment  - Modify environment to reduce risk of injury  - Consider OT/PT consult to assist with strengthening/mobility  Outcome: Progressing     Problem: Prexisting or High Potential for Compromised Skin Integrity  Goal: Skin integrity is maintained or improved  Description  INTERVENTIONS:  - Identify patients at risk for skin breakdown  - Assess and monitor skin integrity  - Assess and monitor nutrition and hydration status  - Monitor labs   - Assess for incontinence   - Turn and reposition patient  - Assist with mobility/ambulation  - Relieve pressure over bony prominences  - Avoid friction and shearing  - Provide appropriate hygiene as needed including keeping skin clean and dry  - Evaluate need for skin moisturizer/barrier cream  - Collaborate with interdisciplinary team   - Patient/family teaching  - Consider wound care consult   Outcome: Progressing

## 2019-11-29 NOTE — ASSESSMENT & PLAN NOTE
Appears creatinine baseline is between 1 4 and 1 5  Current creatinine 1 48-within baseline  Trend BMP  Cautious use of nephrotoxin agents

## 2019-11-29 NOTE — H&P
H&P- Wilberto Boyd 7/3/1931, 80 y o  female MRN: [id number]    Unit/Bed#: RM04 Encounter: [id number]    Primary Care Provider: Latrell Dave DO   Date and time admitted to hospital: 11/29/2019  7:59 AM        Ambulatory dysfunction  Assessment & Plan  Ambulate with assist all times  PT ordered  OT ordered  Provide supportive care    Atrial fibrillation, chronic  Assessment & Plan  AFib-rate controlled  Anticoagulated on Coumadin-continue  Most recent INR 2 16  Trend INR    CKD (chronic kidney disease) stage 3, GFR 30-59 ml/min ([facility])  Assessment & Plan  Appears creatinine baseline is between 1 4 and 1 5  Current creatinine 1 48-within baseline  Trend BMP  Cautious use of nephrotoxin agents    Restless leg syndrome  Assessment & Plan  Continue prior to admission medication  Provide supportive care        VTE Prophylaxis: Warfarin (Coumadin)  / sequential compression device   Code Status: FULL  POLST: POLST is not applicable to this patient    Anticipated Length of Stay:  Patient will be admitted on an Observation basis with an anticipated length of stay of  Less than 2 midnights  Justification for Hospital Stay:  Ambulatory dysfunction    Total Time for Visit, including Counseling / Coordination of Care: 45 minutes  Greater than 50% of this total time spent on direct patient counseling and coordination of care  Chief Complaint:   Left-sided groin pain and ambulatory dysfunction    History of Present Illness:    Wilberto Boyd is a 80 y o  female who presented to the emergency room for evaluation of left lower quadrant/groin pain  States that this pain has been ongoing and intermittent in nature for approximately the past 2-3 weeks  Patient reports awakening this morning and having a difficulty ambulating due to intense pain and points to left lower quadrant groin region    Patient has a past medical history including SVT, stroke, restless 8 syndrome, lymphedema, hyperlipidemia, hypertension, right drop foot status post back surgery approximately 10 years ago, carpal tunnel syndrome cardiac disease arthritis, aortic heart valve replacement 2002  Patient denies lightheadedness dizziness denies vision changes, denies chest pain denies shortness of breath denies nausea vomiting diarrhea constipation, denies dysuria urgency or frequency denies melena hematochezia  Patient will be admitted for evaluation PT and OT will be consult provide analgesia as needed  Review of Systems:    Review of Systems   Gastrointestinal: Positive for abdominal pain (Left lower quadrant)  Musculoskeletal: Positive for back pain ( chronic) and gait problem ( right drop foot)  Also reports restless legs syndrome     All other systems reviewed and are negative  Past Medical and Surgical History:     Past Medical History:   Diagnosis Date    Arthritis     L shoulder,fingers    Cardiac disease     valve replacement    Carpal tunnel syndrome, bilateral     Dropfoot     right    History of echocardiogram 04/20/2016    EF 75%, Hyperdynamic LVSF  Mild concentric LVH  Normal functioning bioprosthetic AV  Trace MR  Mild pulm htn   HTN (hypertension)     Hyperlipidemia     Lymphedema     Restless leg syndrome     Right bundle branch block     Stroke ([facility] [address] )     Supraventricular tachycardia [facility])        Past Surgical History:   Procedure Laterality Date    AORTIC VALVE REPLACEMENT  11/25/2002    Tissue AVR #21    AORTIC VALVULOPLASTY  11/25/2002    Bovine Pericardial heart valve    BACK SURGERY      CARDIAC CATHETERIZATION  11/22/2002    Sever critical Aortic stenosis  Pulm Htn  Normal coronary arteries   HAND SURGERY      right hand    HYSTERECTOMY      ROTATOR CUFF REPAIR Right     VASCULAR SURGERY      bilateral vein ligation & stripping       Meds/Allergies:    Prior to Admission medications    Medication Sig Start Date End Date Taking?  Authorizing Provider   atorvastatin (LIPITOR) 40 mg tablet Take 40 mg by mouth every evening   Yes Historical Provider, MD   ergocalciferol (VITAMIN D2) 50,000 units Take 50,000 Units by mouth once a week 7/22/19  Yes Historical Provider, MD   ferrous sulfate 325 (65 Fe) mg tablet Take 325 mg by mouth daily with breakfast   Yes Historical Provider, MD   furosemide (LASIX) 40 mg tablet Take 1 tablet (40 mg total) by mouth daily  Patient taking differently: Take 80 mg by mouth daily  4/4/19  Yes Chiqui Kern MD   gabapentin (NEURONTIN) 100 mg capsule Take 100 mg by mouth 3 (three) times a day 7/9/19  Yes Historical Provider, MD   Melatonin 5 MG CAPS Take 5 mg by mouth daily at bedtime   Yes Historical Provider, MD   rOPINIRole (REQUIP) 2 mg tablet Take 2 mg by mouth 2 (two) times a day   Yes Historical Provider, MD   warfarin (COUMADIN) 5 mg tablet 5 mg Take as directed by Dr Jenny Middleton 7/8/19  Yes Historical Provider, MD   warfarin (COUMADIN) 3 mg tablet Take 1 tab (3mg) by mouth each evening  Patient not taking: Reported on 7/24/2019 4/3/19   Chiqui Kern MD   diclofenac sodium (VOLTAREN) 1 % Apply 2 g topically 4 (four) times a day 11/27/19 11/29/19  Radha Ferraro MD   pregabalin (LYRICA) 50 mg capsule Take 50 mg by mouth 2 (two) times a day  11/29/19  Historical Provider, MD     I have reviewed home medications with patient personally  Allergies: Allergies   Allergen Reactions    Baclofen      Patient developed acute encephalopathy on dosing of 10 mg 3 times a day, patient could possibly be tolerant of smaller doses, would advise cautious use in the future or no use at all       Social History:     Marital Status:     Occupation: retired  Patient Pre-hospital Living Situation: independent  Patient Pre-hospital Level of Mobility: limited  Patient Pre-hospital Diet Restrictions: denies  Substance Use History:   Social History     Substance and Sexual Activity   Alcohol Use Never    Frequency: Never     Social History     Tobacco Use   Smoking Status Never Smoker Smokeless Tobacco Never Used     Social History     Substance and Sexual Activity   Drug Use No       Family History:    Family History   Problem Relation Age of Onset    No Known Problems Mother     No Known Problems Father     No Known Problems Sister     No Known Problems Brother     Hyperlipidemia Son     Hypertension Son     No Known Problems Sister     No Known Problems Brother     Hyperlipidemia Son     Hypertension Son     Hyperlipidemia Son     Hypertension Son        Physical Exam:     Vitals:   Blood Pressure: 133/82 (11/29/19 1255)  Pulse: 81 (11/29/19 1255)  Temperature: 97 8 °F (36 6 °C) (11/29/19 1255)  Temp Source: Temporal (11/29/19 1255)  Respirations: 16 (11/29/19 1255)  Height: 5' 2" (157 5 cm) (11/29/19 1255)  Weight - Scale: 78 2 kg (172 lb 6 4 oz) (11/29/19 1255)  SpO2: 97 % (11/29/19 1255)    Physical Exam   Constitutional: She is oriented to person, place, and time  She appears well-developed and well-nourished  HENT:   Head: Normocephalic and atraumatic  Eyes: Pupils are equal, round, and reactive to light  EOM are normal    Neck: Normal range of motion  Neck supple  Cardiovascular: Normal rate, regular rhythm, normal heart sounds and intact distal pulses  Pulmonary/Chest: No stridor  No respiratory distress  Abdominal: Soft  Bowel sounds are normal  She exhibits no distension  There is no tenderness  Musculoskeletal: She exhibits no edema, tenderness or deformity  Tenderness to palpation right lower quadrant/groin region    5/5 strength in all extremities   Neurological: She is alert and oriented to person, place, and time  GCS eye subscore is 4  GCS verbal subscore is 5  GCS motor subscore is 6  Skin: Capillary refill takes less than 2 seconds  No rash noted  No erythema  No pallor  Psychiatric: She has a normal mood and affect   Her behavior is normal  Judgment and thought content normal        Additional Data:     Lab Results: I have personally reviewed pertinent reports  Results from last 7 days   Lab Units 11/29/19  0817   WBC Thousand/uL 7 29   HEMOGLOBIN g/dL 12 1   HEMATOCRIT % 38 5   PLATELETS Thousands/uL 168   NEUTROS PCT % 76*   LYMPHS PCT % 14   MONOS PCT % 9   EOS PCT % 1     Results from last 7 days   Lab Units 11/29/19  0817   POTASSIUM mmol/L 4 1   CHLORIDE mmol/L 105   CO2 mmol/L 28   BUN mg/dL 23   CREATININE mg/dL 1 48*   CALCIUM mg/dL 8 6   ALK PHOS U/L 87   ALT U/L 27   AST U/L 26     Results from last 7 days   Lab Units 11/29/19  0817   INR  2 16*       Imaging: I have personally reviewed pertinent reports  Ct Abdomen Pelvis Wo Contrast    Result Date: 11/27/2019  Narrative: CT ABDOMEN AND PELVIS WITHOUT IV CONTRAST INDICATION:   LLQ abdominal pain  Worsening left lower quadrant abdominal pain for 2 weeks  COMPARISON:  CT chest abdomen pelvis March 20, 2019 TECHNIQUE:  CT examination of the abdomen and pelvis was performed without intravenous contrast   Axial, sagittal, and coronal 2D reformatted images were created from the source data and submitted for interpretation  Radiation dose length product (DLP) for this visit:  375 86 mGy-cm   This examination, like all CT scans performed in the [facility], was performed utilizing techniques to minimize radiation dose exposure, including the use of iterative  reconstruction and automated exposure control  Enteric contrast was administered  FINDINGS: Study is somewhat limited due to patient motion artifact  ABDOMEN LOWER CHEST:  No clinically significant abnormality identified in the visualized lower chest   The heart is enlarged  Coronary artery calcifications  Median sternotomy  Aortic valve replacement  LIVER/BILIARY TREE:  Fatty infiltrative changes in the liver  Stable low-density hepatic lesions too small to accurately characterize, however likely represent cysts  GALLBLADDER:  No calcified gallstones  No pericholecystic inflammatory change  SPLEEN:  Unremarkable  PANCREAS:  Unremarkable  ADRENAL GLANDS:  Unremarkable  KIDNEYS/URETERS:  Scarring in the kidneys bilaterally  No renal or ureteric calculi  No hydronephrosis or hydroureter  STOMACH AND BOWEL:  Evaluation of the GI tract limited due to lack of oral contrast material  Moderate-sized hiatal hernia  Thickening of the distal esophagus, likely reflux esophagitis  No evidence of small bowel obstruction  Normal fecal burden throughout the colon  Scattered diverticula of the descending colon and sigmoid colon  Sigmoid colon redundant and tortuous  Nothing to suggest acute diverticulitis  APPENDIX:  No findings to suggest appendicitis  ABDOMINOPELVIC CAVITY:  No ascites or free intraperitoneal air  No lymphadenopathy  VESSELS:  Atherosclerotic changes are present  No evidence of aneurysm  PELVIS REPRODUCTIVE ORGANS:  Patient is status post hysterectomy  URINARY BLADDER:  Incompletely distended  Inadequately evaluated  ABDOMINAL WALL/INGUINAL REGIONS:  Small umbilical hernia containing fat  Small left inguinal hernia containing fat  OSSEOUS STRUCTURES: Degenerative and postoperative changes lumbar spine, similar to the prior study  No acute fracture or destructive osseous lesion  Impression: 1  Somewhat limited examination due to patient motion artifact as well as lack of oral and intravenous contrast material  2   No acute abdominal pelvic abnormality  3   Colonic diverticulosis  Workstation performed: [id number]     Xr Knee 4+ Views Left Injury    Result Date: 11/27/2019  Narrative: LEFT KNEE INDICATION:   L knee pain  COMPARISON:  None VIEWS:  XR KNEE 4+ VW LEFT INJURY FINDINGS: There is no acute fracture or dislocation  There is no joint effusion  Tricompartmental osteophytes with medial compartment narrowing and patellofemoral bone-on-bone articulation No lytic or blastic lesions are seen  Meniscal chondrocalcinosis is identified    Soft tissue infiltration noted throughout     Impression: Severe osteoarthritis without acute abnormality Workstation performed: [id number]       EKG, Pathology, and Other Studies Reviewed on Admission:   · EKG: afib rate controlled    Epic / Care Everywhere Records Reviewed: Yes     ** Please Note: This note has been constructed using a voice recognition system   **

## 2019-11-30 VITALS
SYSTOLIC BLOOD PRESSURE: 132 MMHG | WEIGHT: 158.73 LBS | HEART RATE: 104 BPM | RESPIRATION RATE: 18 BRPM | OXYGEN SATURATION: 96 % | HEIGHT: 61 IN | DIASTOLIC BLOOD PRESSURE: 73 MMHG | TEMPERATURE: 97.3 F | BODY MASS INDEX: 29.97 KG/M2

## 2019-11-30 LAB
ANION GAP SERPL CALCULATED.3IONS-SCNC: 10 MMOL/L (ref 4–13)
BUN SERPL-MCNC: 22 MG/DL (ref 5–25)
CALCIUM SERPL-MCNC: 9.1 MG/DL (ref 8.3–10.1)
CHLORIDE SERPL-SCNC: 104 MMOL/L (ref 100–108)
CO2 SERPL-SCNC: 28 MMOL/L (ref 21–32)
CREAT SERPL-MCNC: 1.45 MG/DL (ref 0.6–1.3)
ERYTHROCYTE [DISTWIDTH] IN BLOOD BY AUTOMATED COUNT: 15.9 % (ref 11.6–15.1)
GFR SERPL CREATININE-BSD FRML MDRD: 32 ML/MIN/1.73SQ M
GLUCOSE SERPL-MCNC: 84 MG/DL (ref 65–140)
HCT VFR BLD AUTO: 42.6 % (ref 34.8–46.1)
HGB BLD-MCNC: 13.4 G/DL (ref 11.5–15.4)
INR PPP: 2.14 (ref 0.84–1.19)
MAGNESIUM SERPL-MCNC: 2.2 MG/DL (ref 1.6–2.6)
MCH RBC QN AUTO: 31.2 PG (ref 26.8–34.3)
MCHC RBC AUTO-ENTMCNC: 31.5 G/DL (ref 31.4–37.4)
MCV RBC AUTO: 99 FL (ref 82–98)
PHOSPHATE SERPL-MCNC: 3.4 MG/DL (ref 2.3–4.1)
PLATELET # BLD AUTO: 184 THOUSANDS/UL (ref 149–390)
PMV BLD AUTO: 10.5 FL (ref 8.9–12.7)
POTASSIUM SERPL-SCNC: 3.9 MMOL/L (ref 3.5–5.3)
PROTHROMBIN TIME: 24.1 SECONDS (ref 11.6–14.5)
RBC # BLD AUTO: 4.29 MILLION/UL (ref 3.81–5.12)
SODIUM SERPL-SCNC: 142 MMOL/L (ref 136–145)
WBC # BLD AUTO: 7.45 THOUSAND/UL (ref 4.31–10.16)

## 2019-11-30 PROCEDURE — 80048 BASIC METABOLIC PNL TOTAL CA: CPT | Performed by: NURSE PRACTITIONER

## 2019-11-30 PROCEDURE — 97162 PT EVAL MOD COMPLEX 30 MIN: CPT | Performed by: PHYSICAL THERAPIST

## 2019-11-30 PROCEDURE — 83735 ASSAY OF MAGNESIUM: CPT | Performed by: NURSE PRACTITIONER

## 2019-11-30 PROCEDURE — G8978 MOBILITY CURRENT STATUS: HCPCS | Performed by: PHYSICAL THERAPIST

## 2019-11-30 PROCEDURE — 84100 ASSAY OF PHOSPHORUS: CPT | Performed by: NURSE PRACTITIONER

## 2019-11-30 PROCEDURE — G8979 MOBILITY GOAL STATUS: HCPCS | Performed by: PHYSICAL THERAPIST

## 2019-11-30 PROCEDURE — 97166 OT EVAL MOD COMPLEX 45 MIN: CPT

## 2019-11-30 PROCEDURE — 85610 PROTHROMBIN TIME: CPT | Performed by: NURSE PRACTITIONER

## 2019-11-30 PROCEDURE — 99217 PR OBSERVATION CARE DISCHARGE MANAGEMENT: CPT | Performed by: NURSE PRACTITIONER

## 2019-11-30 PROCEDURE — G8988 SELF CARE GOAL STATUS: HCPCS

## 2019-11-30 PROCEDURE — 85027 COMPLETE CBC AUTOMATED: CPT | Performed by: NURSE PRACTITIONER

## 2019-11-30 PROCEDURE — G8987 SELF CARE CURRENT STATUS: HCPCS

## 2019-11-30 RX ADMIN — FUROSEMIDE 80 MG: 80 TABLET ORAL at 09:24

## 2019-11-30 RX ADMIN — ROPINIROLE HYDROCHLORIDE 2 MG: 1 TABLET, FILM COATED ORAL at 09:24

## 2019-11-30 RX ADMIN — GABAPENTIN 100 MG: 100 CAPSULE ORAL at 09:23

## 2019-11-30 RX ADMIN — HEPARIN SODIUM 5000 UNITS: 5000 INJECTION, SOLUTION INTRAVENOUS; SUBCUTANEOUS at 05:18

## 2019-11-30 RX ADMIN — FERROUS SULFATE TAB 325 MG (65 MG ELEMENTAL FE) 325 MG: 325 (65 FE) TAB at 09:24

## 2019-11-30 NOTE — DISCHARGE SUMMARY
Discharge- Ephraim Drew 7/3/1931, 80 y o  female MRN: [id number]    Unit/Bed#: 427-01 Encounter: [id number]    Primary Care Provider: Laura Steven DO   Date and time admitted to hospital: 11/29/2019  7:59 AM        Atrial fibrillation, chronic  Assessment & Plan  AFib-rate controlled  Anticoagulated on Coumadin-continue  Most recent INR 2 16  Trend INR    CKD (chronic kidney disease) stage 3, GFR 30-59 ml/min ([facility])  Assessment & Plan  Appears creatinine baseline is between 1 4 and 1 5  Current creatinine 1 45-within baseline  Trend BMP  Cautious use of nephrotoxin agents    Restless leg syndrome  Assessment & Plan  Continue prior to admission medication  Provide supportive care    * Ambulatory dysfunction  Assessment & Plan  Ambulate with assist all times  PT -recommend home PT and walker  OT consulted  Provide supportive care        Discharging Physician / Practitioner: Randall Queen  PCP: Laura Steven DO  Admission Date:   Admission Orders (From admission, onward)     Ordered        11/29/19 1121  Place in Observation  Once                   Discharge Date: 11/30/19    Resolved Problems  Date Reviewed: 11/30/2019    None          Consultations During Hospital Stay:  · PT and OT    Procedures Performed:   · none    Significant Findings / Test Results:   · none    Incidental Findings:   · none     Test Results Pending at Discharge (will require follow up):   · none     Outpatient Tests Requested:  · none    Complications:  none    Reason for Admission:  Left lower abdominal pain/groin pain    Hospital Course:     Ephraim Drew is a 80 y o  female patient who originally presented to the hospital on 11/29/2019 due to pain in left lower quadrant/groin region  Reports pain has been ongoing intermittently in nature for the past 2-3 weeks  She reports awakening yesterday morning having difficulty ambulating which prompted her to seek medical attention in the emergency room    Images and labs were collected emergency room-see below  PT and OT were consulted-both recommend home therapy additionally PT recommended walker for use at home  Patient will be discharged home with the aforementioned services  Please see above list of diagnoses and related plan for additional information  Condition at Discharge: good     Discharge Day Visit / Exam:     Subjective:  Patient reports intermittent pain in left lower quadrant groin region, denies chest pain denies shortness of breath, admits to walking today with a walker without difficulty with PT denies dysuria urgency or frequency denies melena hematochezia  Vitals: Blood Pressure: 132/73 (11/30/19 0906)  Pulse: 104 (11/30/19 0906)  Temperature: (!) 97 3 °F (36 3 °C) (11/29/19 2240)  Temp Source: Oral (11/29/19 2240)  Respirations: 18 (11/30/19 0906)  Height: 5' 1" (154 9 cm) (11/29/19 1525)  Weight - Scale: 72 kg (158 lb 11 7 oz) (11/30/19 0600)  SpO2: 96 % (11/30/19 0906)  Exam:   Physical Exam   Constitutional: She is oriented to person, place, and time  She appears well-developed and well-nourished  HENT:   Head: Normocephalic and atraumatic  Eyes: Pupils are equal, round, and reactive to light  EOM are normal    Neck: Normal range of motion  Neck supple  Cardiovascular: Normal rate, regular rhythm, normal heart sounds and intact distal pulses  Pulmonary/Chest: Effort normal and breath sounds normal    Abdominal: Soft  Bowel sounds are normal    Musculoskeletal: She exhibits no edema, tenderness or deformity  Neurological: She is alert and oriented to person, place, and time  No cranial nerve deficit  Skin: Skin is warm and dry  Capillary refill takes less than 2 seconds  No rash noted  No erythema  No pallor  Psychiatric: She has a normal mood and affect   Her behavior is normal  Judgment and thought content normal        Discussion with Family: POC including needed home PT and OT in addition to utilization of walker    Discharge instructions/Information to patient and family:   See after visit summary for information provided to patient and family  Provisions for Follow-Up Care:  See after visit summary for information related to follow-up care and any pertinent home health orders  Disposition:     Home with VNA Services (Reminder: Complete face to face encounter)    For Discharges to [facility] SNF:   · Not Applicable to this Patient - Not Applicable to this Patient    Planned Readmission: N/A     Discharge Statement:  I spent 35 minutes discharging the patient  This time was spent on the day of discharge  I had direct contact with the patient on the day of discharge  Greater than 50% of the total time was spent examining patient, answering all patient questions, arranging and discussing plan of care with patient as well as directly providing post-discharge instructions  Additional time then spent on discharge activities  Discharge Medications:  See after visit summary for reconciled discharge medications provided to patient and family        ** Please Note: This note has been constructed using a voice recognition system **

## 2019-11-30 NOTE — PHYSICAL THERAPY NOTE
PT Evaluation        11/30/19 1152   Note Type   Note type Eval only   Pain Assessment   Pain Assessment 0-10   Pain Score 6   Pain Location Abdomen   Pain Orientation Left; Lower   Home Living   Additional Comments See OT Eval for details    Prior Function   Comments See OT Eval for details    Restrictions/Precautions   Weight Bearing Precautions Per Order No   Other Precautions Chair Alarm;Telemetry; Fall Risk;Pain   Cognition   Overall Cognitive Status Impaired   Arousal/Participation Alert   Attention Within functional limits   Orientation Level Oriented to person;Oriented to place;Oriented to situation;Disoriented to time   Memory Decreased short term memory;Decreased long term memory   Following Commands Follows one step commands without difficulty   RLE Assessment   RLE Assessment WFL  (3/5 grossly, Foot drop on right LE )   LLE Assessment   LLE Assessment WFL  (3/5 grossly )   Bed Mobility   Supine to Sit 5  Supervision   Additional items Bedrails; Increased time required   Transfers   Sit to Stand 5  Supervision   Additional items Verbal cues   Stand to Sit 5  Supervision   Additional items Verbal cues   Additional Comments RW used for transfer    Ambulation/Elevation   Gait pattern Forward Flexion;Narrow DOUG; Short stride; Excessively slow; Inconsistent shayna  (R Foot Drop )   Gait Assistance 5  Supervision   Assistive Device Rolling walker   Distance 15 feet to bathroom/chair    Balance   Static Sitting Good   Dynamic Sitting Good   Static Standing Fair +   Dynamic Standing Fair   Ambulatory Fair   Activity Tolerance   Activity Tolerance Patient limited by pain; Patient limited by fatigue   Assessment   Prognosis Guarded   Problem List Decreased strength;Decreased range of motion;Decreased endurance; Impaired balance;Decreased mobility   Assessment Pt is 80 y o  female seen for PT evaluation s/p admit to [address] on 11/29/2019 w/ Ambulatory dysfunction   PT consulted to assess pt's functional mobility and d/c needs  Order placed for PT eval and tx, w/ up and OOB as tolerated order  Comorbidities affecting pt's physical performance at time of assessment include: Ambulatory dysfunction, CKD, Afib, PTA, pt was requiring A for mobility  Personal factors affecting pt at time of IE include: inability to ambulate household distances, inability to navigate community distances, inability to navigate level surfaces w/o external assistance, unable to perform dynamic tasks in community and unable to perform physical activity  Please find objective findings from PT assessment regarding body systems outlined above with impairments and limitations including weakness, decreased ROM, impaired balance, decreased endurance, impaired coordination, gait deviations, pain, decreased activity tolerance and decreased functional mobility tolerance    From PT/mobility standpoint, recommendation at time of d/c would be Home PT pending progress in order to facilitate return to PLOF  Goals   Patient Goals to go home    LTG Expiration Date 12/14/19   Long Term Goal #1 Pt will be (I) with all transfers and bed mobility    Long Term Goal #2 Pt will safely ambulate 50 feet (I) using RW to facilitate (I) at home    Plan   Treatment/Interventions LE strengthening/ROM; Functional transfer training;Elevations; Therapeutic exercise; Endurance training;Bed mobility;Gait training   PT Frequency 2-3x/wk   Recommendation   Recommendation Home PT   Equipment Recommended Walker   PT - OK to Discharge Yes   Additional Comments when medically stable      Pt in bed when PT entered   Pt seated in chair when PT left, all lines intact, all needs within reach

## 2019-11-30 NOTE — PLAN OF CARE
Problem: OCCUPATIONAL THERAPY ADULT  Goal: Performs self-care activities at highest level of function for planned discharge setting  See evaluation for individualized goals  Description  Treatment Interventions: ADL retraining, Functional transfer training, UE strengthening/ROM, Endurance training, Patient/family training, Activityengagement          See flowsheet documentation for full assessment, interventions and recommendations  Note:   Limitation: Decreased ADL status, Decreased UE strength, Decreased Safe judgement during ADL, Decreased UE ROM, Decreased endurance, Decreased self-care trans, Decreased high-level ADLs, Decreased cognition     Assessment: Pt is a 80 y o  female seen for OT evaluation s/p admit to [facility] on 11/29/2019 w/ Ambulatory dysfunction  Comorbidities affecting pt's functional performance at time of assessment include: HTN and cardiac disease, RLS, drop foot, CVA, tachycardia,lymphedema, CTS, arthritis  Personal factors affecting pt at time of IE include:steps to enter environment, difficulty performing ADLS, difficulty performing IADLS , limited insight into deficits, decreased initiation and engagement  and health management   Prior to admission, pt was (A) with ADLs and IADLs with RW during functional mobility  Upon evaluation: Pt requires (S)-min (A) with use of RW during functional mobility 2* the following deficits impacting occupational performance: weakness, decreased strength, decreased balance, decreased tolerance, impaired initiation, impaired memory, impaired problem solving and decreased safety awareness  Pt to benefit from continued skilled OT tx while in the hospital to address deficits as defined above and maximize level of functional independence w ADL's and functional mobility  Occupational Performance areas to address include: grooming, bathing/shower, toilet hygiene, dressing, functional mobility, community mobility and clothing management   From OT standpoint, recommendation at time of d/c would be home with continued home health and family (A) and (S)         OT Discharge Recommendation: [address]

## 2019-11-30 NOTE — ASSESSMENT & PLAN NOTE
Appears creatinine baseline is between 1 4 and 1 5  Current creatinine 1 45-within baseline  Trend BMP  Cautious use of nephrotoxin agents

## 2019-11-30 NOTE — CASE MANAGEMENT
THELMA met with the patient and she lives alone and she has a one floor setup  Her family is very involved in her care she has an aide 2 hours a day 4 times a week  She has a walker that is 13years old and she will need a new one on d/c to home  She has a quad cane also and a w/c   She stays on one floor and she has a bedside commode and a bathroom on that floor  She was active in the past with all [facility]  There is 3 steps to enter the home  Her family transports her to appointments

## 2019-11-30 NOTE — CASE MANAGEMENT
I spoke with the patients daughter and she is aware that her mother is being d/c to home  PT fitted her with a walker  I set up hhc for her and she will need Nursing /PT/OT   I called and sent a referral to [address] and I spoke with Faye Fam and he is agreeable to take the referral they will be out to see her Monday or Tuesday   Her family will transport her home

## 2019-11-30 NOTE — PLAN OF CARE
Problem: PHYSICAL THERAPY ADULT  Goal: Performs mobility at highest level of function for planned discharge setting  See evaluation for individualized goals  Description  Treatment/Interventions: LE strengthening/ROM, Functional transfer training, Elevations, Therapeutic exercise, Endurance training, Bed mobility, Gait training  Equipment Recommended: Keila Flaherty       See flowsheet documentation for full assessment, interventions and recommendations  Note:   Prognosis: Guarded  Problem List: Decreased strength, Decreased range of motion, Decreased endurance, Impaired balance, Decreased mobility  Assessment: Pt is 80 y o  female seen for PT evaluation s/p admit to [facility] on 11/29/2019 w/ Ambulatory dysfunction  PT consulted to assess pt's functional mobility and d/c needs  Order placed for PT eval and tx, w/ up and OOB as tolerated order  Comorbidities affecting pt's physical performance at time of assessment include: Ambulatory dysfunction, CKD, Afib, PTA, pt was requiring A for mobility  Personal factors affecting pt at time of IE include: inability to ambulate household distances, inability to navigate community distances, inability to navigate level surfaces w/o external assistance, unable to perform dynamic tasks in community and unable to perform physical activity  Please find objective findings from PT assessment regarding body systems outlined above with impairments and limitations including weakness, decreased ROM, impaired balance, decreased endurance, impaired coordination, gait deviations, pain, decreased activity tolerance and decreased functional mobility tolerance    From PT/mobility standpoint, recommendation at time of d/c would be Home PT pending progress in order to facilitate return to PLOF  Recommendation: Home PT     PT - OK to Discharge: Yes    See flowsheet documentation for full assessment

## 2019-11-30 NOTE — UTILIZATION REVIEW
Initial Clinical Review    Admission: Date/Time/Statement:  Placed in observation status on 11/29 @ 11:21  Orders Placed This Encounter   Procedures    Place in Observation     Standing Status:   Standing     Number of Occurrences:   1     Order Specific Question:   Admitting Physician     Answer:   Leticia Cleveland     Order Specific Question:   Level of Care     Answer:   Med Surg [16]     ED Arrival Information     Expected Arrival Acuity Means of Arrival Escorted By Service Admission Type    - 11/29/2019 07:53 Urgent Ambulance [address] [facility] General Medicine Urgent    Arrival Complaint    abdominal pain        Chief Complaint   Patient presents with    Groin Pain     pt  was here last week with left sided groin pain; pt  had  pain on the left side of her groin rating it an 8/10 this morning     Assessment/Plan:  81 yo f with a PMH of SVT, stroke, restless leg syndrome, lymphedema, HLD,  HTN, right foot drop s/p back surgery 10 yrs ago, carpal tunnel syndrome, CAD, arthritis, AVR in 2002  She presents to the Er with a complaint of left lower quadrant groin pain, which has been ongoing for the past 2-3 weeks  Note she was seen in the ER 2 days prior for the same complaint  She reports difficulty walking due to the pain  Patient admission to observation status fro PT/OT evaluation and pain management           ED Triage Vitals   Temperature Pulse Respirations Blood Pressure SpO2   11/29/19 0758 11/29/19 0758 11/29/19 0758 11/29/19 0758 11/29/19 0758   (!) 97 4 °F (36 3 °C) (!) 110 20 169/90 96 %      Temp Source Heart Rate Source Patient Position - Orthostatic VS BP Location FiO2 (%)   11/29/19 0758 11/29/19 0845 11/29/19 0845 11/29/19 0758 --   Temporal Monitor Sitting Left arm       Pain Score       11/29/19 0758       8        Wt Readings from Last 1 Encounters:   11/30/19 72 kg (158 lb 11 7 oz)     Additional Vital Signs:   Date/Time  Temp  Pulse  Resp  BP  MAP (mmHg)  SpO2  O2 Device  Patient Position - Orthostatic VS   11/30/19 09:06:35    104  18  132/73  93  96 %       11/30/19 07:41:15    106Abnormal   18  161/135Abnormal   144  97 %       11/29/19 2244    99               11/29/19 22:40:59  97 3 °F (36 3 °C)Abnormal   118Abnormal   16  167/96  120  96 %  None (Room air)  Lying   11/29/19 15:25:59  98 °F (36 7 °C)  95  18  168/96  120  97 %  None (Room air)  Lying   11/29/19 1255  97 8 °F (36 6 °C)  81  16  133/82    97 %  None (Room air)     11/29/19 1238    85    178/136Abnormal     96 %       11/29/19 1200    102  18  172/106Abnormal     91 %  None (Room air)  Lying   11/29/19 1100    97  18  166/114Abnormal     98 %  None (Room air)  Lying   11/29/19 1045    90    142/90    99 %  None (Room air)  Sitting           Pertinent Labs/Diagnostic Test Results:   Results from last 7 days   Lab Units 11/30/19  0436 11/29/19  0817 11/27/19  0603   WBC Thousand/uL 7 45 7 29 5 37   HEMOGLOBIN g/dL 13 4 12 1 12 1   HEMATOCRIT % 42 6 38 5 39 0   PLATELETS Thousands/uL 184 168 170   NEUTROS ABS Thousands/µL  --  5 45 2 84     Results from last 7 days   Lab Units 11/30/19  0436 11/29/19  0817 11/27/19  0603   SODIUM mmol/L 142 140 142   POTASSIUM mmol/L 3 9 4 1 3 9   CHLORIDE mmol/L 104 105 105   CO2 mmol/L 28 28 28   ANION GAP mmol/L 10 7 9   BUN mg/dL 22 23 28*   CREATININE mg/dL 1 45* 1 48* 1 64*   EGFR ml/min/1 73sq m 32 31 28   CALCIUM mg/dL 9 1 8 6 8 9   MAGNESIUM mg/dL 2 2  --   --    PHOSPHORUS mg/dL 3 4  --   --      Results from last 7 days   Lab Units 11/29/19  0817   AST U/L 26   ALT U/L 27   ALK PHOS U/L 87   TOTAL PROTEIN g/dL 6 7   ALBUMIN g/dL 3 5   TOTAL BILIRUBIN mg/dL 0 60     Results from last 7 days   Lab Units 11/30/19  0436 11/29/19  0817 11/27/19  0603   GLUCOSE RANDOM mg/dL 84 95 95       Results from last 7 days   Lab Units 11/30/19  0436 11/29/19  0817 11/27/19  0603   PROTIME seconds 24 1* 24 3* 20 7*   INR  2 14* 2 16* 1 76*     Results from last 7 days Lab Units 11/29/19  0817   LIPASE u/L 212       Results from last 7 days   Lab Units 11/29/19  1158   CLARITY UA  Slightly Cloudy   COLOR UA  Yellow   SPEC GRAV UA  1 020   PH UA  5 5   GLUCOSE UA mg/dl Negative   KETONES UA mg/dl Trace*   BLOOD UA  Negative   PROTEIN UA mg/dl Trace*   NITRITE UA  Negative   BILIRUBIN UA  Negative   UROBILINOGEN UA E U /dl 0 2   LEUKOCYTES UA  Negative   WBC UA /hpf 1-2*   RBC UA /hpf None Seen   BACTERIA UA /hpf Occasional   EPITHELIAL CELLS WET PREP /hpf Occasional     CT A & P - 11/27 -  1  Somewhat limited examination due to patient motion artifact as well as lack of oral and intravenous contrast material  2   No acute abdominal pelvic abnormality  3   Colonic diverticulosis    XR L knee - 11/27 - severe osteoarthritis without  acute abnormality    EKG  11/29 - Afib rate controlled     ED Treatment:   Medication Administration from 11/29/2019 0752 to 11/29/2019 1508       Date/Time Order Dose Route Action     11/29/2019 0836 acetaminophen (TYLENOL) tablet 650 mg 650 mg Oral Given     11/29/2019 0837 diclofenac sodium (VOLTAREN) 1 % topical gel 2 g 2 g Topical Given        Past Medical History:   Diagnosis Date    Arthritis     L shoulder,fingers    Cardiac disease     valve replacement    Carpal tunnel syndrome, bilateral     Dropfoot     right    History of echocardiogram 04/20/2016    EF 75%, Hyperdynamic LVSF  Mild concentric LVH  Normal functioning bioprosthetic AV  Trace MR  Mild pulm htn       HTN (hypertension)     Hyperlipidemia     Lymphedema     Restless leg syndrome     Right bundle branch block     Stroke (HCC)     Supraventricular tachycardia (HCC)      Present on Admission:   Restless leg syndrome   CKD (chronic kidney disease) stage 3, GFR 30-59 ml/min (HCC)   Atrial fibrillation, chronic   Ambulatory dysfunction      Admitting Diagnosis: Abdominal pain [R10 9]  Ambulatory dysfunction [R26 2]  Left lower quadrant abdominal pain [R10 32]  Age/Sex: 80 y o  female  Admission Orders:  Scheduled Medications:    Medications:  atorvastatin 40 mg Oral QPM   ergocalciferol 50,000 Units Oral Weekly   ferrous sulfate 325 mg Oral Daily With Breakfast   furosemide 80 mg Oral Daily   gabapentin 100 mg Oral TID   heparin (porcine) 5,000 Units Subcutaneous Q8H [address]   rOPINIRole 2 mg Oral BID   warfarin 2 5 mg Oral Once per day on Mon Wed Sat   warfarin 5 mg Oral Once per day on Sun Tue Thu Fri     Continuous IV Infusions:     PRN Meds:       Nursing orders - PT/OT -  VS q 4 - I & O q shift - SCD's to le's- up with assistance  - diet cardiac     Network Utilization Review Department  [email or web address]  ATTENTION: Please call with any questions or concerns to [phone] and carefully listen to the prompts so that you are directed to the right person  All voicemails are confidential   Mario Raman all requests for admission clinical reviews, approved or denied determinations and any other requests to dedicated fax number below belonging to the campus where the patient is receiving treatment    FACILITY NAME UR FAX NUMBER   ADMISSION DENIALS (Administrative/Medical Necessity) [address] [facility] (Maternity/NICU/Pediatrics) [phone]   [facility] [address] [facility] [phone]   [facility] [address] [facility] [phone]   Ivon Mendezs [address] [facility] 500 [facility] [phone]

## 2019-11-30 NOTE — ASSESSMENT & PLAN NOTE
Ambulate with assist all times  PT -recommend home PT and walker  OT consulted  Provide supportive care

## 2019-11-30 NOTE — OCCUPATIONAL THERAPY NOTE
Occupational Therapy Evaluation     Patient Name: Nikki Ibanez  CTDFN'M Date: 11/30/2019  Problem List  Principal Problem:    Ambulatory dysfunction  Active Problems:    Restless leg syndrome    CKD (chronic kidney disease) stage 3, GFR 30-59 ml/min ([facility])    Atrial fibrillation, chronic    Past Medical History  Past Medical History:   Diagnosis Date    Arthritis     L shoulder,fingers    Cardiac disease     valve replacement    Carpal tunnel syndrome, bilateral     Dropfoot     right    History of echocardiogram 04/20/2016    EF 75%, Hyperdynamic LVSF  Mild concentric LVH  Normal functioning bioprosthetic AV  Trace MR  Mild pulm htn   HTN (hypertension)     Hyperlipidemia     Lymphedema     Restless leg syndrome     Right bundle branch block     Stroke ([address] )     Supraventricular tachycardia [facility])      Past Surgical History  Past Surgical History:   Procedure Laterality Date    AORTIC VALVE REPLACEMENT  11/25/2002    Tissue AVR #21    AORTIC VALVULOPLASTY  11/25/2002    Bovine Pericardial heart valve    BACK SURGERY      CARDIAC CATHETERIZATION  11/22/2002    Sever critical Aortic stenosis  Pulm Htn  Normal coronary arteries   HAND SURGERY      right hand    HYSTERECTOMY      ROTATOR CUFF REPAIR Right     VASCULAR SURGERY      bilateral vein ligation & stripping             11/30/19 1151   Note Type   Note type Eval/Treat   Restrictions/Precautions   Weight Bearing Precautions Per Order No   Other Precautions Chair Alarm;Telemetry; Fall Risk;Pain   Pain Assessment   Pain Assessment 0-10   Pain Score 6   Pain Location Abdomen   Pain Orientation Left; Lower   Diversional Activities Television   Home Living   Type of [address] Multi-level;Performs ADLs on one level; Able to live on main level with bedroom/bathroom;Stairs to enter with rails; Other (Comment)  (3 REENA c HR; 1st setup)   Bathroom Shower/Tub Walk-in shower   Bathroom Toilet Standard   Bathroom Equipment Grab bars in shower; Shower chair;Grab bars around toilet;Commode   Bathroom Accessibility Accessible   Home Equipment Walker;Cane;Wheelchair-manual;Hospital bed;Grab bars   Additional Comments pt reports use of RW at baseline during functional mobility   Prior Function   Level of [place] Needs assistance with ADLs and functional mobility; Needs assistance with IADLs   Lives With Spouse   Receives Help From Family;Personal care attendant   ADL Assistance Needs assistance   IADLs Needs assistance   Falls in the last 6 months 0   Vocational Retired   Comments pt' sfamily drives; home health aide (A) 4x a week x2 hrs a day; family (A)   Psychosocial   Psychosocial (WDL) WDL   Subjective   Subjective "oh well i think i would like to use the bathroom"   ADL   Where Assessed Other (Comment)  (bathroom)   Grooming Assistance 4  Minimal Assistance   Grooming Deficit Standing with assistive device; Wash/dry hands;Brushing hair  ((A) with back of hair)   LB Dressing Assistance 4  Minimal Assistance   LB Dressing Deficit Pull up over hips   [place] Krista Deficit Clothing management up;Grab bar use; Increased time to complete   Additional Comments pt attemtps to perform mobility towards sink without donning underwear from knees; (A) provided to complete    Bed Mobility   Supine to Sit 5  Supervision   Additional items Bedrails; Increased time required   Sit to Supine   (seated in chair at end of session)   Additional Comments pt on RA during session with no complaints of SOB   Transfers   Sit to Stand 5  Supervision   Additional items Verbal cues   Stand to Sit 5  Supervision   Additional items Verbal cues   Toilet transfer 5  Supervision   Additional items Standard toilet;Verbal cues; Increased time required  (grab bar use)   Additional Comments utilizes RW during session this date; no significant LOB   Functional Mobility   Functional Mobility 5  Supervision   Additional Comments performs to and from bathroom with no significant LOB; ~20ft this date; appears fatigued    Additional items Rolling walker   Balance   Static Sitting Good   Dynamic Sitting Good   Static Standing Fair +   Dynamic Standing Fair   Ambulatory Fair   Activity Tolerance   Activity Tolerance Patient limited by fatigue;Patient limited by pain   RUE Assessment   RUE Assessment X  (4/5 grossly; limited AROM at shoulder)   LUE Assessment   LUE Assessment X  (4/5 grossly; limited AROM at shoulder)   Hand Function   Gross Motor Coordination Impaired  (arthritis)   Fine Motor Coordination Impaired  (arthritis)   Sensation   Light Touch No apparent deficits   Sharp/Dull No apparent deficits   Cognition   Overall Cognitive Status Impaired   Arousal/Participation Alert   Attention Within functional limits   Orientation Level Oriented to person;Oriented to place; Disoriented to time;Oriented to situation   Memory Decreased long term memory;Decreased short term memory   Following Commands Follows one step commands without difficulty   Assessment   Limitation Decreased ADL status; Decreased UE strength;Decreased Safe judgement during ADL;Decreased UE ROM; Decreased endurance;Decreased self-care trans;Decreased high-level ADLs; Decreased cognition   Assessment Pt is a 80 y o  female seen for OT evaluation s/p admit to [facility] on 11/29/2019 w/ Ambulatory dysfunction  Comorbidities affecting pt's functional performance at time of assessment include: HTN and cardiac disease, RLS, drop foot, CVA, tachycardia,lymphedema, CTS, arthritis  Personal factors affecting pt at time of IE include:steps to enter environment, difficulty performing ADLS, difficulty performing IADLS , limited insight into deficits, decreased initiation and engagement  and health management   Prior to admission, pt was (A) with ADLs and IADLs with RW during functional mobility   Upon evaluation: Pt requires (S)-min (A) with use of RW during functional mobility 2* the following deficits impacting occupational performance: weakness, decreased strength, decreased balance, decreased tolerance, impaired initiation, impaired memory, impaired problem solving and decreased safety awareness  Pt to benefit from continued skilled OT tx while in the hospital to address deficits as defined above and maximize level of functional independence w ADL's and functional mobility  Occupational Performance areas to address include: grooming, bathing/shower, toilet hygiene, dressing, functional mobility, community mobility and clothing management  From OT standpoint, recommendation at time of d/c would be home with continued home health and family (A) and (S)  Goals   Patient Goals to go home    Short Term Goal  pt will perform UE strengthening exercises    Long Term Goal #1 pt will increase independence with functional mobility with RW to mod (I) level with increased endurance and distance   Long Term Goal #2 pt will demonstrate UB/LB bathing and grooming tasks seated in chair at min (A) level    Long Term Goal pt will increase independence with toilet transfers and hygiene to (I) level    Plan   Treatment Interventions ADL retraining;Functional transfer training;UE strengthening/ROM; Endurance training;Patient/family training; Activityengagement   Goal Expiration Date 12/14/19   OT Frequency 3-5x/wk   Recommendation   OT Discharge Recommendation Home Health Agency   Barthel Index   Feeding 10   Bathing 0   Grooming Score 0   Dressing Score 5   Bladder Score 10   Bowels Score 10   Toilet Use Score 5   Transfers (Bed/Chair) Score 10   Mobility (Level Surface) Score 10   Stairs Score 0   Barthel Index Score 60     Pt will benefit from continued OT services in order to maximize (I) c ADL performance, FM c RW, and improve overall endurance/strength required to complete functional tasks in preparation for d/c  Pt left seated in chair at end of session; all needs within reach; all lines intact

## 2019-12-23 ENCOUNTER — OFFICE VISIT (OUTPATIENT)
Dept: CARDIOLOGY CLINIC | Facility: CLINIC | Age: 84
End: 2019-12-23
Payer: MEDICARE

## 2019-12-23 VITALS
HEART RATE: 80 BPM | WEIGHT: 160 LBS | SYSTOLIC BLOOD PRESSURE: 140 MMHG | DIASTOLIC BLOOD PRESSURE: 80 MMHG | BODY MASS INDEX: 30.21 KG/M2 | HEIGHT: 61 IN

## 2019-12-23 DIAGNOSIS — I50.32 CHRONIC DIASTOLIC CONGESTIVE HEART FAILURE (HCC): ICD-10-CM

## 2019-12-23 DIAGNOSIS — Z95.2 S/P AVR: Primary | ICD-10-CM

## 2019-12-23 DIAGNOSIS — I48.20 ATRIAL FIBRILLATION, CHRONIC (HCC): Chronic | ICD-10-CM

## 2019-12-23 PROCEDURE — 99214 OFFICE O/P EST MOD 30 MIN: CPT | Performed by: INTERNAL MEDICINE

## 2019-12-23 RX ORDER — TRAMADOL HYDROCHLORIDE 50 MG/1
50 TABLET ORAL EVERY 6 HOURS PRN
COMMUNITY
End: 2020-07-27 | Stop reason: SDUPTHER

## 2019-12-23 RX ORDER — PROPRANOLOL/HYDROCHLOROTHIAZID 40 MG-25MG
500 TABLET ORAL EVERY OTHER DAY
COMMUNITY

## 2019-12-23 NOTE — PATIENT INSTRUCTIONS
Aortic Valve Replacement   WHAT YOU NEED TO KNOW:   Aortic valve replacement is surgery to put a new aortic valve in your heart  Your aortic valve separates the lower section of your heart from your aorta  The aorta is the large blood vessel that carries blood from your heart to your body  Your aortic valve opens and closes to let blood flow from your heart  When your aortic valve does not open or close as it should, the amount of blood that your heart can pump to your body decreases  DISCHARGE INSTRUCTIONS:   Medicines:   · Antiplatelets  help prevent blood clots  This medicine makes it more likely for you to bleed or bruise  · Blood thinners    help prevent blood clots  Examples of blood thinners include heparin and warfarin  Clots can cause strokes, heart attacks, and death  The following are general safety guidelines to follow while you are taking a blood thinner:    ¨ Watch for bleeding and bruising while you take blood thinners  Watch for bleeding from your gums or nose  Watch for blood in your urine and bowel movements  Use a soft washcloth on your skin, and a soft toothbrush to brush your teeth  This can keep your skin and gums from bleeding  If you shave, use an electric shaver  Do not play contact sports  ¨ Tell your dentist and other healthcare providers that you take anticoagulants  Wear a bracelet or necklace that says you take this medicine  ¨ Do not start or stop any medicines unless your healthcare provider tells you to  Many medicines cannot be used with blood thinners  ¨ Tell your healthcare provider right away if you forget to take the medicine, or if you take too much  ¨ Warfarin  is a blood thinner that you may need to take  The following are things you should be aware of if you take warfarin  § Foods and medicines can affect the amount of warfarin in your blood  Do not make major changes to your diet while you take warfarin   Warfarin works best when you eat about the same amount of vitamin K every day  Vitamin K is found in green leafy vegetables and certain other foods  Ask for more information about what to eat when you are taking warfarin  § You will need to see your healthcare provider for follow-up visits when you are on warfarin  You will need regular blood tests  These tests are used to decide how much medicine you need  · Heart medicine: This medicine is given to strengthen or regulate your heartbeat  It also may help your heart in other ways  Talk with your caregiver to find out what your heart medicine is and why you are taking it  · Blood pressure medicine: This is given to lower your blood pressure  A controlled blood pressure helps protect your organs, such as your heart, lungs, brain, and kidneys  Take your blood pressure medicine exactly as directed  · Antibiotics: This medicine will help kill germs that may get into your blood and cause an infection in your heart  Healthcare providers may tell you to take antibiotics before and after dental work, surgery, and some procedures  This is important after heart valve disease  · Take your medicine as directed  Contact your healthcare provider if you think your medicine is not helping or if you have side effects  Tell him or her if you are allergic to any medicine  Keep a list of the medicines, vitamins, and herbs you take  Include the amounts, and when and why you take them  Bring the list or the pill bottles to follow-up visits  Carry your medicine list with you in case of an emergency  Follow up with your healthcare provider as directed:  Write down your questions so you remember to ask them during your visits  Care for your wound:  Ask healthcare providers how to take care of your incision wound  Check your wound, clean it, and change the bandages each day  If the bandage gets dirty or falls off, put on a new one  Mouth care: Take care of your teeth and gums   Brush and floss your teeth, and see your dentist regularly  You may help prevent an infection in your heart if you do this  Tell your dentist that you have had heart valve surgery  Cardiac rehabilitation:  Your cardiologist or heart surgeon may recommend that you attend cardiac rehabilitation (rehab)  This is a program run by specialists who will help you safely strengthen your heart and prevent more heart disease  The plan includes exercise, relaxation, stress management, and heart-healthy nutrition  Healthcare providers will also check to make sure any medicines you are taking are working  The plan may also include instructions for when you can drive, return to work, and do other normal daily activities  Good nutrition for your heart:  Get enough calories, protein, vitamins, and minerals to help prevent poor nutrition and muscle wasting  You may be told to eat foods low in cholesterol or sodium (salt)  You also may be told to limit saturated and trans fats  Do eat foods that contain healthy fats, such as walnuts, salmon, and canola and soybean oils  Eat foods that help protect the heart, including plenty of fruits and vegetables, nuts, and sources of fiber  Ask what a healthy weight is for you  Set goals to reach and stay at that weight  Contact your healthcare provider if:   · You have a fever  · Your wound area is painful, red, or oozing fluid  · You feel too tired for normal activities weeks after your surgery  · Your hands, ankles, or feet are swollen  · You urinate less, or not at all  · You feel tired or weak and are short of breath  · Your arm or leg feels warm, tender, and painful  It may look swollen and red  · You have questions or concerns about your condition or care  Seek care immediately or call 911 if:   · You have blood in your bowel movements or urine  You are bleeding from your nose, mouth, or incision wound  · You have a severe headache  This may feel like the worst headache of your life  · You have weakness or numbness in your arm, leg, or face  This may happen on only 1 side of your body  · You feel lightheaded, dizzy, or sick to your stomach  You may have cold sweats and bluish skin, lips, or nail beds  · You have trouble breathing or are coughing up blood  You may have more pain when you take deep breaths or cough  · You have chest pain, or discomfort that spreads to your arms, jaw, or back, or sudden back pain  · You are confused or have problems speaking or understanding speech  You have vision changes or loss of vision  © 2017 [employer]  Information is for End User's use only and may not be sold, redistributed or otherwise used for commercial purposes  All illustrations and images included in CareNotes® are the copyrighted property of [employer]  or [employer]  The above information is an  only  It is not intended as medical advice for individual conditions or treatments  Talk to your doctor, nurse or pharmacist before following any medical regimen to see if it is safe and effective for you

## 2019-12-23 NOTE — PROGRESS NOTES
Subjective:        Patient ID: Sha Barboza is a 80 y o  female  Chief Complaint:  Conchita Lofton is here for routine follow-up  She has diastolic CHF, aortic stenosis status post tissue AVR approximately 17 years ago, chronic dependent edema, chronic AFib  She is on Coumadin anticoagulation, goal INR 2 0-3 0 has been set  No falls or overt bleeding  Conchita Lofton denies any chest pains or tightness  She has no alarming shortness of breath  She walks with a walker  Her legs have been less swollen than usual     As you know x-ray after I last saw her in the summer was negative for CHF but proBNP quite elevated  The following portions of the patient's history were reviewed and updated as appropriate: allergies, current medications, past family history, past medical history, past social history, past surgical history and problem list   Review of Systems   Constitution: Negative for chills, diaphoresis, malaise/fatigue and weight gain  HENT: Negative for nosebleeds and stridor  Eyes: Negative for double vision, vision loss in left eye, vision loss in right eye and visual disturbance  Cardiovascular: Positive for leg swelling  Negative for chest pain, claudication, cyanosis, dyspnea on exertion, irregular heartbeat, near-syncope, orthopnea, palpitations, paroxysmal nocturnal dyspnea and syncope  Respiratory: Negative for cough, shortness of breath, snoring and wheezing  Endocrine: Negative for polydipsia, polyphagia and polyuria  Hematologic/Lymphatic: Negative for bleeding problem  Does not bruise/bleed easily  Skin: Negative for flushing and rash  Musculoskeletal: Positive for arthritis  Negative for falls and myalgias  Gastrointestinal: Negative for abdominal pain, heartburn, hematemesis, hematochezia, melena and nausea  Genitourinary: Negative for hematuria     Neurological: Negative for brief paralysis, dizziness, focal weakness, headaches, light-headedness, loss of balance and vertigo  Psychiatric/Behavioral: Negative for altered mental status and substance abuse  Allergic/Immunologic: Negative for hives  Objective:      /80   Pulse 80   Ht 5' 1" (1 549 m)   Wt 72 6 kg (160 lb)   LMP  (LMP Unknown)   BMI 30 23 kg/m²   Physical Exam   Constitutional: She is oriented to person, place, and time  She appears well-developed and well-nourished  HENT:   Head: Normocephalic and atraumatic  Eyes: Pupils are equal, round, and reactive to light  EOM are normal    Neck: Normal range of motion  Neck supple  No JVD present  No thyromegaly present  Cardiovascular: Normal rate, regular rhythm, normal heart sounds and intact distal pulses  Exam reveals no gallop and no friction rub  No murmur heard  Pulmonary/Chest: Effort normal and breath sounds normal  No stridor  No respiratory distress  She has no wheezes  She has no rales  Abdominal: Soft  Bowel sounds are normal  She exhibits no mass  There is no tenderness  Musculoskeletal: Normal range of motion  She exhibits edema  Lymphadenopathy:     She has no cervical adenopathy  Neurological: She is alert and oriented to person, place, and time  Skin: Skin is warm and dry  No rash noted  No pallor  Psychiatric: She has a normal mood and affect  Her behavior is normal  Judgment and thought content normal    Nursing note and vitals reviewed        Lab Review:   Admission on 11/29/2019, Discharged on 11/30/2019   Component Date Value    WBC 11/29/2019 7 29     RBC 11/29/2019 3 86     Hemoglobin 11/29/2019 12 1     Hematocrit 11/29/2019 38 5     MCV 11/29/2019 100*    MCH 11/29/2019 31 3     MCHC 11/29/2019 31 4     RDW 11/29/2019 15 9*    MPV 11/29/2019 10 1     Platelets 33/05/7100 168     nRBC 11/29/2019 0     Neutrophils Relative 11/29/2019 76*    Immat GRANS % 11/29/2019 0     Lymphocytes Relative 11/29/2019 14     Monocytes Relative 11/29/2019 9     Eosinophils Relative 11/29/2019 1     Basophils Relative 11/29/2019 0     Neutrophils Absolute 11/29/2019 5 45     Immature Grans Absolute 11/29/2019 0 03     Lymphocytes Absolute 11/29/2019 1 05     Monocytes Absolute 11/29/2019 0 67     Eosinophils Absolute 11/29/2019 0 06     Basophils Absolute 11/29/2019 0 03     Sodium 11/29/2019 140     Potassium 11/29/2019 4 1     Chloride 11/29/2019 105     CO2 11/29/2019 28     ANION GAP 11/29/2019 7     BUN 11/29/2019 23     Creatinine 11/29/2019 1 48*    Glucose 11/29/2019 95     Calcium 11/29/2019 8 6     AST 11/29/2019 26     ALT 11/29/2019 27     Alkaline Phosphatase 11/29/2019 87     Total Protein 11/29/2019 6 7     Albumin 11/29/2019 3 5     Total Bilirubin 11/29/2019 0 60     eGFR 11/29/2019 31     Lipase 11/29/2019 212     Color, UA 11/29/2019 Yellow     Clarity, UA 11/29/2019 Slightly Cloudy     Specific Gravity, UA 11/29/2019 1 020     pH, UA 11/29/2019 5 5     Leukocytes, UA 11/29/2019 Negative     Nitrite, UA 11/29/2019 Negative     Protein, UA 11/29/2019 Trace*    Glucose, UA 11/29/2019 Negative     Ketones, UA 11/29/2019 Trace*    Urobilinogen, UA 11/29/2019 0 2     Bilirubin, UA 11/29/2019 Negative     Blood, UA 11/29/2019 Negative     Protime 11/29/2019 24 3*    INR 11/29/2019 2 16*    RBC, UA 11/29/2019 None Seen     WBC, UA 11/29/2019 1-2*    Epithelial Cells 11/29/2019 Occasional     Bacteria, UA 11/29/2019 Occasional     Hyaline Casts, UA 11/29/2019 0-1*    Ventricular Rate 11/29/2019 78     Atrial Rate 11/29/2019 68     QRSD Interval 11/29/2019 144     QT Interval 11/29/2019 416     QTC Interval 11/29/2019 474     QRS Axis 11/29/2019 -50     T Wave Axis 11/29/2019 16     Sodium 11/30/2019 142     Potassium 11/30/2019 3 9     Chloride 11/30/2019 104     CO2 11/30/2019 28     ANION GAP 11/30/2019 10     BUN 11/30/2019 22     Creatinine 11/30/2019 1 45*    Glucose 11/30/2019 84     Calcium 11/30/2019 9 1     eGFR 11/30/2019 32     Magnesium 11/30/2019 2 2     Phosphorus 11/30/2019 3 4     WBC 11/30/2019 7 45     RBC 11/30/2019 4 29     Hemoglobin 11/30/2019 13 4     Hematocrit 11/30/2019 42 6     MCV 11/30/2019 99*    MCH 11/30/2019 31 2     MCHC 11/30/2019 31 5     RDW 11/30/2019 15 9*    Platelets 50/06/0399 184     MPV 11/30/2019 10 5     Protime 11/30/2019 24 1*    INR 11/30/2019 2 14*   Admission on 11/27/2019, Discharged on 11/27/2019   Component Date Value    WBC 11/27/2019 5 37     RBC 11/27/2019 3 92     Hemoglobin 11/27/2019 12 1     Hematocrit 11/27/2019 39 0     MCV 11/27/2019 100*    MCH 11/27/2019 30 9     MCHC 11/27/2019 31 0*    RDW 11/27/2019 15 9*    MPV 11/27/2019 9 7     Platelets 64/05/3682 170     nRBC 11/27/2019 0     Neutrophils Relative 11/27/2019 52     Immat GRANS % 11/27/2019 0     Lymphocytes Relative 11/27/2019 33     Monocytes Relative 11/27/2019 12     Eosinophils Relative 11/27/2019 2     Basophils Relative 11/27/2019 1     Neutrophils Absolute 11/27/2019 2 84     Immature Grans Absolute 11/27/2019 0 01     Lymphocytes Absolute 11/27/2019 1 76     Monocytes Absolute 11/27/2019 0 62     Eosinophils Absolute 11/27/2019 0 10     Basophils Absolute 11/27/2019 0 04     Sodium 11/27/2019 142     Potassium 11/27/2019 3 9     Chloride 11/27/2019 105     CO2 11/27/2019 28     ANION GAP 11/27/2019 9     BUN 11/27/2019 28*    Creatinine 11/27/2019 1 64*    Glucose 11/27/2019 95     Calcium 11/27/2019 8 9     eGFR 11/27/2019 28     Protime 11/27/2019 20 7*    INR 11/27/2019 1 76*     Ct Abdomen Pelvis Wo Contrast    Result Date: 11/27/2019  Narrative: CT ABDOMEN AND PELVIS WITHOUT IV CONTRAST INDICATION:   LLQ abdominal pain  Worsening left lower quadrant abdominal pain for 2 weeks   COMPARISON:  CT chest abdomen pelvis March 20, 2019 TECHNIQUE:  CT examination of the abdomen and pelvis was performed without intravenous contrast   Axial, sagittal, and coronal 2D reformatted images were created from the source data and submitted for interpretation  Radiation dose length product (DLP) for this visit:  375 86 mGy-cm   This examination, like all CT scans performed in the [facility], was performed utilizing techniques to minimize radiation dose exposure, including the use of iterative  reconstruction and automated exposure control  Enteric contrast was administered  FINDINGS: Study is somewhat limited due to patient motion artifact  ABDOMEN LOWER CHEST:  No clinically significant abnormality identified in the visualized lower chest   The heart is enlarged  Coronary artery calcifications  Median sternotomy  Aortic valve replacement  LIVER/BILIARY TREE:  Fatty infiltrative changes in the liver  Stable low-density hepatic lesions too small to accurately characterize, however likely represent cysts  GALLBLADDER:  No calcified gallstones  No pericholecystic inflammatory change  SPLEEN:  Unremarkable  PANCREAS:  Unremarkable  ADRENAL GLANDS:  Unremarkable  KIDNEYS/URETERS:  Scarring in the kidneys bilaterally  No renal or ureteric calculi  No hydronephrosis or hydroureter  STOMACH AND BOWEL:  Evaluation of the GI tract limited due to lack of oral contrast material  Moderate-sized hiatal hernia  Thickening of the distal esophagus, likely reflux esophagitis  No evidence of small bowel obstruction  Normal fecal burden throughout the colon  Scattered diverticula of the descending colon and sigmoid colon  Sigmoid colon redundant and tortuous  Nothing to suggest acute diverticulitis  APPENDIX:  No findings to suggest appendicitis  ABDOMINOPELVIC CAVITY:  No ascites or free intraperitoneal air  No lymphadenopathy  VESSELS:  Atherosclerotic changes are present  No evidence of aneurysm  PELVIS REPRODUCTIVE ORGANS:  Patient is status post hysterectomy  URINARY BLADDER:  Incompletely distended  Inadequately evaluated   ABDOMINAL WALL/INGUINAL REGIONS:  Small umbilical hernia containing fat  Small left inguinal hernia containing fat  OSSEOUS STRUCTURES: Degenerative and postoperative changes lumbar spine, similar to the prior study  No acute fracture or destructive osseous lesion  Impression: 1  Somewhat limited examination due to patient motion artifact as well as lack of oral and intravenous contrast material  2   No acute abdominal pelvic abnormality  3   Colonic diverticulosis  Workstation performed: [id number]     Xr Knee 4+ Views Left Injury    Result Date: 11/27/2019  Narrative: LEFT KNEE INDICATION:   L knee pain  COMPARISON:  None VIEWS:  XR KNEE 4+ VW LEFT INJURY FINDINGS: There is no acute fracture or dislocation  There is no joint effusion  Tricompartmental osteophytes with medial compartment narrowing and patellofemoral bone-on-bone articulation No lytic or blastic lesions are seen  Meniscal chondrocalcinosis is identified  Soft tissue infiltration noted throughout     Impression: Severe osteoarthritis without acute abnormality Workstation performed: [id number]     Us Abdomen Complete    Result Date: 12/2/2019  Narrative: ABDOMEN ULTRASOUND, COMPLETE INDICATION:   R10 84: Generalized abdominal pain R10 32: Left lower quadrant pain  COMPARISON: November 27, 2019 TECHNIQUE:   Real-time ultrasound of the abdomen was performed with a curvilinear transducer with both volumetric sweeps and still imaging techniques  FINDINGS: PANCREAS:  Pancreatic duct is mildly dilated at the head /neck measuring approximately 5 7 mm  AORTA AND IVC:  Visualized portions are normal for patient age  LIVER: Size:  Within normal range  The liver measures 14 cm in the midclavicular line  Contour:  Surface contour is smooth  Parenchyma:  Echogenicity and echotexture are within normal limits  Right liver lobe 1 1 cm septated cyst  Limited imaging of the main portal vein shows it to be patent and hepatopetal  BILIARY: No gallbladder findings  No intrahepatic biliary dilatation   CBD measures 8 mm  No choledocholithiasis  KIDNEY: Right kidney measures 8 6 cm  Small Left kidney measures 8 cm  Small SPLEEN: Measures 7 4 cm  Within normal limits  ASCITES:  None  Impression: 1  Focal dilatation pancreatic head duct measuring approximately 6 mm questionably present on the prior CT  Consider nonemergent outpatient MRCP if clinically warranted  2  Small/atrophic kidneys  Workstation performed: [id number]     Us Pelvis Complete Non Ob    Result Date: 12/2/2019  Narrative: PELVIC ULTRASOUND, COMPLETE INDICATION:  80years old  R10 84: Generalized abdominal pain R10 32: Left lower quadrant pain  COMPARISON:  None  TECHNIQUE:  Transabdominal pelvic ultrasound was performed in sagittal and transverse planes with a curvilinear transducer  Transvaginal imaging was deferred by the patient  Imaging included volumetric sweeps as well as traditional still imaging technique  FINDINGS: UTERUS: Status post hysterectomy  OVARIES/ADNEXA: Ovaries not visualized  No suspicious adnexal mass or loculated collections  There is no free fluid  Impression: The patient deferred transvaginal imaging  Status post hysterectomy  Ovaries not visualized  No evidence of a mass or free fluid in the pelvis  Workstation performed: [id number]         Assessment:       1  S/P AVR  Echo complete with contrast if indicated   2  Chronic diastolic congestive heart failure ([address] )     3  Atrial fibrillation, chronic          Plan:       I have ordered an echocardiogram after the new year, her valve sounds a little bit more stenotic by auscultation today  Is a bioprosthesis in approaching/at 17 years now  I hear no diastolic murmur however  SBE antibiotic prophylaxis required  Her CHF is well compensated, dependent edema chronic but also improved, continue present dose diuretic therapy  AFib is rate controlled and anticoagulated, goal INR 2 0-3 0 recommended  Especially with her history of CVA      I made no changes today, we will see her back after echocardiogram in late winter  Let me know if you need me to see her sooner

## 2020-02-06 ENCOUNTER — HOSPITAL ENCOUNTER (OUTPATIENT)
Dept: NON INVASIVE DIAGNOSTICS | Facility: CLINIC | Age: 85
Discharge: HOME | End: 2020-02-06
Payer: MEDICARE

## 2020-02-06 DIAGNOSIS — Z95.2 S/P AVR: ICD-10-CM

## 2020-02-06 PROCEDURE — 93306 TTE W/DOPPLER COMPLETE: CPT

## 2020-02-06 PROCEDURE — 93306 TTE W/DOPPLER COMPLETE: CPT | Performed by: INTERNAL MEDICINE

## 2020-02-21 ENCOUNTER — TRANSCRIBE ORDERS (OUTPATIENT)
Dept: ADMINISTRATIVE | Facility: HOSPITAL | Age: 85
End: 2020-02-21

## 2020-02-21 DIAGNOSIS — R60.0 BILATERAL LOWER EXTREMITY EDEMA: Primary | ICD-10-CM

## 2020-02-21 DIAGNOSIS — I70.213 ATHEROSCLEROSIS OF NATIVE ARTERY OF BOTH LOWER EXTREMITIES WITH INTERMITTENT CLAUDICATION (HCC): Primary | ICD-10-CM

## 2020-03-02 ENCOUNTER — OFFICE VISIT (OUTPATIENT)
Dept: CARDIOLOGY CLINIC | Facility: CLINIC | Age: 85
End: 2020-03-02
Payer: MEDICARE

## 2020-03-02 VITALS
HEIGHT: 61 IN | BODY MASS INDEX: 30.21 KG/M2 | WEIGHT: 160 LBS | SYSTOLIC BLOOD PRESSURE: 138 MMHG | HEART RATE: 80 BPM | DIASTOLIC BLOOD PRESSURE: 82 MMHG

## 2020-03-02 DIAGNOSIS — Z95.2 S/P AVR: ICD-10-CM

## 2020-03-02 DIAGNOSIS — I50.32 CHRONIC DIASTOLIC CONGESTIVE HEART FAILURE (HCC): Primary | ICD-10-CM

## 2020-03-02 DIAGNOSIS — I48.20 ATRIAL FIBRILLATION, CHRONIC (HCC): Chronic | ICD-10-CM

## 2020-03-02 PROCEDURE — 99214 OFFICE O/P EST MOD 30 MIN: CPT | Performed by: INTERNAL MEDICINE

## 2020-03-02 NOTE — PROGRESS NOTES
Subjective:        Patient ID: Tomy Figueredo is a 80 y o  female  Chief Complaint:  Norman Galicia returns for routine follow-up for diastolic heart failure, bioprosthetic AVR, dependent edema, chronic AFib  She is not having any palpitations or chest pains  She denies any orthopnea or PND  She still has some mild shortness of breath and chronic lower extremity edema, seeing podiatry, vascular study of lower extremities ordered  This is the clear her for possible podiatric surgery  Reviewed her echocardiogram, mean gradient 17 mm Hg stable for the last 3 years and no significant AI suggesting stable bioprosthetic valve function  LVEF also normal         The following portions of the patient's history were reviewed and updated as appropriate: allergies, current medications, past family history, past medical history, past social history, past surgical history and problem list   Review of Systems   Constitution: Negative for chills, diaphoresis, malaise/fatigue and weight gain  HENT: Negative for nosebleeds and stridor  Eyes: Negative for double vision, vision loss in left eye, vision loss in right eye and visual disturbance  Cardiovascular: Positive for dyspnea on exertion (Mild stable) and leg swelling ( chronic stable)  Negative for chest pain, claudication, cyanosis, irregular heartbeat, near-syncope, orthopnea, palpitations, paroxysmal nocturnal dyspnea and syncope  Respiratory: Negative for cough, shortness of breath, snoring and wheezing  Endocrine: Negative for polydipsia, polyphagia and polyuria  Hematologic/Lymphatic: Negative for bleeding problem  Does not bruise/bleed easily  Skin: Negative for flushing and rash  Musculoskeletal: Positive for arthritis and stiffness  Negative for falls and myalgias  Gastrointestinal: Negative for abdominal pain, heartburn, hematemesis, hematochezia, melena and nausea  Genitourinary: Negative for hematuria     Neurological: Negative for brief paralysis, dizziness, focal weakness, headaches, light-headedness, loss of balance and vertigo  Psychiatric/Behavioral: Negative for altered mental status and substance abuse  Allergic/Immunologic: Negative for hives  Objective:      /82   Pulse 80   Ht 5' 1" (1 549 m)   Wt 72 6 kg (160 lb)   LMP  (LMP Unknown)   BMI 30 23 kg/m²   Physical Exam   Constitutional: She is oriented to person, place, and time  She appears well-developed and well-nourished  HENT:   Head: Normocephalic and atraumatic  Eyes: Pupils are equal, round, and reactive to light  EOM are normal    Neck: Normal range of motion  Neck supple  No JVD present  No thyromegaly present  Cardiovascular: Normal rate and intact distal pulses  Exam reveals no gallop and no friction rub  Murmur (Grade 1/6 RUTH murmur no diastolic murmur) heard  Pulmonary/Chest: Effort normal and breath sounds normal  No stridor  No respiratory distress  She has no wheezes  She has no rales  Abdominal: Soft  Bowel sounds are normal  She exhibits no mass  There is no tenderness  Musculoskeletal: Normal range of motion  She exhibits edema (Moderate bilaterally chronic)  Lymphadenopathy:     She has no cervical adenopathy  Neurological: She is alert and oriented to person, place, and time  Skin: Skin is warm and dry  No rash noted  No pallor  Psychiatric: She has a normal mood and affect  Her behavior is normal  Judgment and thought content normal    Nursing note and vitals reviewed  Lab Review:   No visits with results within 2 Month(s) from this visit     Latest known visit with results is:   Admission on 11/29/2019, Discharged on 11/30/2019   Component Date Value    WBC 11/29/2019 7 29     RBC 11/29/2019 3 86     Hemoglobin 11/29/2019 12 1     Hematocrit 11/29/2019 38 5     MCV 11/29/2019 100*    MCH 11/29/2019 31 3     MCHC 11/29/2019 31 4     RDW 11/29/2019 15 9*    MPV 11/29/2019 10 1     Platelets 45/22/2996 168     nRBC 11/29/2019 0     Neutrophils Relative 11/29/2019 76*    Immat GRANS % 11/29/2019 0     Lymphocytes Relative 11/29/2019 14     Monocytes Relative 11/29/2019 9     Eosinophils Relative 11/29/2019 1     Basophils Relative 11/29/2019 0     Neutrophils Absolute 11/29/2019 5 45     Immature Grans Absolute 11/29/2019 0 03     Lymphocytes Absolute 11/29/2019 1 05     Monocytes Absolute 11/29/2019 0 67     Eosinophils Absolute 11/29/2019 0 06     Basophils Absolute 11/29/2019 0 03     Sodium 11/29/2019 140     Potassium 11/29/2019 4 1     Chloride 11/29/2019 105     CO2 11/29/2019 28     ANION GAP 11/29/2019 7     BUN 11/29/2019 23     Creatinine 11/29/2019 1 48*    Glucose 11/29/2019 95     Calcium 11/29/2019 8 6     AST 11/29/2019 26     ALT 11/29/2019 27     Alkaline Phosphatase 11/29/2019 87     Total Protein 11/29/2019 6 7     Albumin 11/29/2019 3 5     Total Bilirubin 11/29/2019 0 60     eGFR 11/29/2019 31     Lipase 11/29/2019 212     Color, UA 11/29/2019 Yellow     Clarity, UA 11/29/2019 Slightly Cloudy     Specific Gravity, UA 11/29/2019 1 020     pH, UA 11/29/2019 5 5     Leukocytes, UA 11/29/2019 Negative     Nitrite, UA 11/29/2019 Negative     Protein, UA 11/29/2019 Trace*    Glucose, UA 11/29/2019 Negative     Ketones, UA 11/29/2019 Trace*    Urobilinogen, UA 11/29/2019 0 2     Bilirubin, UA 11/29/2019 Negative     Blood, UA 11/29/2019 Negative     Protime 11/29/2019 24 3*    INR 11/29/2019 2 16*    RBC, UA 11/29/2019 None Seen     WBC, UA 11/29/2019 1-2*    Epithelial Cells 11/29/2019 Occasional     Bacteria, UA 11/29/2019 Occasional     Hyaline Casts, UA 11/29/2019 0-1*    Ventricular Rate 11/29/2019 78     Atrial Rate 11/29/2019 68     QRSD Interval 11/29/2019 144     QT Interval 11/29/2019 416     QTC Interval 11/29/2019 474     QRS Axis 11/29/2019 -50     T Wave Axis 11/29/2019 16     Sodium 11/30/2019 142     Potassium 11/30/2019 3 9     Chloride 11/30/2019 104     CO2 11/30/2019 28     ANION GAP 11/30/2019 10     BUN 11/30/2019 22     Creatinine 11/30/2019 1 45*    Glucose 11/30/2019 84     Calcium 11/30/2019 9 1     eGFR 11/30/2019 32     Magnesium 11/30/2019 2 2     Phosphorus 11/30/2019 3 4     WBC 11/30/2019 7 45     RBC 11/30/2019 4 29     Hemoglobin 11/30/2019 13 4     Hematocrit 11/30/2019 42 6     MCV 11/30/2019 99*    MCH 11/30/2019 31 2     MCHC 11/30/2019 31 5     RDW 11/30/2019 15 9*    Platelets 72/81/3033 184     MPV 11/30/2019 10 5     Protime 11/30/2019 24 1*    INR 11/30/2019 2 14*     No results found  Assessment:       1  Chronic diastolic congestive heart failure ([address] )     2  Atrial fibrillation, chronic     3  S/P AVR          Plan:       May's heart failure is well compensated, renal function stable on moderate dose Lasix 80 mg daily, which she held today likely explaining a trivial rise in her blood pressure, she will take this when she gets home  AFib is rate controlled and anticoagulated, goal INR 2 0-3 0 advised  Prosthetic aortic valve function appropriate, no signs of significant aortic insufficiency or stenosis, and LVEF stable  LDL at goal according to summer of 2019 blood work, recent transaminases normal, she remains on statin therapy  She is not having any angina  I recommended no changes today, we will see her back in 3-4 months time, she will call sooner with any concerns

## 2020-03-02 NOTE — PATIENT INSTRUCTIONS
Heart Failure   AMBULATORY CARE:   Heart failure (HF) is a condition that does not allow your heart to fill or pump properly  Not enough oxygen in your blood gets to your organs and tissues  HF can occur in the right side, the left side, or both lower chambers of your heart  HF is often caused by damage or injury to your heart  The damage may be caused by heart attack, other heart conditions, or high blood pressure  HF is a long-term condition that tends to get worse over time  It is important to manage your health to improve your quality of life  HF can be worsened by heavy alcohol use, smoking, diabetes that is not controlled, or obesity  Common signs and symptoms:   · Difficulty breathing with activity that worsens to difficulty breathing at rest    · Shortness of breath while lying flat    · Severe shortness of breath and coughing at night that usually wakes you     · Chest pain at night    · Periods of no breathing, then breathing fast    · Fatigue or lack of energy (often worsened by physical activity)     · Swelling in your ankles, legs, or abdomen    · Fast heartbeat, purple color around your mouth and nailbeds    · Fingers and toes cool to the touch  Call 911 if:   · You have any of the following signs of a heart attack:      ¨ Squeezing, pressure, or pain in your chest that lasts longer than 5 minutes or returns    ¨ Discomfort or pain in your back, neck, jaw, stomach, or arm     ¨ Trouble breathing    ¨ Nausea or vomiting    ¨ Lightheadedness or a sudden cold sweat, especially with chest pain or trouble breathing    Seek care immediately if:   · You gain 3 or more pounds (1 4 kg) in a day, or more than your healthcare provider says you should  · Your heartbeat is fast, slow, or uneven all the time    Contact your healthcare provider if:   · You have symptoms of worsening HF:      ¨ Shortness of breath at rest, at night, or that is getting worse in any way     ¨ Weight gain of 5 or more pounds (2 2 kg) in a week     ¨ More swelling in your legs or ankles     ¨ Abdominal pain or swelling     ¨ More coughing     ¨ Loss of appetite     ¨ Feeling tired all the time    · You feel hopeless or depressed, or you have lost interest in things you used to enjoy  · You often feel worried or afraid  · You have questions or concerns about your condition or care  Treatment for HF  may include any of the following:  · Medicines  may be needed to help regulate your heart rhythm  You may also need medicines to lower your blood pressure, and to get rid of extra fluids  · Oxygen  may help you breathe easier if your oxygen level is lower than normal  A CPAP machine may be used to keep your airway open while you sleep  · Surgery  can be done to implant a pacemaker in your chest to regulate your heart rhythm  Other types of surgery can open blocked heart vessels, replace a damaged heart valve, or remove scar tissue  Manage or prevent HF:   · Do not smoke  Nicotine and other chemicals in cigarettes and cigars can cause lung damage and make HF difficult to manage  Ask your healthcare provider for information if you currently smoke and need help to quit  E-cigarettes or smokeless tobacco still contain nicotine  Talk to your healthcare provider before you use these products  · Do not drink alcohol or take illegal drugs  Alcohol and drugs can worsen your symptoms quickly  · Weigh yourself every morning  Use the same scale, in the same spot  Do this after you use the bathroom, but before you eat or drink anything  Wear the same type of clothing  Do not wear shoes  Record your weight each day so you will notice any sudden weight gain  Swelling and weight gain are signs of fluid retention  If you are overweight, ask how to lose weight safely  · Check your blood pressure and heart rate every day  Ask for more information about how to measure your blood pressure and heart rate correctly   Ask what these numbers should be for you  · Manage any chronic health conditions you have  These include high blood pressure, diabetes, obesity, high cholesterol, metabolic syndrome, and COPD  You will have fewer symptoms if you manage these health conditions  Follow your healthcare provider's recommendations and follow up with him or her regularly  · Eat heart-healthy foods and limit sodium (salt)  An easy way to do this is to eat more fresh fruits and vegetables and fewer canned and processed foods  Replace butter and margarine with heart-healthy oils such as olive oil and canola oil  Other heart-healthy foods include walnuts, whole-grain breads, low-fat dairy products, beans, and lean meats  Fatty fish such as salmon and tuna are also heart healthy  Ask how much salt you can eat each day  Do not use salt substitutes  · Drink liquids as directed  You may need to limit the amount of liquids you drink if you retain fluid  Ask how much liquid to drink each day and which liquids are best for you  · Stay active  If you are not active, your symptoms are likely to worsen quickly  Walking, bicycling, and other types of physical activity help maintain your strength and improve your mood  Physical activity also helps you manage your weight  Work with your healthcare provider to create an exercise plan that is right for you  · Get vaccines as directed  Get a flu shot every year  You may also need the pneumonia vaccine  The flu and pneumonia can be severe for a person who has HF  Vaccines protect you from these infections  Join a support group:  Living with HF can be difficult  It may be helpful to talk with others who have HF  You may learn how to better manage your condition or get emotional support  For more information:   · [address]  [place] , [place]   Phone: 5- 620 - [phone]  Web Address: https://[employer]/  org   Follow up with your healthcare provider or cardiologist within 2 weeks or as directed: You may need to return for other tests  You may need home health care  A healthcare provider will monitor your vital signs, weight, and make sure your medicines are working  Write down your questions so you remember to ask them during your visits  © 2017 [employer] Information is for End User's use only and may not be sold, redistributed or otherwise used for commercial purposes  All illustrations and images included in CareNotes® are the copyrighted property of [employer]  or Gigi Chavis  The above information is an  only  It is not intended as medical advice for individual conditions or treatments  Talk to your doctor, nurse or pharmacist before following any medical regimen to see if it is safe and effective for you

## 2020-03-03 ENCOUNTER — HOSPITAL ENCOUNTER (OUTPATIENT)
Dept: NON INVASIVE DIAGNOSTICS | Facility: HOSPITAL | Age: 85
Discharge: HOME | End: 2020-03-03
Payer: MEDICARE

## 2020-03-03 DIAGNOSIS — I70.213 ATHEROSCLEROSIS OF NATIVE ARTERY OF BOTH LOWER EXTREMITIES WITH INTERMITTENT CLAUDICATION (HCC): ICD-10-CM

## 2020-03-03 PROCEDURE — 93925 LOWER EXTREMITY STUDY: CPT

## 2020-03-03 PROCEDURE — 93922 UPR/L XTREMITY ART 2 LEVELS: CPT | Performed by: SURGERY

## 2020-03-03 PROCEDURE — 93923 UPR/LXTR ART STDY 3+ LVLS: CPT

## 2020-03-03 PROCEDURE — 93925 LOWER EXTREMITY STUDY: CPT | Performed by: SURGERY

## 2020-04-17 ENCOUNTER — APPOINTMENT (EMERGENCY)
Dept: RADIOLOGY | Facility: HOSPITAL | Age: 85
End: 2020-04-17
Payer: MEDICARE

## 2020-04-17 ENCOUNTER — HOSPITAL ENCOUNTER (EMERGENCY)
Facility: HOSPITAL | Age: 85
Discharge: HOME | End: 2020-04-17
Attending: EMERGENCY MEDICINE
Payer: MEDICARE

## 2020-04-17 VITALS
DIASTOLIC BLOOD PRESSURE: 65 MMHG | WEIGHT: 164.24 LBS | BODY MASS INDEX: 31.03 KG/M2 | RESPIRATION RATE: 16 BRPM | HEART RATE: 77 BPM | OXYGEN SATURATION: 98 % | SYSTOLIC BLOOD PRESSURE: 137 MMHG | TEMPERATURE: 98 F

## 2020-04-17 DIAGNOSIS — R00.2 PALPITATIONS: Primary | ICD-10-CM

## 2020-04-17 LAB
ANION GAP SERPL CALCULATED.3IONS-SCNC: 9 MMOL/L (ref 4–13)
ATRIAL RATE: 84 BPM
BASOPHILS # BLD AUTO: 0.05 THOUSANDS/ΜL (ref 0–0.1)
BASOPHILS NFR BLD AUTO: 1 % (ref 0–1)
BUN SERPL-MCNC: 30 MG/DL (ref 5–25)
CALCIUM SERPL-MCNC: 9.3 MG/DL (ref 8.3–10.1)
CHLORIDE SERPL-SCNC: 103 MMOL/L (ref 100–108)
CO2 SERPL-SCNC: 28 MMOL/L (ref 21–32)
CREAT SERPL-MCNC: 1.63 MG/DL (ref 0.6–1.3)
EOSINOPHIL # BLD AUTO: 0.2 THOUSAND/ΜL (ref 0–0.61)
EOSINOPHIL NFR BLD AUTO: 4 % (ref 0–6)
ERYTHROCYTE [DISTWIDTH] IN BLOOD BY AUTOMATED COUNT: 13.8 % (ref 11.6–15.1)
GFR SERPL CREATININE-BSD FRML MDRD: 28 ML/MIN/1.73SQ M
GLUCOSE SERPL-MCNC: 94 MG/DL (ref 65–140)
HCT VFR BLD AUTO: 40 % (ref 34.8–46.1)
HGB BLD-MCNC: 12.8 G/DL (ref 11.5–15.4)
IMM GRANULOCYTES # BLD AUTO: 0.01 THOUSAND/UL (ref 0–0.2)
IMM GRANULOCYTES NFR BLD AUTO: 0 % (ref 0–2)
INR PPP: 1.93 (ref 0.84–1.19)
INR PPP: 1.93 (ref 0.84–1.19)
LYMPHOCYTES # BLD AUTO: 1.62 THOUSANDS/ΜL (ref 0.6–4.47)
LYMPHOCYTES NFR BLD AUTO: 31 % (ref 14–44)
MCH RBC QN AUTO: 32.1 PG (ref 26.8–34.3)
MCHC RBC AUTO-ENTMCNC: 32 G/DL (ref 31.4–37.4)
MCV RBC AUTO: 100 FL (ref 82–98)
MONOCYTES # BLD AUTO: 0.56 THOUSAND/ΜL (ref 0.17–1.22)
MONOCYTES NFR BLD AUTO: 11 % (ref 4–12)
NEUTROPHILS # BLD AUTO: 2.85 THOUSANDS/ΜL (ref 1.85–7.62)
NEUTS SEG NFR BLD AUTO: 53 % (ref 43–75)
NRBC BLD AUTO-RTO: 0 /100 WBCS
PLATELET # BLD AUTO: 206 THOUSANDS/UL (ref 149–390)
PMV BLD AUTO: 9.9 FL (ref 8.9–12.7)
POTASSIUM SERPL-SCNC: 3.9 MMOL/L (ref 3.5–5.3)
PROTHROMBIN TIME: 22.2 SECONDS (ref 11.6–14.5)
QRS AXIS: -59 DEGREES
QRSD INTERVAL: 144 MS
QT INTERVAL: 420 MS
QTC INTERVAL: 508 MS
RBC # BLD AUTO: 3.99 MILLION/UL (ref 3.81–5.12)
SODIUM SERPL-SCNC: 140 MMOL/L (ref 136–145)
T WAVE AXIS: 46 DEGREES
TROPONIN I SERPL-MCNC: 0.03 NG/ML
TSH SERPL DL<=0.05 MIU/L-ACNC: 3.37 UIU/ML (ref 0.36–3.74)
VENTRICULAR RATE: 88 BPM
WBC # BLD AUTO: 5.29 THOUSAND/UL (ref 4.31–10.16)

## 2020-04-17 PROCEDURE — 71045 X-RAY EXAM CHEST 1 VIEW: CPT

## 2020-04-17 PROCEDURE — 99283 EMERGENCY DEPT VISIT LOW MDM: CPT | Performed by: EMERGENCY MEDICINE

## 2020-04-17 PROCEDURE — 99285 EMERGENCY DEPT VISIT HI MDM: CPT

## 2020-04-17 PROCEDURE — 85610 PROTHROMBIN TIME: CPT | Performed by: EMERGENCY MEDICINE

## 2020-04-17 PROCEDURE — 84443 ASSAY THYROID STIM HORMONE: CPT | Performed by: EMERGENCY MEDICINE

## 2020-04-17 PROCEDURE — 93005 ELECTROCARDIOGRAM TRACING: CPT

## 2020-04-17 PROCEDURE — 85025 COMPLETE CBC W/AUTO DIFF WBC: CPT | Performed by: EMERGENCY MEDICINE

## 2020-04-17 PROCEDURE — 84484 ASSAY OF TROPONIN QUANT: CPT | Performed by: EMERGENCY MEDICINE

## 2020-04-17 PROCEDURE — 80048 BASIC METABOLIC PNL TOTAL CA: CPT | Performed by: EMERGENCY MEDICINE

## 2020-04-17 PROCEDURE — 36415 COLL VENOUS BLD VENIPUNCTURE: CPT | Performed by: EMERGENCY MEDICINE

## 2020-04-17 PROCEDURE — 93010 ELECTROCARDIOGRAM REPORT: CPT | Performed by: INTERNAL MEDICINE

## 2020-06-04 ENCOUNTER — ANTICOAG VISIT (OUTPATIENT)
Dept: FAMILY MEDICINE CLINIC | Facility: CLINIC | Age: 85
End: 2020-06-04

## 2020-06-12 DIAGNOSIS — R60.9 EDEMA, UNSPECIFIED TYPE: Primary | ICD-10-CM

## 2020-06-12 RX ORDER — POTASSIUM CHLORIDE 20 MEQ/1
TABLET, EXTENDED RELEASE ORAL
Qty: 90 TABLET | Refills: 1 | Status: SHIPPED | OUTPATIENT
Start: 2020-06-12 | End: 2020-11-20 | Stop reason: SDUPTHER

## 2020-06-15 ENCOUNTER — ANTICOAG VISIT (OUTPATIENT)
Dept: FAMILY MEDICINE CLINIC | Facility: CLINIC | Age: 85
End: 2020-06-15

## 2020-06-23 DIAGNOSIS — I48.91 ATRIAL FIBRILLATION, UNSPECIFIED TYPE (HCC): Primary | ICD-10-CM

## 2020-06-24 ENCOUNTER — TELEPHONE (OUTPATIENT)
Dept: FAMILY MEDICINE CLINIC | Facility: CLINIC | Age: 85
End: 2020-06-24

## 2020-06-24 ENCOUNTER — ANTICOAG VISIT (OUTPATIENT)
Dept: FAMILY MEDICINE CLINIC | Facility: CLINIC | Age: 85
End: 2020-06-24

## 2020-06-24 LAB — INR PPP: 3.27 (ref 0.84–1.19)

## 2020-07-06 ENCOUNTER — OFFICE VISIT (OUTPATIENT)
Dept: CARDIOLOGY CLINIC | Facility: CLINIC | Age: 85
End: 2020-07-06
Payer: MEDICARE

## 2020-07-06 VITALS
BODY MASS INDEX: 28.15 KG/M2 | SYSTOLIC BLOOD PRESSURE: 130 MMHG | DIASTOLIC BLOOD PRESSURE: 82 MMHG | WEIGHT: 149 LBS | HEART RATE: 74 BPM

## 2020-07-06 DIAGNOSIS — Z95.2 S/P AVR: ICD-10-CM

## 2020-07-06 DIAGNOSIS — I50.32 CHRONIC DIASTOLIC CONGESTIVE HEART FAILURE (HCC): ICD-10-CM

## 2020-07-06 DIAGNOSIS — I48.20 ATRIAL FIBRILLATION, CHRONIC (HCC): Primary | Chronic | ICD-10-CM

## 2020-07-06 PROCEDURE — 1036F TOBACCO NON-USER: CPT | Performed by: INTERNAL MEDICINE

## 2020-07-06 PROCEDURE — 1160F RVW MEDS BY RX/DR IN RCRD: CPT | Performed by: INTERNAL MEDICINE

## 2020-07-06 PROCEDURE — 99214 OFFICE O/P EST MOD 30 MIN: CPT | Performed by: INTERNAL MEDICINE

## 2020-07-06 NOTE — PATIENT INSTRUCTIONS

## 2020-07-06 NOTE — PROGRESS NOTES
Subjective:        Patient ID: Cassie Giordano is a 80 y o  female  Chief Complaint:  Carmel Guzmán is here for routine follow-up  She has chronic diastolic heart failure, status post AVR, and chronic AFib  She denies any concerning chest pains or alarming dyspnea, her lower extremity edema is per usual, no major changes, she did take her water pill yet today because she has to doctor appointments  Has not had any palpitations recur since her 1 ER visit not long ago, records reviewed, nothing found  EKG there was stable  The following portions of the patient's history were reviewed and updated as appropriate: allergies, current medications, past family history, past medical history, past social history, past surgical history and problem list   Review of Systems   Constitution: Negative for chills, diaphoresis, malaise/fatigue and weight gain  HENT: Negative for nosebleeds and stridor  Eyes: Negative for double vision, vision loss in left eye, vision loss in right eye and visual disturbance  Cardiovascular: Positive for dyspnea on exertion ( chronic unchanged worse with mask wearing) and leg swelling (Chronic unchanged)  Negative for chest pain, claudication, cyanosis, irregular heartbeat, near-syncope, orthopnea, palpitations, paroxysmal nocturnal dyspnea and syncope  Respiratory: Negative for cough, shortness of breath, snoring and wheezing  Endocrine: Negative for polydipsia, polyphagia and polyuria  Hematologic/Lymphatic: Negative for bleeding problem  Does not bruise/bleed easily  Skin: Negative for flushing and rash  Musculoskeletal: Negative for falls and myalgias  Gastrointestinal: Negative for abdominal pain, heartburn, hematemesis, hematochezia, melena and nausea  Genitourinary: Negative for hematuria  Neurological: Negative for brief paralysis, dizziness, focal weakness, headaches, light-headedness, loss of balance and vertigo     Psychiatric/Behavioral: Negative for altered mental status and substance abuse  Allergic/Immunologic: Negative for hives  Objective:      /82   Pulse 74   Wt 67 6 kg (149 lb)   LMP  (LMP Unknown)   BMI 28 15 kg/m²   Physical Exam   Constitutional: She is oriented to person, place, and time  She appears well-developed and well-nourished  HENT:   Head: Normocephalic and atraumatic  Eyes: Pupils are equal, round, and reactive to light  EOM are normal    Neck: Normal range of motion  Neck supple  No JVD present  No thyromegaly present  Cardiovascular: Normal rate, normal heart sounds and intact distal pulses  Exam reveals no gallop and no friction rub  No murmur heard  Pulmonary/Chest: Effort normal and breath sounds normal  No stridor  No respiratory distress  She has no wheezes  She has no rales  Abdominal: Soft  Bowel sounds are normal  She exhibits no mass  There is no tenderness  Musculoskeletal: Normal range of motion  She exhibits edema (Moderate bilateral unchanged)  Lymphadenopathy:     She has no cervical adenopathy  Neurological: She is alert and oriented to person, place, and time  Skin: Skin is warm and dry  No rash noted  No pallor  Psychiatric: She has a normal mood and affect  Her behavior is normal  Judgment and thought content normal    Nursing note and vitals reviewed        Lab Review:   Anticoag visit on 06/24/2020   Component Date Value    INR 06/24/2020 3 27*   Anticoag visit on 06/04/2020   Component Date Value    INR 04/17/2020 1 93*   Admission on 04/17/2020, Discharged on 04/17/2020   Component Date Value    WBC 04/17/2020 5 29     RBC 04/17/2020 3 99     Hemoglobin 04/17/2020 12 8     Hematocrit 04/17/2020 40 0     MCV 04/17/2020 100*    MCH 04/17/2020 32 1     MCHC 04/17/2020 32 0     RDW 04/17/2020 13 8     MPV 04/17/2020 9 9     Platelets 21/45/4261 206     nRBC 04/17/2020 0     Neutrophils Relative 04/17/2020 53     Immat GRANS % 04/17/2020 0     Lymphocytes Relative 2020 31     Monocytes Relative 2020 11     Eosinophils Relative 2020 4     Basophils Relative 2020 1     Neutrophils Absolute 2020 2 85     Immature Grans Absolute 2020 0 01     Lymphocytes Absolute 2020 1 62     Monocytes Absolute 2020 0 56     Eosinophils Absolute 2020 0 20     Basophils Absolute 2020 0 05     Sodium 2020 140     Potassium 2020 3 9     Chloride 2020 103     CO2 2020 28     ANION GAP 2020 9     BUN 2020 30*    Creatinine 2020 1 63*    Glucose 2020 94     Calcium 2020 9 3     eGFR 2020 28     Protime 2020 22 2*    INR 2020 1 93*    TSH 3RD GENERATON 2020 3 370     Troponin I 2020 0 03     Ventricular Rate 2020 88     Atrial Rate 2020 84     QRSD Interval 2020 144     QT Interval 2020 420     QTC Interval 2020 508     QRS Axis 2020 -61     T Wave Axis 2020 46      Transthoracic Echocardiogram  2D, M-mode, Doppler, and Color Doppler     Study date:  2020     Patient: Stacy Trujillo  MR number: [id number]  Account number: [de-identified]  : 1931  Age: 80 years  Gender: Female  Status: Outpatient  Location: [facility]  Height: 61 in  Weight: 160 lb  BP: 132/ 70 mmHg     Indications: Aortic Valve Disease     Diagnoses: I35 0 - Nonrheumatic aortic (valve) stenosis, Z95 2 - Presence of prosthetic heart valve     Sonographer:  Ursula Weems RDCS  Primary Physician:  Cole Iyer DO  Referring Physician: YOVANA Curtis  Group:  [facility]  Interpreting Physician: YOVANA Curtis      SUMMARY     LEFT VENTRICLE:  Systolic function was normal  Ejection fraction was estimated to be 55 %  There were no regional wall motion abnormalities    There was mild concentric hypertrophy      RIGHT VENTRICLE:  Systolic function was normal      LEFT ATRIUM:  The atrium was mildly dilated      MITRAL VALVE:  There was mild annular calcification  There was mild regurgitation      AORTIC VALVE:  A bioprosthesis was present  It exhibited normal function  Mean gradient 17 mm Hg-stable finding  There was trace regurgitation      TRICUSPID VALVE:  There was moderate regurgitation  The findings suggest moderate pulmonary hypertension      PULMONIC VALVE:  There was mild regurgitation      PERICARDIUM:  There was no pericardial effusion  Assessment:       1  Atrial fibrillation, chronic     2  S/P AVR     3  Chronic diastolic congestive heart failure (HCC)          Plan:       Her AFib is rate controlled, Coumadin your direction, may I recommend goal INR 2 0-3 0  She has no signs or symptoms reminiscent of angina pectoris, no evidence for decompensated heart failure  Current diuretic doses seems appropriate  Tolerating statin therapy, transaminases normal end of last year  I recommended no changes today nor surveillance cardiac testing  Reiterated need for SBE antibiotic prophylaxis  I will see her back in 4 months, sooner as needed she will call with any problems

## 2020-07-09 DIAGNOSIS — G62.9 PERIPHERAL POLYNEUROPATHY: Primary | ICD-10-CM

## 2020-07-09 RX ORDER — GABAPENTIN 100 MG/1
CAPSULE ORAL
Qty: 180 CAPSULE | Refills: 1 | Status: SHIPPED | OUTPATIENT
Start: 2020-07-09 | End: 2020-09-04

## 2020-07-22 ENCOUNTER — TELEPHONE (OUTPATIENT)
Dept: FAMILY MEDICINE CLINIC | Facility: CLINIC | Age: 85
End: 2020-07-22

## 2020-07-22 ENCOUNTER — ANTICOAG VISIT (OUTPATIENT)
Dept: FAMILY MEDICINE CLINIC | Facility: CLINIC | Age: 85
End: 2020-07-22

## 2020-07-22 LAB — INR PPP: 2.31 (ref 0.84–1.19)

## 2020-07-22 NOTE — TELEPHONE ENCOUNTER
Called Granddaughter Johanna with Orders to continue on Altering Doses and Recheck in one month   Will call REGINA 07/23/2020 with Lab Orders

## 2020-07-22 NOTE — TELEPHONE ENCOUNTER
----- Message from Valerie Dubon DO sent at 7/22/2020  3:56 PM EDT -----  Repeat PT and INR in 1 month  ----- Message -----  From: Julia Tapia  Sent: 7/22/2020   3:03 PM EDT  To: Valerie Dubon DO    Results from [facility]  PT 18 5  INR 2 31  Patient has been alternating 5 0 and 2 5mg

## 2020-07-27 ENCOUNTER — OFFICE VISIT (OUTPATIENT)
Dept: FAMILY MEDICINE CLINIC | Facility: CLINIC | Age: 85
End: 2020-07-27
Payer: MEDICARE

## 2020-07-27 VITALS
SYSTOLIC BLOOD PRESSURE: 124 MMHG | HEART RATE: 84 BPM | WEIGHT: 148 LBS | DIASTOLIC BLOOD PRESSURE: 64 MMHG | RESPIRATION RATE: 20 BRPM | BODY MASS INDEX: 27.96 KG/M2

## 2020-07-27 DIAGNOSIS — R26.2 AMBULATORY DYSFUNCTION: ICD-10-CM

## 2020-07-27 DIAGNOSIS — G89.29 CHRONIC BILATERAL LOW BACK PAIN WITH BILATERAL SCIATICA: ICD-10-CM

## 2020-07-27 DIAGNOSIS — I48.20 ATRIAL FIBRILLATION, CHRONIC (HCC): Primary | Chronic | ICD-10-CM

## 2020-07-27 DIAGNOSIS — I63.423 CEREBROVASCULAR ACCIDENT (CVA) DUE TO BILATERAL EMBOLISM OF ANTERIOR CEREBRAL ARTERIES (HCC): ICD-10-CM

## 2020-07-27 DIAGNOSIS — R60.0 BILATERAL LOWER EXTREMITY EDEMA: ICD-10-CM

## 2020-07-27 DIAGNOSIS — Z95.2 S/P AVR: ICD-10-CM

## 2020-07-27 DIAGNOSIS — M21.371 RIGHT FOOT DROP: Chronic | ICD-10-CM

## 2020-07-27 DIAGNOSIS — E78.5 HYPERLIPIDEMIA, UNSPECIFIED HYPERLIPIDEMIA TYPE: Chronic | ICD-10-CM

## 2020-07-27 DIAGNOSIS — G25.81 RESTLESS LEG SYNDROME: Chronic | ICD-10-CM

## 2020-07-27 DIAGNOSIS — M54.42 CHRONIC BILATERAL LOW BACK PAIN WITH BILATERAL SCIATICA: ICD-10-CM

## 2020-07-27 DIAGNOSIS — M54.41 CHRONIC BILATERAL LOW BACK PAIN WITH BILATERAL SCIATICA: ICD-10-CM

## 2020-07-27 PROCEDURE — 1036F TOBACCO NON-USER: CPT | Performed by: FAMILY MEDICINE

## 2020-07-27 PROCEDURE — 99214 OFFICE O/P EST MOD 30 MIN: CPT | Performed by: FAMILY MEDICINE

## 2020-07-27 PROCEDURE — 1160F RVW MEDS BY RX/DR IN RCRD: CPT | Performed by: FAMILY MEDICINE

## 2020-07-27 RX ORDER — WARFARIN SODIUM 5 MG/1
TABLET ORAL
Qty: 90 TABLET | Refills: 1 | Status: SHIPPED | OUTPATIENT
Start: 2020-07-27 | End: 2021-03-19 | Stop reason: SDUPTHER

## 2020-07-27 RX ORDER — ATORVASTATIN CALCIUM 40 MG/1
40 TABLET, FILM COATED ORAL EVERY EVENING
Qty: 90 TABLET | Refills: 1 | Status: SHIPPED | OUTPATIENT
Start: 2020-07-27 | End: 2021-03-19 | Stop reason: SDUPTHER

## 2020-07-27 RX ORDER — TRAMADOL HYDROCHLORIDE 50 MG/1
50 TABLET ORAL 2 TIMES DAILY PRN
Qty: 60 TABLET | Refills: 3 | Status: SHIPPED | OUTPATIENT
Start: 2020-07-27 | End: 2020-11-20 | Stop reason: SDUPTHER

## 2020-07-27 NOTE — PROGRESS NOTES
Assessment and plan patient has atrial fibrillation chronic congestive heart failure post aortic valve replacement  Chronic lymphedema  Degenerative disc disease of the lumbar spine degenerative joint disease of the knees  PDMP was reviewed no concerns  There is no evidence of diversion or misuse the medication improves function  Problem List Items Addressed This Visit        Cardiovascular and Mediastinum    Atrial fibrillation, chronic - Primary (Chronic)    Relevant Medications    warfarin (COUMADIN) 5 mg tablet    Other Relevant Orders    CBC and differential    Comprehensive metabolic panel    Stroke (cerebrum) (HCC)       Other    Restless leg syndrome (Chronic)    Right foot drop (Chronic)    Hyperlipidemia (Chronic)    Relevant Medications    atorvastatin (LIPITOR) 40 mg tablet    Other Relevant Orders    Lipid Panel with Direct LDL reflex    S/P AVR    Bilateral lower extremity edema    Ambulatory dysfunction      Other Visit Diagnoses     Chronic bilateral low back pain with bilateral sciatica        Relevant Medications    traMADol (ULTRAM) 50 mg tablet           Diagnoses and all orders for this visit:    Atrial fibrillation, chronic  -     warfarin (COUMADIN) 5 mg tablet; Take as directed by Dr Ramirez Chauhan  -     CBC and differential; Future  -     Comprehensive metabolic panel; Future    Cerebrovascular accident (CVA) due to bilateral embolism of anterior cerebral arteries (HCC)    Restless leg syndrome    Right foot drop    Hyperlipidemia, unspecified hyperlipidemia type  -     atorvastatin (LIPITOR) 40 mg tablet; Take 1 tablet (40 mg total) by mouth every evening  -     Lipid Panel with Direct LDL reflex; Future    S/P AVR    Bilateral lower extremity edema    Ambulatory dysfunction    Chronic bilateral low back pain with bilateral sciatica  -     traMADol (ULTRAM) 50 mg tablet;  Take 1 tablet (50 mg total) by mouth 2 (two) times a day as needed for moderate pain        No problem-specific Assessment & Plan notes found for this encounter  PHQ-9 Depression Screening    PHQ-9:    Frequency of the following problems over the past two weeks:       Little interest or pleasure in doing things:  0 - not at all  Feeling down, depressed, or hopeless:  0 - not at all  PHQ-2 Score:  0          Body mass index is 27 96 kg/m²  BMI Counseling: Body mass index is 27 96 kg/m²  The BMI is above normal  Nutrition recommendations include reducing portion sizes, decreasing overall calorie intake, 3-5 servings of fruits/vegetables daily, reducing fast food intake, consuming healthier snacks, decreasing soda and/or juice intake, moderation in carbohydrate intake, increasing intake of lean protein, reducing intake of saturated fat and trans fat and reducing intake of cholesterol  Subjective:      Patient ID: Argenis Brito is a 80 y o  female  HPI    The following portions of the patient's history were reviewed and updated as appropriate:   She has a past medical history of Arthritis, Cardiac disease, Carpal tunnel syndrome, bilateral, Dropfoot, History of echocardiogram (04/20/2016), HTN (hypertension), Hyperlipidemia, Lymphedema, Phlebitis, Psoriasis, Restless leg syndrome, Right bundle branch block, Stroke ([facility] [address] ), and Supraventricular tachycardia ([facility] [address] )  ,  does not have any pertinent problems on file  ,   has a past surgical history that includes Hysterectomy; Rotator cuff repair (Right); Aortic valvuloplasty (11/25/2002); Back surgery; Hand surgery; Cardiac catheterization (11/22/2002); Aortic valve replacement (11/25/2002); and Vascular surgery  ,  family history includes Hyperlipidemia in her son, son, and son; Hypertension in her son, son, and son; No Known Problems in her brother, brother, father, mother, sister, and sister  ,   reports that she has never smoked  She has never used smokeless tobacco  She reports that she does not drink alcohol or use drugs  ,  is allergic to baclofen     Current Outpatient Medications   Medication Sig Dispense Refill    acetaminophen (TYLENOL) 500 mg tablet Take 500 mg by mouth every 6 (six) hours as needed for mild pain      atorvastatin (LIPITOR) 40 mg tablet Take 1 tablet (40 mg total) by mouth every evening 90 tablet 1    diclofenac sodium (VOLTAREN) 1 % apply to affected area four times a day  0    furosemide (LASIX) 80 mg tablet Take 80 mg by mouth daily  0    gabapentin (NEURONTIN) 100 mg capsule take 2 tablets by mouth three times a day 180 capsule 1    Melatonin 5 MG CAPS Take 5 mg by mouth daily at bedtime      potassium chloride (K-DUR,KLOR-CON) 20 mEq tablet take 1 tablet by mouth once daily 90 tablet 1    rOPINIRole (REQUIP) 2 mg tablet Take 2 mg by mouth daily       traMADol (ULTRAM) 50 mg tablet Take 1 tablet (50 mg total) by mouth 2 (two) times a day as needed for moderate pain 60 tablet 3    Turmeric 500 MG CAPS Take 500 mg by mouth every other day      warfarin (COUMADIN) 5 mg tablet Take as directed by Dr Emilee Alicia 90 tablet 1     No current facility-administered medications for this visit  Review of Systems   Constitutional: Positive for fatigue  Obese   HENT: Negative  Eyes: Negative  Respiratory: Positive for shortness of breath  Cardiovascular: Positive for leg swelling  Gastrointestinal: Negative  Endocrine: Negative  Genitourinary: Negative  Musculoskeletal: Positive for arthralgias, back pain and neck pain  Skin: Negative  Allergic/Immunologic: Negative  Neurological: Negative  Hematological: Negative  Psychiatric/Behavioral: Negative  Objective:    /64   Pulse 84   Resp 20   Wt 67 1 kg (148 lb)   LMP  (LMP Unknown)   BMI 27 96 kg/m²   Body mass index is 27 96 kg/m²  Physical Exam   Constitutional: She is oriented to person, place, and time  She appears well-developed and well-nourished  HENT:   Head: Normocephalic  Eyes: Pupils are equal, round, and reactive to light  Neck: Normal range of motion  Cardiovascular: Normal rate, regular rhythm and normal heart sounds  The heart is irregular irregular with a grade 3/6 systolic ejection murmur   Pulmonary/Chest: Effort normal and breath sounds normal    Abdominal: Soft  Bowel sounds are normal  There is no tenderness  Musculoskeletal:   Degenerative joint deformities of the knees bilaterally +2 pitting edema bilaterally  Neurological: She is alert and oriented to person, place, and time  Skin: Skin is warm  Psychiatric: She has a normal mood and affect

## 2020-08-19 ENCOUNTER — ANTICOAG VISIT (OUTPATIENT)
Dept: FAMILY MEDICINE CLINIC | Facility: CLINIC | Age: 85
End: 2020-08-19

## 2020-08-19 LAB — INR PPP: 2.63 (ref 0.84–1.19)

## 2020-09-04 DIAGNOSIS — G62.9 PERIPHERAL POLYNEUROPATHY: ICD-10-CM

## 2020-09-04 RX ORDER — GABAPENTIN 100 MG/1
CAPSULE ORAL
Qty: 180 CAPSULE | Refills: 1 | Status: SHIPPED | OUTPATIENT
Start: 2020-09-04 | End: 2020-11-20 | Stop reason: SDUPTHER

## 2020-09-21 ENCOUNTER — TELEPHONE (OUTPATIENT)
Dept: FAMILY MEDICINE CLINIC | Facility: CLINIC | Age: 85
End: 2020-09-21

## 2020-09-21 ENCOUNTER — ANTICOAG VISIT (OUTPATIENT)
Dept: FAMILY MEDICINE CLINIC | Facility: CLINIC | Age: 85
End: 2020-09-21

## 2020-09-21 LAB — INR PPP: 3.08 (ref 0.84–1.19)

## 2020-09-21 NOTE — TELEPHONE ENCOUNTER
Need to call Granddaughter, Emelina Maria Eugenia with orders to continue alternating 5 0mg & 2 5mg of coumadin  repeat PT/INR x 2 weeks  [phone]    Need to call REGINA with repeat lab orders x 2weeks

## 2020-09-21 NOTE — TELEPHONE ENCOUNTER
Called and spoke to [address] [place] [address]      Called and spoke to Gibson at [facility]

## 2020-09-23 ENCOUNTER — APPOINTMENT (EMERGENCY)
Dept: RADIOLOGY | Facility: HOSPITAL | Age: 85
End: 2020-09-23
Payer: MEDICARE

## 2020-09-23 ENCOUNTER — HOSPITAL ENCOUNTER (EMERGENCY)
Facility: HOSPITAL | Age: 85
Discharge: HOME | End: 2020-09-23
Attending: EMERGENCY MEDICINE | Admitting: EMERGENCY MEDICINE
Payer: MEDICARE

## 2020-09-23 VITALS
HEART RATE: 76 BPM | SYSTOLIC BLOOD PRESSURE: 142 MMHG | RESPIRATION RATE: 18 BRPM | OXYGEN SATURATION: 98 % | WEIGHT: 160 LBS | DIASTOLIC BLOOD PRESSURE: 83 MMHG | TEMPERATURE: 97.7 F | BODY MASS INDEX: 30.23 KG/M2

## 2020-09-23 DIAGNOSIS — M19.031 ARTHRITIS OF RIGHT WRIST: Primary | ICD-10-CM

## 2020-09-23 PROCEDURE — 99283 EMERGENCY DEPT VISIT LOW MDM: CPT

## 2020-09-23 PROCEDURE — 73110 X-RAY EXAM OF WRIST: CPT

## 2020-09-23 PROCEDURE — 99284 EMERGENCY DEPT VISIT MOD MDM: CPT | Performed by: EMERGENCY MEDICINE

## 2020-09-23 RX ORDER — FERROUS SULFATE 325(65) MG
325 TABLET ORAL
COMMUNITY
End: 2021-03-19 | Stop reason: HOSPADM

## 2020-09-23 RX ORDER — DIPHENOXYLATE HYDROCHLORIDE AND ATROPINE SULFATE 2.5; .025 MG/1; MG/1
1 TABLET ORAL EVERY OTHER DAY
COMMUNITY

## 2020-09-23 NOTE — ED PROVIDER NOTES
History  Chief Complaint   Patient presents with    Wrist Pain     Started this AM with right hand/wrist pain  States it initially was "tingling" like was asleep  Denies any injury    Hand Pain     Patient is an 60-year-old female who is right-hand dominant presenting with right wrist pain that began this morning  She noted pain throughout the night  Denies any recent falls or trauma  Denies any history of gout  States it was initially tingling but now pain is focused in the radial styloid region  No other complaints  Area swollen and tender and warm  Will obtain x-ray, suspect gout vs osteoarthritis versus trauma  Prior to Admission Medications   Prescriptions Last Dose Informant Patient Reported? Taking?    Ergocalciferol (VITAMIN D2 PO)   Yes Yes   Sig: Take 1 25 mg by mouth once a week   Melatonin 5 MG CAPS   Yes Yes   Sig: Take 5 mg by mouth daily at bedtime   Turmeric 500 MG CAPS   Yes Yes   Sig: Take 500 mg by mouth every other day   acetaminophen (TYLENOL) 500 mg tablet   Yes Yes   Sig: Take 500 mg by mouth every 6 (six) hours as needed for mild pain   atorvastatin (LIPITOR) 40 mg tablet   No Yes   Sig: Take 1 tablet (40 mg total) by mouth every evening   diclofenac sodium (VOLTAREN) 1 %   Yes Yes   Sig: apply to affected area four times a day   ferrous sulfate 325 (65 Fe) mg tablet   Yes Yes   Sig: Take 325 mg by mouth daily with breakfast   furosemide (LASIX) 80 mg tablet   Yes Yes   Sig: Take 80 mg by mouth daily   gabapentin (NEURONTIN) 100 mg capsule   No Yes   Sig: take 2 capsules by mouth three times a day   multivitamin (THERAGRAN) TABS   Yes Yes   Sig: Take 1 tablet by mouth every other day   potassium chloride (K-DUR,KLOR-CON) 20 mEq tablet   No Yes   Sig: take 1 tablet by mouth once daily   rOPINIRole (REQUIP) 2 mg tablet   Yes Yes   Sig: Take 2 mg by mouth daily    traMADol (ULTRAM) 50 mg tablet   No Yes   Sig: Take 1 tablet (50 mg total) by mouth 2 (two) times a day as needed for moderate pain   warfarin (COUMADIN) 5 mg tablet   No Yes   Sig: Take as directed by Dr Destiney Ortiz      Facility-Administered Medications: None       Past Medical History:   Diagnosis Date    Arthritis     L shoulder,fingers    Cardiac disease     valve replacement    Carpal tunnel syndrome, bilateral     Dropfoot     right    History of echocardiogram 04/20/2016    EF 75%, Hyperdynamic LVSF  Mild concentric LVH  Normal functioning bioprosthetic AV  Trace MR  Mild pulm htn   HTN (hypertension)     Hyperlipidemia     Lymphedema     Phlebitis     Psoriasis     Restless leg syndrome     Right bundle branch block     Stroke ([address] )     Supraventricular tachycardia [facility])        Past Surgical History:   Procedure Laterality Date    AORTIC VALVE REPLACEMENT  11/25/2002    Tissue AVR #21    AORTIC VALVULOPLASTY  11/25/2002    Bovine Pericardial heart valve    BACK SURGERY      CARDIAC CATHETERIZATION  11/22/2002    Sever critical Aortic stenosis  Pulm Htn  Normal coronary arteries   HAND SURGERY      right hand    HYSTERECTOMY      ROTATOR CUFF REPAIR Right     VASCULAR SURGERY      bilateral vein ligation & stripping       Family History   Problem Relation Age of Onset    No Known Problems Mother     No Known Problems Father     No Known Problems Sister     No Known Problems Brother     Hyperlipidemia Son     Hypertension Son     No Known Problems Sister     No Known Problems Brother     Hyperlipidemia Son     Hypertension Son     Hyperlipidemia Son     Hypertension Son      I have reviewed and agree with the history as documented      E-Cigarette/Vaping    E-Cigarette Use Never User      E-Cigarette/Vaping Substances    Nicotine No     THC No     CBD No     Flavoring No     Other No     Unknown No      Social History     Tobacco Use    Smoking status: Never Smoker    Smokeless tobacco: Never Used   Substance Use Topics    Alcohol use: Never     Frequency: Never    Drug use: No       Review of Systems   Constitutional: Negative for appetite change, fatigue, fever and unexpected weight change  HENT: Negative for congestion, rhinorrhea and sore throat  Eyes: Negative for visual disturbance  Respiratory: Negative for cough, chest tightness, shortness of breath and wheezing  Cardiovascular: Positive for leg swelling  Negative for chest pain and palpitations  History of lower extremity lymphedema   Gastrointestinal: Negative for abdominal pain, constipation, diarrhea, nausea and vomiting  Genitourinary: Negative for difficulty urinating, dysuria, frequency, menstrual problem, pelvic pain, vaginal bleeding and vaginal discharge  Musculoskeletal: Positive for joint swelling and myalgias  Negative for back pain and neck pain  Neurological: Negative for dizziness, seizures, syncope, light-headedness and headaches  Psychiatric/Behavioral: Negative for sleep disturbance  The patient is nervous/anxious  All other systems reviewed and are negative  Physical Exam  Physical Exam  Vitals signs and nursing note reviewed  Constitutional:       Appearance: She is well-developed  Comments: Appears elderly and frail   HENT:      Head: Normocephalic and atraumatic  Mouth/Throat:      Mouth: Mucous membranes are moist    Eyes:      General: No scleral icterus  Extraocular Movements: Extraocular movements intact  Conjunctiva/sclera: Conjunctivae normal    Neck:      Musculoskeletal: Normal range of motion and neck supple  Cardiovascular:      Rate and Rhythm: Normal rate and regular rhythm  Heart sounds: Murmur present  No friction rub  No gallop  Pulmonary:      Effort: Pulmonary effort is normal  No respiratory distress  Breath sounds: Normal breath sounds  No wheezing or rales  Abdominal:      Palpations: Abdomen is soft  There is no mass  Tenderness: There is no abdominal tenderness  There is no guarding or rebound        Hernia: No hernia is present  Musculoskeletal: Normal range of motion  General: Swelling and tenderness present  Right lower leg: Edema present  Left lower leg: Edema present  Comments: There is swelling and tenderness over the radial styloid with warmth  No erythema or induration  Normal pulses  Patient has chronic lower extremity lymphedema   Skin:     General: Skin is warm and dry  Capillary Refill: Capillary refill takes less than 2 seconds  Coloration: Skin is not jaundiced or pale  Findings: No erythema  Neurological:      General: No focal deficit present  Mental Status: She is alert and oriented to person, place, and time  Mental status is at baseline  Psychiatric:         Mood and Affect: Mood normal          Behavior: Behavior normal          Vital Signs  ED Triage Vitals [09/23/20 1034]   Temperature Pulse Respirations Blood Pressure SpO2   97 7 °F (36 5 °C) 76 18 142/83 98 %      Temp Source Heart Rate Source Patient Position - Orthostatic VS BP Location FiO2 (%)   Temporal Monitor -- Left arm --      Pain Score       8           Vitals:    09/23/20 1034   BP: 142/83   Pulse: 76         Visual Acuity      ED Medications  Medications - No data to display    Diagnostic Studies  Results Reviewed     None                 XR wrist 3+ views RIGHT   Final Result by Lamberto Rawls MD (09/23 1156)      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 Yes Filed at: 09/23/2020 1109   Initial Alcohol Screen: US AUDIT-C    1   How often do you have a drink containing alcohol?  0 Filed at: 09/23/2020 1109   2  How many drinks containing alcohol do you have on a typical day you are drinking? 0 Filed at: 09/23/2020 1109   3a  Male UNDER 65: How often do you have five or more drinks on one occasion? 0 Filed at: 09/23/2020 1109   3b  FEMALE Any Age, or MALE 65+: How often do you have 4 or more drinks on one occassion? 0 Filed at: 09/23/2020 1109   Audit-C Score  0 Filed at: 09/23/2020 1109   OLIVER: How many times in the past year have you    Used an illegal drug or used a prescription medication for non-medical reasons? Never Filed at: 09/23/2020 1109                  MDM    Disposition  Final diagnoses:   Arthritis of right wrist     Time reflects when diagnosis was documented in both MDM as applicable and the Disposition within this note     Time User Action Codes Description Comment    9/23/2020 12:17 PM Will Cantu [G44 [id number]] Arthritis of right wrist       ED Disposition     ED Disposition Condition Date/Time Comment    Discharge Stable Wed Sep 23, 2020 12:17 PM Lauren Syed discharge to home/self care  Follow-up Information     Follow up With Specialties Details Why Contact Info    Zaira Self DO Family Medicine Schedule an appointment as soon as possible for a visit   [address] [place] [address]  [phone]            Patient's Medications   Discharge Prescriptions    DICLOFENAC SODIUM (VOLTAREN) 1 %    Apply 2 g topically 4 (four) times a day       Start Date: 9/23/2020 End Date: --       Order Dose: 2 g       Quantity: 100 g    Refills: 0     No discharge procedures on file      PDMP Review       Value Time User    PDMP Reviewed  Yes 7/27/2020  1:55 PM Zaira Self DO          ED Provider  Electronically Signed by           Rene Saenz DO  09/23/20 6798

## 2020-10-02 ENCOUNTER — OFFICE VISIT (OUTPATIENT)
Dept: FAMILY MEDICINE CLINIC | Facility: CLINIC | Age: 85
End: 2020-10-02
Payer: MEDICARE

## 2020-10-02 VITALS
HEIGHT: 58 IN | HEART RATE: 68 BPM | TEMPERATURE: 98.2 F | RESPIRATION RATE: 20 BRPM | WEIGHT: 143 LBS | BODY MASS INDEX: 30.02 KG/M2 | SYSTOLIC BLOOD PRESSURE: 132 MMHG | DIASTOLIC BLOOD PRESSURE: 66 MMHG

## 2020-10-02 DIAGNOSIS — G62.9 PERIPHERAL POLYNEUROPATHY: ICD-10-CM

## 2020-10-02 DIAGNOSIS — G25.81 RESTLESS LEG SYNDROME: ICD-10-CM

## 2020-10-02 DIAGNOSIS — R60.9 EDEMA, UNSPECIFIED TYPE: ICD-10-CM

## 2020-10-02 DIAGNOSIS — Z13.1 SCREENING FOR DIABETES MELLITUS: ICD-10-CM

## 2020-10-02 DIAGNOSIS — Z11.59 NEED FOR HEPATITIS C SCREENING TEST: ICD-10-CM

## 2020-10-02 DIAGNOSIS — M54.42 CHRONIC BILATERAL LOW BACK PAIN WITH BILATERAL SCIATICA: ICD-10-CM

## 2020-10-02 DIAGNOSIS — R60.0 BILATERAL LOWER EXTREMITY EDEMA: ICD-10-CM

## 2020-10-02 DIAGNOSIS — Z95.2 S/P AVR: ICD-10-CM

## 2020-10-02 DIAGNOSIS — I48.20 ATRIAL FIBRILLATION, CHRONIC (HCC): ICD-10-CM

## 2020-10-02 DIAGNOSIS — Z23 NEED FOR VACCINATION: Primary | ICD-10-CM

## 2020-10-02 DIAGNOSIS — M54.41 CHRONIC BILATERAL LOW BACK PAIN WITH BILATERAL SCIATICA: ICD-10-CM

## 2020-10-02 DIAGNOSIS — G89.29 CHRONIC BILATERAL LOW BACK PAIN WITH BILATERAL SCIATICA: ICD-10-CM

## 2020-10-02 DIAGNOSIS — Z00.00 MEDICARE ANNUAL WELLNESS VISIT, SUBSEQUENT: ICD-10-CM

## 2020-10-02 DIAGNOSIS — M21.371 RIGHT FOOT DROP: ICD-10-CM

## 2020-10-02 DIAGNOSIS — Z11.59 NEED FOR HEPATITIS B SCREENING TEST: ICD-10-CM

## 2020-10-02 DIAGNOSIS — Z13.6 SCREENING FOR CARDIOVASCULAR CONDITION: ICD-10-CM

## 2020-10-02 DIAGNOSIS — Z11.4 SCREENING FOR HIV (HUMAN IMMUNODEFICIENCY VIRUS): ICD-10-CM

## 2020-10-02 DIAGNOSIS — R26.2 AMBULATORY DYSFUNCTION: ICD-10-CM

## 2020-10-02 DIAGNOSIS — I63.423 CEREBROVASCULAR ACCIDENT (CVA) DUE TO BILATERAL EMBOLISM OF ANTERIOR CEREBRAL ARTERIES (HCC): ICD-10-CM

## 2020-10-02 PROCEDURE — 90662 IIV NO PRSV INCREASED AG IM: CPT

## 2020-10-02 PROCEDURE — G0439 PPPS, SUBSEQ VISIT: HCPCS | Performed by: FAMILY MEDICINE

## 2020-10-02 PROCEDURE — G0008 ADMIN INFLUENZA VIRUS VAC: HCPCS

## 2020-10-07 LAB — INR PPP: 2.5 (ref 0.84–1.19)

## 2020-10-08 ENCOUNTER — ANTICOAG VISIT (OUTPATIENT)
Dept: FAMILY MEDICINE CLINIC | Facility: CLINIC | Age: 85
End: 2020-10-08

## 2020-10-08 DIAGNOSIS — I63.423 CEREBROVASCULAR ACCIDENT (CVA) DUE TO BILATERAL EMBOLISM OF ANTERIOR CEREBRAL ARTERIES (HCC): ICD-10-CM

## 2020-10-08 DIAGNOSIS — D68.9 COAGULOPATHY (HCC): Primary | ICD-10-CM

## 2020-10-26 ENCOUNTER — OFFICE VISIT (OUTPATIENT)
Dept: CARDIOLOGY CLINIC | Facility: CLINIC | Age: 85
End: 2020-10-26
Payer: MEDICARE

## 2020-10-26 VITALS
HEIGHT: 58 IN | SYSTOLIC BLOOD PRESSURE: 122 MMHG | BODY MASS INDEX: 30.02 KG/M2 | WEIGHT: 143 LBS | DIASTOLIC BLOOD PRESSURE: 78 MMHG | HEART RATE: 70 BPM

## 2020-10-26 DIAGNOSIS — I48.20 ATRIAL FIBRILLATION, CHRONIC (HCC): Chronic | ICD-10-CM

## 2020-10-26 DIAGNOSIS — I50.32 CHRONIC DIASTOLIC CONGESTIVE HEART FAILURE (HCC): ICD-10-CM

## 2020-10-26 DIAGNOSIS — Z95.2 S/P AVR: Primary | ICD-10-CM

## 2020-10-26 PROCEDURE — 99214 OFFICE O/P EST MOD 30 MIN: CPT | Performed by: INTERNAL MEDICINE

## 2020-11-13 LAB — INR PPP: 1.6 (ref 0.84–1.19)

## 2020-11-16 ENCOUNTER — ANTICOAG VISIT (OUTPATIENT)
Dept: FAMILY MEDICINE CLINIC | Facility: CLINIC | Age: 85
End: 2020-11-16

## 2020-11-16 ENCOUNTER — TELEPHONE (OUTPATIENT)
Dept: FAMILY MEDICINE CLINIC | Facility: CLINIC | Age: 85
End: 2020-11-16

## 2020-11-20 ENCOUNTER — OFFICE VISIT (OUTPATIENT)
Dept: FAMILY MEDICINE CLINIC | Facility: CLINIC | Age: 85
End: 2020-11-20
Payer: MEDICARE

## 2020-11-20 VITALS
DIASTOLIC BLOOD PRESSURE: 74 MMHG | HEART RATE: 74 BPM | SYSTOLIC BLOOD PRESSURE: 128 MMHG | TEMPERATURE: 98 F | BODY MASS INDEX: 30.23 KG/M2 | HEIGHT: 58 IN | RESPIRATION RATE: 20 BRPM | WEIGHT: 144 LBS

## 2020-11-20 DIAGNOSIS — M54.42 CHRONIC BILATERAL LOW BACK PAIN WITH BILATERAL SCIATICA: ICD-10-CM

## 2020-11-20 DIAGNOSIS — R60.9 EDEMA, UNSPECIFIED TYPE: ICD-10-CM

## 2020-11-20 DIAGNOSIS — I63.423 CEREBROVASCULAR ACCIDENT (CVA) DUE TO BILATERAL EMBOLISM OF ANTERIOR CEREBRAL ARTERIES (HCC): ICD-10-CM

## 2020-11-20 DIAGNOSIS — G25.81 RESTLESS LEG SYNDROME: ICD-10-CM

## 2020-11-20 DIAGNOSIS — G89.29 CHRONIC BILATERAL LOW BACK PAIN WITH BILATERAL SCIATICA: ICD-10-CM

## 2020-11-20 DIAGNOSIS — Z95.2 S/P AVR: Primary | ICD-10-CM

## 2020-11-20 DIAGNOSIS — R60.0 BILATERAL LOWER EXTREMITY EDEMA: ICD-10-CM

## 2020-11-20 DIAGNOSIS — M54.41 CHRONIC BILATERAL LOW BACK PAIN WITH BILATERAL SCIATICA: ICD-10-CM

## 2020-11-20 DIAGNOSIS — E78.5 DYSLIPIDEMIA: ICD-10-CM

## 2020-11-20 DIAGNOSIS — G62.9 PERIPHERAL POLYNEUROPATHY: ICD-10-CM

## 2020-11-20 DIAGNOSIS — I48.20 ATRIAL FIBRILLATION, CHRONIC (HCC): ICD-10-CM

## 2020-11-20 PROCEDURE — 99214 OFFICE O/P EST MOD 30 MIN: CPT | Performed by: FAMILY MEDICINE

## 2020-11-20 RX ORDER — FUROSEMIDE 80 MG
80 TABLET ORAL DAILY
Qty: 90 TABLET | Refills: 1 | Status: SHIPPED | OUTPATIENT
Start: 2020-11-20

## 2020-11-20 RX ORDER — TRAMADOL HYDROCHLORIDE 50 MG/1
50 TABLET ORAL 2 TIMES DAILY PRN
Qty: 60 TABLET | Refills: 3 | Status: SHIPPED | OUTPATIENT
Start: 2020-11-20 | End: 2021-03-19 | Stop reason: SDUPTHER

## 2020-11-20 RX ORDER — ROPINIROLE 2 MG/1
2 TABLET, FILM COATED ORAL DAILY
Qty: 90 TABLET | Refills: 1 | Status: SHIPPED | OUTPATIENT
Start: 2020-11-20 | End: 2021-01-13 | Stop reason: SDUPTHER

## 2020-11-20 RX ORDER — POTASSIUM CHLORIDE 20 MEQ/1
20 TABLET, EXTENDED RELEASE ORAL DAILY
Qty: 90 TABLET | Refills: 1 | Status: SHIPPED | OUTPATIENT
Start: 2020-11-20 | End: 2021-03-19 | Stop reason: SDUPTHER

## 2020-11-20 RX ORDER — GABAPENTIN 100 MG/1
200 CAPSULE ORAL 3 TIMES DAILY
Qty: 180 CAPSULE | Refills: 5 | Status: SHIPPED | OUTPATIENT
Start: 2020-11-20 | End: 2021-05-26 | Stop reason: HOSPADM

## 2020-11-23 ENCOUNTER — ANTICOAG VISIT (OUTPATIENT)
Dept: FAMILY MEDICINE CLINIC | Facility: CLINIC | Age: 85
End: 2020-11-23

## 2020-11-23 LAB — INR PPP: 1.8 (ref 0.84–1.19)

## 2020-12-21 ENCOUNTER — ANTICOAG VISIT (OUTPATIENT)
Dept: FAMILY MEDICINE CLINIC | Facility: CLINIC | Age: 85
End: 2020-12-21

## 2020-12-21 LAB — INR PPP: 4.6 (ref 0.84–1.19)

## 2020-12-28 LAB — INR PPP: 1.5 (ref 0.84–1.19)

## 2020-12-29 ENCOUNTER — ANTICOAG VISIT (OUTPATIENT)
Dept: FAMILY MEDICINE CLINIC | Facility: CLINIC | Age: 85
End: 2020-12-29

## 2021-01-04 LAB — INR PPP: 1.5 (ref 0.84–1.19)

## 2021-01-05 ENCOUNTER — ANTICOAG VISIT (OUTPATIENT)
Dept: FAMILY MEDICINE CLINIC | Facility: CLINIC | Age: 86
End: 2021-01-05

## 2021-01-05 NOTE — PROGRESS NOTES
Received result via fax  Spoke to Granddaughter, Monika Roche    Alternate 2 5mg & 5 0mg    Recheck labs x one week    Will notify [facility] for next draw   [phone] (fax)

## 2021-01-13 DIAGNOSIS — G25.81 RESTLESS LEG SYNDROME: ICD-10-CM

## 2021-01-13 RX ORDER — ROPINIROLE 2 MG/1
2 TABLET, FILM COATED ORAL 2 TIMES DAILY
Qty: 180 TABLET | Refills: 1 | Status: SHIPPED | OUTPATIENT
Start: 2021-01-13

## 2021-01-13 NOTE — TELEPHONE ENCOUNTER
Granddaughter called  Patient's requip is normally 2mg BID  Johanna states the new Rx is only written as 2mg once a day  Request correction sent to Pharm

## 2021-01-14 ENCOUNTER — TELEPHONE (OUTPATIENT)
Dept: FAMILY MEDICINE CLINIC | Facility: CLINIC | Age: 86
End: 2021-01-14

## 2021-01-14 NOTE — TELEPHONE ENCOUNTER
Spoke to Christine at [facility]  Christine received call from Patient's Granddaughter, Johanna, that patient was suppose to have a PT INR collected 01/11/2021  Christine contacted Office that she never received faxed order for patient's redraw  Christine took a verbal and has patient scheduled for Tomorrow, 01/15/2021 for draw  Christine will make Patient/Johanna aware  Christine asked to call [facility] and personally ask for her or Guerry Morning to make sure this doesn't continue to happen to the patient  Verified Phone and fax numbers  [phone] (phone)  [phone] (fax)    Also personally spoke to Granddaughter, Tracy Cecily to reasure Patient/Johanna this problem was being resolved

## 2021-01-15 LAB — INR PPP: 2 (ref 0.84–1.19)

## 2021-01-18 ENCOUNTER — ANTICOAG VISIT (OUTPATIENT)
Dept: FAMILY MEDICINE CLINIC | Facility: CLINIC | Age: 86
End: 2021-01-18

## 2021-01-18 ENCOUNTER — TELEPHONE (OUTPATIENT)
Dept: FAMILY MEDICINE CLINIC | Facility: CLINIC | Age: 86
End: 2021-01-18

## 2021-01-18 NOTE — PROGRESS NOTES
Received result via fax  Spoke to Granddaughter, Pooja Marquez    Alternate 2 5mg & 5 0mg    Recheck labs x two week    Will notify [facility] for next draw   [phone] (fax)  Spoke to [facility]

## 2021-01-20 DIAGNOSIS — Z23 ENCOUNTER FOR IMMUNIZATION: ICD-10-CM

## 2021-01-29 ENCOUNTER — ANTICOAG VISIT (OUTPATIENT)
Dept: FAMILY MEDICINE CLINIC | Facility: CLINIC | Age: 86
End: 2021-01-29

## 2021-01-29 LAB
INR PPP: 2 (ref 0.84–1.19)
INR PPP: 2 (ref 0.84–1.19)

## 2021-01-29 NOTE — PROGRESS NOTES
Received result via fax  Spoke to Granddaughter, Elva Carmona    Alternate 2 5mg & 5 0mg    Recheck labs x two week    Will notify [facility] for next draw   [phone] (fax)  Spoke to [facility]

## 2021-02-08 ENCOUNTER — IMMUNIZATIONS (OUTPATIENT)
Dept: FAMILY MEDICINE CLINIC | Facility: HOSPITAL | Age: 86
End: 2021-02-08

## 2021-02-08 DIAGNOSIS — Z23 ENCOUNTER FOR IMMUNIZATION: Primary | ICD-10-CM

## 2021-02-08 PROCEDURE — 0011A SARS-COV-2 / COVID-19 MRNA VACCINE (MODERNA) 100 MCG: CPT

## 2021-02-08 PROCEDURE — 91301 SARS-COV-2 / COVID-19 MRNA VACCINE (MODERNA) 100 MCG: CPT

## 2021-02-15 ENCOUNTER — TELEPHONE (OUTPATIENT)
Dept: FAMILY MEDICINE CLINIC | Facility: CLINIC | Age: 86
End: 2021-02-15

## 2021-02-15 DIAGNOSIS — N30.90 CYSTITIS: Primary | ICD-10-CM

## 2021-02-15 LAB — INR PPP: 2.5 (ref 0.84–1.19)

## 2021-02-15 NOTE — TELEPHONE ENCOUNTER
Johanna called back after talking to her In-laws this morning  She asked her in-laws if they noticed any additional complaints leading them to think Patient has a UTI  They told Johanna they have noticed Patient's occasions of "hearing things" have increased in the last 2 weeks

## 2021-02-15 NOTE — TELEPHONE ENCOUNTER
Spoke to granddaughter, Stefan Oar  Patient still lives alone  Family checks on patient at least twice a day to make sure she is okay and that she takes her medications  Johanna stated there have been a few occasions over the last several months where the patient believes she hears things (example: like people trying to break into the house)  Patient calls the family, they check on the house, everything is secure  Family becoming concerned if this is normal for her age? Or maybe related to one of her medications? Johanna questioned about stopping the Tramadol and replacing it with 2 tylenols? I did ask if Johanna would have any reason to believe patient has a UTI causing confusion? Johanna states patient has not made any complaints besides mild groin pain yesterday  Patient does have an appointment scheduled in March in our office and will need some labs drawn (standing orders are already in the system)  I have already placed Orders with [facility] for these draws  Would you want to possibly add a urine? Advised Johanna that I would discuss with JFM and get back to her  Doesn't seem like Family wants to upset Patient by bringing her in early for an appointment

## 2021-02-15 NOTE — TELEPHONE ENCOUNTER
Contacted Family  Called [facility]-- office closed for the day  Faxed labs to [facility]

## 2021-02-16 ENCOUNTER — TELEPHONE (OUTPATIENT)
Dept: FAMILY MEDICINE CLINIC | Facility: CLINIC | Age: 86
End: 2021-02-16

## 2021-02-16 ENCOUNTER — ANTICOAG VISIT (OUTPATIENT)
Dept: FAMILY MEDICINE CLINIC | Facility: CLINIC | Age: 86
End: 2021-02-16

## 2021-02-16 NOTE — PROGRESS NOTES
Received result via fax  Spoke to Granddaughter, Noah Alvarez    Alternate 2 5mg & 5 0mg    Recheck labs x two week    Will notify [facility] for next draw   [phone] (fax)  Spoke to [facility]

## 2021-02-24 ENCOUNTER — TELEPHONE (OUTPATIENT)
Dept: FAMILY MEDICINE CLINIC | Facility: CLINIC | Age: 86
End: 2021-02-24

## 2021-02-24 LAB
EXTERNAL HIV SCREEN: NORMAL
HCV AB SER-ACNC: NEGATIVE
INR PPP: 1.9 (ref 0.84–1.19)

## 2021-02-24 NOTE — TELEPHONE ENCOUNTER
Rachael from \[facility]"" labs reports patients labs drawn at 11am today, results as follows:  PT 21 5   INR 1 9

## 2021-02-25 ENCOUNTER — ANTICOAG VISIT (OUTPATIENT)
Dept: FAMILY MEDICINE CLINIC | Facility: CLINIC | Age: 86
End: 2021-02-25

## 2021-02-25 NOTE — PROGRESS NOTES
Spoke to Granddaughter, Monika Thompson    Alternate 2 5mg & 5 0mg    Recheck labs x two week    Will notify [facility] for next draw   [phone] (fax)  Spoke to Obdulia Riley

## 2021-02-25 NOTE — TELEPHONE ENCOUNTER
No, thank you  Contacted Johanna to continue alternating and recheck in 2 weeks  I need to contact lab to look for the remaining lab work that we have not received from [facility]  Will give PT/INR recheck orders at that time

## 2021-03-05 ENCOUNTER — TELEPHONE (OUTPATIENT)
Dept: LAB | Facility: HOSPITAL | Age: 86
End: 2021-03-05

## 2021-03-05 ENCOUNTER — TELEPHONE (OUTPATIENT)
Dept: FAMILY MEDICINE CLINIC | Facility: CLINIC | Age: 86
End: 2021-03-05

## 2021-03-05 NOTE — TELEPHONE ENCOUNTER
Granddaughter called with questions, will be in contact in the next few weeks depending on INR results

## 2021-03-05 NOTE — TELEPHONE ENCOUNTER
Spoke to Granddaughter, Stefan Marley  Johanna has been having trouble with [facility] labs  Johanna contacted [facility] to take over Patient's labs (especially PT/INR)  Johanna khan will still have [facility] come next week (03/10/2021) since patient is on [facility]'s schedule  After that, Stefan Marley will be transferring Care to [facility]

## 2021-03-08 ENCOUNTER — IMMUNIZATIONS (OUTPATIENT)
Dept: FAMILY MEDICINE CLINIC | Facility: HOSPITAL | Age: 86
End: 2021-03-08

## 2021-03-08 DIAGNOSIS — Z23 ENCOUNTER FOR IMMUNIZATION: Primary | ICD-10-CM

## 2021-03-08 DIAGNOSIS — N30.90 CYSTITIS: Primary | ICD-10-CM

## 2021-03-08 PROCEDURE — 0012A SARS-COV-2 / COVID-19 MRNA VACCINE (MODERNA) 100 MCG: CPT

## 2021-03-08 PROCEDURE — 91301 SARS-COV-2 / COVID-19 MRNA VACCINE (MODERNA) 100 MCG: CPT

## 2021-03-08 RX ORDER — CIPROFLOXACIN 250 MG/1
250 TABLET, FILM COATED ORAL EVERY 12 HOURS SCHEDULED
Qty: 14 TABLET | Refills: 0 | Status: SHIPPED | OUTPATIENT
Start: 2021-03-08 | End: 2021-03-15

## 2021-03-08 NOTE — PROGRESS NOTES
Urinalysis has 500 wbc's per field with sparse growth of bacteria will begin Cipro 250 b i d  pending sensitivity

## 2021-03-11 ENCOUNTER — TELEPHONE (OUTPATIENT)
Dept: FAMILY MEDICINE CLINIC | Facility: CLINIC | Age: 86
End: 2021-03-11

## 2021-03-11 LAB — INR PPP: 1.9 (ref 0.84–1.19)

## 2021-03-11 NOTE — TELEPHONE ENCOUNTER
Received Fax from [facility] with results    Collected 03/10/2021  Result 03/11/2021    PT 21 0  INR 1 9    Alternate 2 5mg & 5 0mg      Recheck orders?

## 2021-03-15 ENCOUNTER — TELEPHONE (OUTPATIENT)
Dept: LAB | Facility: HOSPITAL | Age: 86
End: 2021-03-15

## 2021-03-15 ENCOUNTER — ANTICOAG VISIT (OUTPATIENT)
Dept: FAMILY MEDICINE CLINIC | Facility: CLINIC | Age: 86
End: 2021-03-15

## 2021-03-15 DIAGNOSIS — I63.423 CEREBROVASCULAR ACCIDENT (CVA) DUE TO BILATERAL EMBOLISM OF ANTERIOR CEREBRAL ARTERIES (HCC): ICD-10-CM

## 2021-03-15 DIAGNOSIS — I48.20 ATRIAL FIBRILLATION, CHRONIC (HCC): Primary | Chronic | ICD-10-CM

## 2021-03-15 DIAGNOSIS — R60.9 EDEMA, UNSPECIFIED TYPE: ICD-10-CM

## 2021-03-15 NOTE — PROGRESS NOTES
Patient requires Standing order for PT/INR  Patient will be starting with [facility]

## 2021-03-19 ENCOUNTER — OFFICE VISIT (OUTPATIENT)
Dept: FAMILY MEDICINE CLINIC | Facility: CLINIC | Age: 86
End: 2021-03-19
Payer: MEDICARE

## 2021-03-19 VITALS
HEIGHT: 58 IN | SYSTOLIC BLOOD PRESSURE: 132 MMHG | HEART RATE: 68 BPM | BODY MASS INDEX: 31.49 KG/M2 | TEMPERATURE: 97.6 F | RESPIRATION RATE: 20 BRPM | DIASTOLIC BLOOD PRESSURE: 74 MMHG | WEIGHT: 150 LBS

## 2021-03-19 DIAGNOSIS — I70.213 ATHEROSCLEROSIS OF NATIVE ARTERY OF BOTH LOWER EXTREMITIES WITH INTERMITTENT CLAUDICATION (HCC): ICD-10-CM

## 2021-03-19 DIAGNOSIS — E78.5 HYPERLIPIDEMIA, UNSPECIFIED HYPERLIPIDEMIA TYPE: Chronic | ICD-10-CM

## 2021-03-19 DIAGNOSIS — I48.20 ATRIAL FIBRILLATION, CHRONIC (HCC): Chronic | ICD-10-CM

## 2021-03-19 DIAGNOSIS — Z95.2 S/P AVR: ICD-10-CM

## 2021-03-19 DIAGNOSIS — N18.30 STAGE 3 CHRONIC KIDNEY DISEASE, UNSPECIFIED WHETHER STAGE 3A OR 3B CKD (HCC): ICD-10-CM

## 2021-03-19 DIAGNOSIS — R26.2 AMBULATORY DYSFUNCTION: ICD-10-CM

## 2021-03-19 DIAGNOSIS — M54.42 CHRONIC BILATERAL LOW BACK PAIN WITH BILATERAL SCIATICA: ICD-10-CM

## 2021-03-19 DIAGNOSIS — G89.29 CHRONIC BILATERAL LOW BACK PAIN WITH BILATERAL SCIATICA: ICD-10-CM

## 2021-03-19 DIAGNOSIS — I50.32 CHRONIC DIASTOLIC CONGESTIVE HEART FAILURE (HCC): ICD-10-CM

## 2021-03-19 DIAGNOSIS — I63.423 CEREBROVASCULAR ACCIDENT (CVA) DUE TO BILATERAL EMBOLISM OF ANTERIOR CEREBRAL ARTERIES (HCC): Primary | ICD-10-CM

## 2021-03-19 DIAGNOSIS — M54.41 CHRONIC BILATERAL LOW BACK PAIN WITH BILATERAL SCIATICA: ICD-10-CM

## 2021-03-19 DIAGNOSIS — R60.0 BILATERAL LOWER EXTREMITY EDEMA: ICD-10-CM

## 2021-03-19 DIAGNOSIS — R60.9 EDEMA, UNSPECIFIED TYPE: ICD-10-CM

## 2021-03-19 PROCEDURE — 99214 OFFICE O/P EST MOD 30 MIN: CPT | Performed by: FAMILY MEDICINE

## 2021-03-19 RX ORDER — ATORVASTATIN CALCIUM 40 MG/1
40 TABLET, FILM COATED ORAL EVERY EVENING
Qty: 90 TABLET | Refills: 1 | Status: SHIPPED | OUTPATIENT
Start: 2021-03-19

## 2021-03-19 RX ORDER — POTASSIUM CHLORIDE 20 MEQ/1
20 TABLET, EXTENDED RELEASE ORAL DAILY
Qty: 90 TABLET | Refills: 1 | Status: SHIPPED | OUTPATIENT
Start: 2021-03-19 | End: 2021-06-17

## 2021-03-19 RX ORDER — WARFARIN SODIUM 5 MG/1
TABLET ORAL
Qty: 90 TABLET | Refills: 1 | Status: SHIPPED | OUTPATIENT
Start: 2021-03-19 | End: 2021-03-22

## 2021-03-19 RX ORDER — TRAMADOL HYDROCHLORIDE 50 MG/1
50 TABLET ORAL 2 TIMES DAILY PRN
Qty: 60 TABLET | Refills: 3 | Status: ON HOLD | OUTPATIENT
Start: 2021-03-19 | End: 2021-05-26 | Stop reason: SDUPTHER

## 2021-03-19 NOTE — PROGRESS NOTES
Assessment/Plan: chronic atrial fibrillation anticoagulated with Coumadin  Status post aortic valve replacement  No angina  Hyperlipidemia with the cholesterol/ HDL cholesterol ratio of 1 57 on atorvastatin 40    Chronic bilateral low back pain treated with tramadol and gabapentin degenerative joint disease of the knees right footdrop with ambulatory dysfunction PDMP reviewed no issues found  Therapy improves function no evidence of abuse or divergence    Degenerative joint disease of the knees bilaterally treated with Voltaren gel    Chronic kidney disease stage 3    Bilateral lower extremity lymphedema    Chronic congestive heart failure stable on current regimen      Atherosclerosis of both lower extremities with intermittent claudication stable at present  Problem List Items Addressed This Visit        Cardiovascular and Mediastinum    Atrial fibrillation, chronic (Chronic)       Other    Hyperlipidemia (Chronic)      Other Visit Diagnoses     Edema, unspecified type        Chronic bilateral low back pain with bilateral sciatica               Diagnoses and all orders for this visit:    Atrial fibrillation, chronic  -     warfarin (COUMADIN) 5 mg tablet; Take as directed by Dr Liz Jackson    Edema, unspecified type  -     potassium chloride (K-DUR,KLOR-CON) 20 mEq tablet; Take 1 tablet (20 mEq total) by mouth daily    Hyperlipidemia, unspecified hyperlipidemia type  -     atorvastatin (LIPITOR) 40 mg tablet; Take 1 tablet (40 mg total) by mouth every evening    Chronic bilateral low back pain with bilateral sciatica  -     traMADol (ULTRAM) 50 mg tablet; Take 1 tablet (50 mg total) by mouth 2 (two) times a day as needed for moderate pain        No problem-specific Assessment & Plan notes found for this encounter  PHQ-9 Depression Screening    PHQ-9:   Frequency of the following problems over the past two weeks: Body mass index is 31 35 kg/m²  BMI Counseling:  Body mass index is 31 35 kg/m²  The BMI is above normal  Nutrition recommendations include reducing portion sizes, decreasing overall calorie intake and 3-5 servings of fruits/vegetables daily  Subjective:      Patient ID: Ephraim Drew is a 80 y o  female  Patient presents for checkup for her atrial fibrillation hyperlipidemia chronic low back pain and lymphedema      The following portions of the patient's history were reviewed and updated as appropriate:   She has a past medical history of Arthritis, Cardiac disease, Carpal tunnel syndrome, bilateral, Dropfoot, History of echocardiogram (04/20/2016), HTN (hypertension), Hyperlipidemia, Lymphedema, Phlebitis, Psoriasis, Restless leg syndrome, Right bundle branch block, Stroke ([facility] [address] ), and Supraventricular tachycardia ([facility] [address] )  ,  does not have any pertinent problems on file  ,   has a past surgical history that includes Hysterectomy; Rotator cuff repair (Right); Aortic valvuloplasty (11/25/2002); Back surgery; Hand surgery; Cardiac catheterization (11/22/2002); Aortic valve replacement (11/25/2002); and Vascular surgery  ,  family history includes Hyperlipidemia in her son, son, and son; Hypertension in her son, son, and son; No Known Problems in her brother, brother, father, mother, sister, and sister  ,   reports that she has never smoked  She has never used smokeless tobacco  She reports that she does not drink alcohol or use drugs  ,  is allergic to baclofen     Current Outpatient Medications   Medication Sig Dispense Refill    acetaminophen (TYLENOL) 500 mg tablet Take 500 mg by mouth every 6 (six) hours as needed for mild pain      atorvastatin (LIPITOR) 40 mg tablet Take 1 tablet (40 mg total) by mouth every evening 90 tablet 1    diclofenac sodium (VOLTAREN) 1 % Apply 2 g topically 4 (four) times a day 100 g 0    furosemide (LASIX) 80 mg tablet Take 1 tablet (80 mg total) by mouth daily 90 tablet 1    gabapentin (NEURONTIN) 100 mg capsule Take 2 capsules (200 mg total) by mouth 3 (three) times a day 180 capsule 5    potassium chloride (K-DUR,KLOR-CON) 20 mEq tablet Take 1 tablet (20 mEq total) by mouth daily 90 tablet 1    rOPINIRole (REQUIP) 2 mg tablet Take 1 tablet (2 mg total) by mouth 2 (two) times a day 180 tablet 1    traMADol (ULTRAM) 50 mg tablet Take 1 tablet (50 mg total) by mouth 2 (two) times a day as needed for moderate pain 60 tablet 3    Turmeric 500 MG CAPS Take 500 mg by mouth every other day      warfarin (COUMADIN) 5 mg tablet Take as directed by Dr Kyle Cedeño 90 tablet 1    Ergocalciferol (VITAMIN D2 PO) Take 1 25 mg by mouth once a week      multivitamin (THERAGRAN) TABS Take 1 tablet by mouth every other day       No current facility-administered medications for this visit  Review of Systems   Constitutional: Negative for chills and fever  HENT: Negative for ear pain and sore throat  Eyes: Negative for pain and visual disturbance  Respiratory: Negative for cough and shortness of breath  Cardiovascular: Positive for palpitations  Negative for chest pain  Gastrointestinal: Negative for abdominal pain and vomiting  Genitourinary: Negative for dysuria and hematuria  Musculoskeletal: Positive for back pain and gait problem  Negative for arthralgias  Has chronic low back pain and bilateral knee pain with the left worse than right and also left 3rd toe pain uses a walker to ambulate   Skin: Negative for color change and rash  Neurological: Negative for seizures and syncope  All other systems reviewed and are negative  Objective:    /74   Pulse 68   Temp 97 6 °F (36 4 °C)   Resp 20   Ht 4' 10" (1 473 m)   Wt 68 kg (150 lb)   LMP  (LMP Unknown)   BMI 31 35 kg/m²   Body mass index is 31 35 kg/m²  Physical Exam  Constitutional:       Appearance: She is well-developed  HENT:      Head: Normocephalic  Eyes:      Pupils: Pupils are equal, round, and reactive to light     Neck:      Musculoskeletal: Normal range of motion  Cardiovascular:      Rate and Rhythm: Normal rate  Rhythm irregular  Heart sounds: Murmur present  Pulmonary:      Effort: Pulmonary effort is normal       Breath sounds: Normal breath sounds  Abdominal:      General: Bowel sounds are normal       Palpations: Abdomen is soft  Tenderness: There is no abdominal tenderness  Musculoskeletal:      Right lower leg: Edema present  Left lower leg: Edema present  Skin:     General: Skin is warm  Neurological:      Mental Status: She is alert and oriented to person, place, and time

## 2021-03-20 DIAGNOSIS — I48.20 ATRIAL FIBRILLATION, CHRONIC (HCC): Chronic | ICD-10-CM

## 2021-03-22 RX ORDER — WARFARIN SODIUM 5 MG/1
TABLET ORAL
Qty: 90 TABLET | Refills: 1 | Status: SHIPPED | OUTPATIENT
Start: 2021-03-22

## 2021-03-29 ENCOUNTER — APPOINTMENT (OUTPATIENT)
Dept: LAB | Facility: HOSPITAL | Age: 86
End: 2021-03-29
Payer: MEDICARE

## 2021-03-29 ENCOUNTER — ANTICOAG VISIT (OUTPATIENT)
Dept: FAMILY MEDICINE CLINIC | Facility: CLINIC | Age: 86
End: 2021-03-29

## 2021-03-29 ENCOUNTER — TELEPHONE (OUTPATIENT)
Dept: FAMILY MEDICINE CLINIC | Facility: CLINIC | Age: 86
End: 2021-03-29

## 2021-03-29 DIAGNOSIS — Z11.4 SCREENING FOR HIV (HUMAN IMMUNODEFICIENCY VIRUS): ICD-10-CM

## 2021-03-29 DIAGNOSIS — Z11.59 NEED FOR HEPATITIS C SCREENING TEST: ICD-10-CM

## 2021-03-29 DIAGNOSIS — Z11.59 NEED FOR HEPATITIS B SCREENING TEST: ICD-10-CM

## 2021-03-29 DIAGNOSIS — R60.9 EDEMA, UNSPECIFIED TYPE: ICD-10-CM

## 2021-03-29 DIAGNOSIS — Z13.1 SCREENING FOR DIABETES MELLITUS: ICD-10-CM

## 2021-03-29 DIAGNOSIS — Z00.00 MEDICARE ANNUAL WELLNESS VISIT, SUBSEQUENT: ICD-10-CM

## 2021-03-29 DIAGNOSIS — I48.20 ATRIAL FIBRILLATION, CHRONIC (HCC): Chronic | ICD-10-CM

## 2021-03-29 DIAGNOSIS — E78.5 DYSLIPIDEMIA: ICD-10-CM

## 2021-03-29 DIAGNOSIS — E78.5 HYPERLIPIDEMIA, UNSPECIFIED HYPERLIPIDEMIA TYPE: Chronic | ICD-10-CM

## 2021-03-29 DIAGNOSIS — Z13.6 SCREENING FOR CARDIOVASCULAR CONDITION: ICD-10-CM

## 2021-03-29 DIAGNOSIS — N30.90 CYSTITIS: ICD-10-CM

## 2021-03-29 LAB
ALBUMIN SERPL BCP-MCNC: 3.7 G/DL (ref 3.5–5)
ALP SERPL-CCNC: 111 U/L (ref 46–116)
ALT SERPL W P-5'-P-CCNC: 43 U/L (ref 12–78)
ANION GAP SERPL CALCULATED.3IONS-SCNC: 7 MMOL/L (ref 4–13)
AST SERPL W P-5'-P-CCNC: 44 U/L (ref 5–45)
BACTERIA UR QL AUTO: ABNORMAL /HPF
BASOPHILS # BLD AUTO: 0.05 THOUSANDS/ΜL (ref 0–0.1)
BASOPHILS NFR BLD AUTO: 1 % (ref 0–1)
BILIRUB SERPL-MCNC: 0.7 MG/DL (ref 0.2–1)
BILIRUB UR QL STRIP: NEGATIVE
BUN SERPL-MCNC: 31 MG/DL (ref 5–25)
CALCIUM SERPL-MCNC: 8.7 MG/DL (ref 8.3–10.1)
CHLORIDE SERPL-SCNC: 106 MMOL/L (ref 100–108)
CHOLEST SERPL-MCNC: 141 MG/DL (ref 50–200)
CLARITY UR: CLEAR
CO2 SERPL-SCNC: 28 MMOL/L (ref 21–32)
COLOR UR: YELLOW
CREAT SERPL-MCNC: 1.71 MG/DL (ref 0.6–1.3)
EOSINOPHIL # BLD AUTO: 0.12 THOUSAND/ΜL (ref 0–0.61)
EOSINOPHIL NFR BLD AUTO: 2 % (ref 0–6)
ERYTHROCYTE [DISTWIDTH] IN BLOOD BY AUTOMATED COUNT: 14.6 % (ref 11.6–15.1)
GFR SERPL CREATININE-BSD FRML MDRD: 26 ML/MIN/1.73SQ M
GLUCOSE P FAST SERPL-MCNC: 88 MG/DL (ref 65–99)
GLUCOSE UR STRIP-MCNC: NEGATIVE MG/DL
HBV SURFACE AG SER QL: NORMAL
HCT VFR BLD AUTO: 36.6 % (ref 34.8–46.1)
HCV AB SER QL: NORMAL
HDLC SERPL-MCNC: 78 MG/DL
HGB BLD-MCNC: 11.3 G/DL (ref 11.5–15.4)
HGB UR QL STRIP.AUTO: NEGATIVE
HYALINE CASTS #/AREA URNS LPF: ABNORMAL /LPF
IMM GRANULOCYTES # BLD AUTO: 0.02 THOUSAND/UL (ref 0–0.2)
IMM GRANULOCYTES NFR BLD AUTO: 0 % (ref 0–2)
INR PPP: 2.17 (ref 0.84–1.19)
KETONES UR STRIP-MCNC: NEGATIVE MG/DL
LDLC SERPL CALC-MCNC: 50 MG/DL (ref 0–100)
LEUKOCYTE ESTERASE UR QL STRIP: ABNORMAL
LYMPHOCYTES # BLD AUTO: 1.49 THOUSANDS/ΜL (ref 0.6–4.47)
LYMPHOCYTES NFR BLD AUTO: 28 % (ref 14–44)
MCH RBC QN AUTO: 31 PG (ref 26.8–34.3)
MCHC RBC AUTO-ENTMCNC: 30.9 G/DL (ref 31.4–37.4)
MCV RBC AUTO: 101 FL (ref 82–98)
MONOCYTES # BLD AUTO: 0.54 THOUSAND/ΜL (ref 0.17–1.22)
MONOCYTES NFR BLD AUTO: 10 % (ref 4–12)
NEUTROPHILS # BLD AUTO: 3.15 THOUSANDS/ΜL (ref 1.85–7.62)
NEUTS SEG NFR BLD AUTO: 59 % (ref 43–75)
NITRITE UR QL STRIP: NEGATIVE
NON-SQ EPI CELLS URNS QL MICRO: ABNORMAL /HPF
NRBC BLD AUTO-RTO: 0 /100 WBCS
PH UR STRIP.AUTO: 6.5 [PH]
PLATELET # BLD AUTO: 190 THOUSANDS/UL (ref 149–390)
PMV BLD AUTO: 10.8 FL (ref 8.9–12.7)
POTASSIUM SERPL-SCNC: 4 MMOL/L (ref 3.5–5.3)
PROT SERPL-MCNC: 6.8 G/DL (ref 6.4–8.2)
PROT UR STRIP-MCNC: NEGATIVE MG/DL
PROTHROMBIN TIME: 24.1 SECONDS (ref 11.6–14.5)
RBC # BLD AUTO: 3.64 MILLION/UL (ref 3.81–5.12)
RBC #/AREA URNS AUTO: ABNORMAL /HPF
SODIUM SERPL-SCNC: 141 MMOL/L (ref 136–145)
SP GR UR STRIP.AUTO: 1.01 (ref 1–1.03)
TRIGL SERPL-MCNC: 65 MG/DL
UROBILINOGEN UR QL STRIP.AUTO: 0.2 E.U./DL
WBC # BLD AUTO: 5.37 THOUSAND/UL (ref 4.31–10.16)
WBC #/AREA URNS AUTO: ABNORMAL /HPF

## 2021-03-29 PROCEDURE — 85610 PROTHROMBIN TIME: CPT

## 2021-03-29 PROCEDURE — 80053 COMPREHEN METABOLIC PANEL: CPT

## 2021-03-29 PROCEDURE — 81001 URINALYSIS AUTO W/SCOPE: CPT | Performed by: FAMILY MEDICINE

## 2021-03-29 PROCEDURE — 87389 HIV-1 AG W/HIV-1&-2 AB AG IA: CPT

## 2021-03-29 PROCEDURE — 86803 HEPATITIS C AB TEST: CPT

## 2021-03-29 PROCEDURE — 87340 HEPATITIS B SURFACE AG IA: CPT

## 2021-03-29 PROCEDURE — 80061 LIPID PANEL: CPT

## 2021-03-29 PROCEDURE — 85025 COMPLETE CBC W/AUTO DIFF WBC: CPT

## 2021-03-29 PROCEDURE — 36415 COLL VENOUS BLD VENIPUNCTURE: CPT

## 2021-03-29 NOTE — TELEPHONE ENCOUNTER
Spoke to Granddaughter, Johanna, regarding PT/INR results  Johanna states the lab also collected another urine  Johanna asked about the test results since patient had the UTI concern a few weeks ago  Please Advise

## 2021-03-30 LAB — HIV 1+2 AB+HIV1 P24 AG SERPL QL IA: NORMAL

## 2021-04-29 ENCOUNTER — HOSPITAL ENCOUNTER (OUTPATIENT)
Dept: NON INVASIVE DIAGNOSTICS | Facility: CLINIC | Age: 86
Discharge: HOME | End: 2021-04-29
Payer: MEDICARE

## 2021-04-29 ENCOUNTER — OFFICE VISIT (OUTPATIENT)
Dept: CARDIOLOGY CLINIC | Facility: CLINIC | Age: 86
End: 2021-04-29
Payer: MEDICARE

## 2021-04-29 VITALS — HEART RATE: 72 BPM | SYSTOLIC BLOOD PRESSURE: 122 MMHG | DIASTOLIC BLOOD PRESSURE: 80 MMHG

## 2021-04-29 DIAGNOSIS — I48.20 ATRIAL FIBRILLATION, CHRONIC (HCC): Chronic | ICD-10-CM

## 2021-04-29 DIAGNOSIS — T82.857A STENOSIS OF PROSTHETIC AORTIC VALVE, INITIAL ENCOUNTER: ICD-10-CM

## 2021-04-29 DIAGNOSIS — Z95.2 S/P AVR: ICD-10-CM

## 2021-04-29 DIAGNOSIS — I50.32 CHRONIC DIASTOLIC CONGESTIVE HEART FAILURE (HCC): ICD-10-CM

## 2021-04-29 DIAGNOSIS — I50.40 COMBINED SYSTOLIC AND DIASTOLIC CONGESTIVE HEART FAILURE, UNSPECIFIED HF CHRONICITY (HCC): Primary | ICD-10-CM

## 2021-04-29 PROCEDURE — 93306 TTE W/DOPPLER COMPLETE: CPT | Performed by: INTERNAL MEDICINE

## 2021-04-29 PROCEDURE — 99214 OFFICE O/P EST MOD 30 MIN: CPT | Performed by: INTERNAL MEDICINE

## 2021-04-29 PROCEDURE — 93306 TTE W/DOPPLER COMPLETE: CPT

## 2021-04-29 RX ORDER — LISINOPRIL 2.5 MG/1
2.5 TABLET ORAL DAILY
Qty: 30 TABLET | Refills: 5 | Status: SHIPPED | OUTPATIENT
Start: 2021-04-29 | End: 2021-05-26 | Stop reason: HOSPADM

## 2021-04-29 RX ORDER — METOLAZONE 2.5 MG/1
TABLET ORAL
Qty: 10 TABLET | Refills: 5 | Status: SHIPPED | OUTPATIENT
Start: 2021-04-29

## 2021-04-29 NOTE — PATIENT INSTRUCTIONS
Dilated Cardiomyopathy   WHAT YOU NEED TO KNOW:   What is dilated cardiomyopathy? Dilated cardiomyopathy (DCM) develops when one or both ventricles (lower chambers of your heart) are damaged and become enlarged  The enlarged ventricles are too weak to pump enough blood to your body for your usual daily activities  What increases my risk for DCM? · Family history of DCM     · Conditions that cause heart damage, such as coronary artery disease     · Infections such as HIV, viruses, or toxoplasmosis    · Long-term alcohol or drug abuse     · Long-term conditions, such as diabetes, hypothyroidism, or autoimmune disorders    · Medicines or treatments such as chemotherapy, radiation, or antivirals    What are the signs and symptoms of DCM? · Coughing, wheezing, or trouble breathing     · Increasing fatigue and weakness    · Swelling in your legs, ankles, or fingers    · Feeling of fullness and no appetite    · Unexplained weight gain    · Fast or fluttering heartbeat, fainting episodes    What medicines are used to treat DCM?   · Diuretics  help your body release fluid that has built up  · Blood pressure medicines  lower your blood pressure, your heart rate, and improve blood flow through your heart  · Heart medicines  help regulate your heart rhythm and strengthen your heartbeat  · Blood thinners  help prevent blood clots  Clots can cause strokes, heart attacks, and death  The following are general safety guidelines to follow while you are taking a blood thinner:    ? Watch for bleeding and bruising while you take blood thinners  Watch for bleeding from your gums or nose  Watch for blood in your urine and bowel movements  Use a soft washcloth on your skin, and a soft toothbrush to brush your teeth  This can keep your skin and gums from bleeding  If you shave, use an electric shaver  Do not play contact sports  ? Tell your dentist and other healthcare providers that you take a blood thinner   Wear a bracelet or necklace that says you take this medicine  ? Do not start or stop any other medicines unless your healthcare provider tells you to  Many medicines cannot be used with blood thinners  ? Take your blood thinner exactly as prescribed by your healthcare provider  Do not skip does or take less than prescribed  Tell your provider right away if you forget to take your blood thinner, or if you take too much  ? Warfarin  is a blood thinner that you may need to take  The following are things you should be aware of if you take warfarin:     § Foods and medicines can affect the amount of warfarin in your blood  Do not make major changes to your diet while you take warfarin  Warfarin works best when you eat about the same amount of vitamin K every day  Vitamin K is found in green leafy vegetables and certain other foods  Ask for more information about what to eat when you are taking warfarin  § You will need to see your healthcare provider for follow-up visits when you are on warfarin  You will need regular blood tests  These tests are used to decide how much medicine you need  What other treatments might I need? · Cardiac rehab  is a program that will help you safely strengthen your heart  This plan includes exercise, relaxation, stress management, and heart-healthy nutrition instructions  Healthcare providers will make sure your medicines are helping to reduce your symptoms  · A surgically implanted device , such as a pacemaker or ventricular assist device (VAD), may be placed in your chest  The device may regulate your heartbeat or help your heart pump blood to your body  · Surgery  may be done to treat other conditions and reduce your symptoms  How do I manage my DCM? · Weigh yourself every morning  Use the same scale, in the same spot  Weigh yourself after you use the bathroom, but before you eat or drink anything  Wear the same type of clothing each day  Do not wear shoes   Keep a record of your daily weights so you will notice sudden weight gain  Bring the record to appointments with your healthcare providers  Swelling and weight gain are signs of fluid retention  If you are overweight, ask your healthcare provider how to lose weight safely  · Eat heart-healthy foods and limit sodium (salt)  Eat more fresh fruits and vegetables and fewer canned and processed foods  Replace butter and margarine with heart-healthy oils such as olive oil and canola oil  Other heart-healthy foods include walnuts, fatty fish such as salmon and tuna, whole-grain breads, low-fat dairy products, beans, and lean meats  You may need to eat less than 2 grams of salt per day  Do not use salt substitutes  Ask your healthcare provider for more information on heart-healthy and low-salt diets  · Limit alcohol  Alcohol may weaken your heart  Ask your healthcare provider if it is safe for you to drink any alcohol  If it is safe, talk to him or her about how much alcohol is safe for you  · Do not smoke  If you smoke, it is never too late to quit  Smoking weakens your heart and makes shortness of breath and other symptoms worse  Ask your healthcare provider for information if you need help quitting  · Manage other health conditions  Diabetes, sleep apnea, and other heart conditions can put more stress on your heart if not managed  Call your local emergency number (911 in the [place])if:   · You have any of the following signs of a heart attack:      ? Squeezing, pressure, or pain in your chest    ? You may  also have any of the following:     § Discomfort or pain in your back, neck, jaw, stomach, or arm    § Shortness of breath    § Nausea or vomiting    § Lightheadedness or a sudden cold sweat    · You have any of the following signs of a stroke:      ? Numbness or drooping on one side of your face     ? Weakness in an arm or leg    ? Confusion or difficulty speaking    ?  Dizziness, a severe headache, or vision loss    · You cough up blood  · You are weak, sweaty, or pale, with cold feet or hands  · You lose consciousness  When should I call my doctor or cardiologist?  · You have more trouble breathing while you do your daily activities or exercise  · You have new swelling in your legs, ankles, or fingers  · You gain 2 or more pounds in a day  · You have constant pain or fullness in your abdomen, or you lose your appetite  · You have questions or concerns about your condition or care  CARE AGREEMENT:   You have the right to help plan your care  Learn about your health condition and how it may be treated  Discuss treatment options with your healthcare providers to decide what care you want to receive  You always have the right to refuse treatment  The above information is an  only  It is not intended as medical advice for individual conditions or treatments  Talk to your doctor, nurse or pharmacist before following any medical regimen to see if it is safe and effective for you  © Copyright [address] 2021 Information is for End User's use only and may not be sold, redistributed or otherwise used for commercial purposes   All illustrations and images included in CareNotes® are the copyrighted property of [employer]  or [address] [place] [address]

## 2021-04-29 NOTE — PROGRESS NOTES
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 progressive pulmonary hypertension/TR and mild-to-moderate MR  Discussed with patient and granddaughter at length  Baruch Cheadle uses a walker, but this is quite difficult for her, she lives a rather inactive lifestyle, has chronic lower extremity edema, which is moderate to severe and unchanged  Recent creatinine 1 7    Usual dyspnea on exertion, no chest pains no palpitations no presyncope  No fever or chills  The following portions of the patient's history were reviewed and updated as appropriate: allergies, current medications, past family history, past medical history, past social history, past surgical history and problem list   Review of Systems   Constitution: Negative for chills, diaphoresis, malaise/fatigue and weight gain  HENT: Negative for nosebleeds and stridor  Eyes: Negative for double vision, vision loss in left eye, vision loss in right eye and visual disturbance  Cardiovascular: Positive for dyspnea on exertion and leg swelling  Negative for chest pain, claudication, cyanosis, irregular heartbeat, near-syncope, orthopnea, palpitations, paroxysmal nocturnal dyspnea and syncope  Respiratory: Negative for cough, shortness of breath, snoring and wheezing  Endocrine: Negative for polydipsia, polyphagia and polyuria  Hematologic/Lymphatic: Negative for bleeding problem  Does not bruise/bleed easily  Skin: Negative for flushing and rash  Musculoskeletal: Positive for arthritis, muscle weakness and stiffness  Negative for falls and myalgias  Gastrointestinal: Negative for abdominal pain, heartburn, hematemesis, hematochezia, melena and nausea  Genitourinary: Negative for hematuria     Neurological: Negative for brief paralysis, dizziness, focal weakness, headaches, light-headedness, loss of balance and vertigo  Psychiatric/Behavioral: Negative for altered mental status and substance abuse  Allergic/Immunologic: Negative for hives  Objective:      /80   Pulse 72   LMP  (LMP Unknown)   Physical Exam   Constitutional: She is oriented to person, place, and time  She appears well-developed and well-nourished  HENT:   Head: Normocephalic and atraumatic  Eyes: Pupils are equal, round, and reactive to light  EOM are normal    Neck: Normal range of motion  Neck supple  No JVD present  No thyromegaly present  Cardiovascular: Normal rate, regular rhythm and intact distal pulses  Exam reveals no gallop and no friction rub  Murmur (  2/6 AS quality murmur at base, S1 coincident murmur at apex grade 3 of 6 holosystolic) heard  Pulmonary/Chest: Effort normal and breath sounds normal  No stridor  No respiratory distress  She has no wheezes  She has no rales  Abdominal: Soft  Bowel sounds are normal  She exhibits no mass  There is no abdominal tenderness  Musculoskeletal: Normal range of motion  General: Edema present  Lymphadenopathy:     She has no cervical adenopathy  Neurological: She is alert and oriented to person, place, and time  Skin: Skin is warm and dry  No rash noted  No pallor  Psychiatric: She has a normal mood and affect  Her behavior is normal  Judgment and thought content normal    Nursing note and vitals reviewed        Lab Review:   Appointment on 03/29/2021   Component Date Value    WBC 03/29/2021 5 37     RBC 03/29/2021 3 64*    Hemoglobin 03/29/2021 11 3*    Hematocrit 03/29/2021 36 6     MCV 03/29/2021 101*    MCH 03/29/2021 31 0     MCHC 03/29/2021 30 9*    RDW 03/29/2021 14 6     MPV 03/29/2021 10 8     Platelets 98/81/5414 190     nRBC 03/29/2021 0     Neutrophils Relative 03/29/2021 59     Immat GRANS % 03/29/2021 0     Lymphocytes Relative 03/29/2021 28     Monocytes Relative 03/29/2021 10     Eosinophils Relative 03/29/2021 2     Basophils Relative 03/29/2021 1     Neutrophils Absolute 03/29/2021 3 15     Immature Grans Absolute 03/29/2021 0 02     Lymphocytes Absolute 03/29/2021 1 49     Monocytes Absolute 03/29/2021 0 54     Eosinophils Absolute 03/29/2021 0 12     Basophils Absolute 03/29/2021 0 05     Sodium 03/29/2021 141     Potassium 03/29/2021 4 0     Chloride 03/29/2021 106     CO2 03/29/2021 28     ANION GAP 03/29/2021 7     BUN 03/29/2021 31*    Creatinine 03/29/2021 1 71*    Glucose, Fasting 03/29/2021 88     Calcium 03/29/2021 8 7     AST 03/29/2021 44     ALT 03/29/2021 43     Alkaline Phosphatase 03/29/2021 111     Total Protein 03/29/2021 6 8     Albumin 03/29/2021 3 7     Total Bilirubin 03/29/2021 0 70     eGFR 03/29/2021 26     Cholesterol 03/29/2021 141     Triglycerides 03/29/2021 65     HDL, Direct 03/29/2021 78     LDL Calculated 03/29/2021 50     HIV-1/HIV-2 Ab 03/29/2021 Non-Reactive     Hepatitis C Ab 03/29/2021 Non-reactive     Hepatitis B Surface Ag 03/29/2021 Non-reactive    Orders Only on 03/15/2021   Component Date Value    Protime 03/29/2021 24 1*    INR 03/29/2021 2 17*   Anticoag visit on 03/15/2021   Component Date Value    INR 03/10/2021 1 90*     No results found  Assessment:       1  Combined systolic and diastolic congestive heart failure, unspecified HF chronicity (HCC)  metolazone (ZAROXOLYN) 2 5 mg tablet    lisinopril (ZESTRIL) 2 5 mg tablet    Basic metabolic panel    NT-BNP PRO   2  S/P AVR     3  Atrial fibrillation, chronic     4  Stenosis of prosthetic aortic valve, initial encounter          Plan:        lengthy conversation had with patient and granddaughter, will address volume excess by adding Zaroxolyn 2 days per week, close BMP follow-up  Will try low-dose lisinopril, hopefully BP will tolerate, in light of LV dysfunction as I do not think her AS is severe       difficult conversation had in that I feel if anything further need be done at need be done soon with evidence of LV dysfunction  Recommended consideration of transesophageal echocardiogram and  Then a left and right heart catheterization if they would consider TAVR  Poor open heart surgical candidate  Caesar Melvin wants to give this some thought as does her granddaughter and talk it over with their family, for now Caesar Melvin is thinking she does not want to proceed at her age with any aggressive intervention  I will see her back in a month they will call sooner with problems

## 2021-05-10 ENCOUNTER — TELEPHONE (OUTPATIENT)
Dept: LAB | Facility: HOSPITAL | Age: 86
End: 2021-05-10

## 2021-05-14 ENCOUNTER — TELEPHONE (OUTPATIENT)
Dept: CARDIOLOGY CLINIC | Facility: CLINIC | Age: 86
End: 2021-05-14

## 2021-05-14 NOTE — TELEPHONE ENCOUNTER
Patient's granddaughter, Srinivasan Smoke, called  Patient has been having shakiness/weakness since starting Lisinopril 2 5 mg daily on 4/29/21  She is going to stop this for now and see how she does over the next few days and report back next week with an update, if no improvement  Otherwise, patient has a follow- up appointment on 5/27/21

## 2021-05-15 ENCOUNTER — APPOINTMENT (EMERGENCY)
Dept: RADIOLOGY | Facility: HOSPITAL | Age: 86
DRG: 682 | End: 2021-05-15
Payer: MEDICARE

## 2021-05-15 ENCOUNTER — HOSPITAL ENCOUNTER (INPATIENT)
Facility: HOSPITAL | Age: 86
LOS: 11 days | Discharge: NURSING, REHAB OR LONG-TERM CARE | DRG: 682 | End: 2021-05-26
Attending: EMERGENCY MEDICINE | Admitting: INTERNAL MEDICINE
Payer: MEDICARE

## 2021-05-15 ENCOUNTER — APPOINTMENT (EMERGENCY)
Dept: CT IMAGING | Facility: HOSPITAL | Age: 86
DRG: 682 | End: 2021-05-15
Payer: MEDICARE

## 2021-05-15 DIAGNOSIS — N18.9 ACUTE KIDNEY INJURY SUPERIMPOSED ON CHRONIC KIDNEY DISEASE (HCC): Primary | ICD-10-CM

## 2021-05-15 DIAGNOSIS — M54.41 CHRONIC BILATERAL LOW BACK PAIN WITH BILATERAL SCIATICA: ICD-10-CM

## 2021-05-15 DIAGNOSIS — G89.29 CHRONIC BILATERAL LOW BACK PAIN WITH BILATERAL SCIATICA: ICD-10-CM

## 2021-05-15 DIAGNOSIS — L03.115 BILATERAL LOWER LEG CELLULITIS: ICD-10-CM

## 2021-05-15 DIAGNOSIS — R77.8 ELEVATED TROPONIN I LEVEL: ICD-10-CM

## 2021-05-15 DIAGNOSIS — N17.9 ACUTE KIDNEY INJURY SUPERIMPOSED ON CHRONIC KIDNEY DISEASE (HCC): Primary | ICD-10-CM

## 2021-05-15 DIAGNOSIS — M54.42 CHRONIC BILATERAL LOW BACK PAIN WITH BILATERAL SCIATICA: ICD-10-CM

## 2021-05-15 DIAGNOSIS — L03.116 BILATERAL LOWER LEG CELLULITIS: ICD-10-CM

## 2021-05-15 DIAGNOSIS — J96.01 ACUTE RESPIRATORY FAILURE WITH HYPOXIA (HCC): ICD-10-CM

## 2021-05-15 DIAGNOSIS — L85.3 DRY SKIN DERMATITIS: ICD-10-CM

## 2021-05-15 DIAGNOSIS — R53.1 GENERALIZED WEAKNESS: ICD-10-CM

## 2021-05-15 DIAGNOSIS — I50.9 CHF (CONGESTIVE HEART FAILURE) (HCC): ICD-10-CM

## 2021-05-15 DIAGNOSIS — B35.6 FUNGAL INFECTION OF THE GROIN: ICD-10-CM

## 2021-05-15 DIAGNOSIS — G62.9 NEUROPATHY: ICD-10-CM

## 2021-05-15 DIAGNOSIS — G93.40 ACUTE ENCEPHALOPATHY: ICD-10-CM

## 2021-05-15 PROBLEM — I63.9 STROKE (CEREBRUM) (HCC): Status: RESOLVED | Noted: 2018-06-29 | Resolved: 2021-05-15

## 2021-05-15 PROBLEM — I50.42 CHRONIC COMBINED SYSTOLIC AND DIASTOLIC CHF (CONGESTIVE HEART FAILURE) (HCC): Status: ACTIVE | Noted: 2021-03-19

## 2021-05-15 LAB
ALBUMIN SERPL BCP-MCNC: 2.6 G/DL (ref 3.5–5)
ALP SERPL-CCNC: 107 U/L (ref 46–116)
ALT SERPL W P-5'-P-CCNC: 27 U/L (ref 12–78)
ANION GAP SERPL CALCULATED.3IONS-SCNC: 9 MMOL/L (ref 4–13)
ANION GAP SERPL CALCULATED.3IONS-SCNC: 9 MMOL/L (ref 4–13)
AST SERPL W P-5'-P-CCNC: 31 U/L (ref 5–45)
ATRIAL RATE: 79 BPM
BASOPHILS # BLD AUTO: 0.04 THOUSANDS/ΜL (ref 0–0.1)
BASOPHILS NFR BLD AUTO: 1 % (ref 0–1)
BILIRUB SERPL-MCNC: 0.48 MG/DL (ref 0.2–1)
BUN SERPL-MCNC: 80 MG/DL (ref 5–25)
BUN SERPL-MCNC: 80 MG/DL (ref 5–25)
CALCIUM ALBUM COR SERPL-MCNC: 9.1 MG/DL (ref 8.3–10.1)
CALCIUM SERPL-MCNC: 7.9 MG/DL (ref 8.3–10.1)
CALCIUM SERPL-MCNC: 8 MG/DL (ref 8.3–10.1)
CHLORIDE SERPL-SCNC: 101 MMOL/L (ref 100–108)
CHLORIDE SERPL-SCNC: 101 MMOL/L (ref 100–108)
CK MB SERPL-MCNC: 2.1 % (ref 0–2.5)
CK MB SERPL-MCNC: 4.3 NG/ML (ref 0–5)
CK SERPL-CCNC: 204 U/L (ref 26–192)
CO2 SERPL-SCNC: 29 MMOL/L (ref 21–32)
CO2 SERPL-SCNC: 30 MMOL/L (ref 21–32)
CREAT SERPL-MCNC: 3.44 MG/DL (ref 0.6–1.3)
CREAT SERPL-MCNC: 3.49 MG/DL (ref 0.6–1.3)
CREAT UR-MCNC: 37.2 MG/DL
CREAT UR-MCNC: 37.2 MG/DL
EOSINOPHIL # BLD AUTO: 0.09 THOUSAND/ΜL (ref 0–0.61)
EOSINOPHIL NFR BLD AUTO: 1 % (ref 0–6)
ERYTHROCYTE [DISTWIDTH] IN BLOOD BY AUTOMATED COUNT: 14.3 % (ref 11.6–15.1)
GFR SERPL CREATININE-BSD FRML MDRD: 11 ML/MIN/1.73SQ M
GFR SERPL CREATININE-BSD FRML MDRD: 11 ML/MIN/1.73SQ M
GLUCOSE SERPL-MCNC: 109 MG/DL (ref 65–140)
GLUCOSE SERPL-MCNC: 109 MG/DL (ref 65–140)
HCT VFR BLD AUTO: 35.3 % (ref 34.8–46.1)
HGB BLD-MCNC: 11 G/DL (ref 11.5–15.4)
IMM GRANULOCYTES # BLD AUTO: 0.02 THOUSAND/UL (ref 0–0.2)
IMM GRANULOCYTES NFR BLD AUTO: 0 % (ref 0–2)
INR PPP: 7.27 (ref 0.84–1.19)
LYMPHOCYTES # BLD AUTO: 1.16 THOUSANDS/ΜL (ref 0.6–4.47)
LYMPHOCYTES NFR BLD AUTO: 13 % (ref 14–44)
MCH RBC QN AUTO: 30.1 PG (ref 26.8–34.3)
MCHC RBC AUTO-ENTMCNC: 31.2 G/DL (ref 31.4–37.4)
MCV RBC AUTO: 97 FL (ref 82–98)
MICROALBUMIN UR-MCNC: 10.2 MG/L (ref 0–20)
MICROALBUMIN/CREAT 24H UR: 27 MG/G CREATININE (ref 0–30)
MONOCYTES # BLD AUTO: 0.84 THOUSAND/ΜL (ref 0.17–1.22)
MONOCYTES NFR BLD AUTO: 10 % (ref 4–12)
NEUTROPHILS # BLD AUTO: 6.54 THOUSANDS/ΜL (ref 1.85–7.62)
NEUTS SEG NFR BLD AUTO: 75 % (ref 43–75)
NRBC BLD AUTO-RTO: 0 /100 WBCS
PLATELET # BLD AUTO: 208 THOUSANDS/UL (ref 149–390)
PMV BLD AUTO: 9.3 FL (ref 8.9–12.7)
POTASSIUM SERPL-SCNC: 3.8 MMOL/L (ref 3.5–5.3)
POTASSIUM SERPL-SCNC: 3.9 MMOL/L (ref 3.5–5.3)
PROT SERPL-MCNC: 6.2 G/DL (ref 6.4–8.2)
PROTHROMBIN TIME: 60.5 SECONDS (ref 11.6–14.5)
QRS AXIS: -72 DEGREES
QRSD INTERVAL: 146 MS
QT INTERVAL: 388 MS
QTC INTERVAL: 487 MS
RBC # BLD AUTO: 3.65 MILLION/UL (ref 3.81–5.12)
SARS-COV-2 RNA RESP QL NAA+PROBE: NEGATIVE
SODIUM SERPL-SCNC: 139 MMOL/L (ref 136–145)
SODIUM SERPL-SCNC: 140 MMOL/L (ref 136–145)
T WAVE AXIS: 72 DEGREES
TROPONIN I SERPL-MCNC: 0.07 NG/ML
TROPONIN I SERPL-MCNC: 0.09 NG/ML
TSH SERPL DL<=0.05 MIU/L-ACNC: 3 UIU/ML (ref 0.36–3.74)
UUN 24H UR-MCNC: 253 MG/DL
VENTRICULAR RATE: 95 BPM
WBC # BLD AUTO: 8.69 THOUSAND/UL (ref 4.31–10.16)

## 2021-05-15 PROCEDURE — 74176 CT ABD & PELVIS W/O CONTRAST: CPT

## 2021-05-15 PROCEDURE — 84484 ASSAY OF TROPONIN QUANT: CPT | Performed by: INTERNAL MEDICINE

## 2021-05-15 PROCEDURE — 84540 ASSAY OF URINE/UREA-N: CPT | Performed by: INTERNAL MEDICINE

## 2021-05-15 PROCEDURE — 99285 EMERGENCY DEPT VISIT HI MDM: CPT | Performed by: EMERGENCY MEDICINE

## 2021-05-15 PROCEDURE — 84484 ASSAY OF TROPONIN QUANT: CPT | Performed by: EMERGENCY MEDICINE

## 2021-05-15 PROCEDURE — 80053 COMPREHEN METABOLIC PANEL: CPT | Performed by: EMERGENCY MEDICINE

## 2021-05-15 PROCEDURE — U0005 INFEC AGEN DETEC AMPLI PROBE: HCPCS | Performed by: EMERGENCY MEDICINE

## 2021-05-15 PROCEDURE — 84443 ASSAY THYROID STIM HORMONE: CPT | Performed by: EMERGENCY MEDICINE

## 2021-05-15 PROCEDURE — 99223 1ST HOSP IP/OBS HIGH 75: CPT | Performed by: INTERNAL MEDICINE

## 2021-05-15 PROCEDURE — 82043 UR ALBUMIN QUANTITATIVE: CPT | Performed by: INTERNAL MEDICINE

## 2021-05-15 PROCEDURE — 80048 BASIC METABOLIC PNL TOTAL CA: CPT | Performed by: INTERNAL MEDICINE

## 2021-05-15 PROCEDURE — 85025 COMPLETE CBC W/AUTO DIFF WBC: CPT | Performed by: EMERGENCY MEDICINE

## 2021-05-15 PROCEDURE — 93005 ELECTROCARDIOGRAM TRACING: CPT

## 2021-05-15 PROCEDURE — 36415 COLL VENOUS BLD VENIPUNCTURE: CPT | Performed by: EMERGENCY MEDICINE

## 2021-05-15 PROCEDURE — 71045 X-RAY EXAM CHEST 1 VIEW: CPT

## 2021-05-15 PROCEDURE — 82553 CREATINE MB FRACTION: CPT | Performed by: INTERNAL MEDICINE

## 2021-05-15 PROCEDURE — 99285 EMERGENCY DEPT VISIT HI MDM: CPT

## 2021-05-15 PROCEDURE — U0003 INFECTIOUS AGENT DETECTION BY NUCLEIC ACID (DNA OR RNA); SEVERE ACUTE RESPIRATORY SYNDROME CORONAVIRUS 2 (SARS-COV-2) (CORONAVIRUS DISEASE [COVID-19]), AMPLIFIED PROBE TECHNIQUE, MAKING USE OF HIGH THROUGHPUT TECHNOLOGIES AS DESCRIBED BY CMS-2020-01-R: HCPCS | Performed by: EMERGENCY MEDICINE

## 2021-05-15 PROCEDURE — 85610 PROTHROMBIN TIME: CPT | Performed by: INTERNAL MEDICINE

## 2021-05-15 PROCEDURE — 93010 ELECTROCARDIOGRAM REPORT: CPT | Performed by: INTERNAL MEDICINE

## 2021-05-15 PROCEDURE — 82550 ASSAY OF CK (CPK): CPT | Performed by: INTERNAL MEDICINE

## 2021-05-15 PROCEDURE — 1124F ACP DISCUSS-NO DSCNMKR DOCD: CPT | Performed by: EMERGENCY MEDICINE

## 2021-05-15 PROCEDURE — 82570 ASSAY OF URINE CREATININE: CPT | Performed by: INTERNAL MEDICINE

## 2021-05-15 RX ORDER — ONDANSETRON 2 MG/ML
4 INJECTION INTRAMUSCULAR; INTRAVENOUS EVERY 6 HOURS PRN
Status: DISCONTINUED | OUTPATIENT
Start: 2021-05-15 | End: 2021-05-26 | Stop reason: HOSPADM

## 2021-05-15 RX ORDER — ROPINIROLE 1 MG/1
2 TABLET, FILM COATED ORAL 2 TIMES DAILY
Status: DISCONTINUED | OUTPATIENT
Start: 2021-05-15 | End: 2021-05-26 | Stop reason: HOSPADM

## 2021-05-15 RX ORDER — ACETAMINOPHEN 325 MG/1
650 TABLET ORAL EVERY 6 HOURS PRN
Status: DISCONTINUED | OUTPATIENT
Start: 2021-05-15 | End: 2021-05-26 | Stop reason: HOSPADM

## 2021-05-15 RX ADMIN — ROPINIROLE HYDROCHLORIDE 2 MG: 1 TABLET, FILM COATED ORAL at 22:17

## 2021-05-15 RX ADMIN — SODIUM CHLORIDE 500 ML: 0.9 INJECTION, SOLUTION INTRAVENOUS at 12:27

## 2021-05-15 RX ADMIN — ROPINIROLE HYDROCHLORIDE 2 MG: 1 TABLET, FILM COATED ORAL at 15:22

## 2021-05-15 NOTE — ASSESSMENT & PLAN NOTE
Lab Results   Component Value Date    EGFR 11 05/15/2021    EGFR 26 03/29/2021    EGFR 28 04/17/2020    CREATININE 3 44 (H) 05/15/2021    CREATININE 1 71 (H) 03/29/2021    CREATININE 1 63 (H) 04/17/2020

## 2021-05-15 NOTE — ED PROVIDER NOTES
History  Chief Complaint   Patient presents with    Weakness - Generalized     family called EMS due to increase weakness over last few days; pt reports shaking for past few weeks         49-year-old female with past medical history of CVA, CHF, atrial fibrillation anticoagulated with Coumadin, aortic insufficiency status post bioprosthetic AVR in 2002, CKD, hypertension, hyperlipidemia, and peripheral edema who is presenting for evaluation of generalized weakness, shakiness, and confusion  The patient is a very poor historian  She reports feeling shaky over the past few weeks  She does report feeling generalized weakness but denies any focal weakness  No speech difficulty, swallowing difficulty, changes, chest pain, shortness of breath, nausea, vomiting, or abdominal pain  Urinary symptoms  I called her son for collateral history and he reported that the patient has been increasingly weak over the past few weeks  She also is seeming more confused  The patient was having some hallucinations this morning which consisted of seeing people in her house that were not really there  The patient last saw Cardiology on April 29th  She was started on lisinopril and metolazone  That note mentioned chronic peripheral edema which was unchanged  Apparently, on EMS arrival, the patient had a low-normal blood pressure in the 60Z systolic  She was given 1 L of IV fluid  SUMMARY     LEFT VENTRICLE:  Systolic function was mildly reduced  Ejection fraction was estimated in the range of 40 % to 45 %  There were no regional wall motion abnormalities  Wall thickness was mildly increased  There was mild concentric hypertrophy      RIGHT VENTRICLE:  The ventricle was mildly dilated  Systolic function was mildly reduced      LEFT ATRIUM:  The atrium was mildly dilated      RIGHT ATRIUM:  The atrium was mildly dilated      MITRAL VALVE:  There was mild to moderate annular calcification    There was mild regurgitation      AORTIC VALVE:  A bioprosthesis was present  It exhibited normal function  Mean gradient 16 mm Hg  There was trace regurgitation      TRICUSPID VALVE:  There was moderate regurgitation  The findings suggest moderate to severe pulmonary hypertension      IVC, HEPATIC VEINS:  The inferior vena cava was dilated  Respirophasic changes were blunted (less than 50% variation)  Wt Readings from Last 3 Encounters:   05/15/21 61 2 kg (134 lb 14 7 oz)   03/19/21 68 kg (150 lb)   11/20/20 65 3 kg (144 lb)       Prior to Admission Medications   Prescriptions Last Dose Informant Patient Reported? Taking? Ergocalciferol (VITAMIN D2 PO) Not Taking at Unknown time  Yes No   Sig: Take 1 25 mg by mouth once a week   Turmeric 500 MG CAPS Not Taking at Unknown time  Yes No   Sig: Take 500 mg by mouth every other day   acetaminophen (TYLENOL) 500 mg tablet More than a month at Unknown time  Yes No   Sig: Take 500 mg by mouth every 6 (six) hours as needed for mild pain   atorvastatin (LIPITOR) 40 mg tablet 5/14/2021 at Unknown time  No Yes   Sig: Take 1 tablet (40 mg total) by mouth every evening   diclofenac sodium (VOLTAREN) 1 % More than a month at Unknown time  No No   Sig: Apply 2 g topically 4 (four) times a day   furosemide (LASIX) 80 mg tablet 5/15/2021 at Unknown time  No Yes   Sig: Take 1 tablet (80 mg total) by mouth daily   gabapentin (NEURONTIN) 100 mg capsule 5/15/2021 at Unknown time  No Yes   Sig: Take 2 capsules (200 mg total) by mouth 3 (three) times a day   lisinopril (ZESTRIL) 2 5 mg tablet Not Taking at Unknown time  No No   Sig: Take 1 tablet (2 5 mg total) by mouth daily   Patient not taking: Reported on 5/15/2021   metolazone (ZAROXOLYN) 2 5 mg tablet Past Week at Unknown time  No Yes   Sig: Take 30 minutes before Lasix two days per week   (m-Th)   multivitamin (THERAGRAN) TABS Not Taking at Unknown time  Yes No   Sig: Take 1 tablet by mouth every other day   potassium chloride (K-DUR,KLOR-CON) 20 mEq tablet 5/15/2021 at Unknown time  No Yes   Sig: Take 1 tablet (20 mEq total) by mouth daily   rOPINIRole (REQUIP) 2 mg tablet 5/14/2021 at Unknown time  No Yes   Sig: Take 1 tablet (2 mg total) by mouth 2 (two) times a day   traMADol (ULTRAM) 50 mg tablet 5/15/2021 at Unknown time  No Yes   Sig: Take 1 tablet (50 mg total) by mouth 2 (two) times a day as needed for moderate pain   warfarin (COUMADIN) 5 mg tablet 5/14/2021 at Unknown time  No Yes   Sig: TAKE AS DIRECTED BY DR Destiny Jimenez      Facility-Administered Medications: None       Past Medical History:   Diagnosis Date    A-fib [facility])     Aortic insufficiency     Arthritis     L shoulder,fingers    Cardiac disease     valve replacement    Carpal tunnel syndrome, bilateral     Chronic anemia     Chronic diastolic congestive heart failure (HCC) 3/19/2021    Chronic lower back pain     DDD (degenerative disc disease), lumbar     Dropfoot     right    Edema     History of echocardiogram 04/20/2016    EF 75%, Hyperdynamic LVSF  Mild concentric LVH  Normal functioning bioprosthetic AV  Trace MR  Mild pulm htn   HTN (hypertension)     Hyperlipidemia     Hypertension     Inguinal hernia, left     Insomnia     Lymphedema     Parkinson disease (HCC)     Peripheral neuropathy     Phlebitis     Psoriasis     Renal insufficiency     Grade 1    Restless leg syndrome     Rhabdomyolysis 8/23/2016    Right bundle branch block     Senile keratosis     Spinal stenosis     Stasis ulcer of right lower extremity ([address] )     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    Stroke ([address] )     Supraventricular tachycardia ([address] )     Vitamin D deficiency        Past Surgical History:   Procedure Laterality Date    AORTIC VALVE REPLACEMENT  11/25/2002    Tissue AVR #21    AORTIC VALVULOPLASTY  11/25/2002    Bovine Pericardial heart valve    BACK SURGERY      CARDIAC CATHETERIZATION  11/22/2002    Sever critical Aortic stenosis  Pulm Htn  Normal coronary arteries   HAND SURGERY      right hand    HYSTERECTOMY      ROTATOR CUFF REPAIR Right     VASCULAR SURGERY      bilateral vein ligation & stripping       Family History   Problem Relation Age of Onset    No Known Problems Mother     No Known Problems Father     No Known Problems Sister     No Known Problems Brother     Hyperlipidemia Son     Hypertension Son     No Known Problems Sister     No Known Problems Brother     Hyperlipidemia Son     Hypertension Son     Hyperlipidemia Son     Hypertension Son      I have reviewed and agree with the history as documented  E-Cigarette/Vaping    E-Cigarette Use Never User      E-Cigarette/Vaping Substances    Nicotine No     THC No     CBD No     Flavoring No     Other No     Unknown No      Social History     Tobacco Use    Smoking status: Never Smoker    Smokeless tobacco: Never Used   Substance Use Topics    Alcohol use: Never     Frequency: Never    Drug use: No       Review of Systems   Unable to perform ROS: Mental status change   Neurological: Positive for weakness (generalized)  Physical Exam  Physical Exam  Vitals signs and nursing note reviewed  Constitutional:       General: She is not in acute distress  Appearance: She is well-developed  Comments: Chronically ill-appearing older female, no acute distress  HENT:      Head: Normocephalic and atraumatic  Right Ear: External ear normal       Left Ear: External ear normal    Eyes:      Conjunctiva/sclera: Conjunctivae normal       Pupils: Pupils are equal, round, and reactive to light  Cardiovascular:      Rate and Rhythm: Rhythm irregularly irregular  Heart sounds: Murmur present  Comments: Atrial fibrillation  Murmur consistent with history of bioprosthetic AVR  Pulmonary:      Effort: Pulmonary effort is normal  No respiratory distress  Breath sounds: Normal breath sounds  No wheezing or rales        Comments: No tachypnea or increased work of breathing  No conversational dyspnea  Abdominal:      General: Bowel sounds are normal  There is no distension  Palpations: Abdomen is soft  Tenderness: There is abdominal tenderness  There is no guarding  Comments: Tenderness to palpation in the left lower quadrant without guarding or peritoneal signs  Musculoskeletal: Normal range of motion  General: No deformity  Right lower leg: Edema present  Left lower leg: Edema present  Comments: Marked edema and erythema of the bilateral lower extremities, chronic per patient  Bilateral DP pulses are palpable  Skin:     General: Skin is warm and dry  Capillary Refill: Capillary refill takes less than 2 seconds  Neurological:      Mental Status: She is alert and oriented to person, place, and time  Comments: Patient is awake  She answers questions appropriately but seems slightly confused  No facial asymmetry  No cranial nerve deficits  Patient has 4/5 strength in the bilateral upper and lower extremities  No focal weakness     Psychiatric:         Mood and Affect: Mood normal          Behavior: Behavior normal          Vital Signs  ED Triage Vitals [05/15/21 0945]   Temperature Pulse Respirations Blood Pressure SpO2   98 6 °F (37 °C) (!) 109 18 100/75 98 %      Temp Source Heart Rate Source Patient Position - Orthostatic VS BP Location FiO2 (%)   Temporal Monitor Sitting Right arm --      Pain Score       8           Vitals:    05/15/21 1030 05/15/21 1230 05/15/21 1300 05/15/21 1525   BP: 95/52 98/51 119/84 92/62   Pulse: 98 98 83 95   Patient Position - Orthostatic VS: Sitting Lying  Lying         Visual Acuity      ED Medications  Medications   rOPINIRole (REQUIP) tablet 2 mg (2 mg Oral Given 5/15/21 1522)   acetaminophen (TYLENOL) tablet 650 mg (has no administration in time range)   ondansetron (ZOFRAN) injection 4 mg (has no administration in time range)   sodium chloride 0 9 % bolus 500 mL (500 mL Intravenous New Bag 5/15/21 1227)       Diagnostic Studies  Results Reviewed     Procedure Component Value Units Date/Time    [facility][id number]  (Normal) Collected: 05/15/21 1052    Lab Status: Final result Specimen: Nares from Nasopharyngeal Swab Updated: 05/15/21 1150     SARS-CoV-2 Negative    Narrative: The specimen collection materials, transport medium, and/or testing methodology utilized in the production of these test results have been proven to be reliable in a limited validation with an abbreviated program under the Emergency Utilization Authorization provided by the FDA  Testing reported as "Presumptive positive" will be confirmed with secondary testing to ensure result accuracy  Clinical caution and judgement should be used with the interpretation of these results with consideration of the clinical impression and other laboratory testing  Testing reported as "Positive" or "Negative" has been proven to be accurate according to standard laboratory validation requirements  All testing is performed with control materials showing appropriate reactivity at standard intervals  TSH, 3rd generation with Free T4 reflex [[id number]]  (Normal) Collected: 05/15/21 1052    Lab Status: Final result Specimen: Blood from Arm, Left Updated: 05/15/21 1123     TSH 3RD GENERATON 2 999 uIU/mL     Narrative:      Patients undergoing fluorescein dye angiography may retain small amounts of fluorescein in the body for 48-72 hours post procedure  Samples containing fluorescein can produce falsely depressed TSH values  If the patient had this procedure,a specimen should be resubmitted post fluorescein clearance        Troponin I [[id number]]  (Abnormal) Collected: 05/15/21 1052    Lab Status: Final result Specimen: Blood from Arm, Left Updated: 05/15/21 1117     Troponin I 0 09 ng/mL     Comprehensive metabolic panel [[id number]]  (Abnormal) Collected: 05/15/21 1052    Lab Status: Final result Specimen: Blood from Arm, Left Updated: 05/15/21 1115     Sodium 140 mmol/L      Potassium 3 9 mmol/L      Chloride 101 mmol/L      CO2 30 mmol/L      ANION GAP 9 mmol/L      BUN 80 mg/dL      Creatinine 3 44 mg/dL      Glucose 109 mg/dL      Calcium 8 0 mg/dL      Corrected Calcium 9 1 mg/dL      AST 31 U/L      ALT 27 U/L      Alkaline Phosphatase 107 U/L      Total Protein 6 2 g/dL      Albumin 2 6 g/dL      Total Bilirubin 0 48 mg/dL      eGFR 11 ml/min/1 73sq m     Narrative:      National Kidney Disease Foundation guidelines for Chronic Kidney Disease (CKD):     Stage 1 with normal or high GFR (GFR > 90 mL/min/1 73 square meters)    Stage 2 Mild CKD (GFR = 60-89 mL/min/1 73 square meters)    Stage 3A Moderate CKD (GFR = 45-59 mL/min/1 73 square meters)    Stage 3B Moderate CKD (GFR = 30-44 mL/min/1 73 square meters)    Stage 4 Severe CKD (GFR = 15-29 mL/min/1 73 square meters)    Stage 5 End Stage CKD (GFR <15 mL/min/1 73 square meters)  Note: GFR calculation is accurate only with a steady state creatinine    CBC and differential [[id number]]  (Abnormal) Collected: 05/15/21 1052    Lab Status: Final result Specimen: Blood from Arm, Left Updated: 05/15/21 1106     WBC 8 69 Thousand/uL      RBC 3 65 Million/uL      Hemoglobin 11 0 g/dL      Hematocrit 35 3 %      MCV 97 fL      MCH 30 1 pg      MCHC 31 2 g/dL      RDW 14 3 %      MPV 9 3 fL      Platelets 249 Thousands/uL      nRBC 0 /100 WBCs      Neutrophils Relative 75 %      Immat GRANS % 0 %      Lymphocytes Relative 13 %      Monocytes Relative 10 %      Eosinophils Relative 1 %      Basophils Relative 1 %      Neutrophils Absolute 6 54 Thousands/µL      Immature Grans Absolute 0 02 Thousand/uL      Lymphocytes Absolute 1 16 Thousands/µL      Monocytes Absolute 0 84 Thousand/µL      Eosinophils Absolute 0 09 Thousand/µL      Basophils Absolute 0 04 Thousands/µL     UA w Reflex to Microscopic w Reflex to Culture [[id number]]     Lab Status: No result Specimen: Urine                  XR chest 1 view portable   ED Interpretation by Porfirio Shone, MD (05/15 1129)   X-ray interpreted by me  Formal Radiology interpretation to follow  Cardiomegaly  No acute abnormality  CT abdomen pelvis wo contrast   Final Result by Sreekanth Campa DO (05/15 1203)   Mild constipation  Workstation performed: [id number]                    Procedures  Procedures         ED Course  ED Course as of May 15 1944   Sat May 15, 2021   4786 Pulse(!): 109   1029 Attempted to call patient's son, Nakul Rankin, to obtain additional history  I did not receive an answer  Left a message requesting a call back  1118 Hemoglobin(!): 11 0   1118 Troponin I(!): 0 09   1118 BUN(!): 80   1118 Creatinine(!): 3 44   1119 Reviewed prior renal function testing  BUN most recently 31  Creatinine most recently 1 71       1128 TSH 3RD GENERATON: 2 999   1129 EKG interpreted by me  Atrial fibrillation at 95 beats per minute  Left axis deviation  Bifascicular block  No T-wave inversions  No ST elevation or depression  1146 One of the patient's sons was at bedside earlier  I went update him regarding results but he must have stepped out  Spoke with patient's son, Indio Ambrosio, regarding results and need for admission  He was agreeable and appreciative of the update  1158 SARS-COV-2: Negative   1206 Mild constipation  No other acute abnormality  CT abdomen pelvis wo contrast   1206 Patient received 1 L of IV fluid from EMS  Due to her elevated BUN suggestive of prerenal etiology for her acute kidney injury, will give 500 mL of IV fluid  1209 Message sent to [facility] for admission  SBIRT 20yo+      Most Recent Value   SBIRT (22 yo +)   In order to provide better care to our patients, we are screening all of our patients for alcohol and drug use  Would it be okay to ask you these screening questions?   Yes Filed at: 05/15/2021 6415   Initial Alcohol Screen: US AUDIT-C    1  How often do you have a drink containing alcohol?  0 Filed at: 05/15/2021 0947   2  How many drinks containing alcohol do you have on a typical day you are drinking? 0 Filed at: 05/15/2021 0947   3a  Male UNDER 65: How often do you have five or more drinks on one occasion? 0 Filed at: 05/15/2021 0947   3b  FEMALE Any Age, or MALE 65+: How often do you have 4 or more drinks on one occassion? 0 Filed at: 05/15/2021 0947   Audit-C Score  0 Filed at: 05/15/2021 6510   OLIVER: How many times in the past year have you    Used an illegal drug or used a prescription medication for non-medical reasons? Never Filed at: 05/15/2021 0947                    MDM  Number of Diagnoses or Management Options  Acute encephalopathy: new and requires workup  Acute kidney injury superimposed on chronic kidney disease ([facility] ): new and requires workup  CHF (congestive heart failure) ([facility] ): established and worsening  Elevated troponin I level: new and requires workup  Generalized weakness: new and requires workup  Diagnosis management comments:     Differential diagnosis included but was limited to dehydration, electrolyte abnormality, acute kidney injury, ACS, CHF exacerbation, pneumonia, COVID-19, urinary tract infection, and acute intra-abdominal pathology  CMP demonstrated significant acute kidney injury, likely from hypovolemia  The patient appeared clinically dry so she was given IV fluids  Troponin was mildly elevated at 0 09, likely from the acute kidney injury as well as possibly related to patient's underlying cardiac disease  EKG did not show any acute ischemia  Remainder of the patient's workup was unremarkable  She was admitted for further evaluation and treatment of her acute kidney injury and acute encephalopathy  Patient's son Raf Pereyra was updated throughout the patient's ER course  I answered his questions and he was appreciative of the updates         Amount and/or Complexity of Data Reviewed  Clinical lab tests: ordered and reviewed  Tests in the radiology section of CPT®: ordered and reviewed  Decide to obtain previous medical records or to obtain history from someone other than the patient: yes  Obtain history from someone other than the patient: yes  Review and summarize past medical records: yes  Discuss the patient with other providers: yes  Independent visualization of images, tracings, or specimens: yes    Risk of Complications, Morbidity, and/or Mortality  Presenting problems: moderate  Diagnostic procedures: minimal  Management options: minimal    Patient Progress  Patient progress: improved      Disposition  Final diagnoses:   Acute kidney injury superimposed on chronic kidney disease ([facility] )   CHF (congestive heart failure) ([place] [address] )   Elevated troponin I level   Acute encephalopathy   Generalized weakness     Time reflects when diagnosis was documented in both MDM as applicable and the Disposition within this note     Time User Action Codes Description Comment    5/15/2021 12:18 PM Dundee Dust Add [N17 9,  N18 9] Acute kidney injury superimposed on chronic kidney disease ([place] [address] )     5/15/2021 12:18 PM Ayla Dust Add [I50 9] CHF (congestive heart failure) ([place] [address] )     5/15/2021 12:18 PM Ayla Dust Add [R77 8] Elevated troponin I level     5/15/2021 12:18 PM Dundee Dust Add [G93 40] Acute encephalopathy     5/15/2021 12:19 PM Ayla Dust Add [R53 1] Generalized weakness       ED Disposition     ED Disposition Condition Date/Time Comment    Admit Fair Sat May 15, 2021 12:18 PM Case was discussed with JENN and the patient's admission status was agreed to be Admission Status: inpatient status to the service of Dr Leia Hsieh          Follow-up Information    None         Current Discharge Medication List      CONTINUE these medications which have NOT CHANGED    Details   atorvastatin (LIPITOR) 40 mg tablet Take 1 tablet (40 mg total) by mouth every evening  Qty: 90 tablet, Refills: 1    Associated Diagnoses: Hyperlipidemia, unspecified hyperlipidemia type      furosemide (LASIX) 80 mg tablet Take 1 tablet (80 mg total) by mouth daily  Qty: 90 tablet, Refills: 1    Associated Diagnoses: Edema, unspecified type      gabapentin (NEURONTIN) 100 mg capsule Take 2 capsules (200 mg total) by mouth 3 (three) times a day  Qty: 180 capsule, Refills: 5    Associated Diagnoses: Peripheral polyneuropathy      metolazone (ZAROXOLYN) 2 5 mg tablet Take 30 minutes before Lasix two days per week   (m-Th)  Qty: 10 tablet, Refills: 5    Associated Diagnoses: Combined systolic and diastolic congestive heart failure, unspecified HF chronicity (HCC)      potassium chloride (K-DUR,KLOR-CON) 20 mEq tablet Take 1 tablet (20 mEq total) by mouth daily  Qty: 90 tablet, Refills: 1    Associated Diagnoses: Edema, unspecified type      rOPINIRole (REQUIP) 2 mg tablet Take 1 tablet (2 mg total) by mouth 2 (two) times a day  Qty: 180 tablet, Refills: 1    Associated Diagnoses: Restless leg syndrome      traMADol (ULTRAM) 50 mg tablet Take 1 tablet (50 mg total) by mouth 2 (two) times a day as needed for moderate pain  Qty: 60 tablet, Refills: 3    Associated Diagnoses: Chronic bilateral low back pain with bilateral sciatica      warfarin (COUMADIN) 5 mg tablet TAKE AS DIRECTED BY DR Pipe Mccord: 90 tablet, Refills: 1    Associated Diagnoses: Atrial fibrillation, chronic (HCC)      acetaminophen (TYLENOL) 500 mg tablet Take 500 mg by mouth every 6 (six) hours as needed for mild pain      diclofenac sodium (VOLTAREN) 1 % Apply 2 g topically 4 (four) times a day  Qty: 100 g, Refills: 0    Associated Diagnoses: Arthritis of right wrist      Ergocalciferol (VITAMIN D2 PO) Take 1 25 mg by mouth once a week      lisinopril (ZESTRIL) 2 5 mg tablet Take 1 tablet (2 5 mg total) by mouth daily  Qty: 30 tablet, Refills: 5    Associated Diagnoses: Combined systolic and diastolic congestive heart failure, unspecified HF chronicity (HCC)      multivitamin (THERAGRAN) TABS Take 1 tablet by mouth every other day      Turmeric 500 MG CAPS Take 500 mg by mouth every other day           No discharge procedures on file      PDMP Review       Value Time User    PDMP Reviewed  Yes 3/19/2021  9:28 AM Fartun Quintana DO          ED Provider  Electronically Signed by           Hussain Fishman MD  05/15/21 8739

## 2021-05-15 NOTE — ASSESSMENT & PLAN NOTE
Hemoglobin slightly lower than 2 months ago 11 0    No signs of active blood loss, follow up a m   Labs

## 2021-05-15 NOTE — ED NOTES
Family at bedside       [facility] [address]  05/15/21 2618

## 2021-05-15 NOTE — ASSESSMENT & PLAN NOTE
Wt Readings from Last 3 Encounters:   05/15/21 61 2 kg (134 lb 14 7 oz)   03/19/21 68 kg (150 lb)   11/20/20 65 3 kg (144 lb)       Patient with bioprosthetic aortic valve, reduced EF on recent echocardiogram    Will hold diuretics, monitor respiratory status, follow up official chest x-ray, lungs are currently clear to auscultation on physical exam

## 2021-05-15 NOTE — ED NOTES
Patient transported to [facility], [address] [place] [address]  05/15/21 4748

## 2021-05-15 NOTE — ASSESSMENT & PLAN NOTE
Acute metabolic encephalopathy, present on admission, as evidenced by increased confusion, visual hallucinations per the family, likely secondary to uremia in the setting of acute kidney injury

## 2021-05-15 NOTE — PLAN OF CARE
Problem: Potential for Falls  Goal: Patient will remain free of falls  Description: INTERVENTIONS:  - Assess patient frequently for physical needs  -  Identify cognitive and physical deficits and behaviors that affect risk of falls    -  [place] fall precautions as indicated by assessment   - Educate patient/family on patient safety including physical limitations  - Instruct patient to call for assistance with activity based on assessment  - Modify environment to reduce risk of injury  - Consider OT/PT consult to assist with strengthening/mobility  Outcome: Progressing     Problem: Prexisting or High Potential for Compromised Skin Integrity  Goal: Skin integrity is maintained or improved  Description: INTERVENTIONS:  - Identify patients at risk for skin breakdown  - Assess and monitor skin integrity  - Assess and monitor nutrition and hydration status  - Monitor labs   - Assess for incontinence   - Turn and reposition patient  - Assist with mobility/ambulation  - Relieve pressure over bony prominences  - Avoid friction and shearing  - Provide appropriate hygiene as needed including keeping skin clean and dry  - Evaluate need for skin moisturizer/barrier cream  - Collaborate with interdisciplinary team   - Patient/family teaching  - Consider wound care consult   Outcome: Progressing     Problem: PAIN - ADULT  Goal: Verbalizes/displays adequate comfort level or baseline comfort level  Description: Interventions:  - Encourage patient to monitor pain and request assistance  - Assess pain using appropriate pain scale  - Administer analgesics based on type and severity of pain and evaluate response  - Implement non-pharmacological measures as appropriate and evaluate response  - Consider cultural and social influences on pain and pain management  - Notify physician/advanced practitioner if interventions unsuccessful or patient reports new pain  Outcome: Progressing     Problem: INFECTION - ADULT  Goal: Absence or prevention of progression during hospitalization  Description: INTERVENTIONS:  - Assess and monitor for signs and symptoms of infection  - Monitor lab/diagnostic results  - Monitor all insertion sites, i e  indwelling lines, tubes, and drains  - Monitor endotracheal if appropriate and nasal secretions for changes in amount and color  - [place] appropriate cooling/warming therapies per order  - Administer medications as ordered  - Instruct and encourage patient and family to use good hand hygiene technique  - Identify and instruct in appropriate isolation precautions for identified infection/condition  Outcome: Progressing  Goal: Absence of fever/infection during neutropenic period  Description: INTERVENTIONS:  - Monitor WBC    Outcome: Progressing     Problem: SAFETY ADULT  Goal: Patient will remain free of falls  Description: INTERVENTIONS:  - Assess patient frequently for physical needs  -  Identify cognitive and physical deficits and behaviors that affect risk of falls    -  [place] fall precautions as indicated by assessment   - Educate patient/family on patient safety including physical limitations  - Instruct patient to call for assistance with activity based on assessment  - Modify environment to reduce risk of injury  - Consider OT/PT consult to assist with strengthening/mobility  Outcome: Progressing  Goal: Maintain or return to baseline ADL function  Description: INTERVENTIONS:  -  Assess patient's ability to carry out ADLs; assess patient's baseline for ADL function and identify physical deficits which impact ability to perform ADLs (bathing, care of mouth/teeth, toileting, grooming, dressing, etc )  - Assess/evaluate cause of self-care deficits   - Assess range of motion  - Assess patient's mobility; develop plan if impaired  - Assess patient's need for assistive devices and provide as appropriate  - Encourage maximum independence but intervene and supervise when necessary  - Involve family in performance of ADLs  - Assess for home care needs following discharge   - Consider OT consult to assist with ADL evaluation and planning for discharge  - Provide patient education as appropriate  Outcome: Progressing  Goal: Maintain or return mobility status to optimal level  Description: INTERVENTIONS:  - Assess patient's baseline mobility status (ambulation, transfers, stairs, etc )    - Identify cognitive and physical deficits and behaviors that affect mobility  - Identify mobility aids required to assist with transfers and/or ambulation (gait belt, sit-to-stand, lift, walker, cane, etc )  - [place] fall precautions as indicated by assessment  - Record patient progress and toleration of activity level on Mobility SBAR; progress patient to next Phase/Stage  - Instruct patient to call for assistance with activity based on assessment  - Consider rehabilitation consult to assist with strengthening/weightbearing, etc   Outcome: Progressing     Problem: DISCHARGE PLANNING  Goal: Discharge to home or other facility with appropriate resources  Description: INTERVENTIONS:  - Identify barriers to discharge w/patient and caregiver  - Arrange for needed discharge resources and transportation as appropriate  - Identify discharge learning needs (meds, wound care, etc )  - Arrange for interpretive services to assist at discharge as needed  - Refer to Case Management Department for coordinating discharge planning if the patient needs post-hospital services based on physician/advanced practitioner order or complex needs related to functional status, cognitive ability, or social support system  Outcome: Progressing     Problem: Knowledge Deficit  Goal: Patient/family/caregiver demonstrates understanding of disease process, treatment plan, medications, and discharge instructions  Description: Complete learning assessment and assess knowledge base    Interventions:  - Provide teaching at level of understanding  - Provide teaching via preferred learning methods  Outcome: Progressing     Problem: Nutrition/Hydration-ADULT  Goal: Nutrient/Hydration intake appropriate for improving, restoring or maintaining nutritional needs  Description: Monitor and assess patient's nutrition/hydration status for malnutrition  Collaborate with interdisciplinary team and initiate plan and interventions as ordered  Monitor patient's weight and dietary intake as ordered or per policy  Utilize nutrition screening tool and intervene as necessary  Determine patient's food preferences and provide high-protein, high-caloric foods as appropriate       INTERVENTIONS:  - Monitor oral intake, urinary output, labs, and treatment plans  - Assess nutrition and hydration status and recommend course of action  - Evaluate amount of meals eaten  - Assist patient with eating if necessary   - Allow adequate time for meals  - Recommend/ encourage appropriate diets, oral nutritional supplements, and vitamin/mineral supplements  - Order, calculate, and assess calorie counts as needed  - Recommend, monitor, and adjust tube feedings and TPN/PPN based on assessed needs  - Assess need for intravenous fluids  - Provide specific nutrition/hydration education as appropriate  - Include patient/family/caregiver in decisions related to nutrition  Outcome: Progressing     Problem: NEUROSENSORY - ADULT  Goal: Achieves stable or improved neurological status  Description: INTERVENTIONS  - Monitor and report changes in neurological status  - Monitor vital signs such as temperature, blood pressure, glucose, and any other labs ordered   - Initiate measures to prevent increased intracranial pressure  - Monitor for seizure activity and implement precautions if appropriate      Outcome: Progressing  Goal: Achieves maximal functionality and self care  Description: INTERVENTIONS  - Monitor swallowing and airway patency with patient fatigue and changes in neurological status  - Encourage and assist patient to increase activity and self care     - Encourage visually impaired, hearing impaired and aphasic patients to use assistive/communication devices  Outcome: Progressing     Problem: RESPIRATORY - ADULT  Goal: Achieves optimal ventilation and oxygenation  Description: INTERVENTIONS:  - Assess for changes in respiratory status  - Assess for changes in mentation and behavior  - Position to facilitate oxygenation and minimize respiratory effort  - Oxygen administered by appropriate delivery if ordered  - Initiate smoking cessation education as indicated  - Encourage broncho-pulmonary hygiene including cough, deep breathe, Incentive Spirometry  - Assess the need for suctioning and aspirate as needed  - Assess and instruct to report SOB or any respiratory difficulty  - Respiratory Therapy support as indicated  Outcome: Progressing     Problem: GASTROINTESTINAL - ADULT  Goal: Maintains adequate nutritional intake  Description: INTERVENTIONS:  - Monitor percentage of each meal consumed  - Identify factors contributing to decreased intake, treat as appropriate  - Assist with meals as needed  - Monitor I&O, weight, and lab values if indicated  - Obtain nutrition services referral as needed  Outcome: Progressing     Problem: GENITOURINARY - ADULT  Goal: Maintains or returns to baseline urinary function  Description: INTERVENTIONS:  - Assess urinary function  - Encourage oral fluids to ensure adequate hydration if ordered  - Administer IV fluids as ordered to ensure adequate hydration  - Administer ordered medications as needed  - Offer frequent toileting  - Follow urinary retention protocol if ordered  Outcome: Progressing  Goal: Absence of urinary retention  Description: INTERVENTIONS:  - Assess patients ability to void and empty bladder  - Monitor I/O  - Bladder scan as needed  - Discuss with physician/AP medications to alleviate retention as needed  - Discuss catheterization for long term situations as appropriate  Outcome: Progressing     Problem: METABOLIC, FLUID AND ELECTROLYTES - ADULT  Goal: Electrolytes maintained within normal limits  Description: INTERVENTIONS:  - Monitor labs and assess patient for signs and symptoms of electrolyte imbalances  - Administer electrolyte replacement as ordered  - Monitor response to electrolyte replacements, including repeat lab results as appropriate  - Instruct patient on fluid and nutrition as appropriate  Outcome: Progressing  Goal: Fluid balance maintained  Description: INTERVENTIONS:  - Monitor labs   - Monitor I/O and WT  - Instruct patient on fluid and nutrition as appropriate  - Assess for signs & symptoms of volume excess or deficit  Outcome: Progressing  Goal: Glucose maintained within target range  Description: INTERVENTIONS:  - Monitor Blood Glucose as ordered  - Assess for signs and symptoms of hyperglycemia and hypoglycemia  - Administer ordered medications to maintain glucose within target range  - Assess nutritional intake and initiate nutrition service referral as needed  Outcome: Progressing     Problem: SKIN/TISSUE INTEGRITY - ADULT  Goal: Skin integrity remains intact  Description: INTERVENTIONS  - Identify patients at risk for skin breakdown  - Assess and monitor skin integrity  - Assess and monitor nutrition and hydration status  - Monitor labs (i e  albumin)  - Assess for incontinence   - Turn and reposition patient  - Assist with mobility/ambulation  - Relieve pressure over bony prominences  - Avoid friction and shearing  - Provide appropriate hygiene as needed including keeping skin clean and dry  - Evaluate need for skin moisturizer/barrier cream  - Collaborate with interdisciplinary team (i e  Nutrition, Rehabilitation, etc )   - Patient/family teaching  Outcome: Progressing  Goal: Incision(s), wounds(s) or drain site(s) healing without S/S of infection  Description: INTERVENTIONS  - Assess and document risk factors for skin impairment   - Assess and document dressing, incision, wound bed, drain sites and surrounding tissue  - Consider nutrition services referral as needed  - Oral mucous membranes remain intact  - Provide patient/ family education  Outcome: Progressing  Goal: Oral mucous membranes remain intact  Description: INTERVENTIONS  - Assess oral mucosa and hygiene practices  - Implement preventative oral hygiene regimen  - Implement oral medicated treatments as ordered  - Initiate Nutrition services referral as needed  Outcome: Progressing     Problem: MUSCULOSKELETAL - ADULT  Goal: Maintain or return mobility to safest level of function  Description: INTERVENTIONS:  - Assess patient's ability to carry out ADLs; assess patient's baseline for ADL function and identify physical deficits which impact ability to perform ADLs (bathing, care of mouth/teeth, toileting, grooming, dressing, etc )  - Assess/evaluate cause of self-care deficits   - Assess range of motion  - Assess patient's mobility  - Assess patient's need for assistive devices and provide as appropriate  - Encourage maximum independence but intervene and supervise when necessary  - Involve family in performance of ADLs  - Assess for home care needs following discharge   - Consider OT consult to assist with ADL evaluation and planning for discharge  - Provide patient education as appropriate  Outcome: Progressing  Goal: Maintain proper alignment of affected body part  Description: INTERVENTIONS:  - Support, maintain and protect limb and body alignment  - Provide patient/ family with appropriate education  Outcome: Progressing

## 2021-05-15 NOTE — ASSESSMENT & PLAN NOTE
Patient with decreased skin turgor, dry mucous membranes  Patient has severe bilateral lower extremity edema, stasis dermatitis present, according to the chart review patient has received 1 5 L of IV fluid today    Will repeat labs, hold diuretics, will not give any additional fluid, start p o  Intake    Avoid nephrotoxins, no evidence of obstructive uropathy, start urinary retention protocol  Patient with underlying chronic kidney disease, baseline creatinine 1 7, creatinine now elevated at 3 44    Check urine urea, urine creatinine    Patient was recently started on Ace inhibitor, lisinopril 2 5 mg daily, also was given Zaroxolyn twice weekly    Patient has underlying severe pulmonary hypertension

## 2021-05-15 NOTE — H&P
[address] [facility] [address]  H&P- Javier Stroud 7/3/1931, 80 y o  female MRN: [id number]  Unit/Bed#: 421-01 Encounter: [id number]  Primary Care Provider: Valerie Dubon DO   Date and time admitted to hospital: 5/15/2021  9:38 AM    * PRASHANT (acute kidney injury) [facility])  Assessment & Plan  Patient with decreased skin turgor, dry mucous membranes  Patient has severe bilateral lower extremity edema, stasis dermatitis present, according to the chart review patient has received 1 5 L of IV fluid today    Will repeat labs, hold diuretics, will not give any additional fluid, start p o  Intake    Avoid nephrotoxins, no evidence of obstructive uropathy, start urinary retention protocol  Patient with underlying chronic kidney disease, baseline creatinine 1 7, creatinine now elevated at 3 44    Check urine urea, urine creatinine    Patient was recently started on Ace inhibitor, lisinopril 2 5 mg daily, also was given Zaroxolyn twice weekly    Patient has underlying severe pulmonary hypertension          Chronic combined systolic and diastolic CHF (congestive heart failure) (HCC)  Assessment & Plan  Wt Readings from Last 3 Encounters:   05/15/21 61 2 kg (134 lb 14 7 oz)   03/19/21 68 kg (150 lb)   11/20/20 65 3 kg (144 lb)       Patient with bioprosthetic aortic valve, reduced EF on recent echocardiogram    Will hold diuretics, monitor respiratory status, follow up official chest x-ray, lungs are currently clear to auscultation on physical exam      Bilateral lower extremity edema  Assessment & Plan  +3 to 4 edema bilateral lower extremities, consult wound care    Patient is on anticoagulation therapy in the form of Coumadin, if INR is significantly subtherapeutic will check lower extremity Dopplers to rule out DVT    CKD (chronic kidney disease) stage 3, GFR 30-59 ml/min [facility])  Assessment & Plan  Lab Results   Component Value Date    EGFR 11 05/15/2021    EGFR 26 03/29/2021    EGFR 28 04/17/2020    CREATININE 3 44 (H) 05/15/2021    CREATININE 1 71 (H) 03/29/2021    CREATININE 1 63 (H) 04/17/2020       Elevated troponin  Assessment & Plan  Non MI troponin elevation, check repeat x1    Check CPK level    Acute encephalopathy  Assessment & Plan  Acute metabolic encephalopathy, present on admission, as evidenced by increased confusion, visual hallucinations per the family, likely secondary to uremia in the setting of acute kidney injury    Restless leg syndrome  Assessment & Plan  Continue Requip    Anemia  Assessment & Plan  Hemoglobin slightly lower than 2 months ago 11 0    No signs of active blood loss, follow up a m  Labs    S/P AVR  Assessment & Plan  History of bioprosthetic aortic valve    Hyperlipidemia  Assessment & Plan  Hold statin    Atrial fibrillation, chronic  Assessment & Plan  Check INR, will continue Coumadin if INR therapeutic      VTE Prophylaxis: Warfarin (Coumadin)  / reason for no mechanical VTE prophylaxis Bilateral lower extremity stasis dermatitis   Code Status:  Full code, will discuss further with patient once her mental status is improved  POLST: There is no POLST form on file for this patient (pre-hospital)  Discussion with family:  I attempted to contact the patient's granddaughter Johanna, I will call again later  Anticipated Length of Stay:  Patient will be admitted on an Inpatient basis with an anticipated length of stay of  greater than 2 midnights  Justification for Hospital Stay:  Acute kidney injury    Chief Complaint:   Generalized weakness    History of Present Illness:    Dayne Armenta is a 80 y o  female who presents with HPI as noted below per the ER physician Dr Denver Pang      "19-year-old female with past medical history of CVA, CHF, atrial fibrillation anticoagulated with Coumadin, aortic insufficiency status post bioprosthetic AVR in 2002, CKD, hypertension, hyperlipidemia, and peripheral edema who is presenting for evaluation of generalized weakness, shakiness, and confusion  The patient is a very poor historian  She reports feeling shaky over the past few weeks  She does report feeling generalized weakness but denies any focal weakness  No speech difficulty, swallowing difficulty, changes, chest pain, shortness of breath, nausea, vomiting, or abdominal pain  Urinary symptoms  I called her son for collateral history and he reported that the patient has been increasingly weak over the past few weeks  She also is seeming more confused  The patient was having some hallucinations this morning which consisted of seeing people in her house that were not really there  The patient last saw Cardiology on April 29th  She was started on lisinopril and metolazone  That note mentioned chronic peripheral edema which was unchanged  Apparently, on EMS arrival, the patient had a low-normal blood pressure in the 06L systolic  She was given 1 L of IV fluid  "    At the time my examination patient denies any specific pain, does note some lower extremity discomfort, she says this is chronic, she is lethargic, oriented to person and place, she thinks it is September of 1928, it is in fact May of 2021  No chest pain, no shortness of breath    Review of Systems:    Review of Systems   Unable to perform ROS: Mental status change       Past Medical and Surgical History:     Past Medical History:   Diagnosis Date    A-fib [facility])     Aortic insufficiency     Arthritis     L shoulder,fingers    Cardiac disease     valve replacement    Carpal tunnel syndrome, bilateral     Chronic anemia     Chronic diastolic congestive heart failure (HCC) 3/19/2021    Chronic lower back pain     DDD (degenerative disc disease), lumbar     Dropfoot     right    Edema     History of echocardiogram 04/20/2016    EF 75%, Hyperdynamic LVSF  Mild concentric LVH  Normal functioning bioprosthetic AV  Trace MR  Mild pulm htn       HTN (hypertension)     Hyperlipidemia     Hypertension     Inguinal hernia, left  Insomnia     Lymphedema     Parkinson disease (HCC)     Peripheral neuropathy     Phlebitis     Psoriasis     Renal insufficiency     Grade 1    Restless leg syndrome     Rhabdomyolysis 8/23/2016    Right bundle branch block     Senile keratosis     Spinal stenosis     Stasis ulcer of right lower extremity [facility] [address] [facility]     Stroke (cerebrum) ([facility] [address] ) 6/29/2018    Stroke ([facility] [address] )     Supraventricular tachycardia (HCC)     Vitamin D deficiency        Past Surgical History:   Procedure Laterality Date    AORTIC VALVE REPLACEMENT  11/25/2002    Tissue AVR #21    AORTIC VALVULOPLASTY  11/25/2002    Bovine Pericardial heart valve    BACK SURGERY      CARDIAC CATHETERIZATION  11/22/2002    Sever critical Aortic stenosis  Pulm Htn  Normal coronary arteries   HAND SURGERY      right hand    HYSTERECTOMY      ROTATOR CUFF REPAIR Right     VASCULAR SURGERY      bilateral vein ligation & stripping       Meds/Allergies:    Prior to Admission medications    Medication Sig Start Date End Date Taking? Authorizing Provider   atorvastatin (LIPITOR) 40 mg tablet Take 1 tablet (40 mg total) by mouth every evening 3/19/21  Yes Kimani Pierce DO   furosemide (LASIX) 80 mg tablet Take 1 tablet (80 mg total) by mouth daily 11/20/20  Yes Kimani Pierce DO   gabapentin (NEURONTIN) 100 mg capsule Take 2 capsules (200 mg total) by mouth 3 (three) times a day 11/20/20  Yes Kimani Pierce DO   metolazone (ZAROXOLYN) 2 5 mg tablet Take 30 minutes before Lasix two days per week   (m-Th) 4/29/21  Yes Donnell Suggs, DO   potassium chloride (K-DUR,KLOR-CON) 20 mEq tablet Take 1 tablet (20 mEq total) by mouth daily 3/19/21 6/17/21 Yes Kimani Pierce DO   rOPINIRole (REQUIP) 2 mg tablet Take 1 tablet (2 mg total) by mouth 2 (two) times a day 1/13/21  Yes Kimani Pierce DO   traMADol (ULTRAM) 50 mg tablet Take 1 tablet (50 mg total) by mouth 2 (two) times a day as needed for moderate pain 3/19/21  Yes Hue Irwin, DO warfarin (COUMADIN) 5 mg tablet TAKE AS DIRECTED BY DR Oneyda Wilson 3/22/21  Yes Kimani Pierce,    acetaminophen (TYLENOL) 500 mg tablet Take 500 mg by mouth every 6 (six) hours as needed for mild pain    Historical Provider, MD   diclofenac sodium (VOLTAREN) 1 % Apply 2 g topically 4 (four) times a day 9/23/20   José Luis Hinson DO   Ergocalciferol (VITAMIN D2 PO) Take 1 25 mg by mouth once a week    Historical Provider, MD   lisinopril (ZESTRIL) 2 5 mg tablet Take 1 tablet (2 5 mg total) by mouth daily  Patient not taking: Reported on 5/15/2021 4/29/21   Edmund Still DO   multivitamin [facility]) TABS Take 1 tablet by mouth every other day    Historical Provider, MD   Turmeric 500 MG CAPS Take 500 mg by mouth every other day    Historical Provider, MD     Nursing staff reviewed medication list in detail with the patient's family    Allergies: Allergies   Allergen Reactions    Baclofen      Patient developed acute encephalopathy on dosing of 10 mg 3 times a day, patient could possibly be tolerant of smaller doses, would advise cautious use in the future or no use at all       Social History:     Marital Status:    Substance Use History:   Social History     Substance and Sexual Activity   Alcohol Use Never    Frequency: Never     Social History     Tobacco Use   Smoking Status Never Smoker   Smokeless Tobacco Never Used     Social History     Substance and Sexual Activity   Drug Use No       Family History:    non-contributory    Physical Exam:     Vitals:   Blood Pressure: 119/84 (05/15/21 1300)  Pulse: 83 (05/15/21 1300)  Temperature: 97 5 °F (36 4 °C) (05/15/21 1300)  Temp Source: Temporal (05/15/21 0945)  Respirations: 16 (05/15/21 1300)  Height: 4' 10" (147 3 cm) (05/15/21 0945)  Weight - Scale: 61 2 kg (134 lb 14 7 oz) (05/15/21 1300)  SpO2: 99 % (05/15/21 1300)    Physical Exam  Vitals signs and nursing note reviewed  Exam conducted with a chaperone present     Constitutional:       General: She is not in acute distress  Appearance: She is not ill-appearing, toxic-appearing or diaphoretic  Comments: Patient appears lethargic, keeping her eyes closed for the examination  Is able to answer yes no questions, she knows she is at the hospital, she can tell me some simple historical details but is not a good historian  HENT:      Head: Normocephalic and atraumatic  Right Ear: External ear normal       Left Ear: External ear normal       Mouth/Throat:      Mouth: Mucous membranes are dry  Neck:      Musculoskeletal: Normal range of motion  Cardiovascular:      Rate and Rhythm: Normal rate  Heart sounds: Murmur present  Pulmonary:      Effort: Pulmonary effort is normal    Abdominal:      Palpations: Abdomen is soft  Musculoskeletal:      Right lower leg: Edema present  Left lower leg: Edema present  Comments: Symmetrical erythema, dry skin bilateral lower extremities   Skin:     Findings: No lesion or rash  Comments: Small wound left 2nd toe, no evidence of superimposed infection, wound is not open   Neurological:      General: No focal deficit present  Mental Status: She is disoriented  Cranial Nerves: No cranial nerve deficit  Comments: Bilateral upper extremities with tremor         Additional Data:     Lab Results: I have personally reviewed pertinent reports  Results from last 7 days   Lab Units 05/15/21  1052   WBC Thousand/uL 8 69   HEMOGLOBIN g/dL 11 0*   HEMATOCRIT % 35 3   PLATELETS Thousands/uL 208   NEUTROS PCT % 75   LYMPHS PCT % 13*   MONOS PCT % 10   EOS PCT % 1     Results from last 7 days   Lab Units 05/15/21  1052   SODIUM mmol/L 140   POTASSIUM mmol/L 3 9   CHLORIDE mmol/L 101   CO2 mmol/L 30   BUN mg/dL 80*   CREATININE mg/dL 3 44*   ANION GAP mmol/L 9   CALCIUM mg/dL 8 0*   ALBUMIN g/dL 2 6*   TOTAL BILIRUBIN mg/dL 0 48   ALK PHOS U/L 107   ALT U/L 27   AST U/L 31   GLUCOSE RANDOM mg/dL 109                       Imaging:  I have personally reviewed pertinent reports  XR chest 1 view portable   ED Interpretation by Mateusz Carrizales MD (05/15 1129)   X-ray interpreted by me  Formal Radiology interpretation to follow  Cardiomegaly  No acute abnormality  CT abdomen pelvis wo contrast   Final Result by Aisha Hodges DO (05/15 1203)   Mild constipation  Workstation performed: [id number]           Allscripts / Epic Records Reviewed:  Yes

## 2021-05-15 NOTE — ASSESSMENT & PLAN NOTE
+3 to 4 edema bilateral lower extremities, consult wound care    Patient is on anticoagulation therapy in the form of Coumadin, if INR is significantly subtherapeutic will check lower extremity Dopplers to rule out DVT

## 2021-05-16 LAB
ALBUMIN SERPL BCP-MCNC: 2.5 G/DL (ref 3.5–5)
ALP SERPL-CCNC: 104 U/L (ref 46–116)
ALT SERPL W P-5'-P-CCNC: 25 U/L (ref 12–78)
ANION GAP SERPL CALCULATED.3IONS-SCNC: 10 MMOL/L (ref 4–13)
AST SERPL W P-5'-P-CCNC: 28 U/L (ref 5–45)
BACTERIA UR QL AUTO: NORMAL /HPF
BASOPHILS # BLD AUTO: 0.03 THOUSANDS/ΜL (ref 0–0.1)
BASOPHILS NFR BLD AUTO: 1 % (ref 0–1)
BILIRUB SERPL-MCNC: 0.56 MG/DL (ref 0.2–1)
BILIRUB UR QL STRIP: NEGATIVE
BUN SERPL-MCNC: 76 MG/DL (ref 5–25)
CALCIUM ALBUM COR SERPL-MCNC: 9.4 MG/DL (ref 8.3–10.1)
CALCIUM SERPL-MCNC: 8.2 MG/DL (ref 8.3–10.1)
CHLORIDE SERPL-SCNC: 101 MMOL/L (ref 100–108)
CLARITY UR: CLEAR
CO2 SERPL-SCNC: 27 MMOL/L (ref 21–32)
COLOR UR: YELLOW
CREAT SERPL-MCNC: 2.95 MG/DL (ref 0.6–1.3)
EOSINOPHIL # BLD AUTO: 0.08 THOUSAND/ΜL (ref 0–0.61)
EOSINOPHIL NFR BLD AUTO: 1 % (ref 0–6)
ERYTHROCYTE [DISTWIDTH] IN BLOOD BY AUTOMATED COUNT: 14.2 % (ref 11.6–15.1)
GFR SERPL CREATININE-BSD FRML MDRD: 14 ML/MIN/1.73SQ M
GLUCOSE SERPL-MCNC: 72 MG/DL (ref 65–140)
GLUCOSE UR STRIP-MCNC: NEGATIVE MG/DL
HCT VFR BLD AUTO: 36.3 % (ref 34.8–46.1)
HGB BLD-MCNC: 11.3 G/DL (ref 11.5–15.4)
HGB UR QL STRIP.AUTO: NEGATIVE
IMM GRANULOCYTES # BLD AUTO: 0.02 THOUSAND/UL (ref 0–0.2)
IMM GRANULOCYTES NFR BLD AUTO: 0 % (ref 0–2)
INR PPP: 8.96 (ref 0.84–1.19)
KETONES UR STRIP-MCNC: NEGATIVE MG/DL
LEUKOCYTE ESTERASE UR QL STRIP: ABNORMAL
LYMPHOCYTES # BLD AUTO: 1.08 THOUSANDS/ΜL (ref 0.6–4.47)
LYMPHOCYTES NFR BLD AUTO: 17 % (ref 14–44)
MAGNESIUM SERPL-MCNC: 2.2 MG/DL (ref 1.6–2.6)
MCH RBC QN AUTO: 30.5 PG (ref 26.8–34.3)
MCHC RBC AUTO-ENTMCNC: 31.1 G/DL (ref 31.4–37.4)
MCV RBC AUTO: 98 FL (ref 82–98)
MONOCYTES # BLD AUTO: 0.56 THOUSAND/ΜL (ref 0.17–1.22)
MONOCYTES NFR BLD AUTO: 9 % (ref 4–12)
NEUTROPHILS # BLD AUTO: 4.72 THOUSANDS/ΜL (ref 1.85–7.62)
NEUTS SEG NFR BLD AUTO: 72 % (ref 43–75)
NITRITE UR QL STRIP: NEGATIVE
NON-SQ EPI CELLS URNS QL MICRO: NORMAL /HPF
NRBC BLD AUTO-RTO: 0 /100 WBCS
PH UR STRIP.AUTO: 6 [PH]
PHOSPHATE SERPL-MCNC: 5.2 MG/DL (ref 2.3–4.1)
PLATELET # BLD AUTO: 182 THOUSANDS/UL (ref 149–390)
PMV BLD AUTO: 9.6 FL (ref 8.9–12.7)
POTASSIUM SERPL-SCNC: 3.8 MMOL/L (ref 3.5–5.3)
PROT SERPL-MCNC: 6 G/DL (ref 6.4–8.2)
PROT UR STRIP-MCNC: NEGATIVE MG/DL
PROTHROMBIN TIME: 71.1 SECONDS (ref 11.6–14.5)
RBC # BLD AUTO: 3.71 MILLION/UL (ref 3.81–5.12)
RBC #/AREA URNS AUTO: NORMAL /HPF
SODIUM SERPL-SCNC: 138 MMOL/L (ref 136–145)
SP GR UR STRIP.AUTO: 1.01 (ref 1–1.03)
UROBILINOGEN UR QL STRIP.AUTO: 0.2 E.U./DL
WBC # BLD AUTO: 6.49 THOUSAND/UL (ref 4.31–10.16)
WBC #/AREA URNS AUTO: NORMAL /HPF

## 2021-05-16 PROCEDURE — 85610 PROTHROMBIN TIME: CPT | Performed by: INTERNAL MEDICINE

## 2021-05-16 PROCEDURE — 85025 COMPLETE CBC W/AUTO DIFF WBC: CPT | Performed by: INTERNAL MEDICINE

## 2021-05-16 PROCEDURE — 80053 COMPREHEN METABOLIC PANEL: CPT | Performed by: INTERNAL MEDICINE

## 2021-05-16 PROCEDURE — 83735 ASSAY OF MAGNESIUM: CPT | Performed by: INTERNAL MEDICINE

## 2021-05-16 PROCEDURE — 84100 ASSAY OF PHOSPHORUS: CPT | Performed by: INTERNAL MEDICINE

## 2021-05-16 PROCEDURE — 99232 SBSQ HOSP IP/OBS MODERATE 35: CPT | Performed by: INTERNAL MEDICINE

## 2021-05-16 PROCEDURE — 81001 URINALYSIS AUTO W/SCOPE: CPT | Performed by: INTERNAL MEDICINE

## 2021-05-16 RX ORDER — SODIUM CHLORIDE 9 MG/ML
50 INJECTION, SOLUTION INTRAVENOUS ONCE
Status: COMPLETED | OUTPATIENT
Start: 2021-05-16 | End: 2021-05-16

## 2021-05-16 RX ORDER — PHYTONADIONE 5 MG/1
5 TABLET ORAL ONCE
Status: COMPLETED | OUTPATIENT
Start: 2021-05-16 | End: 2021-05-16

## 2021-05-16 RX ADMIN — PHYTONADIONE 5 MG: 5 TABLET ORAL at 10:17

## 2021-05-16 RX ADMIN — ROPINIROLE HYDROCHLORIDE 2 MG: 1 TABLET, FILM COATED ORAL at 10:17

## 2021-05-16 RX ADMIN — ACETAMINOPHEN 650 MG: 325 TABLET, FILM COATED ORAL at 21:18

## 2021-05-16 RX ADMIN — ROPINIROLE HYDROCHLORIDE 2 MG: 1 TABLET, FILM COATED ORAL at 21:16

## 2021-05-16 RX ADMIN — SODIUM CHLORIDE 50 ML/HR: 0.9 INJECTION, SOLUTION INTRAVENOUS at 11:20

## 2021-05-16 NOTE — ASSESSMENT & PLAN NOTE
Patient with decreased skin turgor, dry mucous membranes  Patient has severe bilateral lower extremity edema, stasis dermatitis present, according to the chart review patient has received 1 5 L of IV fluid on admission, renal function has been improving  Fe urea calculated at 29 7%, consistent with pre renal disease  Patient has history of chronic lower extremity edema, chest x-ray is clear, will treat lower extremities with elevation, local wound care, wound care consult  Will start normal saline at 50 mL/hour    Avoid nephrotoxins, no evidence of obstructive uropathy, start urinary retention protocol  Patient with underlying chronic kidney disease, baseline creatinine 1 7, creatinine trending down from 3 49 to 2 95    Patient was recently started on Ace inhibitor, lisinopril 2 5 mg daily, also was given Zaroxolyn twice weekly  Patient has underlying severe pulmonary hypertension

## 2021-05-16 NOTE — PROGRESS NOTES
[address]  Progress Note - Ala Pro 7/3/1931, 80 y o  female MRN: [id number]  Unit/Bed#: 612-32 Encounter: [id number]  Primary Care Provider: Donal Koch DO   Date and time admitted to hospital: 5/15/2021  9:38 AM    * PRASHANT (acute kidney injury) [facility])  Assessment & Plan  Patient with decreased skin turgor, dry mucous membranes  Patient has severe bilateral lower extremity edema, stasis dermatitis present, according to the chart review patient has received 1 5 L of IV fluid on admission, renal function has been improving  Fe urea calculated at 29 7%, consistent with pre renal disease  Patient has history of chronic lower extremity edema, chest x-ray is clear, will treat lower extremities with elevation, local wound care, wound care consult  Will start normal saline at 50 mL/hour    Avoid nephrotoxins, no evidence of obstructive uropathy, start urinary retention protocol  Patient with underlying chronic kidney disease, baseline creatinine 1 7, creatinine trending down from 3 49 to 2 95    Patient was recently started on Ace inhibitor, lisinopril 2 5 mg daily, also was given Zaroxolyn twice weekly  Patient has underlying severe pulmonary hypertension          Chronic combined systolic and diastolic CHF (congestive heart failure) (HCC)  Assessment & Plan  Wt Readings from Last 3 Encounters:   05/16/21 60 kg (132 lb 4 4 oz)   03/19/21 68 kg (150 lb)   11/20/20 65 3 kg (144 lb)       Patient with bioprosthetic aortic valve, reduced EF on recent echocardiogram    Will hold diuretics, monitor respiratory status, chest x-ray negative for any acute pulmonary disease, lungs are currently clear to auscultation on physical exam    Will start normal saline at 50 mL/hour, continue to monitor fluid status      Bilateral lower extremity edema  Assessment & Plan  +3 to 4 edema bilateral lower extremities, consult wound care    Patient is on anticoagulation therapy in the form of Coumadin, INR supratherapeutic, no need for ultrasound of the lower extremities  Patient is sick continue with anticoagulation  Due to supratherapeutic INR, will give 1 time dose of p o  Vitamin K    CKD (chronic kidney disease) stage 3, GFR 30-59 ml/min [facility])  Assessment & Plan  Lab Results   Component Value Date    EGFR 14 05/16/2021    EGFR 11 05/15/2021    EGFR 11 05/15/2021    CREATININE 2 95 (H) 05/16/2021    CREATININE 3 49 (H) 05/15/2021    CREATININE 3 44 (H) 05/15/2021       Elevated troponin  Assessment & Plan  Non MI troponin elevation    CPK level not significantly elevated    Acute encephalopathy  Assessment & Plan  Acute metabolic encephalopathy, present on admission, as evidenced by increased confusion, visual hallucinations per the family, likely secondary to uremia in the setting of acute kidney injury    Restless leg syndrome  Assessment & Plan  Continue Requip    Anemia  Assessment & Plan  Hemoglobin stable, trending up slightly from 11 0 to 11 3    No signs of active blood loss, follow up a m  Labs    S/P AVR  Assessment & Plan  History of bioprosthetic aortic valve, continue outpatient follow-up with Cardiology    Hyperlipidemia  Assessment & Plan  Hold statin    Atrial fibrillation, chronic  Assessment & Plan  INR supratherapeutic, Coumadin is on hold      VTE Pharmacologic Prophylaxis:   Pharmacologic: Warfarin (Coumadin)  Mechanical VTE Prophylaxis in Place: No    Patient Centered Rounds: I have performed bedside rounds with nursing staff today  Discussions with Specialists or Other Care Team Provider:  None    Education and Discussions with Family / Patient: Will update family today    Time Spent for Care: 30 minutes  More than 50% of total time spent on counseling and coordination of care as described above      Current Length of Stay: 1 day(s)    Current Patient Status: Inpatient   Certification Statement: The patient will continue to require additional inpatient hospital stay due to Acute renal failure          Code Status: Level 1 - Full Code      Subjective:   No pain, patient is confused, she does not know that she is at the hospital    Objective:     Vitals:   Temp (24hrs), Av 2 °F (36 8 °C), Min:97 5 °F (36 4 °C), Max:99 3 °F (37 4 °C)    Temp:  [97 5 °F (36 4 °C)-99 3 °F (37 4 °C)] 98 2 °F (36 8 °C)  HR:  [] 98  Resp:  [16-20] 20  BP: ()/(51-84) 109/62  SpO2:  [91 %-99 %] 95 %  Body mass index is 27 65 kg/m²  Input and Output Summary (last 24 hours): Intake/Output Summary (Last 24 hours) at 2021 1042  Last data filed at 2021 0317  Gross per 24 hour   Intake --   Output 1225 ml   Net -1225 ml       Physical Exam:     Physical Exam  Vitals signs and nursing note reviewed  Constitutional:       Appearance: She is not diaphoretic  Comments: Cachectic chronically ill-appearing elderly female   Cardiovascular:      Rate and Rhythm: Normal rate  Pulmonary:      Effort: Pulmonary effort is normal    Abdominal:      General: There is no distension  Palpations: Abdomen is soft  Tenderness: There is no abdominal tenderness  There is no guarding  Musculoskeletal:      Right lower leg: Edema present  Left lower leg: Edema present  Comments: Erythema bilateral lower extremities consistent with stasis dermatitis   Neurological:      General: No focal deficit present  Mental Status: She is disoriented  Cranial Nerves: No cranial nerve deficit  Motor: Weakness (Generalized weakness without specific deficit) present  Comments: Patient follows commands, keeps her eyes closed during physical exam and interview           Additional Data:     Labs:    Results from last 7 days   Lab Units 21  0450   WBC Thousand/uL 6 49   HEMOGLOBIN g/dL 11 3*   HEMATOCRIT % 36 3   PLATELETS Thousands/uL 182   NEUTROS PCT % 72   LYMPHS PCT % 17   MONOS PCT % 9   EOS PCT % 1     Results from last 7 days   Lab Units 210 POTASSIUM mmol/L 3 8   CHLORIDE mmol/L 101   CO2 mmol/L 27   BUN mg/dL 76*   CREATININE mg/dL 2 95*   CALCIUM mg/dL 8 2*   ALK PHOS U/L 104   ALT U/L 25   AST U/L 28     Results from last 7 days   Lab Units 05/16/21  0450   INR  8 96*       * I Have Reviewed All Lab Data Listed Above  * Additional Pertinent Lab Tests Reviewed:  [address] Admission Reviewed      Recent Cultures (last 7 days):           Last 24 Hours Medication List:   Current Facility-Administered Medications   Medication Dose Route Frequency Provider Last Rate    acetaminophen  650 mg Oral Q6H PRN Chuck Folds, DO      ondansetron  4 mg Intravenous Q6H PRN Chuck Folds, DO      rOPINIRole  2 mg Oral BID Chuck Armida, DO      sodium chloride  50 mL/hr Intravenous Once Chuck Huffman, DO          Today, Patient Was Seen By: Chuck Huffman DO

## 2021-05-16 NOTE — PLAN OF CARE
Problem: Potential for Falls  Goal: Patient will remain free of falls  Description: INTERVENTIONS:  - Assess patient frequently for physical needs  -  Identify cognitive and physical deficits and behaviors that affect risk of falls    -  [place] fall precautions as indicated by assessment   - Educate patient/family on patient safety including physical limitations  - Instruct patient to call for assistance with activity based on assessment  - Modify environment to reduce risk of injury  - Consider OT/PT consult to assist with strengthening/mobility  5/16/2021 0708 by Rika Russell RN  Outcome: Progressing  5/15/2021 1735 by Rika Russell RN  Outcome: Progressing     Problem: Prexisting or High Potential for Compromised Skin Integrity  Goal: Skin integrity is maintained or improved  Description: INTERVENTIONS:  - Identify patients at risk for skin breakdown  - Assess and monitor skin integrity  - Assess and monitor nutrition and hydration status  - Monitor labs   - Assess for incontinence   - Turn and reposition patient  - Assist with mobility/ambulation  - Relieve pressure over bony prominences  - Avoid friction and shearing  - Provide appropriate hygiene as needed including keeping skin clean and dry  - Evaluate need for skin moisturizer/barrier cream  - Collaborate with interdisciplinary team   - Patient/family teaching  - Consider wound care consult   5/16/2021 0708 by Rika Russell RN  Outcome: Progressing  5/15/2021 1735 by Rika Russell RN  Outcome: Progressing     Problem: PAIN - ADULT  Goal: Verbalizes/displays adequate comfort level or baseline comfort level  Description: Interventions:  - Encourage patient to monitor pain and request assistance  - Assess pain using appropriate pain scale  - Administer analgesics based on type and severity of pain and evaluate response  - Implement non-pharmacological measures as appropriate and evaluate response  - Consider cultural and social influences on pain and pain management  - Notify physician/advanced practitioner if interventions unsuccessful or patient reports new pain  5/16/2021 0708 by Ioana Gomez RN  Outcome: Progressing  5/15/2021 1735 by Ioana Gomez RN  Outcome: Progressing     Problem: INFECTION - ADULT  Goal: Absence or prevention of progression during hospitalization  Description: INTERVENTIONS:  - Assess and monitor for signs and symptoms of infection  - Monitor lab/diagnostic results  - Monitor all insertion sites, i e  indwelling lines, tubes, and drains  - Monitor endotracheal if appropriate and nasal secretions for changes in amount and color  - [place] appropriate cooling/warming therapies per order  - Administer medications as ordered  - Instruct and encourage patient and family to use good hand hygiene technique  - Identify and instruct in appropriate isolation precautions for identified infection/condition  5/16/2021 0708 by Ioana Gomez RN  Outcome: Progressing  5/15/2021 1735 by Ioana Gomez RN  Outcome: Progressing  Goal: Absence of fever/infection during neutropenic period  Description: INTERVENTIONS:  - Monitor WBC    5/16/2021 0708 by Ioana Gomez RN  Outcome: Progressing  5/15/2021 1735 by Ioana Gomez RN  Outcome: Progressing     Problem: SAFETY ADULT  Goal: Patient will remain free of falls  Description: INTERVENTIONS:  - Assess patient frequently for physical needs  -  Identify cognitive and physical deficits and behaviors that affect risk of falls    -  [place] fall precautions as indicated by assessment   - Educate patient/family on patient safety including physical limitations  - Instruct patient to call for assistance with activity based on assessment  - Modify environment to reduce risk of injury  - Consider OT/PT consult to assist with strengthening/mobility  5/16/2021 0708 by Ioana Gomez RN  Outcome: Progressing  5/15/2021 1735 by Ioana Gomez RN  Outcome: Progressing  Goal: Maintain or return to baseline ADL function  Description: INTERVENTIONS:  -  Assess patient's ability to carry out ADLs; assess patient's baseline for ADL function and identify physical deficits which impact ability to perform ADLs (bathing, care of mouth/teeth, toileting, grooming, dressing, etc )  - Assess/evaluate cause of self-care deficits   - Assess range of motion  - Assess patient's mobility; develop plan if impaired  - Assess patient's need for assistive devices and provide as appropriate  - Encourage maximum independence but intervene and supervise when necessary  - Involve family in performance of ADLs  - Assess for home care needs following discharge   - Consider OT consult to assist with ADL evaluation and planning for discharge  - Provide patient education as appropriate  5/16/2021 0708 by Christopher Fisher RN  Outcome: Progressing  5/15/2021 1735 by Christopher Fisher RN  Outcome: Progressing  Goal: Maintain or return mobility status to optimal level  Description: INTERVENTIONS:  - Assess patient's baseline mobility status (ambulation, transfers, stairs, etc )    - Identify cognitive and physical deficits and behaviors that affect mobility  - Identify mobility aids required to assist with transfers and/or ambulation (gait belt, sit-to-stand, lift, walker, cane, etc )  - [place] fall precautions as indicated by assessment  - Record patient progress and toleration of activity level on Mobility SBAR; progress patient to next Phase/Stage  - Instruct patient to call for assistance with activity based on assessment  - Consider rehabilitation consult to assist with strengthening/weightbearing, etc   5/16/2021 0708 by Christopher Fisher RN  Outcome: Progressing  5/15/2021 1735 by Christopher Fisher RN  Outcome: Progressing     Problem: DISCHARGE PLANNING  Goal: Discharge to home or other facility with appropriate resources  Description: INTERVENTIONS:  - Identify barriers to discharge w/patient and caregiver  - Arrange for needed discharge resources and transportation as appropriate  - Identify discharge learning needs (meds, wound care, etc )  - Arrange for interpretive services to assist at discharge as needed  - Refer to Case Management Department for coordinating discharge planning if the patient needs post-hospital services based on physician/advanced practitioner order or complex needs related to functional status, cognitive ability, or social support system  5/16/2021 0708 by Ham Mayorga RN  Outcome: Progressing  5/15/2021 1735 by Ham Mayorga RN  Outcome: Progressing     Problem: Knowledge Deficit  Goal: Patient/family/caregiver demonstrates understanding of disease process, treatment plan, medications, and discharge instructions  Description: Complete learning assessment and assess knowledge base  Interventions:  - Provide teaching at level of understanding  - Provide teaching via preferred learning methods  5/16/2021 0708 by Ham Mayorga RN  Outcome: Progressing  5/15/2021 1735 by Ham Mayorga RN  Outcome: Progressing     Problem: Nutrition/Hydration-ADULT  Goal: Nutrient/Hydration intake appropriate for improving, restoring or maintaining nutritional needs  Description: Monitor and assess patient's nutrition/hydration status for malnutrition  Collaborate with interdisciplinary team and initiate plan and interventions as ordered  Monitor patient's weight and dietary intake as ordered or per policy  Utilize nutrition screening tool and intervene as necessary  Determine patient's food preferences and provide high-protein, high-caloric foods as appropriate       INTERVENTIONS:  - Monitor oral intake, urinary output, labs, and treatment plans  - Assess nutrition and hydration status and recommend course of action  - Evaluate amount of meals eaten  - Assist patient with eating if necessary   - Allow adequate time for meals  - Recommend/ encourage appropriate diets, oral nutritional supplements, and vitamin/mineral supplements  - Order, calculate, and assess calorie counts as needed  - Recommend, monitor, and adjust tube feedings and TPN/PPN based on assessed needs  - Assess need for intravenous fluids  - Provide specific nutrition/hydration education as appropriate  - Include patient/family/caregiver in decisions related to nutrition  5/16/2021 0708 by Aparna French RN  Outcome: Laurent Kevinn  5/15/2021 1735 by Aparna French RN  Outcome: Progressing     Problem: NEUROSENSORY - ADULT  Goal: Achieves stable or improved neurological status  Description: INTERVENTIONS  - Monitor and report changes in neurological status  - Monitor vital signs such as temperature, blood pressure, glucose, and any other labs ordered   - Initiate measures to prevent increased intracranial pressure  - Monitor for seizure activity and implement precautions if appropriate      5/16/2021 0708 by Aparna French RN  Outcome: Progressing  5/15/2021 1735 by Aparna French RN  Outcome: Progressing  Goal: Achieves maximal functionality and self care  Description: INTERVENTIONS  - Monitor swallowing and airway patency with patient fatigue and changes in neurological status  - Encourage and assist patient to increase activity and self care     - Encourage visually impaired, hearing impaired and aphasic patients to use assistive/communication devices  5/16/2021 0708 by Aparna French RN  Outcome: Progressing  5/15/2021 1735 by Aparna French RN  Outcome: Progressing     Problem: RESPIRATORY - ADULT  Goal: Achieves optimal ventilation and oxygenation  Description: INTERVENTIONS:  - Assess for changes in respiratory status  - Assess for changes in mentation and behavior  - Position to facilitate oxygenation and minimize respiratory effort  - Oxygen administered by appropriate delivery if ordered  - Initiate smoking cessation education as indicated  - Encourage broncho-pulmonary hygiene including cough, deep breathe, Incentive Spirometry  - Assess the need for suctioning and aspirate as needed  - Assess and instruct to report SOB or any respiratory difficulty  - Respiratory Therapy support as indicated  5/16/2021 0708 by Rika Russell RN  Outcome: Progressing  5/15/2021 1735 by Rika Russell RN  Outcome: Progressing     Problem: GASTROINTESTINAL - ADULT  Goal: Maintains adequate nutritional intake  Description: INTERVENTIONS:  - Monitor percentage of each meal consumed  - Identify factors contributing to decreased intake, treat as appropriate  - Assist with meals as needed  - Monitor I&O, weight, and lab values if indicated  - Obtain nutrition services referral as needed  5/16/2021 0708 by Rika Russell RN  Outcome: Progressing  5/15/2021 1735 by Rika Russell RN  Outcome: Progressing     Problem: GENITOURINARY - ADULT  Goal: Maintains or returns to baseline urinary function  Description: INTERVENTIONS:  - Assess urinary function  - Encourage oral fluids to ensure adequate hydration if ordered  - Administer IV fluids as ordered to ensure adequate hydration  - Administer ordered medications as needed  - Offer frequent toileting  - Follow urinary retention protocol if ordered  5/16/2021 0708 by Rika Russell RN  Outcome: Progressing  5/15/2021 1735 by Rika Russell RN  Outcome: Progressing  Goal: Absence of urinary retention  Description: INTERVENTIONS:  - Assess patients ability to void and empty bladder  - Monitor I/O  - Bladder scan as needed  - Discuss with physician/AP medications to alleviate retention as needed  - Discuss catheterization for long term situations as appropriate  5/16/2021 0708 by Rika Russell RN  Outcome: Progressing  5/15/2021 1735 by Rika Russell RN  Outcome: Progressing     Problem: METABOLIC, FLUID AND ELECTROLYTES - ADULT  Goal: Electrolytes maintained within normal limits  Description: INTERVENTIONS:  - Monitor labs and assess patient for signs and symptoms of electrolyte imbalances  - Administer electrolyte replacement as ordered  - Monitor response to electrolyte replacements, including repeat lab results as appropriate  - Instruct patient on fluid and nutrition as appropriate  5/16/2021 0708 by Lupe Renee RN  Outcome: Progressing  5/15/2021 1735 by Lupe Renee RN  Outcome: Progressing  Goal: Fluid balance maintained  Description: INTERVENTIONS:  - Monitor labs   - Monitor I/O and WT  - Instruct patient on fluid and nutrition as appropriate  - Assess for signs & symptoms of volume excess or deficit  5/16/2021 0708 by Lupe Renee RN  Outcome: Progressing  5/15/2021 1735 by Lupe Renee RN  Outcome: Progressing  Goal: Glucose maintained within target range  Description: INTERVENTIONS:  - Monitor Blood Glucose as ordered  - Assess for signs and symptoms of hyperglycemia and hypoglycemia  - Administer ordered medications to maintain glucose within target range  - Assess nutritional intake and initiate nutrition service referral as needed  5/16/2021 0708 by Lupe Renee RN  Outcome: Progressing  5/15/2021 1735 by Lupe Renee RN  Outcome: Progressing     Problem: SKIN/TISSUE INTEGRITY - ADULT  Goal: Skin integrity remains intact  Description: INTERVENTIONS  - Identify patients at risk for skin breakdown  - Assess and monitor skin integrity  - Assess and monitor nutrition and hydration status  - Monitor labs (i e  albumin)  - Assess for incontinence   - Turn and reposition patient  - Assist with mobility/ambulation  - Relieve pressure over bony prominences  - Avoid friction and shearing  - Provide appropriate hygiene as needed including keeping skin clean and dry  - Evaluate need for skin moisturizer/barrier cream  - Collaborate with interdisciplinary team (i e  Nutrition, Rehabilitation, etc )   - Patient/family teaching  5/16/2021 0708 by Lupe Renee RN  Outcome: Progressing  5/15/2021 1735 by Lupe Renee RN  Outcome: Progressing  Goal: Incision(s), wounds(s) or drain site(s) healing without S/S of infection  Description: INTERVENTIONS  - Assess and document risk factors for skin impairment   - Assess and document dressing, incision, wound bed, drain sites and surrounding tissue  - Consider nutrition services referral as needed  - Oral mucous membranes remain intact  - Provide patient/ family education  5/16/2021 0708 by Norma Posada RN  Outcome: Progressing  5/15/2021 1735 by Norma Posada RN  Outcome: Progressing  Goal: Oral mucous membranes remain intact  Description: INTERVENTIONS  - Assess oral mucosa and hygiene practices  - Implement preventative oral hygiene regimen  - Implement oral medicated treatments as ordered  - Initiate Nutrition services referral as needed  5/16/2021 0708 by Norma Posada RN  Outcome: Progressing  5/15/2021 1735 by Norma Posada RN  Outcome: Progressing     Problem: MUSCULOSKELETAL - ADULT  Goal: Maintain or return mobility to safest level of function  Description: INTERVENTIONS:  - Assess patient's ability to carry out ADLs; assess patient's baseline for ADL function and identify physical deficits which impact ability to perform ADLs (bathing, care of mouth/teeth, toileting, grooming, dressing, etc )  - Assess/evaluate cause of self-care deficits   - Assess range of motion  - Assess patient's mobility  - Assess patient's need for assistive devices and provide as appropriate  - Encourage maximum independence but intervene and supervise when necessary  - Involve family in performance of ADLs  - Assess for home care needs following discharge   - Consider OT consult to assist with ADL evaluation and planning for discharge  - Provide patient education as appropriate  5/16/2021 0708 by Norma Posada RN  Outcome: Progressing  5/15/2021 1735 by Norma Posada RN  Outcome: Progressing  Goal: Maintain proper alignment of affected body part  Description: INTERVENTIONS:  - Support, maintain and protect limb and body alignment  - Provide patient/ family with appropriate education  5/16/2021 0708 by Norma Posada RN  Outcome: Progressing  5/15/2021 1735 by Evens Hays RN  Outcome: Progressing

## 2021-05-16 NOTE — ASSESSMENT & PLAN NOTE
Hemoglobin stable, trending up slightly from 11 0 to 11 3    No signs of active blood loss, follow up a m   Labs

## 2021-05-16 NOTE — ASSESSMENT & PLAN NOTE
+3 to 4 edema bilateral lower extremities, consult wound care    Patient is on anticoagulation therapy in the form of Coumadin, INR supratherapeutic, no need for ultrasound of the lower extremities  Patient is sick continue with anticoagulation  Due to supratherapeutic INR, will give 1 time dose of p o   Vitamin K

## 2021-05-16 NOTE — ASSESSMENT & PLAN NOTE
Wt Readings from Last 3 Encounters:   05/16/21 60 kg (132 lb 4 4 oz)   03/19/21 68 kg (150 lb)   11/20/20 65 3 kg (144 lb)       Patient with bioprosthetic aortic valve, reduced EF on recent echocardiogram    Will hold diuretics, monitor respiratory status, chest x-ray negative for any acute pulmonary disease, lungs are currently clear to auscultation on physical exam    Will start normal saline at 50 mL/hour, continue to monitor fluid status

## 2021-05-16 NOTE — ASSESSMENT & PLAN NOTE
Lab Results   Component Value Date    EGFR 14 05/16/2021    EGFR 11 05/15/2021    EGFR 11 05/15/2021    CREATININE 2 95 (H) 05/16/2021    CREATININE 3 49 (H) 05/15/2021    CREATININE 3 44 (H) 05/15/2021

## 2021-05-17 ENCOUNTER — APPOINTMENT (INPATIENT)
Dept: ULTRASOUND IMAGING | Facility: HOSPITAL | Age: 86
DRG: 682 | End: 2021-05-17
Payer: MEDICARE

## 2021-05-17 ENCOUNTER — APPOINTMENT (INPATIENT)
Dept: RADIOLOGY | Facility: HOSPITAL | Age: 86
DRG: 682 | End: 2021-05-17
Payer: MEDICARE

## 2021-05-17 PROBLEM — N17.9 ACUTE KIDNEY INJURY SUPERIMPOSED ON CHRONIC KIDNEY DISEASE (HCC): Status: ACTIVE | Noted: 2018-07-02

## 2021-05-17 PROBLEM — N18.9 ACUTE KIDNEY INJURY SUPERIMPOSED ON CHRONIC KIDNEY DISEASE (HCC): Status: ACTIVE | Noted: 2018-07-02

## 2021-05-17 LAB
ALBUMIN SERPL BCP-MCNC: 2.2 G/DL (ref 3.5–5)
ALP SERPL-CCNC: 91 U/L (ref 46–116)
ALT SERPL W P-5'-P-CCNC: 14 U/L (ref 12–78)
ANION GAP SERPL CALCULATED.3IONS-SCNC: 9 MMOL/L (ref 4–13)
AST SERPL W P-5'-P-CCNC: 21 U/L (ref 5–45)
BASOPHILS # BLD AUTO: 0.04 THOUSANDS/ΜL (ref 0–0.1)
BASOPHILS NFR BLD AUTO: 1 % (ref 0–1)
BILIRUB SERPL-MCNC: 0.99 MG/DL (ref 0.2–1)
BUN SERPL-MCNC: 71 MG/DL (ref 5–25)
CALCIUM ALBUM COR SERPL-MCNC: 9.5 MG/DL (ref 8.3–10.1)
CALCIUM SERPL-MCNC: 8.1 MG/DL (ref 8.3–10.1)
CHLORIDE SERPL-SCNC: 101 MMOL/L (ref 100–108)
CO2 SERPL-SCNC: 28 MMOL/L (ref 21–32)
CREAT SERPL-MCNC: 2.46 MG/DL (ref 0.6–1.3)
EOSINOPHIL # BLD AUTO: 0.06 THOUSAND/ΜL (ref 0–0.61)
EOSINOPHIL NFR BLD AUTO: 1 % (ref 0–6)
ERYTHROCYTE [DISTWIDTH] IN BLOOD BY AUTOMATED COUNT: 13.9 % (ref 11.6–15.1)
GFR SERPL CREATININE-BSD FRML MDRD: 17 ML/MIN/1.73SQ M
GLUCOSE SERPL-MCNC: 86 MG/DL (ref 65–140)
HCT VFR BLD AUTO: 31.8 % (ref 34.8–46.1)
HGB BLD-MCNC: 10.2 G/DL (ref 11.5–15.4)
IMM GRANULOCYTES # BLD AUTO: 0.02 THOUSAND/UL (ref 0–0.2)
IMM GRANULOCYTES NFR BLD AUTO: 0 % (ref 0–2)
INR PPP: 2.52 (ref 0.84–1.19)
LYMPHOCYTES # BLD AUTO: 1.08 THOUSANDS/ΜL (ref 0.6–4.47)
LYMPHOCYTES NFR BLD AUTO: 13 % (ref 14–44)
MAGNESIUM SERPL-MCNC: 2.2 MG/DL (ref 1.6–2.6)
MCH RBC QN AUTO: 30.4 PG (ref 26.8–34.3)
MCHC RBC AUTO-ENTMCNC: 32.1 G/DL (ref 31.4–37.4)
MCV RBC AUTO: 95 FL (ref 82–98)
MONOCYTES # BLD AUTO: 0.77 THOUSAND/ΜL (ref 0.17–1.22)
MONOCYTES NFR BLD AUTO: 9 % (ref 4–12)
NEUTROPHILS # BLD AUTO: 6.25 THOUSANDS/ΜL (ref 1.85–7.62)
NEUTS SEG NFR BLD AUTO: 76 % (ref 43–75)
NRBC BLD AUTO-RTO: 0 /100 WBCS
PHOSPHATE SERPL-MCNC: 4.8 MG/DL (ref 2.3–4.1)
PLATELET # BLD AUTO: 162 THOUSANDS/UL (ref 149–390)
PMV BLD AUTO: 9.4 FL (ref 8.9–12.7)
POTASSIUM SERPL-SCNC: 3.2 MMOL/L (ref 3.5–5.3)
PROCALCITONIN SERPL-MCNC: 0.4 NG/ML
PROT SERPL-MCNC: 5.5 G/DL (ref 6.4–8.2)
PROTHROMBIN TIME: 26.6 SECONDS (ref 11.6–14.5)
RBC # BLD AUTO: 3.35 MILLION/UL (ref 3.81–5.12)
SODIUM SERPL-SCNC: 138 MMOL/L (ref 136–145)
WBC # BLD AUTO: 8.22 THOUSAND/UL (ref 4.31–10.16)

## 2021-05-17 PROCEDURE — 85025 COMPLETE CBC W/AUTO DIFF WBC: CPT | Performed by: INTERNAL MEDICINE

## 2021-05-17 PROCEDURE — 84100 ASSAY OF PHOSPHORUS: CPT | Performed by: INTERNAL MEDICINE

## 2021-05-17 PROCEDURE — 85610 PROTHROMBIN TIME: CPT | Performed by: INTERNAL MEDICINE

## 2021-05-17 PROCEDURE — 97163 PT EVAL HIGH COMPLEX 45 MIN: CPT

## 2021-05-17 PROCEDURE — 84145 PROCALCITONIN (PCT): CPT | Performed by: INTERNAL MEDICINE

## 2021-05-17 PROCEDURE — 76882 US LMTD JT/FCL EVL NVASC XTR: CPT

## 2021-05-17 PROCEDURE — 97530 THERAPEUTIC ACTIVITIES: CPT

## 2021-05-17 PROCEDURE — 71045 X-RAY EXAM CHEST 1 VIEW: CPT

## 2021-05-17 PROCEDURE — 83735 ASSAY OF MAGNESIUM: CPT | Performed by: INTERNAL MEDICINE

## 2021-05-17 PROCEDURE — 97535 SELF CARE MNGMENT TRAINING: CPT

## 2021-05-17 PROCEDURE — 97167 OT EVAL HIGH COMPLEX 60 MIN: CPT

## 2021-05-17 PROCEDURE — 80053 COMPREHEN METABOLIC PANEL: CPT | Performed by: INTERNAL MEDICINE

## 2021-05-17 PROCEDURE — 99232 SBSQ HOSP IP/OBS MODERATE 35: CPT | Performed by: FAMILY MEDICINE

## 2021-05-17 RX ORDER — AMMONIUM LACTATE 12 G/100G
LOTION TOPICAL 2 TIMES DAILY
Status: DISCONTINUED | OUTPATIENT
Start: 2021-05-17 | End: 2021-05-26 | Stop reason: HOSPADM

## 2021-05-17 RX ORDER — CEFTRIAXONE 1 G/50ML
1000 INJECTION, SOLUTION INTRAVENOUS EVERY 24 HOURS
Status: DISCONTINUED | OUTPATIENT
Start: 2021-05-17 | End: 2021-05-22

## 2021-05-17 RX ORDER — SODIUM CHLORIDE 9 MG/ML
50 INJECTION, SOLUTION INTRAVENOUS ONCE
Status: COMPLETED | OUTPATIENT
Start: 2021-05-17 | End: 2021-05-17

## 2021-05-17 RX ADMIN — CEFTRIAXONE 1000 MG: 1 INJECTION, SOLUTION INTRAVENOUS at 10:12

## 2021-05-17 RX ADMIN — ROPINIROLE HYDROCHLORIDE 2 MG: 1 TABLET, FILM COATED ORAL at 08:17

## 2021-05-17 RX ADMIN — SODIUM CHLORIDE 50 ML/HR: 0.9 INJECTION, SOLUTION INTRAVENOUS at 10:12

## 2021-05-17 RX ADMIN — ROPINIROLE HYDROCHLORIDE 2 MG: 1 TABLET, FILM COATED ORAL at 21:34

## 2021-05-17 RX ADMIN — Medication: at 17:29

## 2021-05-17 RX ADMIN — Medication: at 14:22

## 2021-05-17 NOTE — ASSESSMENT & PLAN NOTE
Lab Results   Component Value Date    EGFR 17 05/17/2021    EGFR 14 05/16/2021    EGFR 11 05/15/2021    CREATININE 2 46 (H) 05/17/2021    CREATININE 2 95 (H) 05/16/2021    CREATININE 3 49 (H) 05/15/2021     Provide an additional L of normal saline

## 2021-05-17 NOTE — ASSESSMENT & PLAN NOTE
Wt Readings from Last 3 Encounters:   05/17/21 59 2 kg (130 lb 8 2 oz)   03/19/21 68 kg (150 lb)   11/20/20 65 3 kg (144 lb)       Patient with bioprosthetic aortic valve, reduced EF on recent echocardiogram    Will hold diuretics, monitor respiratory status, chest x-ray negative for any acute pulmonary disease, lungs are currently clear to auscultation on physical exam    Will provide an additional L of normal saline given significant renal improvement

## 2021-05-17 NOTE — ASSESSMENT & PLAN NOTE
+3 to 4 edema bilateral lower extremities, consult wound care  With increasing warmth and low grade fever will treat with rocephin

## 2021-05-17 NOTE — WOUND OSTOMY CARE
Consult Note - Wound   Karlene Luke 80 y o  female MRN: [id number]  Unit/Bed#: 077-32 Encounter: [id number]        History and Present Illness:  Wound care consulted for patient admitted with acute kidney injury  Patient history significant for bilateral lower extremity edema, a-fib, CHF, CKD stage 3, right foot drop, atherosclerosis of native artery of both lower extremities with intermittent claudication  Assessment Findings:   Patient OOB to the recliner, she is alert and confused  Patient was referring to another person in the room of which there was none  She is not incontinent of urine or stool  She is a moderate assist with a walker  Patient was able to stand for assessment of buttocks and sacrum with assist x1  Ehob offloading cushion placed under patient in recliner  Bilateral legs and feet washed, lac-hydrin ordered by provider for lower legs  Patient is on IV antibiotics and fluid restriction at this time  1  Bilateral breast, abdominal folds, groin buttocks and sacrum intact, inner buttocks pink and blanchable  2  Bilateral heels boggy, blanchable erythema  3  Bilateral elbows pink and blanchable  4  POA-2nd left foot plantar surface, resolving DTI, light brown area to tip of hammer toe  5  Bilateral lower extremity cellulitis with edema, extremely scaly skin with scattered dry scabbed areas, no open or draining wounds at this time  Left medial calf, area of induration noted 9x6 with scab located to the center of the indurated area, reported to provider    Skin and Wound Care Plan:   1  Apply skin nourishing cream to the skin daily  2  Ehob offloading cushion to chair when OOB  3  Elevate heels off of bed and recliner with pillows to offload  4  Wash lower legs daily with soap and water, pat dry, apply Lac-hydrin to bilateral lower legs BID and PRN as ordered by provider  5  Turn and reposition patient Q2 hours  6   Allevyn life heel foams to bilateral heels, and sacral Allevyn to upper buttocks/sacral area, darcy with P, date, and peel back daily for skin assessment, change every 3 days or with soilage or dislodgement  Wounds:  Wound 05/17/21 Cellulitis Pretibial Left (Active)   Wound Image    05/17/21 1104   Wound Description Dry;Brown;Fragile 05/17/21 1104   Josi-wound Assessment Erythema;Scaly; Yellow-brown 05/17/21 1104   Drainage Amount None 05/17/21 1104   Treatments Cleansed 05/17/21 1104   Dressing Other (Comment) 05/17/21 1104   Patient Tolerance Tolerated well 05/17/21 1104       Wound 05/17/21 Cellulitis Pretibial Right (Active)   Wound Image    05/17/21 1105   Wound Description Dry;Brown;Fragile 05/17/21 1105   Josi-wound Assessment Erythema;Scaly; Yellow-brown 05/17/21 1105   Drainage Amount None 05/17/21 1105   Treatments Cleansed 05/17/21 1105   Dressing Other (Comment) 05/17/21 1105   Patient Tolerance Tolerated well 05/17/21 1105       Wound 05/17/21 Pressure Injury Toe (Comment  which one) Left (Active)   Wound Image   05/17/21 1110   Wound Description Intact; Brown 05/17/21 1110   Pressure Injury Stage DTPI 05/17/21 1110   Josi-wound Assessment Dry; Intact 05/17/21 1110   Wound Length (cm) 0 5 cm 05/17/21 1110   Wound Width (cm) 0 5 cm 05/17/21 1110   Wound Depth (cm) 0 cm 05/17/21 1110   Wound Surface Area (cm^2) 0 25 cm^2 05/17/21 1110   Wound Volume (cm^3) 0 cm^3 05/17/21 1110   Calculated Wound Volume (cm^3) 0 cm^3 05/17/21 1110   Drainage Amount None 05/17/21 1110   Dressing Open to air 05/17/21 1110   Patient Tolerance Tolerated well 05/17/21 1110     Reviewed plan of care with primary JOSE Tan  Recommendations written as orders  Wound care team to follow weekly while admitted  Questions or concerns Marichuy HERNANDEZN, RN, [facility], [facility]

## 2021-05-17 NOTE — PLAN OF CARE
Problem: Potential for Falls  Goal: Patient will remain free of falls  Description: INTERVENTIONS:  - Assess patient frequently for physical needs  -  Identify cognitive and physical deficits and behaviors that affect risk of falls    -  [place] fall precautions as indicated by assessment   - Educate patient/family on patient safety including physical limitations  - Instruct patient to call for assistance with activity based on assessment  - Modify environment to reduce risk of injury  - Consider OT/PT consult to assist with strengthening/mobility  Outcome: Progressing     Problem: Prexisting or High Potential for Compromised Skin Integrity  Goal: Skin integrity is maintained or improved  Description: INTERVENTIONS:  - Identify patients at risk for skin breakdown  - Assess and monitor skin integrity  - Assess and monitor nutrition and hydration status  - Monitor labs   - Assess for incontinence   - Turn and reposition patient  - Assist with mobility/ambulation  - Relieve pressure over bony prominences  - Avoid friction and shearing  - Provide appropriate hygiene as needed including keeping skin clean and dry  - Evaluate need for skin moisturizer/barrier cream  - Collaborate with interdisciplinary team   - Patient/family teaching  - Consider wound care consult   Outcome: Progressing     Problem: PAIN - ADULT  Goal: Verbalizes/displays adequate comfort level or baseline comfort level  Description: Interventions:  - Encourage patient to monitor pain and request assistance  - Assess pain using appropriate pain scale  - Administer analgesics based on type and severity of pain and evaluate response  - Implement non-pharmacological measures as appropriate and evaluate response  - Consider cultural and social influences on pain and pain management  - Notify physician/advanced practitioner if interventions unsuccessful or patient reports new pain  Outcome: Progressing     Problem: INFECTION - ADULT  Goal: Absence or prevention of progression during hospitalization  Description: INTERVENTIONS:  - Assess and monitor for signs and symptoms of infection  - Monitor lab/diagnostic results  - Monitor all insertion sites, i e  indwelling lines, tubes, and drains  - Monitor endotracheal if appropriate and nasal secretions for changes in amount and color  - [place] appropriate cooling/warming therapies per order  - Administer medications as ordered  - Instruct and encourage patient and family to use good hand hygiene technique  - Identify and instruct in appropriate isolation precautions for identified infection/condition  Outcome: Progressing  Goal: Absence of fever/infection during neutropenic period  Description: INTERVENTIONS:  - Monitor WBC    Outcome: Progressing     Problem: SAFETY ADULT  Goal: Patient will remain free of falls  Description: INTERVENTIONS:  - Assess patient frequently for physical needs  -  Identify cognitive and physical deficits and behaviors that affect risk of falls    -  [place] fall precautions as indicated by assessment   - Educate patient/family on patient safety including physical limitations  - Instruct patient to call for assistance with activity based on assessment  - Modify environment to reduce risk of injury  - Consider OT/PT consult to assist with strengthening/mobility  Outcome: Progressing  Goal: Maintain or return to baseline ADL function  Description: INTERVENTIONS:  -  Assess patient's ability to carry out ADLs; assess patient's baseline for ADL function and identify physical deficits which impact ability to perform ADLs (bathing, care of mouth/teeth, toileting, grooming, dressing, etc )  - Assess/evaluate cause of self-care deficits   - Assess range of motion  - Assess patient's mobility; develop plan if impaired  - Assess patient's need for assistive devices and provide as appropriate  - Encourage maximum independence but intervene and supervise when necessary  - Involve family in performance of ADLs  - Assess for home care needs following discharge   - Consider OT consult to assist with ADL evaluation and planning for discharge  - Provide patient education as appropriate  Outcome: Progressing  Goal: Maintain or return mobility status to optimal level  Description: INTERVENTIONS:  - Assess patient's baseline mobility status (ambulation, transfers, stairs, etc )    - Identify cognitive and physical deficits and behaviors that affect mobility  - Identify mobility aids required to assist with transfers and/or ambulation (gait belt, sit-to-stand, lift, walker, cane, etc )  - [place] fall precautions as indicated by assessment  - Record patient progress and toleration of activity level on Mobility SBAR; progress patient to next Phase/Stage  - Instruct patient to call for assistance with activity based on assessment  - Consider rehabilitation consult to assist with strengthening/weightbearing, etc   Outcome: Progressing     Problem: DISCHARGE PLANNING  Goal: Discharge to home or other facility with appropriate resources  Description: INTERVENTIONS:  - Identify barriers to discharge w/patient and caregiver  - Arrange for needed discharge resources and transportation as appropriate  - Identify discharge learning needs (meds, wound care, etc )  - Arrange for interpretive services to assist at discharge as needed  - Refer to Case Management Department for coordinating discharge planning if the patient needs post-hospital services based on physician/advanced practitioner order or complex needs related to functional status, cognitive ability, or social support system  Outcome: Progressing     Problem: Knowledge Deficit  Goal: Patient/family/caregiver demonstrates understanding of disease process, treatment plan, medications, and discharge instructions  Description: Complete learning assessment and assess knowledge base    Interventions:  - Provide teaching at level of understanding  - Provide teaching via preferred learning methods  Outcome: Progressing     Problem: Nutrition/Hydration-ADULT  Goal: Nutrient/Hydration intake appropriate for improving, restoring or maintaining nutritional needs  Description: Monitor and assess patient's nutrition/hydration status for malnutrition  Collaborate with interdisciplinary team and initiate plan and interventions as ordered  Monitor patient's weight and dietary intake as ordered or per policy  Utilize nutrition screening tool and intervene as necessary  Determine patient's food preferences and provide high-protein, high-caloric foods as appropriate       INTERVENTIONS:  - Monitor oral intake, urinary output, labs, and treatment plans  - Assess nutrition and hydration status and recommend course of action  - Evaluate amount of meals eaten  - Assist patient with eating if necessary   - Allow adequate time for meals  - Recommend/ encourage appropriate diets, oral nutritional supplements, and vitamin/mineral supplements  - Order, calculate, and assess calorie counts as needed  - Recommend, monitor, and adjust tube feedings and TPN/PPN based on assessed needs  - Assess need for intravenous fluids  - Provide specific nutrition/hydration education as appropriate  - Include patient/family/caregiver in decisions related to nutrition  Outcome: Progressing     Problem: NEUROSENSORY - ADULT  Goal: Achieves stable or improved neurological status  Description: INTERVENTIONS  - Monitor and report changes in neurological status  - Monitor vital signs such as temperature, blood pressure, glucose, and any other labs ordered   - Initiate measures to prevent increased intracranial pressure  - Monitor for seizure activity and implement precautions if appropriate      Outcome: Progressing  Goal: Achieves maximal functionality and self care  Description: INTERVENTIONS  - Monitor swallowing and airway patency with patient fatigue and changes in neurological status  - Encourage and assist patient to increase activity and self care     - Encourage visually impaired, hearing impaired and aphasic patients to use assistive/communication devices  Outcome: Progressing     Problem: RESPIRATORY - ADULT  Goal: Achieves optimal ventilation and oxygenation  Description: INTERVENTIONS:  - Assess for changes in respiratory status  - Assess for changes in mentation and behavior  - Position to facilitate oxygenation and minimize respiratory effort  - Oxygen administered by appropriate delivery if ordered  - Initiate smoking cessation education as indicated  - Encourage broncho-pulmonary hygiene including cough, deep breathe, Incentive Spirometry  - Assess the need for suctioning and aspirate as needed  - Assess and instruct to report SOB or any respiratory difficulty  - Respiratory Therapy support as indicated  Outcome: Progressing     Problem: GASTROINTESTINAL - ADULT  Goal: Maintains adequate nutritional intake  Description: INTERVENTIONS:  - Monitor percentage of each meal consumed  - Identify factors contributing to decreased intake, treat as appropriate  - Assist with meals as needed  - Monitor I&O, weight, and lab values if indicated  - Obtain nutrition services referral as needed  Outcome: Progressing     Problem: GENITOURINARY - ADULT  Goal: Maintains or returns to baseline urinary function  Description: INTERVENTIONS:  - Assess urinary function  - Encourage oral fluids to ensure adequate hydration if ordered  - Administer IV fluids as ordered to ensure adequate hydration  - Administer ordered medications as needed  - Offer frequent toileting  - Follow urinary retention protocol if ordered  Outcome: Progressing  Goal: Absence of urinary retention  Description: INTERVENTIONS:  - Assess patients ability to void and empty bladder  - Monitor I/O  - Bladder scan as needed  - Discuss with physician/AP medications to alleviate retention as needed  - Discuss catheterization for long term situations as appropriate  Outcome: Progressing     Problem: METABOLIC, FLUID AND ELECTROLYTES - ADULT  Goal: Electrolytes maintained within normal limits  Description: INTERVENTIONS:  - Monitor labs and assess patient for signs and symptoms of electrolyte imbalances  - Administer electrolyte replacement as ordered  - Monitor response to electrolyte replacements, including repeat lab results as appropriate  - Instruct patient on fluid and nutrition as appropriate  Outcome: Progressing  Goal: Fluid balance maintained  Description: INTERVENTIONS:  - Monitor labs   - Monitor I/O and WT  - Instruct patient on fluid and nutrition as appropriate  - Assess for signs & symptoms of volume excess or deficit  Outcome: Progressing  Goal: Glucose maintained within target range  Description: INTERVENTIONS:  - Monitor Blood Glucose as ordered  - Assess for signs and symptoms of hyperglycemia and hypoglycemia  - Administer ordered medications to maintain glucose within target range  - Assess nutritional intake and initiate nutrition service referral as needed  Outcome: Progressing     Problem: SKIN/TISSUE INTEGRITY - ADULT  Goal: Skin integrity remains intact  Description: INTERVENTIONS  - Identify patients at risk for skin breakdown  - Assess and monitor skin integrity  - Assess and monitor nutrition and hydration status  - Monitor labs (i e  albumin)  - Assess for incontinence   - Turn and reposition patient  - Assist with mobility/ambulation  - Relieve pressure over bony prominences  - Avoid friction and shearing  - Provide appropriate hygiene as needed including keeping skin clean and dry  - Evaluate need for skin moisturizer/barrier cream  - Collaborate with interdisciplinary team (i e  Nutrition, Rehabilitation, etc )   - Patient/family teaching  Outcome: Progressing  Goal: Incision(s), wounds(s) or drain site(s) healing without S/S of infection  Description: INTERVENTIONS  - Assess and document risk factors for skin impairment   - Assess and document dressing, incision, wound bed, drain sites and surrounding tissue  - Consider nutrition services referral as needed  - Oral mucous membranes remain intact  - Provide patient/ family education  Outcome: Progressing  Goal: Oral mucous membranes remain intact  Description: INTERVENTIONS  - Assess oral mucosa and hygiene practices  - Implement preventative oral hygiene regimen  - Implement oral medicated treatments as ordered  - Initiate Nutrition services referral as needed  Outcome: Progressing     Problem: MUSCULOSKELETAL - ADULT  Goal: Maintain or return mobility to safest level of function  Description: INTERVENTIONS:  - Assess patient's ability to carry out ADLs; assess patient's baseline for ADL function and identify physical deficits which impact ability to perform ADLs (bathing, care of mouth/teeth, toileting, grooming, dressing, etc )  - Assess/evaluate cause of self-care deficits   - Assess range of motion  - Assess patient's mobility  - Assess patient's need for assistive devices and provide as appropriate  - Encourage maximum independence but intervene and supervise when necessary  - Involve family in performance of ADLs  - Assess for home care needs following discharge   - Consider OT consult to assist with ADL evaluation and planning for discharge  - Provide patient education as appropriate  Outcome: Progressing  Goal: Maintain proper alignment of affected body part  Description: INTERVENTIONS:  - Support, maintain and protect limb and body alignment  - Provide patient/ family with appropriate education  Outcome: Progressing

## 2021-05-17 NOTE — ASSESSMENT & PLAN NOTE
Patient with decreased skin turgor, dry mucous membranes  Patient has severe bilateral lower extremity edema, stasis dermatitis present, according to the chart review patient has received 1 5 L of IV fluid on admission, renal function has been improving  Fe urea calculated at 29 7%, consistent with pre renal disease  Patient has history of chronic lower extremity edema, chest x-ray is clear, will treat lower extremities with elevation, local wound care, wound care consult  Will start normal saline at 50 mL/hour    Avoid nephrotoxins, no evidence of obstructive uropathy, start urinary retention protocol  Patient with underlying chronic kidney disease, baseline creatinine 1 7, creatinine trending down from 3 49 to 2 95  Patient was recently started on Ace inhibitor, lisinopril 2 5 mg daily, also was given Zaroxolyn twice weekly      Will provide another L on slow NS infusion 5/17/21

## 2021-05-17 NOTE — CASE MANAGEMENT
A post acute care recommendation was made by your care team for STR  Discussed Freedom of Choice with caregiver  List of facilities given to caregiver via in person  caregiver aware the list is custom filtered for them by preference  and that [facility] post acute providers are designated  Met with pts son Indio Ambrosio in person and SonAster via phone  Both agreed to STR and requesting [facility] since she was there before  Will send referral  CM to follow

## 2021-05-17 NOTE — OCCUPATIONAL THERAPY NOTE
Occupational Therapy Evaluation     Patient Name: Ibeth Davidson  FXCPW'W Date: 5/17/2021  Problem List  Principal Problem:    PRASHANT (acute kidney injury) ([facility] [address] )  Active Problems:    S/P AVR    Bilateral lower leg cellulitis    Elevated troponin    Restless leg syndrome    Anemia    Acute encephalopathy    Acute kidney injury superimposed on chronic kidney disease (HCC)    Atrial fibrillation, chronic    Hyperlipidemia    Chronic combined systolic and diastolic CHF (congestive heart failure) ([facility] [address] )    Past Medical History  Past Medical History:   Diagnosis Date    A-fib ([facility] [address] )     Aortic insufficiency     Arthritis     L shoulder,fingers    Cardiac disease     valve replacement    Carpal tunnel syndrome, bilateral     Chronic anemia     Chronic diastolic congestive heart failure (HCC) 3/19/2021    Chronic lower back pain     DDD (degenerative disc disease), lumbar     Dropfoot     right    Edema     History of echocardiogram 04/20/2016    EF 75%, Hyperdynamic LVSF  Mild concentric LVH  Normal functioning bioprosthetic AV  Trace MR  Mild pulm htn   HTN (hypertension)     Hyperlipidemia     Hypertension     Inguinal hernia, left     Insomnia     Lymphedema     Parkinson disease ([facility]     Peripheral neuropathy     Phlebitis     Psoriasis     Renal insufficiency     Grade 1    Restless leg syndrome     Rhabdomyolysis 8/23/2016    Right bundle branch block     Senile keratosis     Spinal stenosis     Stasis ulcer of right lower extremity ([facility] [address] )     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    Stroke ([facility] [address] )     Supraventricular tachycardia ([facility] [address] )     Vitamin D deficiency      Past Surgical History  Past Surgical History:   Procedure Laterality Date    AORTIC VALVE REPLACEMENT  11/25/2002    Tissue AVR #21    AORTIC VALVULOPLASTY  11/25/2002    Bovine Pericardial heart valve    BACK SURGERY      CARDIAC CATHETERIZATION  11/22/2002    Sever critical Aortic stenosis  Pulm Htn   Normal coronary arteries   HAND SURGERY      right hand    HYSTERECTOMY      ROTATOR CUFF REPAIR Right     VASCULAR SURGERY      bilateral vein ligation & stripping             05/17/21 0948   OT Last Visit   OT Visit Date 05/17/21   Note Type   Note type Evaluation   Restrictions/Precautions   Weight Bearing Precautions Per Order No   Other Precautions Multiple lines; Fall Risk   Pain Assessment   Pain Assessment Tool Pain Assessment not indicated - pt denies pain   Pain Score 6   Pain Location/Orientation Orientation: Right;Location: Groin; Location: Abdomen; Location: Generalized   Home Living   Type of [address] Multi-level;Performs ADLs on one level; Able to live on main level with bedroom/bathroom;Stairs to enter with rails   Bathroom Shower/Tub Walk-in shower   Bathroom Toilet Standard   Bathroom Equipment Grab bars in shower;Grab bars around toilet; Shower chair;Commode   Bathroom Accessibility Accessible   Home Equipment Walker;Cane;Wheelchair-manual;Hospital bed;Grab bars   Additional Comments pt reports RW at baseline; pt reports living on 1st floor   Prior Function   Level of [place] Needs assistance with IADLs; Needs assistance with ADLs and functional mobility   Lives With Alone   Receives Help From Family; Other (Comment)  (family member is Aide and visits 2-3x wk)   ADL Assistance Independent   IADLs Needs assistance   Vocational Retired   Comments per chart review family has a camera in home and family visits 2x a day for meals and medications;family aide visits 2-3x wk; at baseline pt reports she is able to complete ADLs (I)   Psychosocial   Psychosocial (WDL) WDL   Subjective   Subjective "I'm trying to go to the bathroom"   ADL   Where Assessed Other (Comment)  (bathroom)   LB Dressing Assistance 3  Moderate Assistance   LB Dressing Deficit Verbal cueing;Supervision/safety; Increased time to complete; Thread RLE into underwear; Thread LLE into underwear   Toileting Assistance  4  Minimal Assistance   Toileting Deficit Verbal cueing;Supervison/safety; Increased time to complete;Clothing management up   Additional Comments pt required (A) for donning pull-up due to limited distal reach, pain, pt self limiting and did not attempt to initiate; pt able to perform hygine needed (A) for clothing manaegement; (A) for hand placemnet on RW   Bed Mobility   Supine to Sit 5  Supervision   Additional items Increased time required;Verbal cues; Bedrails   Additional Comments pt on 2L O2 upon arrival; during session pt on RA SPO2 WNL; pt seated in chair at end of session with all needs within reach   Transfers   Sit to Stand 5  Supervision   Additional items Verbal cues; Increased time required   Stand to Sit 5  Supervision   Additional items Increased time required;Verbal cues   Toilet transfer 4  Minimal assistance   Additional items Increased time required;Verbal cues; Assist x 1   Additional Comments RW used   Functional Mobility   Functional Mobility 5  Supervision   Additional Comments pt demonstrated funcitonal mobility ~20ft with RW; increased time needed due to slow pace; no instability or LOB   Additional items Rolling walker   Balance   Static Sitting Good   Dynamic Sitting Fair +   Static Standing Fair   Dynamic Standing Poor +   Ambulatory Poor +   Activity Tolerance   Activity Tolerance Patient limited by fatigue;Patient limited by pain   RUE Assessment   RUE Assessment X  (Impaired Shld flexion)   LUE Assessment   LUE Assessment X  (Impaired Shld flexion)   Hand Function   Gross Motor Coordination Functional   Fine Motor Coordination Functional   Sensation   Light Touch No apparent deficits   Sharp/Dull No apparent deficits   Cognition   Overall Cognitive Status Impaired   Arousal/Participation Alert; Responsive   Attention Attends with cues to redirect   Orientation Level Oriented to person;Disoriented to time;Disoriented to situation;Oriented to place   Memory Decreased long term memory;Decreased short term memory   Following Commands Follows one step commands with increased time or repetition   Assessment   Limitation Decreased ADL status; Decreased UE strength;Decreased Safe judgement during ADL;Decreased endurance;Decreased self-care trans;Decreased high-level ADLs   Assessment  Pt is a 80 y o  female seen for OT evaluation s/p admit to [facility] on 5/15/2021 w/ PRASHANT (acute kidney injury) ([facility] [address] )  Comorbidities affecting pt's functional performance at time of assessment include: HTN and Cardiac disease, CVA, Arthritis, Hyperlipidemia, A-fib, Peripheral neuropathy, DDD, Carpal tunnel syndrome  Personal factors affecting pt at time of IE include:steps to enter environment, difficulty performing ADLS, difficulty performing IADLS , limited insight into deficits, decreased initiation and engagement  and health management   Prior to admission, pt was (I) with ADLs and (A) for IADLs  Upon evaluation: Pt requires min (A) to max (A) for ADLs and functional mobility with RW 2* the following deficits impacting occupational performance: weakness, decreased strength, decreased balance, decreased tolerance, impaired initiation, impaired sequencing, impaired problem solving, decreased safety awareness and increased pain  Pt to benefit from continued skilled OT tx while in the hospital to address deficits as defined above and maximize level of functional independence w ADL's and functional mobility  Occupational Performance areas to address include: grooming, bathing/shower, toilet hygiene, dressing, health maintenance, functional mobility, community mobility and clothing management  The patient's raw score on the AM-PAC Daily Activity inpatient short form is 17, standardized score is 37 26, less than 39 4  Patients at this level are likely to benefit from discharge to post-acute rehabilitation services  Please refer to the recommendation of the Occupational Therapist for safe discharge planning      Goals   Patient Goals to go home Short Term Goal  pt will perform UB strengthening exercises   Long Term Goal #1 pt will demonstrate UB/LB bathing and grooming at (I) level   Long Term Goal #2 pt will demonstrate toielting transfers and hygine at (I) level   Long Term Goal pt will demonstrate funcitonal mobility with RW at modified (I) level   Plan   Treatment Interventions ADL retraining;Functional transfer training;UE strengthening/ROM; Patient/family training;Energy conservation; Activityengagement; Endurance training   Goal Expiration Date 05/31/21   OT Frequency 3-5x/wk   Recommendation   OT Discharge Recommendation Post acute rehabilitation services   AM-PAC Daily Activity Inpatient   Lower Body Dressing 2   Bathing 2   Toileting 2   Upper Body Dressing 3   Grooming 4   Eating 4   Daily Activity Raw Score 17   Daily Activity Standardized Score (Calc for Raw Score >=11) 37 26   AM-PAC Applied Cognition Inpatient   Following a Speech/Presentation 4   Understanding Ordinary Conversation 4   Taking Medications 4   Remembering Where Things Are Placed or Put Away 3   Remembering List of 4-5 Errands 2   Taking Care of Complicated Tasks 2   Applied Cognition Raw Score 19   Applied Cognition Standardized Score 39 77     Pt will benefit from continued OT services in order to maximize (I) c ADL performance, FM c RW, and improve overall endurance/strength required to complete functional tasks in preparation for d/c  Pt left seated in chair at end of session; all needs within reach; all lines intact

## 2021-05-17 NOTE — PLAN OF CARE
Problem: PHYSICAL THERAPY ADULT  Goal: Performs mobility at highest level of function for planned discharge setting  See evaluation for individualized goals  Description: Treatment/Interventions: Functional transfer training, LE strengthening/ROM, Therapeutic exercise, Endurance training, Bed mobility, Gait training          See flowsheet documentation for full assessment, interventions and recommendations  Note: Prognosis: Good  Problem List: Decreased strength, Decreased endurance, Decreased mobility, Impaired balance, Impaired judgement, Decreased safety awareness, Decreased cognition, Pain  Assessment: Patient is a 80 y o  female evaluated by Physical Therapy s/p admit to [address] [place] [address] on 5/15/2021 with admitting diagnosis of: CHF (congestive heart failure), Weakness, Elevated troponin I level, Acute encephalopathy, Generalized weakness, Acute kidney injury superimposed on chronic kidney disease, and principal problem of: PRASHANT (acute kidney injury)  PT was consulted to assess patient's functional mobility and discharge needs  Ordered are PT Evaluation and treatment with activity level of: up and OOB as tolerated  Comorbidities affecting patient's physical performance at time of assessment include: hx of CVA, HTN, HLD, arthritis, CHF, a-fib, chronic lower back pain, peripheral neuropathy, Parkinson disease  Personal factors affecting the patient at time of IE include: lives in 2 story home, ambulating with assistive device, inability to navigate community distances, impaired safety awareness, decreased initiation and engagement, inability/difficulty performing IADLs and inability/difficulty performing ADLs  Please locate objective findings from PT assessment regarding body systems outlined above  Upon evaluation, pt able to perform all functional mobility with SUP-Braulio x 1, RW, and increased time  Moderate verbal cuing provided for safety awareness and sequencing   Ambulation limited to short functional distances within room d/t fatigue and to utilize bathroom  No true LOB experienced, however pt demonstrating moderate postural sway with mobility  HR and SpO2 remained WFL on RA throughout  The patient's AM-PAC Basic Mobility Inpatient Short Form Raw Score is 17, Standardized Score is 39 67  A standardized score less than 42 9 suggests the patient may benefit from discharge to post-acute rehabilitation services  Please also refer to the recommendation of the Physical Therapist for safe discharge planning  Pt will benefit from continued PT intervention during LOS to address current deficits, increase LOF, and facilitate safe d/c to next level of care when medically appropriate  D/c recommendation at this time is post-acute rehabilitation services  PT Discharge Recommendation: Post acute rehabilitation services          See flowsheet documentation for full assessment

## 2021-05-17 NOTE — PROGRESS NOTES
[address] [facility] [address]  Progress Note - Дмитрий Clemons 7/3/1931, 80 y o  female MRN: [id number]  Unit/Bed#: 719-61 Encounter: [id number]  Primary Care Provider: Sung Hall DO   Date and time admitted to hospital: 5/15/2021  9:38 AM    Chronic combined systolic and diastolic CHF (congestive heart failure) ([facility] [address] )  Assessment & Plan  Wt Readings from Last 3 Encounters:   05/17/21 59 2 kg (130 lb 8 2 oz)   03/19/21 68 kg (150 lb)   11/20/20 65 3 kg (144 lb)       Patient with bioprosthetic aortic valve, reduced EF on recent echocardiogram    Will hold diuretics, monitor respiratory status, chest x-ray negative for any acute pulmonary disease, lungs are currently clear to auscultation on physical exam    Will provide an additional L of normal saline given significant renal improvement    Atrial fibrillation, chronic  Assessment & Plan  INR supratherapeutic, Coumadin is on hold    Acute kidney injury superimposed on chronic kidney disease [facility])  Assessment & Plan  Lab Results   Component Value Date    EGFR 17 05/17/2021    EGFR 14 05/16/2021    EGFR 11 05/15/2021    CREATININE 2 46 (H) 05/17/2021    CREATININE 2 95 (H) 05/16/2021    CREATININE 3 49 (H) 05/15/2021     Provide an additional L of normal saline    Acute encephalopathy  Assessment & Plan  Acute metabolic encephalopathy, present on admission, as evidenced by increased confusion, visual hallucinations per the family, likely secondary to uremia in the setting of acute kidney injury      Elevated troponin  Assessment & Plan  Non MI troponin elevation    CPK level not significantly elevated    Bilateral lower leg cellulitis  Assessment & Plan  +3 to 4 edema bilateral lower extremities, consult wound care  With increasing warmth and low grade fever will treat with rocephin     S/P AVR  Assessment & Plan  History of bioprosthetic aortic valve, continue outpatient follow-up with Cardiology  Recheck INR    * PRASHANT (acute kidney injury) [facility])  Assessment & Plan  Patient with decreased skin turgor, dry mucous membranes  Patient has severe bilateral lower extremity edema, stasis dermatitis present, according to the chart review patient has received 1 5 L of IV fluid on admission, renal function has been improving  Fe urea calculated at 29 7%, consistent with pre renal disease  Patient has history of chronic lower extremity edema, chest x-ray is clear, will treat lower extremities with elevation, local wound care, wound care consult  Will start normal saline at 50 mL/hour    Avoid nephrotoxins, no evidence of obstructive uropathy, start urinary retention protocol  Patient with underlying chronic kidney disease, baseline creatinine 1 7, creatinine trending down from 3 49 to 2 95  Patient was recently started on Ace inhibitor, lisinopril 2 5 mg daily, also was given Zaroxolyn twice weekly  Will provide another L on slow NS infusion 5/17/21          Progress Note - Inell Poet 80 y o  female MRN: [id number]    Unit/Bed#: 619-11 Encounter: [id number]        Subjective:   Patient seen examined at bedside, she is awake and alert but is somewhat confused  Objective:     Vitals:   Vitals:    05/17/21 0626   BP: (!) 119/103   Pulse: 83   Resp: 18   Temp: 97 6 °F (36 4 °C)   SpO2: 98%     Body mass index is 27 28 kg/m²      Intake/Output Summary (Last 24 hours) at 5/17/2021 0923  Last data filed at 5/17/2021 0626  Gross per 24 hour   Intake 160 ml   Output 1900 ml   Net -1740 ml       Physical Exam:   BP (!) 119/103 (BP Location: Left arm)   Pulse 83   Temp 97 6 °F (36 4 °C) (Oral)   Resp 18   Ht 4' 10" (1 473 m)   Wt 59 2 kg (130 lb 8 2 oz)   LMP  (LMP Unknown)   SpO2 98%   BMI 27 28 kg/m²   General appearance: alert  Head: Normocephalic, without obvious abnormality, atraumatic  Lungs: clear to auscultation bilaterally  Heart: regular rate and rhythm, S1, S2 normal, no murmur, click, rub or gallop  Abdomen: soft, non-tender; bowel sounds normal; no masses,  no organomegaly  Extremities:  +3 edema, erythema and warmth  Pulses: 2+ and symmetric  Neurologic: Grossly normal     Invasive Devices     Peripheral Intravenous Line            Peripheral IV 05/17/21 Right;Ventral (anterior) Hand less than 1 day                Results from last 7 days   Lab Units 05/17/21  0438 05/16/21  0450 05/15/21  1052   WBC Thousand/uL 8 22 6 49 8 69   HEMOGLOBIN g/dL 10 2* 11 3* 11 0*   HEMATOCRIT % 31 8* 36 3 35 3   PLATELETS Thousands/uL 162 182 208       Results from last 7 days   Lab Units 05/17/21  0438 05/16/21  0450 05/15/21  1500 05/15/21  1052   POTASSIUM mmol/L 3 2* 3 8 3 8 3 9   CHLORIDE mmol/L 101 101 101 101   CO2 mmol/L 28 27 29 30   BUN mg/dL 71* 76* 80* 80*   CREATININE mg/dL 2 46* 2 95* 3 49* 3 44*   CALCIUM mg/dL 8 1* 8 2* 7 9* 8 0*   ALK PHOS U/L 91 104  --  107   ALT U/L 14 25  --  27   AST U/L 21 28  --  31       Medication Administration - last 24 hours from 05/16/2021 0923 to 05/17/2021 5684       Date/Time Order Dose Route Action Action by     05/17/2021 0817 rOPINIRole (REQUIP) tablet 2 mg 2 mg Oral Given Rebecca Fajardo RN     05/16/2021 2116 rOPINIRole (REQUIP) tablet 2 mg 2 mg Oral Given Gianfranco Mercedes RN     05/16/2021 1017 rOPINIRole (REQUIP) tablet 2 mg 2 mg Oral Given Mao Callaway RN     05/16/2021 2118 acetaminophen (TYLENOL) tablet 650 mg 650 mg Oral Given Gianfranco Mercedes RN     05/16/2021 1017 phytonadione (MEPHYTON) tablet 5 mg 5 mg Oral Given Mao Callaway RN     05/16/2021 1120 sodium chloride 0 9 % infusion 50 mL/hr Intravenous New [address] Steve Hi RN            Lab, Imaging and other studies: I have personally reviewed pertinent reports      VTE Pharmacologic Prophylaxis:  Coumadin  VTE Mechanical Prophylaxis: sequential compression device     Claudetta Fear, MD  5/17/2021,9:23 AM

## 2021-05-17 NOTE — PHYSICAL THERAPY NOTE
Physical Therapy Evaluation    Patient Name: Jeimy Serrato    SPFQE'C Date: 5/17/2021     Problem List  Principal Problem:    PRASHANT (acute kidney injury) ([facility] )  Active Problems:    S/P AVR    Bilateral lower leg cellulitis    Elevated troponin    Restless leg syndrome    Anemia    Acute encephalopathy    Acute kidney injury superimposed on chronic kidney disease (HCC)    Atrial fibrillation, chronic    Hyperlipidemia    Chronic combined systolic and diastolic CHF (congestive heart failure) ([facility] )       Past Medical History  Past Medical History:   Diagnosis Date    [facility]     Aortic insufficiency     Arthritis     L shoulder,fingers    Cardiac disease     valve replacement    Carpal tunnel syndrome, bilateral     Chronic anemia     Chronic diastolic congestive heart failure (HCC) 3/19/2021    Chronic lower back pain     DDD (degenerative disc disease), lumbar     Dropfoot     right    Edema     History of echocardiogram 04/20/2016    EF 75%, Hyperdynamic LVSF  Mild concentric LVH  Normal functioning bioprosthetic AV  Trace MR  Mild pulm htn       HTN (hypertension)     Hyperlipidemia     Hypertension     Inguinal hernia, left     Insomnia     Lymphedema     Parkinson disease ([facility]     Peripheral neuropathy     Phlebitis     Psoriasis     Renal insufficiency     Grade 1    Restless leg syndrome     Rhabdomyolysis 8/23/2016    Right bundle branch block     Senile keratosis     Spinal stenosis     Stasis ulcer of right lower extremity ([facility] )     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    Stroke ([facility] )     Supraventricular tachycardia ([facility] )     Vitamin D deficiency         Past Surgical History  Past Surgical History:   Procedure Laterality Date    AORTIC VALVE REPLACEMENT  11/25/2002    Tissue AVR #21    AORTIC VALVULOPLASTY  11/25/2002    Bovine Pericardial heart valve    BACK SURGERY      CARDIAC CATHETERIZATION  11/22/2002 Sever critical Aortic stenosis  Pulm Htn  Normal coronary arteries   HAND SURGERY      right hand    HYSTERECTOMY      ROTATOR CUFF REPAIR Right     VASCULAR SURGERY      bilateral vein ligation & stripping           05/17/21 0949   PT Last Visit   PT Visit Date 05/17/21   Note Type   Note type Evaluation   Pain Assessment   Pain Assessment Tool 0-10   Pain Score 6   Pain Location/Orientation Orientation: Right;Location: Groin   Home Living   Type of [address] Multi-level;Performs ADLs on one level; Able to live on main level with bedroom/bathroom;Stairs to enter with rails   Bathroom Shower/Tub Walk-in shower   Bathroom Toilet Standard   Bathroom Equipment Grab bars in shower; Shower chair;Grab bars around toilet;Commode   [address] Walker;Cane;Wheelchair-manual;Hospital bed;Grab bars   Additional Comments RW used at baseline   Prior Function   Level of [place] Needs assistance with IADLs; Needs assistance with ADLs and functional mobility   Lives With Alone   Receives Help From Family   ADL Assistance Independent   IADLs Needs assistance   Vocational Retired   Comments (-) driving   Restrictions/Precautions   [place] Bearing Precautions Per Order No   Other Precautions Pain; Fall Risk;Multiple lines   General   Family/Caregiver Present No   Cognition   Overall Cognitive Status Impaired   Arousal/Participation Alert   Attention Attends with cues to redirect   Orientation Level Oriented to person;Oriented to place; Disoriented to time;Disoriented to situation   Following Commands Follows one step commands without difficulty   RLE Assessment   RLE Assessment X  (4-/5 grossly)   LLE Assessment   LLE Assessment X  (4-/5 grossly)   Bed Mobility   Supine to Sit 5  Supervision   Additional items Increased time required;Verbal cues   Sit to Supine   (pt OOB at end of session)   Transfers   Sit to Stand 5  Supervision   Additional items Increased time required;Verbal cues   Stand to Sit 5  Supervision   Additional items Increased time required;Verbal cues   Toilet transfer 4  Minimal assistance   Additional items Assist x 1; Increased time required;Verbal cues;Standard toilet   Additional Comments RW used   Ambulation/Elevation   Gait pattern Excessively slow; Short stride; Foward flexed;Decreased foot clearance; Improper Weight shift   Gait Assistance 5  Supervision   Additional items Verbal cues   Assistive Device Rolling walker   Distance 20'   Balance   Static Sitting Good   Dynamic Sitting Fair +   Static Standing Fair   Dynamic Standing [address] -  (with RW)   Endurance Deficit   Endurance Deficit Yes   Endurance Deficit Description pt easily fatigued with minimal ambulation   Activity Tolerance   Activity Tolerance Patient limited by fatigue;Patient limited by pain   Assessment   Prognosis Good   Problem List Decreased strength;Decreased endurance;Decreased mobility; Impaired balance; Impaired judgement;Decreased safety awareness;Decreased cognition;Pain   Assessment Patient is a 80 y o  female evaluated by Physical Therapy s/p admit to [address] on 5/15/2021 with admitting diagnosis of: CHF (congestive heart failure), Weakness, Elevated troponin I level, Acute encephalopathy, Generalized weakness, Acute kidney injury superimposed on chronic kidney disease, and principal problem of: PRASHANT (acute kidney injury)  PT was consulted to assess patient's functional mobility and discharge needs  Ordered are PT Evaluation and treatment with activity level of: up and OOB as tolerated  Comorbidities affecting patient's physical performance at time of assessment include: hx of CVA, HTN, HLD, arthritis, CHF, a-fib, chronic lower back pain, peripheral neuropathy, Parkinson disease   Personal factors affecting the patient at time of IE include: lives in 2 story home, ambulating with assistive device, inability to navigate community distances, impaired safety awareness, decreased initiation and engagement, inability/difficulty performing IADLs and inability/difficulty performing ADLs  Please locate objective findings from PT assessment regarding body systems outlined above  Upon evaluation, pt able to perform all functional mobility with SUP-Braulio x 1, RW, and increased time  Moderate verbal cuing provided for safety awareness and sequencing  Ambulation limited to short functional distances within room d/t fatigue and to utilize bathroom  No true LOB experienced, however pt demonstrating moderate postural sway with mobility  HR and SpO2 remained WFL on RA throughout  The patient's AM-PAC Basic Mobility Inpatient Short Form Raw Score is 17, Standardized Score is 39 67  A standardized score less than 42 9 suggests the patient may benefit from discharge to post-acute rehabilitation services  Please also refer to the recommendation of the Physical Therapist for safe discharge planning  Pt will benefit from continued PT intervention during LOS to address current deficits, increase LOF, and facilitate safe d/c to next level of care when medically appropriate  D/c recommendation at this time is post-acute rehabilitation services  Goals   Patient Goals to go home   Short Term Goal #1 Pt will participate in B LE strengthening exercises to facilitate improved functional mobility  LTG Expiration Date 05/31/21   Long Term Goal #1 Pt will perform all functional transfers and bed mobility mod(I) with good safety awareness  Long Term Goal #2 Pt will ambulate 100' with RW and SUP while maintaining good functional dynamic balance  Plan   Treatment/Interventions Functional transfer training;LE strengthening/ROM; Therapeutic exercise; Endurance training;Bed mobility;Gait training   PT Frequency Other (Comment)  (3-5x/week)   Recommendation   PT Discharge Recommendation Post acute rehabilitation services   AM-PAC Basic Mobility Inpatient   Turning in Bed Without Bedrails 3   Lying on Back to Sitting on Edge of Flat Bed 3   Moving Bed to Chair 3   Standing Up From Chair 3   Walk in Room 3   Climb 3-5 Stairs 2   Basic Mobility Inpatient Raw Score 17   Basic Mobility Standardized Score 39 67     Pt seated in recliner at end of session with all needs in reach

## 2021-05-18 LAB
ANION GAP SERPL CALCULATED.3IONS-SCNC: 12 MMOL/L (ref 4–13)
ATRIAL RATE: 125 BPM
BASOPHILS # BLD AUTO: 0.02 THOUSANDS/ΜL (ref 0–0.1)
BASOPHILS NFR BLD AUTO: 0 % (ref 0–1)
BUN SERPL-MCNC: 62 MG/DL (ref 5–25)
CALCIUM SERPL-MCNC: 8.5 MG/DL (ref 8.3–10.1)
CHLORIDE SERPL-SCNC: 99 MMOL/L (ref 100–108)
CO2 SERPL-SCNC: 26 MMOL/L (ref 21–32)
CREAT SERPL-MCNC: 2.01 MG/DL (ref 0.6–1.3)
EOSINOPHIL # BLD AUTO: 0.07 THOUSAND/ΜL (ref 0–0.61)
EOSINOPHIL NFR BLD AUTO: 1 % (ref 0–6)
ERYTHROCYTE [DISTWIDTH] IN BLOOD BY AUTOMATED COUNT: 13.9 % (ref 11.6–15.1)
GFR SERPL CREATININE-BSD FRML MDRD: 22 ML/MIN/1.73SQ M
GLUCOSE SERPL-MCNC: 90 MG/DL (ref 65–140)
HCT VFR BLD AUTO: 35.3 % (ref 34.8–46.1)
HGB BLD-MCNC: 11.4 G/DL (ref 11.5–15.4)
IMM GRANULOCYTES # BLD AUTO: 0.04 THOUSAND/UL (ref 0–0.2)
IMM GRANULOCYTES NFR BLD AUTO: 1 % (ref 0–2)
INR PPP: 1.76 (ref 0.84–1.19)
LYMPHOCYTES # BLD AUTO: 1.01 THOUSANDS/ΜL (ref 0.6–4.47)
LYMPHOCYTES NFR BLD AUTO: 12 % (ref 14–44)
MCH RBC QN AUTO: 30.6 PG (ref 26.8–34.3)
MCHC RBC AUTO-ENTMCNC: 32.3 G/DL (ref 31.4–37.4)
MCV RBC AUTO: 95 FL (ref 82–98)
MONOCYTES # BLD AUTO: 0.67 THOUSAND/ΜL (ref 0.17–1.22)
MONOCYTES NFR BLD AUTO: 8 % (ref 4–12)
NEUTROPHILS # BLD AUTO: 6.74 THOUSANDS/ΜL (ref 1.85–7.62)
NEUTS SEG NFR BLD AUTO: 78 % (ref 43–75)
NRBC BLD AUTO-RTO: 0 /100 WBCS
PLATELET # BLD AUTO: 161 THOUSANDS/UL (ref 149–390)
PMV BLD AUTO: 9.8 FL (ref 8.9–12.7)
POTASSIUM SERPL-SCNC: 3 MMOL/L (ref 3.5–5.3)
PROCALCITONIN SERPL-MCNC: 0.33 NG/ML
PROTHROMBIN TIME: 20.1 SECONDS (ref 11.6–14.5)
QRS AXIS: -72 DEGREES
QRSD INTERVAL: 148 MS
QT INTERVAL: 308 MS
QTC INTERVAL: 435 MS
RBC # BLD AUTO: 3.73 MILLION/UL (ref 3.81–5.12)
SODIUM SERPL-SCNC: 137 MMOL/L (ref 136–145)
T WAVE AXIS: 59 DEGREES
VENTRICULAR RATE: 120 BPM
WBC # BLD AUTO: 8.55 THOUSAND/UL (ref 4.31–10.16)

## 2021-05-18 PROCEDURE — 85025 COMPLETE CBC W/AUTO DIFF WBC: CPT | Performed by: FAMILY MEDICINE

## 2021-05-18 PROCEDURE — 85610 PROTHROMBIN TIME: CPT | Performed by: FAMILY MEDICINE

## 2021-05-18 PROCEDURE — 97530 THERAPEUTIC ACTIVITIES: CPT

## 2021-05-18 PROCEDURE — 93005 ELECTROCARDIOGRAM TRACING: CPT

## 2021-05-18 PROCEDURE — 84145 PROCALCITONIN (PCT): CPT | Performed by: INTERNAL MEDICINE

## 2021-05-18 PROCEDURE — 99232 SBSQ HOSP IP/OBS MODERATE 35: CPT | Performed by: FAMILY MEDICINE

## 2021-05-18 PROCEDURE — 80048 BASIC METABOLIC PNL TOTAL CA: CPT | Performed by: FAMILY MEDICINE

## 2021-05-18 PROCEDURE — 93010 ELECTROCARDIOGRAM REPORT: CPT | Performed by: INTERNAL MEDICINE

## 2021-05-18 PROCEDURE — 97116 GAIT TRAINING THERAPY: CPT

## 2021-05-18 RX ORDER — POTASSIUM CHLORIDE 20 MEQ/1
40 TABLET, EXTENDED RELEASE ORAL 2 TIMES DAILY
Status: COMPLETED | OUTPATIENT
Start: 2021-05-18 | End: 2021-05-18

## 2021-05-18 RX ORDER — SODIUM CHLORIDE 9 MG/ML
75 INJECTION, SOLUTION INTRAVENOUS ONCE
Status: DISCONTINUED | OUTPATIENT
Start: 2021-05-18 | End: 2021-05-18

## 2021-05-18 RX ORDER — ALBUMIN (HUMAN) 12.5 G/50ML
25 SOLUTION INTRAVENOUS EVERY 6 HOURS
Status: COMPLETED | OUTPATIENT
Start: 2021-05-18 | End: 2021-05-19

## 2021-05-18 RX ORDER — METOPROLOL TARTRATE 5 MG/5ML
2.5 INJECTION INTRAVENOUS ONCE
Status: COMPLETED | OUTPATIENT
Start: 2021-05-18 | End: 2021-05-18

## 2021-05-18 RX ADMIN — ALBUMIN (HUMAN) 25 G: 0.25 INJECTION, SOLUTION INTRAVENOUS at 23:51

## 2021-05-18 RX ADMIN — METOPROLOL TARTRATE 2.5 MG: 5 INJECTION INTRAVENOUS at 04:27

## 2021-05-18 RX ADMIN — ROPINIROLE HYDROCHLORIDE 2 MG: 1 TABLET, FILM COATED ORAL at 08:29

## 2021-05-18 RX ADMIN — Medication: at 08:29

## 2021-05-18 RX ADMIN — ROPINIROLE HYDROCHLORIDE 2 MG: 1 TABLET, FILM COATED ORAL at 22:16

## 2021-05-18 RX ADMIN — ACETAMINOPHEN 650 MG: 325 TABLET, FILM COATED ORAL at 19:42

## 2021-05-18 RX ADMIN — Medication 1 APPLICATION: at 18:18

## 2021-05-18 RX ADMIN — METOPROLOL TARTRATE 2.5 MG: 5 INJECTION INTRAVENOUS at 02:26

## 2021-05-18 RX ADMIN — POTASSIUM CHLORIDE 40 MEQ: 1500 TABLET, EXTENDED RELEASE ORAL at 11:06

## 2021-05-18 RX ADMIN — POTASSIUM CHLORIDE 40 MEQ: 1500 TABLET, EXTENDED RELEASE ORAL at 18:11

## 2021-05-18 RX ADMIN — CEFTRIAXONE 1000 MG: 1 INJECTION, SOLUTION INTRAVENOUS at 09:47

## 2021-05-18 RX ADMIN — ALBUMIN (HUMAN) 25 G: 0.25 INJECTION, SOLUTION INTRAVENOUS at 11:40

## 2021-05-18 RX ADMIN — ALBUMIN (HUMAN) 25 G: 0.25 INJECTION, SOLUTION INTRAVENOUS at 18:11

## 2021-05-18 NOTE — ASSESSMENT & PLAN NOTE
Patient with decreased skin turgor, dry mucous membranes  Patient has severe bilateral lower extremity edema, stasis dermatitis present, according to the chart review patient has received 1 5 L of IV fluid on admission, renal function has been improving  Fe urea calculated at 29 7%, consistent with pre renal disease  Patient has history of chronic lower extremity edema, chest x-ray is clear, will treat lower extremities with elevation, local wound care, wound care consult  Will start normal saline at 50 mL/hour    Avoid nephrotoxins, no evidence of obstructive uropathy, start urinary retention protocol  Patient with underlying chronic kidney disease, baseline creatinine 1 7, creatinine trending down from 3 49 to 2 95  Patient was recently started on Ace inhibitor, lisinopril 2 5 mg daily, also was given Zaroxolyn twice weekly      Will provide another L on slow NS infusion 5/17/21  Will hold off on additional IV fluids, and provide albumin 05/18/2021

## 2021-05-18 NOTE — PLAN OF CARE
Problem: PHYSICAL THERAPY ADULT  Goal: Performs mobility at highest level of function for planned discharge setting  See evaluation for individualized goals  Description: Treatment/Interventions: Functional transfer training, LE strengthening/ROM, Therapeutic exercise, Endurance training, Bed mobility, Gait training          See flowsheet documentation for full assessment, interventions and recommendations  Outcome: Progressing  Note: Prognosis: Good  Problem List: Decreased strength, Decreased endurance, Impaired balance, Decreased mobility, Decreased cognition, Impaired judgement, Decreased safety awareness, Pain  Assessment: Pt  seen for PT treatment session this date with interventions consisting of bed mobility, transfers and  gait training w/ emphasis on improving pt's ability to ambulate  Pt  Currently performing  tx and ambulation at ( ) x 1 level of function  The patient's AM-PAC Basic Mobility Inpatient Short Form Raw Score is 16, Standardized Score is 38 32  A standardized score less than 42 9 suggests the patient may benefit from discharge to post-acute rehabilitation services  Please also refer to physical therapy recommendation for safe DC planning  In comparison to previous session, Pt  With no change  in activity tolerance  Pt is in need of continued activity in PT to improve strength balance endurance mobility transfers and ambulation with return to maximize LOF  From PT/mobility standpoint, recommendation at time of d/c would be post acute rehab  in order to promote return to PLOF and independence  PT Discharge Recommendation: Post acute rehabilitation services          See flowsheet documentation for full assessment

## 2021-05-18 NOTE — ASSESSMENT & PLAN NOTE
Acute metabolic encephalopathy, present on admission, as evidenced by increased confusion, visual hallucinations per the family, likely secondary to uremia in the setting of acute kidney injury      Resolved

## 2021-05-18 NOTE — CASE MANAGEMENT
Call placed to [facility] to follow up if they can accept pt  Message left for Rupa Kaur in admissions dept  Awaiting a call back

## 2021-05-18 NOTE — ASSESSMENT & PLAN NOTE
Lab Results   Component Value Date    EGFR 22 05/18/2021    EGFR 17 05/17/2021    EGFR 14 05/16/2021    CREATININE 2 01 (H) 05/18/2021    CREATININE 2 46 (H) 05/17/2021    CREATININE 2 95 (H) 05/16/2021     Provide an additional L of normal saline 5/17/21  Will hold off on additional IVF, encourage PO intake and provide albumin 5/18/21

## 2021-05-18 NOTE — PHYSICAL THERAPY NOTE
PHYSICAL THERAPY NOTE          Patient Name: Pepper Coto  FSCCD'Q Date: 5/18/2021 05/18/21 8546   Note Type   Note Type Treatment   Restrictions/Precautions   Weight Bearing Precautions Per Order No   Other Precautions Multiple lines; Fall Risk;Bed Alarm; Chair Alarm   Cognition   Overall Cognitive Status Impaired   Following Commands Follows one step commands with increased time or repetition   Subjective   Subjective my legs are sore  Agreeable to therapy  Bed Mobility   Sit to Supine 3  Moderate assistance   Additional items Assist x 1; Increased time required;Verbal cues;LE management   Transfers   Sit to Stand 4  Minimal assistance   Additional items Assist x 1; Armrests; Increased time required;Verbal cues   Stand to Sit 4  Minimal assistance   Additional items Assist x 1; Increased time required;Verbal cues   Stand pivot 4  Minimal assistance   Additional items Verbal cues   Ambulation/Elevation   Gait pattern Excessively slow; Short stride; Foward flexed;Decreased foot clearance; Improper Weight shift   Gait Assistance 4  Minimal assist   Additional items Assist x 1;Verbal cues   Assistive Device Rolling walker   Distance 20'   Balance   Ambulatory Poor +  (RW)   Endurance Deficit   Endurance Deficit Yes   Activity Tolerance   Activity Tolerance Patient limited by fatigue;Patient limited by pain   Assessment   Prognosis Good   Problem List Decreased strength;Decreased endurance; Impaired balance;Decreased mobility; Decreased cognition; Impaired judgement;Decreased safety awareness;Pain   Assessment Pt  seen for PT treatment session this date with interventions consisting of bed mobility, transfers and  gait training w/ emphasis on improving pt's ability to ambulate  Pt  Currently performing  tx and ambulation at ( ) x 1 level of function   The patient's AM-PAC Basic Mobility Inpatient Short Form Raw Score is 16, Standardized Score is 38 32  A standardized score less than 42 9 suggests the patient may benefit from discharge to post-acute rehabilitation services  Please also refer to physical therapy recommendation for safe DC planning  In comparison to previous session, Pt  With no change  in activity tolerance  Pt is in need of continued activity in PT to improve strength balance endurance mobility transfers and ambulation with return to maximize LOF  From PT/mobility standpoint, recommendation at time of d/c would be post acute rehab  in order to promote return to PLOF and independence  Goals   LTG Expiration Date   (5/31/21)   Plan   Treatment/Interventions Functional transfer training;LE strengthening/ROM; Therapeutic exercise; Endurance training;Bed mobility;Gait training   Progress Slow progress, decreased activity tolerance   AM-PAC Basic Mobility Inpatient   Turning in Bed Without Bedrails 3   Lying on Back to Sitting on Edge of Flat Bed 2   Moving Bed to Chair 3   Standing Up From Chair 3   Walk in Room 3   Climb 3-5 Stairs 2   Basic Mobility Inpatient Raw Score 16   Basic Mobility Standardized Score 38 32   Pt  In bed  with call bell within reach, all lines intact and alarm on at end of PT session  Discussed with  PT today's treatment and patient's current level of function for care coordination

## 2021-05-18 NOTE — PROGRESS NOTES
[facility] [address] [place]  Progress Note - Chantel Dorsey 7/3/1931, 80 y o  female MRN: [id number]  Unit/Bed#: 940-92 Encounter: [id number]  Primary Care Provider: Cierra Kwno DO   Date and time admitted to hospital: 5/15/2021  9:38 AM    Acute encephalopathy  Assessment & Plan  Acute metabolic encephalopathy, present on admission, as evidenced by increased confusion, visual hallucinations per the family, likely secondary to uremia in the setting of acute kidney injury      Resolved     PRASHANT (acute kidney injury) ([facility] )  Assessment & Plan  Patient with decreased skin turgor, dry mucous membranes  Patient has severe bilateral lower extremity edema, stasis dermatitis present, according to the chart review patient has received 1 5 L of IV fluid on admission, renal function has been improving  Fe urea calculated at 29 7%, consistent with pre renal disease  Patient has history of chronic lower extremity edema, chest x-ray is clear, will treat lower extremities with elevation, local wound care, wound care consult  Will start normal saline at 50 mL/hour    Avoid nephrotoxins, no evidence of obstructive uropathy, start urinary retention protocol  Patient with underlying chronic kidney disease, baseline creatinine 1 7, creatinine trending down from 3 49 to 2 95  Patient was recently started on Ace inhibitor, lisinopril 2 5 mg daily, also was given Zaroxolyn twice weekly      Will provide another L on slow NS infusion 5/17/21  Will hold off on additional IV fluids, and provide albumin 05/18/2021    Chronic combined systolic and diastolic CHF (congestive heart failure) ([facility])  Assessment & Plan  Wt Readings from Last 3 Encounters:   05/18/21 61 1 kg (134 lb 11 2 oz)   03/19/21 68 kg (150 lb)   11/20/20 65 3 kg (144 lb)       Patient with bioprosthetic aortic valve, reduced EF on recent echocardiogram    Will hold diuretics, monitor respiratory status, chest x-ray negative for any acute pulmonary disease, lungs are currently clear to auscultation on physical exam    Will provide an additional L of normal saline given significant renal improvement 05/17/2021    Atrial fibrillation, chronic  Assessment & Plan  INR supratherapeutic, Coumadin is on hold    Acute kidney injury superimposed on chronic kidney disease [facility])  Assessment & Plan  Lab Results   Component Value Date    EGFR 22 05/18/2021    EGFR 17 05/17/2021    EGFR 14 05/16/2021    CREATININE 2 01 (H) 05/18/2021    CREATININE 2 46 (H) 05/17/2021    CREATININE 2 95 (H) 05/16/2021     Provide an additional L of normal saline 5/17/21  Will hold off on additional IVF, encourage PO intake and provide albumin 5/18/21    Elevated troponin  Assessment & Plan  Non MI troponin elevation    CPK level not significantly elevated    Bilateral lower leg cellulitis  Assessment & Plan  +3 to 4 edema bilateral lower extremities, consult wound care  With increasing warmth and low grade fever will treat with rocephin   Day 2 of Rocephin        Progress Note - Ether Riley 80 y o  female MRN: [id number]    Unit/Bed#: 421-01 Encounter: [id number]        Subjective:   Patient seen examined at bedside, she appears well and offers no complaints    Objective:     Vitals:   Vitals:    05/18/21 0642   BP: 122/74   Pulse: 95   Resp: 16   Temp: 97 8 °F (36 6 °C)   SpO2: 92%     Body mass index is 28 15 kg/m²      Intake/Output Summary (Last 24 hours) at 5/18/2021 1025  Last data filed at 5/18/2021 0847  Gross per 24 hour   Intake 680 ml   Output 875 ml   Net -195 ml       Physical Exam:   /74 (BP Location: Left arm)   Pulse 95   Temp 97 8 °F (36 6 °C) (Oral)   Resp 16   Ht 4' 10" (1 473 m)   Wt 61 1 kg (134 lb 11 2 oz)   LMP  (LMP Unknown)   SpO2 92%   BMI 28 15 kg/m²   General appearance: alert and oriented, in no acute distress  Head: Normocephalic, without obvious abnormality, atraumatic  Lungs: clear to auscultation bilaterally  Heart: regular rate and rhythm, S1, S2 normal, no murmur, click, rub or gallop  Abdomen: soft, non-tender; bowel sounds normal; no masses,  no organomegaly  Extremities:  +2 edema, and mild to moderate erythema  Pulses: 2+ and symmetric  Neurologic: Grossly normal     Invasive Devices     Peripheral Intravenous Line            Peripheral IV 05/17/21 Right;Ventral (anterior) Hand 1 day                Results from last 7 days   Lab Units 05/18/21 0438 05/17/21  0438 05/16/21  0450   WBC Thousand/uL 8 55 8 22 6 49   HEMOGLOBIN g/dL 11 4* 10 2* 11 3*   HEMATOCRIT % 35 3 31 8* 36 3   PLATELETS Thousands/uL 161 162 182       Results from last 7 days   Lab Units 05/18/21  0438 05/17/21  0438 05/16/21  0450  05/15/21  1052   POTASSIUM mmol/L 3 0* 3 2* 3 8   < > 3 9   CHLORIDE mmol/L 99* 101 101   < > 101   CO2 mmol/L 26 28 27   < > 30   BUN mg/dL 62* 71* 76*   < > 80*   CREATININE mg/dL 2 01* 2 46* 2 95*   < > 3 44*   CALCIUM mg/dL 8 5 8 1* 8 2*   < > 8 0*   ALK PHOS U/L  --  91 104  --  107   ALT U/L  --  14 25  --  27   AST U/L  --  21 28  --  31    < > = values in this interval not displayed         Medication Administration - last 24 hours from 05/17/2021 1025 to 05/18/2021 1025       Date/Time Order Dose Route Action Action by     05/18/2021 0829 rOPINIRole (REQUIP) tablet 2 mg 2 mg Oral Given Trisha Pisano RN     05/17/2021 2134 rOPINIRole (REQUIP) tablet 2 mg 2 mg Oral Given Michelle Flaherty RN     05/18/2021 0947 cefTRIAXone (ROCEPHIN) IVPB (premix in dextrose) 1,000 mg 50 mL 1,000 mg Intravenous Rashi 37 Trisha Pisano, [address] [place] [address]     05/18/2021 0829 ammonium lactate (LAC-HYDRIN) 12 % lotion   Topical Given Trisha Pisano RN     05/17/2021 1729 ammonium lactate (LAC-HYDRIN) 12 % lotion   Topical Given Branden Castellanos RN     05/17/2021 1422 ammonium lactate (LAC-HYDRIN) 12 % lotion   Topical Given Trisha Pisano RN     05/18/2021 0226 metoprolol (LOPRESSOR) injection 2 5 mg 2 5 mg Intravenous Given Michelle Flaherty RN     05/18/2021 7532 metoprolol (LOPRESSOR) injection 2 5 mg 2 5 mg Intravenous Given Shanti Rodas RN            Lab, Imaging and other studies: I have personally reviewed pertinent reports      VTE Pharmacologic Prophylaxis: Heparin  VTE Mechanical Prophylaxis: sequential compression device     Vangie Carrizales MD  5/18/2021,10:25 AM

## 2021-05-18 NOTE — PLAN OF CARE
Problem: Potential for Falls  Goal: Patient will remain free of falls  Description: INTERVENTIONS:  - Assess patient frequently for physical needs  -  Identify cognitive and physical deficits and behaviors that affect risk of falls    -  [place] fall precautions as indicated by assessment   - Educate patient/family on patient safety including physical limitations  - Instruct patient to call for assistance with activity based on assessment  - Modify environment to reduce risk of injury  - Consider OT/PT consult to assist with strengthening/mobility  Outcome: Progressing     Problem: Prexisting or High Potential for Compromised Skin Integrity  Goal: Skin integrity is maintained or improved  Description: INTERVENTIONS:  - Identify patients at risk for skin breakdown  - Assess and monitor skin integrity  - Assess and monitor nutrition and hydration status  - Monitor labs   - Assess for incontinence   - Turn and reposition patient  - Assist with mobility/ambulation  - Relieve pressure over bony prominences  - Avoid friction and shearing  - Provide appropriate hygiene as needed including keeping skin clean and dry  - Evaluate need for skin moisturizer/barrier cream  - Collaborate with interdisciplinary team   - Patient/family teaching  - Consider wound care consult   Outcome: Progressing     Problem: PAIN - ADULT  Goal: Verbalizes/displays adequate comfort level or baseline comfort level  Description: Interventions:  - Encourage patient to monitor pain and request assistance  - Assess pain using appropriate pain scale  - Administer analgesics based on type and severity of pain and evaluate response  - Implement non-pharmacological measures as appropriate and evaluate response  - Consider cultural and social influences on pain and pain management  - Notify physician/advanced practitioner if interventions unsuccessful or patient reports new pain  Outcome: Progressing     Problem: INFECTION - ADULT  Goal: Absence or prevention of progression during hospitalization  Description: INTERVENTIONS:  - Assess and monitor for signs and symptoms of infection  - Monitor lab/diagnostic results  - Monitor all insertion sites, i e  indwelling lines, tubes, and drains  - Monitor endotracheal if appropriate and nasal secretions for changes in amount and color  - [place] appropriate cooling/warming therapies per order  - Administer medications as ordered  - Instruct and encourage patient and family to use good hand hygiene technique  - Identify and instruct in appropriate isolation precautions for identified infection/condition  Outcome: Progressing  Goal: Absence of fever/infection during neutropenic period  Description: INTERVENTIONS:  - Monitor WBC    Outcome: Progressing     Problem: SAFETY ADULT  Goal: Patient will remain free of falls  Description: INTERVENTIONS:  - Assess patient frequently for physical needs  -  Identify cognitive and physical deficits and behaviors that affect risk of falls    -  [place] fall precautions as indicated by assessment   - Educate patient/family on patient safety including physical limitations  - Instruct patient to call for assistance with activity based on assessment  - Modify environment to reduce risk of injury  - Consider OT/PT consult to assist with strengthening/mobility  Outcome: Progressing  Goal: Maintain or return to baseline ADL function  Description: INTERVENTIONS:  -  Assess patient's ability to carry out ADLs; assess patient's baseline for ADL function and identify physical deficits which impact ability to perform ADLs (bathing, care of mouth/teeth, toileting, grooming, dressing, etc )  - Assess/evaluate cause of self-care deficits   - Assess range of motion  - Assess patient's mobility; develop plan if impaired  - Assess patient's need for assistive devices and provide as appropriate  - Encourage maximum independence but intervene and supervise when necessary  - Involve family in performance of ADLs  - Assess for home care needs following discharge   - Consider OT consult to assist with ADL evaluation and planning for discharge  - Provide patient education as appropriate  Outcome: Progressing  Goal: Maintain or return mobility status to optimal level  Description: INTERVENTIONS:  - Assess patient's baseline mobility status (ambulation, transfers, stairs, etc )    - Identify cognitive and physical deficits and behaviors that affect mobility  - Identify mobility aids required to assist with transfers and/or ambulation (gait belt, sit-to-stand, lift, walker, cane, etc )  - [place] fall precautions as indicated by assessment  - Record patient progress and toleration of activity level on Mobility SBAR; progress patient to next Phase/Stage  - Instruct patient to call for assistance with activity based on assessment  - Consider rehabilitation consult to assist with strengthening/weightbearing, etc   Outcome: Progressing     Problem: DISCHARGE PLANNING  Goal: Discharge to home or other facility with appropriate resources  Description: INTERVENTIONS:  - Identify barriers to discharge w/patient and caregiver  - Arrange for needed discharge resources and transportation as appropriate  - Identify discharge learning needs (meds, wound care, etc )  - Arrange for interpretive services to assist at discharge as needed  - Refer to Case Management Department for coordinating discharge planning if the patient needs post-hospital services based on physician/advanced practitioner order or complex needs related to functional status, cognitive ability, or social support system  Outcome: Progressing     Problem: Knowledge Deficit  Goal: Patient/family/caregiver demonstrates understanding of disease process, treatment plan, medications, and discharge instructions  Description: Complete learning assessment and assess knowledge base    Interventions:  - Provide teaching at level of understanding  - Provide teaching via preferred learning methods  Outcome: Progressing     Problem: Nutrition/Hydration-ADULT  Goal: Nutrient/Hydration intake appropriate for improving, restoring or maintaining nutritional needs  Description: Monitor and assess patient's nutrition/hydration status for malnutrition  Collaborate with interdisciplinary team and initiate plan and interventions as ordered  Monitor patient's weight and dietary intake as ordered or per policy  Utilize nutrition screening tool and intervene as necessary  Determine patient's food preferences and provide high-protein, high-caloric foods as appropriate       INTERVENTIONS:  - Monitor oral intake, urinary output, labs, and treatment plans  - Assess nutrition and hydration status and recommend course of action  - Evaluate amount of meals eaten  - Assist patient with eating if necessary   - Allow adequate time for meals  - Recommend/ encourage appropriate diets, oral nutritional supplements, and vitamin/mineral supplements  - Order, calculate, and assess calorie counts as needed  - Recommend, monitor, and adjust tube feedings and TPN/PPN based on assessed needs  - Assess need for intravenous fluids  - Provide specific nutrition/hydration education as appropriate  - Include patient/family/caregiver in decisions related to nutrition  Outcome: Progressing     Problem: NEUROSENSORY - ADULT  Goal: Achieves stable or improved neurological status  Description: INTERVENTIONS  - Monitor and report changes in neurological status  - Monitor vital signs such as temperature, blood pressure, glucose, and any other labs ordered   - Initiate measures to prevent increased intracranial pressure  - Monitor for seizure activity and implement precautions if appropriate      Outcome: Progressing  Goal: Achieves maximal functionality and self care  Description: INTERVENTIONS  - Monitor swallowing and airway patency with patient fatigue and changes in neurological status  - Encourage and assist patient to increase activity and self care     - Encourage visually impaired, hearing impaired and aphasic patients to use assistive/communication devices  Outcome: Progressing     Problem: RESPIRATORY - ADULT  Goal: Achieves optimal ventilation and oxygenation  Description: INTERVENTIONS:  - Assess for changes in respiratory status  - Assess for changes in mentation and behavior  - Position to facilitate oxygenation and minimize respiratory effort  - Oxygen administered by appropriate delivery if ordered  - Initiate smoking cessation education as indicated  - Encourage broncho-pulmonary hygiene including cough, deep breathe, Incentive Spirometry  - Assess the need for suctioning and aspirate as needed  - Assess and instruct to report SOB or any respiratory difficulty  - Respiratory Therapy support as indicated  Outcome: Progressing     Problem: GASTROINTESTINAL - ADULT  Goal: Maintains adequate nutritional intake  Description: INTERVENTIONS:  - Monitor percentage of each meal consumed  - Identify factors contributing to decreased intake, treat as appropriate  - Assist with meals as needed  - Monitor I&O, weight, and lab values if indicated  - Obtain nutrition services referral as needed  Outcome: Progressing     Problem: GENITOURINARY - ADULT  Goal: Maintains or returns to baseline urinary function  Description: INTERVENTIONS:  - Assess urinary function  - Encourage oral fluids to ensure adequate hydration if ordered  - Administer IV fluids as ordered to ensure adequate hydration  - Administer ordered medications as needed  - Offer frequent toileting  - Follow urinary retention protocol if ordered  Outcome: Progressing  Goal: Absence of urinary retention  Description: INTERVENTIONS:  - Assess patients ability to void and empty bladder  - Monitor I/O  - Bladder scan as needed  - Discuss with physician/AP medications to alleviate retention as needed  - Discuss catheterization for long term situations as appropriate  Outcome: Progressing     Problem: METABOLIC, FLUID AND ELECTROLYTES - ADULT  Goal: Electrolytes maintained within normal limits  Description: INTERVENTIONS:  - Monitor labs and assess patient for signs and symptoms of electrolyte imbalances  - Administer electrolyte replacement as ordered  - Monitor response to electrolyte replacements, including repeat lab results as appropriate  - Instruct patient on fluid and nutrition as appropriate  Outcome: Progressing  Goal: Fluid balance maintained  Description: INTERVENTIONS:  - Monitor labs   - Monitor I/O and WT  - Instruct patient on fluid and nutrition as appropriate  - Assess for signs & symptoms of volume excess or deficit  Outcome: Progressing  Goal: Glucose maintained within target range  Description: INTERVENTIONS:  - Monitor Blood Glucose as ordered  - Assess for signs and symptoms of hyperglycemia and hypoglycemia  - Administer ordered medications to maintain glucose within target range  - Assess nutritional intake and initiate nutrition service referral as needed  Outcome: Progressing     Problem: SKIN/TISSUE INTEGRITY - ADULT  Goal: Skin integrity remains intact  Description: INTERVENTIONS  - Identify patients at risk for skin breakdown  - Assess and monitor skin integrity  - Assess and monitor nutrition and hydration status  - Monitor labs (i e  albumin)  - Assess for incontinence   - Turn and reposition patient  - Assist with mobility/ambulation  - Relieve pressure over bony prominences  - Avoid friction and shearing  - Provide appropriate hygiene as needed including keeping skin clean and dry  - Evaluate need for skin moisturizer/barrier cream  - Collaborate with interdisciplinary team (i e  Nutrition, Rehabilitation, etc )   - Patient/family teaching  Outcome: Progressing  Goal: Incision(s), wounds(s) or drain site(s) healing without S/S of infection  Description: INTERVENTIONS  - Assess and document risk factors for skin impairment   - Assess and document dressing, incision, wound bed, drain sites and surrounding tissue  - Consider nutrition services referral as needed  - Oral mucous membranes remain intact  - Provide patient/ family education  Outcome: Progressing  Goal: Oral mucous membranes remain intact  Description: INTERVENTIONS  - Assess oral mucosa and hygiene practices  - Implement preventative oral hygiene regimen  - Implement oral medicated treatments as ordered  - Initiate Nutrition services referral as needed  Outcome: Progressing     Problem: MUSCULOSKELETAL - ADULT  Goal: Maintain or return mobility to safest level of function  Description: INTERVENTIONS:  - Assess patient's ability to carry out ADLs; assess patient's baseline for ADL function and identify physical deficits which impact ability to perform ADLs (bathing, care of mouth/teeth, toileting, grooming, dressing, etc )  - Assess/evaluate cause of self-care deficits   - Assess range of motion  - Assess patient's mobility  - Assess patient's need for assistive devices and provide as appropriate  - Encourage maximum independence but intervene and supervise when necessary  - Involve family in performance of ADLs  - Assess for home care needs following discharge   - Consider OT consult to assist with ADL evaluation and planning for discharge  - Provide patient education as appropriate  Outcome: Progressing  Goal: Maintain proper alignment of affected body part  Description: INTERVENTIONS:  - Support, maintain and protect limb and body alignment  - Provide patient/ family with appropriate education  Outcome: Progressing

## 2021-05-18 NOTE — QUICK NOTE
Called by nursing staff for tachycardia- history of chronic atrial fibrillation chronically anticoagulated with warfarin, INR therapeutic  EKG-atrial fibrillation with  bpm  - monitor on telemetry, IV metoprolol tartrate 2 5 mg x 1

## 2021-05-18 NOTE — ASSESSMENT & PLAN NOTE
+3 to 4 edema bilateral lower extremities, consult wound care  With increasing warmth and low grade fever will treat with rocephin   Day 2 of Rocephin

## 2021-05-18 NOTE — ASSESSMENT & PLAN NOTE
Wt Readings from Last 3 Encounters:   05/18/21 61 1 kg (134 lb 11 2 oz)   03/19/21 68 kg (150 lb)   11/20/20 65 3 kg (144 lb)       Patient with bioprosthetic aortic valve, reduced EF on recent echocardiogram    Will hold diuretics, monitor respiratory status, chest x-ray negative for any acute pulmonary disease, lungs are currently clear to auscultation on physical exam    Will provide an additional L of normal saline given significant renal improvement 05/17/2021

## 2021-05-19 LAB
ANION GAP SERPL CALCULATED.3IONS-SCNC: 8 MMOL/L (ref 4–13)
BASOPHILS # BLD AUTO: 0.03 THOUSANDS/ΜL (ref 0–0.1)
BASOPHILS NFR BLD AUTO: 0 % (ref 0–1)
BUN SERPL-MCNC: 54 MG/DL (ref 5–25)
CALCIUM SERPL-MCNC: 8.9 MG/DL (ref 8.3–10.1)
CHLORIDE SERPL-SCNC: 101 MMOL/L (ref 100–108)
CO2 SERPL-SCNC: 29 MMOL/L (ref 21–32)
CREAT SERPL-MCNC: 1.74 MG/DL (ref 0.6–1.3)
EOSINOPHIL # BLD AUTO: 0.13 THOUSAND/ΜL (ref 0–0.61)
EOSINOPHIL NFR BLD AUTO: 2 % (ref 0–6)
ERYTHROCYTE [DISTWIDTH] IN BLOOD BY AUTOMATED COUNT: 13.8 % (ref 11.6–15.1)
GFR SERPL CREATININE-BSD FRML MDRD: 26 ML/MIN/1.73SQ M
GLUCOSE SERPL-MCNC: 86 MG/DL (ref 65–140)
HCT VFR BLD AUTO: 34.4 % (ref 34.8–46.1)
HGB BLD-MCNC: 10.8 G/DL (ref 11.5–15.4)
IMM GRANULOCYTES # BLD AUTO: 0.02 THOUSAND/UL (ref 0–0.2)
IMM GRANULOCYTES NFR BLD AUTO: 0 % (ref 0–2)
LYMPHOCYTES # BLD AUTO: 1.21 THOUSANDS/ΜL (ref 0.6–4.47)
LYMPHOCYTES NFR BLD AUTO: 17 % (ref 14–44)
MAGNESIUM SERPL-MCNC: 2 MG/DL (ref 1.6–2.6)
MCH RBC QN AUTO: 30.3 PG (ref 26.8–34.3)
MCHC RBC AUTO-ENTMCNC: 31.4 G/DL (ref 31.4–37.4)
MCV RBC AUTO: 96 FL (ref 82–98)
MONOCYTES # BLD AUTO: 0.57 THOUSAND/ΜL (ref 0.17–1.22)
MONOCYTES NFR BLD AUTO: 8 % (ref 4–12)
NEUTROPHILS # BLD AUTO: 5.08 THOUSANDS/ΜL (ref 1.85–7.62)
NEUTS SEG NFR BLD AUTO: 73 % (ref 43–75)
NRBC BLD AUTO-RTO: 0 /100 WBCS
PLATELET # BLD AUTO: 150 THOUSANDS/UL (ref 149–390)
PMV BLD AUTO: 10.1 FL (ref 8.9–12.7)
POTASSIUM SERPL-SCNC: 3.7 MMOL/L (ref 3.5–5.3)
RBC # BLD AUTO: 3.57 MILLION/UL (ref 3.81–5.12)
SODIUM SERPL-SCNC: 138 MMOL/L (ref 136–145)
WBC # BLD AUTO: 7.04 THOUSAND/UL (ref 4.31–10.16)

## 2021-05-19 PROCEDURE — 99232 SBSQ HOSP IP/OBS MODERATE 35: CPT | Performed by: FAMILY MEDICINE

## 2021-05-19 PROCEDURE — 80048 BASIC METABOLIC PNL TOTAL CA: CPT | Performed by: FAMILY MEDICINE

## 2021-05-19 PROCEDURE — 97116 GAIT TRAINING THERAPY: CPT

## 2021-05-19 PROCEDURE — 85025 COMPLETE CBC W/AUTO DIFF WBC: CPT | Performed by: FAMILY MEDICINE

## 2021-05-19 PROCEDURE — 97110 THERAPEUTIC EXERCISES: CPT

## 2021-05-19 PROCEDURE — 83735 ASSAY OF MAGNESIUM: CPT | Performed by: FAMILY MEDICINE

## 2021-05-19 RX ORDER — FUROSEMIDE 10 MG/ML
20 INJECTION INTRAMUSCULAR; INTRAVENOUS ONCE
Status: COMPLETED | OUTPATIENT
Start: 2021-05-19 | End: 2021-05-19

## 2021-05-19 RX ORDER — GABAPENTIN 100 MG/1
100 CAPSULE ORAL 2 TIMES DAILY
Status: DISCONTINUED | OUTPATIENT
Start: 2021-05-19 | End: 2021-05-26 | Stop reason: HOSPADM

## 2021-05-19 RX ORDER — ALBUMIN (HUMAN) 12.5 G/50ML
25 SOLUTION INTRAVENOUS EVERY 6 HOURS
Status: COMPLETED | OUTPATIENT
Start: 2021-05-19 | End: 2021-05-20

## 2021-05-19 RX ADMIN — ROPINIROLE HYDROCHLORIDE 2 MG: 1 TABLET, FILM COATED ORAL at 08:32

## 2021-05-19 RX ADMIN — ROPINIROLE HYDROCHLORIDE 2 MG: 1 TABLET, FILM COATED ORAL at 20:49

## 2021-05-19 RX ADMIN — ALBUMIN (HUMAN) 25 G: 0.25 INJECTION, SOLUTION INTRAVENOUS at 06:18

## 2021-05-19 RX ADMIN — GABAPENTIN 100 MG: 100 CAPSULE ORAL at 17:40

## 2021-05-19 RX ADMIN — GABAPENTIN 100 MG: 100 CAPSULE ORAL at 11:36

## 2021-05-19 RX ADMIN — ACETAMINOPHEN 650 MG: 325 TABLET, FILM COATED ORAL at 20:49

## 2021-05-19 RX ADMIN — ALBUMIN (HUMAN) 25 G: 0.25 INJECTION, SOLUTION INTRAVENOUS at 17:41

## 2021-05-19 RX ADMIN — FUROSEMIDE 20 MG: 10 INJECTION, SOLUTION INTRAVENOUS at 11:43

## 2021-05-19 RX ADMIN — Medication 1 APPLICATION: at 08:33

## 2021-05-19 RX ADMIN — Medication: at 17:44

## 2021-05-19 RX ADMIN — ALBUMIN (HUMAN) 25 G: 0.25 INJECTION, SOLUTION INTRAVENOUS at 11:38

## 2021-05-19 RX ADMIN — CEFTRIAXONE 1000 MG: 1 INJECTION, SOLUTION INTRAVENOUS at 10:42

## 2021-05-19 NOTE — CASE MANAGEMENT
Call placed to son Eloina Kat, no answer  Call placed to son Pratibha Pierre informed him that the [facility] has no available beds  He has no other preference so will send out additional referrals for STR  Referrals sent to [facility], [facility], [facility], and [facility]  Will await responses  CM to follow

## 2021-05-19 NOTE — ASSESSMENT & PLAN NOTE
Lab Results   Component Value Date    EGFR 26 05/19/2021    EGFR 22 05/18/2021    EGFR 17 05/17/2021    CREATININE 1 74 (H) 05/19/2021    CREATININE 2 01 (H) 05/18/2021    CREATININE 2 46 (H) 05/17/2021     Provide an additional L of normal saline 5/17/21  Will hold off on additional IVF, encourage PO intake and provide albumin 5/18/21  Renal function slowly improving

## 2021-05-19 NOTE — CASE MANAGEMENT
Received TT from Katy Silveira at the [address] [place] [address] Extension, they are able to accept pt  Call placed to son Bradley Brambila to inform him of same  Will set up transport for tentative discharge tomorrow  CM to follow

## 2021-05-19 NOTE — CASE MANAGEMENT
Dominique Fulton at the [facility] [employer] [facility] requested PASRR, completed and faxed to [phone]

## 2021-05-19 NOTE — PROGRESS NOTES
[facility] [address]  Progress Note - Dayne Armenta 7/3/1931, 80 y o  female MRN: [id number]  Unit/Bed#: 723-68 Encounter: [id number]  Primary Care Provider: Earlene Bishop DO   Date and time admitted to hospital: 5/15/2021  9:38 AM    * Acute encephalopathy  Assessment & Plan  Acute metabolic encephalopathy, present on admission, as evidenced by increased confusion, visual hallucinations per the family, likely secondary to uremia in the setting of acute kidney injury      Resolved     Chronic combined systolic and diastolic CHF (congestive heart failure) (HCC)  Assessment & Plan  Wt Readings from Last 3 Encounters:   05/18/21 61 1 kg (134 lb 11 2 oz)   03/19/21 68 kg (150 lb)   11/20/20 65 3 kg (144 lb)       Patient with bioprosthetic aortic valve, reduced EF on recent echocardiogram    Will hold diuretics, monitor respiratory status, chest x-ray negative for any acute pulmonary disease, lungs are currently clear to auscultation on physical exam    Will provide an additional L of normal saline given significant renal improvement 05/17/2021    Atrial fibrillation, chronic  Assessment & Plan  INR supratherapeutic, Coumadin is on hold    Acute kidney injury superimposed on chronic kidney disease [facility])  Assessment & Plan  Lab Results   Component Value Date    EGFR 26 05/19/2021    EGFR 22 05/18/2021    EGFR 17 05/17/2021    CREATININE 1 74 (H) 05/19/2021    CREATININE 2 01 (H) 05/18/2021    CREATININE 2 46 (H) 05/17/2021     Provide an additional L of normal saline 5/17/21  Will hold off on additional IVF, encourage PO intake and provide albumin 5/18/21  Renal function slowly improving    Bilateral lower leg cellulitis  Assessment & Plan  +3 to 4 edema bilateral lower extremities, consult wound care  With increasing warmth and low grade fever will treat with rocephin   Day 3 of Rocephin        Progress Note - Dayne Armenta 80 y o  female MRN: [id number]    Unit/Bed#: 421-01 Encounter: [id number]        Subjective:   Patient seen examined bedside, she appears more somnolent today, but able to answer questions appropriately    Objective:     Vitals:   Vitals:    05/19/21 0710   BP: 131/62   Pulse: (!) 106   Resp: 16   Temp: (!) 97 1 °F (36 2 °C)   SpO2: 97%     Body mass index is 28 15 kg/m²      Intake/Output Summary (Last 24 hours) at 5/19/2021 1036  Last data filed at 5/19/2021 0853  Gross per 24 hour   Intake 540 ml   Output 400 ml   Net 140 ml       Physical Exam:   /62 (BP Location: Left arm)   Pulse (!) 106   Temp (!) 97 1 °F (36 2 °C) (Oral)   Resp 16   Ht 4' 10" (1 473 m)   Wt 61 1 kg (134 lb 11 2 oz)   LMP  (LMP Unknown)   SpO2 97%   BMI 28 15 kg/m²   General appearance: alert  Head: Normocephalic, without obvious abnormality, atraumatic  Lungs: clear to auscultation bilaterally  Heart: regular rate and rhythm, S1, S2 normal, no murmur, click, rub or gallop  Abdomen: soft, non-tender; bowel sounds normal; no masses,  no organomegaly  Extremities:  +3 lower extremity edema, surrounding erythema improved  Pulses: 2+ and symmetric  Neurologic: Grossly normal     Invasive Devices     Peripheral Intravenous Line            Peripheral IV 05/17/21 Right;Ventral (anterior) Hand 2 days                Results from last 7 days   Lab Units 05/19/21  0514 05/18/21  0438 05/17/21  0438   WBC Thousand/uL 7 04 8 55 8 22   HEMOGLOBIN g/dL 10 8* 11 4* 10 2*   HEMATOCRIT % 34 4* 35 3 31 8*   PLATELETS Thousands/uL 150 161 162       Results from last 7 days   Lab Units 05/19/21  0514 05/18/21  0438 05/17/21  0438 05/16/21  0450  05/15/21  1052   POTASSIUM mmol/L 3 7 3 0* 3 2* 3 8   < > 3 9   CHLORIDE mmol/L 101 99* 101 101   < > 101   CO2 mmol/L 29 26 28 27   < > 30   BUN mg/dL 54* 62* 71* 76*   < > 80*   CREATININE mg/dL 1 74* 2 01* 2 46* 2 95*   < > 3 44*   CALCIUM mg/dL 8 9 8 5 8 1* 8 2*   < > 8 0*   ALK PHOS U/L  --   --  91 104  --  107   ALT U/L  --   --  14 25  --  27   AST U/L  --   -- 21 28  --  31    < > = values in this interval not displayed  Medication Administration - last 24 hours from 05/18/2021 1036 to 05/19/2021 1036       Date/Time Order Dose Route Action Action by     05/19/2021 0832 rOPINIRole (REQUIP) tablet 2 mg 2 mg Oral Given Lyndhurst, RN     05/18/2021 2216 rOPINIRole (REQUIP) tablet 2 mg 2 mg Oral Given Carl Cerda, RN     05/18/2021 1942 acetaminophen (TYLENOL) tablet 650 mg 650 mg Oral Given Carl Cerda, RN     05/19/2021 0833 ammonium lactate (LAC-HYDRIN) 12 % lotion 1 application Topical Given Garfield, RN     05/18/2021 1818 ammonium lactate (LAC-HYDRIN) 12 % lotion 1 application Topical Given Bradley Gomezon, RN     05/18/2021 1811 potassium chloride (K-DUR,KLOR-CON) CR tablet 40 mEq 40 mEq Oral Given Bradley Franc, RN     05/18/2021 1106 potassium chloride (K-DUR,KLOR-CON) CR tablet 40 mEq 40 mEq Oral Given Whitney Alford RN     05/19/2021 0618 albumin human (FLEXBUMIN) 25 % injection 25 g 25 g Intravenous New 75 [facility], [address] [place] [address]     05/18/2021 2351 albumin human (FLEXBUMIN) 25 % injection 25 g 25 g Intravenous New 75 [facility], [address] [place] [address]     05/18/2021 1811 albumin human (FLEXBUMIN) 25 % injection 25 g 25 g Intravenous [address] Bradley Franc, [address] [place] [address]     05/18/2021 1140 albumin human (FLEXBUMIN) 25 % injection 25 g 25 g Intravenous New Bag Whitney Alford RN            Lab, Imaging and other studies: I have personally reviewed pertinent reports      VTE Pharmacologic Prophylaxis: Heparin  VTE Mechanical Prophylaxis: sequential compression device     Sangeetaverareli Pitts MD  5/19/2021,10:36 AM

## 2021-05-19 NOTE — PHYSICAL THERAPY NOTE
PHYSICAL THERAPY NOTE          Patient Name: Dayne Armenta  EDRTT'K Date: 5/19/2021 05/19/21 0935   Note Type   Note Type Treatment   Restrictions/Precautions   Weight Bearing Precautions Per Order No   General   Family/Caregiver Present No   Cognition   Overall Cognitive Status Impaired   Following Commands Follows one step commands with increased time or repetition   Subjective   Subjective c/o soreness B LE's  Agreeable to therapy   Transfers   Sit to Stand 4  Minimal assistance   Additional items Assist x 1; Armrests; Increased time required;Verbal cues   Stand to Sit 4  Minimal assistance   Additional items Assist x 1; Armrests; Increased time required;Verbal cues   Stand pivot 4  Minimal assistance   Additional items Verbal cues   Ambulation/Elevation   Gait pattern Excessively slow; Short stride; Foward flexed;Decreased foot clearance; Improper Weight shift   Gait Assistance 4  Minimal assist   Additional items Assist x 1;Verbal cues   Assistive Device Rolling walker   Distance 20'   Balance   Static Sitting Fair   Dynamic Sitting Fair   Static Standing Poor +   Dynamic Standing Poor +   Ambulatory Poor +  (RW)   Endurance Deficit   Endurance Deficit Yes   Activity Tolerance   Activity Tolerance Patient limited by fatigue;Patient limited by pain   Exercises   Hip Flexion Sitting;10 reps   Hip Abduction Sitting;10 reps   Hip Adduction Sitting;10 reps   Knee AROM Long Arc Quad Sitting;10 reps   Ankle Pumps Sitting;15 reps   Assessment   Prognosis Fair   Problem List Decreased strength;Decreased endurance; Impaired balance;Decreased mobility; Decreased cognition; Impaired judgement;Decreased safety awareness;Pain   Assessment Pt  seen for PT treatment session this date with interventions consisting of  therapeutic exercises,  transfers and  gait training w/ emphasis on improving pt's ability to ambulate   Pt  Currently performing  tx and ambulation at (min ) x 1 level of function  The patient's AM-PAC Basic Mobility Inpatient Short Form Raw Score is 16, Standardized Score is 38 32  A standardized score less than 42 9 suggests the patient may benefit from discharge to post-acute rehabilitation services  Please also refer to physical therapy recommendation for safe DC planning  In comparison to previous session, Pt  With no improvement  in activity tolerance  Ambulation limited by weakness, pain, decreased endurance  Cues for direction and safety  Pt is in need of continued activity in PT to improve strength balance endurance mobility transfers and ambulation with return to maximize LOF  From PT/mobility standpoint, recommendation at time of d/c would be post acuta rehab  in order to promote return to PLOF and independence  Goals   LTG Expiration Date 05/31/21   Plan   Treatment/Interventions Functional transfer training;LE strengthening/ROM; Therapeutic exercise; Endurance training;Bed mobility;Gait training   Progress Slow progress, decreased activity tolerance   AM-PAC Basic Mobility Inpatient   Turning in Bed Without Bedrails 3   Lying on Back to Sitting on Edge of Flat Bed 2   Moving Bed to Chair 3   Standing Up From Chair 3   Walk in Room 3   Climb 3-5 Stairs 2   Basic Mobility Inpatient Raw Score 16   Basic Mobility Standardized Score 38 32   Pt  OOB in chair  with call bell within reach, all lines intact and alarm on at end of PT session  Discussed with  PT today's treatment and patient's current level of function for care coordination

## 2021-05-19 NOTE — PLAN OF CARE
Problem: Potential for Falls  Goal: Patient will remain free of falls  Description: INTERVENTIONS:  - Assess patient frequently for physical needs  -  Identify cognitive and physical deficits and behaviors that affect risk of falls    -  [place] fall precautions as indicated by assessment   - Educate patient/family on patient safety including physical limitations  - Instruct patient to call for assistance with activity based on assessment  - Modify environment to reduce risk of injury  - Consider OT/PT consult to assist with strengthening/mobility  Outcome: Progressing     Problem: Prexisting or High Potential for Compromised Skin Integrity  Goal: Skin integrity is maintained or improved  Description: INTERVENTIONS:  - Identify patients at risk for skin breakdown  - Assess and monitor skin integrity  - Assess and monitor nutrition and hydration status  - Monitor labs   - Assess for incontinence   - Turn and reposition patient  - Assist with mobility/ambulation  - Relieve pressure over bony prominences  - Avoid friction and shearing  - Provide appropriate hygiene as needed including keeping skin clean and dry  - Evaluate need for skin moisturizer/barrier cream  - Collaborate with interdisciplinary team   - Patient/family teaching  - Consider wound care consult   Outcome: Progressing     Problem: PAIN - ADULT  Goal: Verbalizes/displays adequate comfort level or baseline comfort level  Description: Interventions:  - Encourage patient to monitor pain and request assistance  - Assess pain using appropriate pain scale  - Administer analgesics based on type and severity of pain and evaluate response  - Implement non-pharmacological measures as appropriate and evaluate response  - Consider cultural and social influences on pain and pain management  - Notify physician/advanced practitioner if interventions unsuccessful or patient reports new pain  Outcome: Progressing     Problem: INFECTION - ADULT  Goal: Absence or prevention of progression during hospitalization  Description: INTERVENTIONS:  - Assess and monitor for signs and symptoms of infection  - Monitor lab/diagnostic results  - Monitor all insertion sites, i e  indwelling lines, tubes, and drains  - Monitor endotracheal if appropriate and nasal secretions for changes in amount and color  - [place] appropriate cooling/warming therapies per order  - Administer medications as ordered  - Instruct and encourage patient and family to use good hand hygiene technique  - Identify and instruct in appropriate isolation precautions for identified infection/condition  Outcome: Progressing  Goal: Absence of fever/infection during neutropenic period  Description: INTERVENTIONS:  - Monitor WBC    Outcome: Progressing     Problem: SAFETY ADULT  Goal: Patient will remain free of falls  Description: INTERVENTIONS:  - Assess patient frequently for physical needs  -  Identify cognitive and physical deficits and behaviors that affect risk of falls    -  [place] fall precautions as indicated by assessment   - Educate patient/family on patient safety including physical limitations  - Instruct patient to call for assistance with activity based on assessment  - Modify environment to reduce risk of injury  - Consider OT/PT consult to assist with strengthening/mobility  Outcome: Progressing  Goal: Maintain or return to baseline ADL function  Description: INTERVENTIONS:  -  Assess patient's ability to carry out ADLs; assess patient's baseline for ADL function and identify physical deficits which impact ability to perform ADLs (bathing, care of mouth/teeth, toileting, grooming, dressing, etc )  - Assess/evaluate cause of self-care deficits   - Assess range of motion  - Assess patient's mobility; develop plan if impaired  - Assess patient's need for assistive devices and provide as appropriate  - Encourage maximum independence but intervene and supervise when necessary  - Involve family in performance of ADLs  - Assess for home care needs following discharge   - Consider OT consult to assist with ADL evaluation and planning for discharge  - Provide patient education as appropriate  Outcome: Progressing  Goal: Maintain or return mobility status to optimal level  Description: INTERVENTIONS:  - Assess patient's baseline mobility status (ambulation, transfers, stairs, etc )    - Identify cognitive and physical deficits and behaviors that affect mobility  - Identify mobility aids required to assist with transfers and/or ambulation (gait belt, sit-to-stand, lift, walker, cane, etc )  - [place] fall precautions as indicated by assessment  - Record patient progress and toleration of activity level on Mobility SBAR; progress patient to next Phase/Stage  - Instruct patient to call for assistance with activity based on assessment  - Consider rehabilitation consult to assist with strengthening/weightbearing, etc   Outcome: Progressing     Problem: DISCHARGE PLANNING  Goal: Discharge to home or other facility with appropriate resources  Description: INTERVENTIONS:  - Identify barriers to discharge w/patient and caregiver  - Arrange for needed discharge resources and transportation as appropriate  - Identify discharge learning needs (meds, wound care, etc )  - Arrange for interpretive services to assist at discharge as needed  - Refer to Case Management Department for coordinating discharge planning if the patient needs post-hospital services based on physician/advanced practitioner order or complex needs related to functional status, cognitive ability, or social support system  Outcome: Progressing     Problem: Knowledge Deficit  Goal: Patient/family/caregiver demonstrates understanding of disease process, treatment plan, medications, and discharge instructions  Description: Complete learning assessment and assess knowledge base    Interventions:  - Provide teaching at level of understanding  - Provide teaching via preferred learning methods  Outcome: Progressing     Problem: Nutrition/Hydration-ADULT  Goal: Nutrient/Hydration intake appropriate for improving, restoring or maintaining nutritional needs  Description: Monitor and assess patient's nutrition/hydration status for malnutrition  Collaborate with interdisciplinary team and initiate plan and interventions as ordered  Monitor patient's weight and dietary intake as ordered or per policy  Utilize nutrition screening tool and intervene as necessary  Determine patient's food preferences and provide high-protein, high-caloric foods as appropriate       INTERVENTIONS:  - Monitor oral intake, urinary output, labs, and treatment plans  - Assess nutrition and hydration status and recommend course of action  - Evaluate amount of meals eaten  - Assist patient with eating if necessary   - Allow adequate time for meals  - Recommend/ encourage appropriate diets, oral nutritional supplements, and vitamin/mineral supplements  - Order, calculate, and assess calorie counts as needed  - Recommend, monitor, and adjust tube feedings and TPN/PPN based on assessed needs  - Assess need for intravenous fluids  - Provide specific nutrition/hydration education as appropriate  - Include patient/family/caregiver in decisions related to nutrition  Outcome: Progressing     Problem: NEUROSENSORY - ADULT  Goal: Achieves stable or improved neurological status  Description: INTERVENTIONS  - Monitor and report changes in neurological status  - Monitor vital signs such as temperature, blood pressure, glucose, and any other labs ordered   - Initiate measures to prevent increased intracranial pressure  - Monitor for seizure activity and implement precautions if appropriate      Outcome: Progressing  Goal: Achieves maximal functionality and self care  Description: INTERVENTIONS  - Monitor swallowing and airway patency with patient fatigue and changes in neurological status  - Encourage and assist patient to increase activity and self care     - Encourage visually impaired, hearing impaired and aphasic patients to use assistive/communication devices  Outcome: Progressing     Problem: RESPIRATORY - ADULT  Goal: Achieves optimal ventilation and oxygenation  Description: INTERVENTIONS:  - Assess for changes in respiratory status  - Assess for changes in mentation and behavior  - Position to facilitate oxygenation and minimize respiratory effort  - Oxygen administered by appropriate delivery if ordered  - Initiate smoking cessation education as indicated  - Encourage broncho-pulmonary hygiene including cough, deep breathe, Incentive Spirometry  - Assess the need for suctioning and aspirate as needed  - Assess and instruct to report SOB or any respiratory difficulty  - Respiratory Therapy support as indicated  Outcome: Progressing     Problem: GASTROINTESTINAL - ADULT  Goal: Maintains adequate nutritional intake  Description: INTERVENTIONS:  - Monitor percentage of each meal consumed  - Identify factors contributing to decreased intake, treat as appropriate  - Assist with meals as needed  - Monitor I&O, weight, and lab values if indicated  - Obtain nutrition services referral as needed  Outcome: Progressing     Problem: GENITOURINARY - ADULT  Goal: Maintains or returns to baseline urinary function  Description: INTERVENTIONS:  - Assess urinary function  - Encourage oral fluids to ensure adequate hydration if ordered  - Administer IV fluids as ordered to ensure adequate hydration  - Administer ordered medications as needed  - Offer frequent toileting  - Follow urinary retention protocol if ordered  Outcome: Progressing  Goal: Absence of urinary retention  Description: INTERVENTIONS:  - Assess patients ability to void and empty bladder  - Monitor I/O  - Bladder scan as needed  - Discuss with physician/AP medications to alleviate retention as needed  - Discuss catheterization for long term situations as appropriate  Outcome: Progressing     Problem: METABOLIC, FLUID AND ELECTROLYTES - ADULT  Goal: Electrolytes maintained within normal limits  Description: INTERVENTIONS:  - Monitor labs and assess patient for signs and symptoms of electrolyte imbalances  - Administer electrolyte replacement as ordered  - Monitor response to electrolyte replacements, including repeat lab results as appropriate  - Instruct patient on fluid and nutrition as appropriate  Outcome: Progressing  Goal: Fluid balance maintained  Description: INTERVENTIONS:  - Monitor labs   - Monitor I/O and WT  - Instruct patient on fluid and nutrition as appropriate  - Assess for signs & symptoms of volume excess or deficit  Outcome: Progressing  Goal: Glucose maintained within target range  Description: INTERVENTIONS:  - Monitor Blood Glucose as ordered  - Assess for signs and symptoms of hyperglycemia and hypoglycemia  - Administer ordered medications to maintain glucose within target range  - Assess nutritional intake and initiate nutrition service referral as needed  Outcome: Progressing     Problem: SKIN/TISSUE INTEGRITY - ADULT  Goal: Skin integrity remains intact  Description: INTERVENTIONS  - Identify patients at risk for skin breakdown  - Assess and monitor skin integrity  - Assess and monitor nutrition and hydration status  - Monitor labs (i e  albumin)  - Assess for incontinence   - Turn and reposition patient  - Assist with mobility/ambulation  - Relieve pressure over bony prominences  - Avoid friction and shearing  - Provide appropriate hygiene as needed including keeping skin clean and dry  - Evaluate need for skin moisturizer/barrier cream  - Collaborate with interdisciplinary team (i e  Nutrition, Rehabilitation, etc )   - Patient/family teaching  Outcome: Progressing  Goal: Incision(s), wounds(s) or drain site(s) healing without S/S of infection  Description: INTERVENTIONS  - Assess and document risk factors for skin impairment   - Assess and document dressing, incision, wound bed, drain sites and surrounding tissue  - Consider nutrition services referral as needed  - Oral mucous membranes remain intact  - Provide patient/ family education  Outcome: Progressing  Goal: Oral mucous membranes remain intact  Description: INTERVENTIONS  - Assess oral mucosa and hygiene practices  - Implement preventative oral hygiene regimen  - Implement oral medicated treatments as ordered  - Initiate Nutrition services referral as needed  Outcome: Progressing     Problem: MUSCULOSKELETAL - ADULT  Goal: Maintain or return mobility to safest level of function  Description: INTERVENTIONS:  - Assess patient's ability to carry out ADLs; assess patient's baseline for ADL function and identify physical deficits which impact ability to perform ADLs (bathing, care of mouth/teeth, toileting, grooming, dressing, etc )  - Assess/evaluate cause of self-care deficits   - Assess range of motion  - Assess patient's mobility  - Assess patient's need for assistive devices and provide as appropriate  - Encourage maximum independence but intervene and supervise when necessary  - Involve family in performance of ADLs  - Assess for home care needs following discharge   - Consider OT consult to assist with ADL evaluation and planning for discharge  - Provide patient education as appropriate  Outcome: Progressing  Goal: Maintain proper alignment of affected body part  Description: INTERVENTIONS:  - Support, maintain and protect limb and body alignment  - Provide patient/ family with appropriate education  Outcome: Progressing

## 2021-05-19 NOTE — PLAN OF CARE
Problem: PHYSICAL THERAPY ADULT  Goal: Performs mobility at highest level of function for planned discharge setting  See evaluation for individualized goals  Description: Treatment/Interventions: Functional transfer training, LE strengthening/ROM, Therapeutic exercise, Endurance training, Bed mobility, Gait training          See flowsheet documentation for full assessment, interventions and recommendations  Outcome: Progressing  Note: Prognosis: Fair  Problem List: Decreased strength, Decreased endurance, Impaired balance, Decreased mobility, Decreased cognition, Impaired judgement, Decreased safety awareness, Pain  Assessment: Pt  seen for PT treatment session this date with interventions consisting of  therapeutic exercises,  transfers and  gait training w/ emphasis on improving pt's ability to ambulate  Pt  Currently performing  tx and ambulation at (min ) x 1 level of function  The patient's AM-PAC Basic Mobility Inpatient Short Form Raw Score is 16, Standardized Score is 38 32  A standardized score less than 42 9 suggests the patient may benefit from discharge to post-acute rehabilitation services  Please also refer to physical therapy recommendation for safe DC planning  In comparison to previous session, Pt  With no improvement  in activity tolerance  Ambulation limited by weakness, pain, decreased endurance  Cues for direction and safety  Pt is in need of continued activity in PT to improve strength balance endurance mobility transfers and ambulation with return to maximize LOF  From PT/mobility standpoint, recommendation at time of d/c would be post acuta rehab  in order to promote return to PLOF and independence  PT Discharge Recommendation: Post acute rehabilitation services          See flowsheet documentation for full assessment

## 2021-05-19 NOTE — ASSESSMENT & PLAN NOTE
+3 to 4 edema bilateral lower extremities, consult wound care  With increasing warmth and low grade fever will treat with rocephin   Day 3 of Rocephin

## 2021-05-20 ENCOUNTER — APPOINTMENT (INPATIENT)
Dept: RADIOLOGY | Facility: HOSPITAL | Age: 86
DRG: 682 | End: 2021-05-20
Payer: MEDICARE

## 2021-05-20 LAB
ANION GAP SERPL CALCULATED.3IONS-SCNC: 10 MMOL/L (ref 4–13)
BUN SERPL-MCNC: 55 MG/DL (ref 5–25)
CALCIUM SERPL-MCNC: 9.2 MG/DL (ref 8.3–10.1)
CHLORIDE SERPL-SCNC: 100 MMOL/L (ref 100–108)
CO2 SERPL-SCNC: 29 MMOL/L (ref 21–32)
CREAT SERPL-MCNC: 1.67 MG/DL (ref 0.6–1.3)
GFR SERPL CREATININE-BSD FRML MDRD: 27 ML/MIN/1.73SQ M
GLUCOSE SERPL-MCNC: 100 MG/DL (ref 65–140)
POTASSIUM SERPL-SCNC: 4 MMOL/L (ref 3.5–5.3)
SODIUM SERPL-SCNC: 139 MMOL/L (ref 136–145)

## 2021-05-20 PROCEDURE — 71045 X-RAY EXAM CHEST 1 VIEW: CPT

## 2021-05-20 PROCEDURE — 80048 BASIC METABOLIC PNL TOTAL CA: CPT | Performed by: FAMILY MEDICINE

## 2021-05-20 PROCEDURE — 97110 THERAPEUTIC EXERCISES: CPT

## 2021-05-20 PROCEDURE — 99232 SBSQ HOSP IP/OBS MODERATE 35: CPT | Performed by: FAMILY MEDICINE

## 2021-05-20 RX ORDER — FUROSEMIDE 10 MG/ML
20 INJECTION INTRAMUSCULAR; INTRAVENOUS ONCE
Status: COMPLETED | OUTPATIENT
Start: 2021-05-20 | End: 2021-05-20

## 2021-05-20 RX ORDER — FUROSEMIDE 10 MG/ML
40 INJECTION INTRAMUSCULAR; INTRAVENOUS ONCE
Status: COMPLETED | OUTPATIENT
Start: 2021-05-20 | End: 2021-05-20

## 2021-05-20 RX ORDER — WARFARIN SODIUM 5 MG/1
5 TABLET ORAL
Status: DISCONTINUED | OUTPATIENT
Start: 2021-05-20 | End: 2021-05-26 | Stop reason: HOSPADM

## 2021-05-20 RX ORDER — TRAMADOL HYDROCHLORIDE 50 MG/1
50 TABLET ORAL ONCE
Status: COMPLETED | OUTPATIENT
Start: 2021-05-20 | End: 2021-05-20

## 2021-05-20 RX ADMIN — WARFARIN SODIUM 5 MG: 5 TABLET ORAL at 17:34

## 2021-05-20 RX ADMIN — GABAPENTIN 100 MG: 100 CAPSULE ORAL at 08:26

## 2021-05-20 RX ADMIN — GABAPENTIN 100 MG: 100 CAPSULE ORAL at 17:34

## 2021-05-20 RX ADMIN — Medication: at 17:34

## 2021-05-20 RX ADMIN — CEFTRIAXONE 1000 MG: 1 INJECTION, SOLUTION INTRAVENOUS at 09:16

## 2021-05-20 RX ADMIN — Medication: at 08:26

## 2021-05-20 RX ADMIN — ALBUMIN (HUMAN) 25 G: 0.25 INJECTION, SOLUTION INTRAVENOUS at 00:34

## 2021-05-20 RX ADMIN — TRAMADOL HYDROCHLORIDE 50 MG: 50 TABLET, FILM COATED ORAL at 03:04

## 2021-05-20 RX ADMIN — ALBUMIN (HUMAN) 25 G: 0.25 INJECTION, SOLUTION INTRAVENOUS at 05:56

## 2021-05-20 RX ADMIN — ROPINIROLE HYDROCHLORIDE 2 MG: 1 TABLET, FILM COATED ORAL at 22:15

## 2021-05-20 RX ADMIN — FUROSEMIDE 20 MG: 10 INJECTION, SOLUTION INTRAMUSCULAR; INTRAVENOUS at 09:16

## 2021-05-20 RX ADMIN — FUROSEMIDE 40 MG: 10 INJECTION, SOLUTION INTRAVENOUS at 17:34

## 2021-05-20 RX ADMIN — ROPINIROLE HYDROCHLORIDE 2 MG: 1 TABLET, FILM COATED ORAL at 08:26

## 2021-05-20 NOTE — PHYSICAL THERAPY NOTE
PHYSICAL THERAPY NOTE          Patient Name: Choco Quiñones  VXZWL'[id number] Date: 5/20/2021 05/20/21 1107   Note Type   Note Type Treatment   Restrictions/Precautions   Weight Bearing Precautions Per Order No   Other Precautions Multiple lines; Fall Risk; Chair Alarm   Cognition   Overall Cognitive Status Impaired   Arousal/Participation Lethargic   Subjective   Subjective Eyes closed throughout session  c/o pain LE's  States she doesn't want andything for pain  I don't think I can walk right now  (nurse made aware of c/o LE pain)   Bed Mobility   Additional Comments OOB in chair at start and end of PT session  pt, repostioned in chair  Transfers   Additional Comments Pt  lethargic unable to keep eyes open during  PT session   Balance   Static Sitting Fair -   Endurance Deficit   Endurance Deficit Yes   Activity Tolerance   Activity Tolerance Patient limited by fatigue   Assessment   Prognosis Good   Problem List Decreased strength;Decreased endurance; Impaired balance;Decreased mobility; Impaired judgement;Decreased cognition;Decreased safety awareness;Pain   Assessment Pt  seen for PT treatment session this date with interventions consisting of  therapeutic exercises  w/ emphasis on improving pt's mobility  Pt seen for therapeutic exercises as above  Minimal participation with exercise  Unsafe to ambulate at this time due to this  Ambulation deferred The patient's AM-PAC Basic Mobility Inpatient Short Form Raw Score is 13, Standardized Score is 33 99  A standardized score less than 42 9 suggests the patient may benefit from discharge to post-acute rehabilitation services  Please also refer to physical therapy recommendation for safe DC planning  In comparison to previous session, Pt  With improvements in activity tolerance     Pt is in need of continued activity in PT to improve strength balance endurance mobility transfers and ambulation with return to maximize LOF  From PT/mobility standpoint, recommendation at time of d/c would be post acute rehab  in order to promote return to PLOF and independence  Goals   LTG Expiration Date 05/31/21   Plan   Treatment/Interventions Functional transfer training;LE strengthening/ROM; Therapeutic exercise; Endurance training;Bed mobility;Gait training   Progress Slow progress, decreased activity tolerance   Recommendation   PT Discharge Recommendation Post acute rehabilitation services   AM-PAC Basic Mobility Inpatient   Turning in Bed Without Bedrails 3   Lying on Back to Sitting on Edge of Flat Bed 2   Moving Bed to Chair 2   Standing Up From Chair 2   Walk in Room 2   Climb 3-5 Stairs 2   Basic Mobility Inpatient Raw Score 13   Basic Mobility Standardized Score 33 99   Pt  OOB in chair  with call bell within reach, all lines intact and alarm on at end of PT session  Discussed with  PT today's treatment and patient's current level of function for care coordination

## 2021-05-20 NOTE — PLAN OF CARE
Problem: PHYSICAL THERAPY ADULT  Goal: Performs mobility at highest level of function for planned discharge setting  See evaluation for individualized goals  Description: Treatment/Interventions: Functional transfer training, LE strengthening/ROM, Therapeutic exercise, Endurance training, Bed mobility, Gait training          See flowsheet documentation for full assessment, interventions and recommendations  Outcome: Progressing  Note: Prognosis: Good  Problem List: Decreased strength, Decreased endurance, Impaired balance, Decreased mobility, Impaired judgement, Decreased cognition, Decreased safety awareness, Pain  Assessment: Pt  seen for PT treatment session this date with interventions consisting of  therapeutic exercises  w/ emphasis on improving pt's mobility  Pt seen for therapeutic exercises as above  Minimal participation with exercise  Unsafe to ambulate at this time due to this  Ambulation deferred The patient's AM-PAC Basic Mobility Inpatient Short Form Raw Score is 13, Standardized Score is 33 99  A standardized score less than 42 9 suggests the patient may benefit from discharge to post-acute rehabilitation services  Please also refer to physical therapy recommendation for safe DC planning  In comparison to previous session, Pt  With improvements in activity tolerance  Pt is in need of continued activity in PT to improve strength balance endurance mobility transfers and ambulation with return to maximize LOF  From PT/mobility standpoint, recommendation at time of d/c would be post acute rehab  in order to promote return to PLOF and independence  PT Discharge Recommendation: Post acute rehabilitation services          See flowsheet documentation for full assessment

## 2021-05-20 NOTE — ASSESSMENT & PLAN NOTE
Lab Results   Component Value Date    EGFR 27 05/20/2021    EGFR 26 05/19/2021    EGFR 22 05/18/2021    CREATININE 1 67 (H) 05/20/2021    CREATININE 1 74 (H) 05/19/2021    CREATININE 2 01 (H) 05/18/2021     Provide an additional L of normal saline 5/17/21  Will hold off on additional IVF, encourage PO intake and provide albumin 5/18/21  Renal function slowly improving  Will administer an additional 20 mg of IV Lasix

## 2021-05-20 NOTE — CASE MANAGEMENT
Pt not medically ready for dc to [facility] today  CM called [address] [facility] ambulance and cancelled the transport and notified [facility] of pt not being ready for dc at this time

## 2021-05-20 NOTE — PROGRESS NOTES
[id number] Loc[address] within [facility] [address]  Progress Note - Zonia Recinos 7/3/1931, 80 y o  female MRN: [id number]  Unit/Bed#: 037-16 Encounter: [id number]  Primary Care Provider: Precious Morin DO   Date and time admitted to hospital: 5/15/2021  9:38 AM    * Acute encephalopathy  Assessment & Plan  Acute metabolic encephalopathy, present on admission, as evidenced by increased confusion, visual hallucinations per the family, likely secondary to uremia in the setting of acute kidney injury      Resolved     Atrial fibrillation, chronic  Assessment & Plan  Rate controlled, resume Coumadin    Acute kidney injury superimposed on chronic kidney disease [facility])  Assessment & Plan  Lab Results   Component Value Date    EGFR 27 05/20/2021    EGFR 26 05/19/2021    EGFR 22 05/18/2021    CREATININE 1 67 (H) 05/20/2021    CREATININE 1 74 (H) 05/19/2021    CREATININE 2 01 (H) 05/18/2021     Provide an additional L of normal saline 5/17/21  Will hold off on additional IVF, encourage PO intake and provide albumin 5/18/21  Renal function slowly improving  Will administer an additional 20 mg of IV Lasix    Anemia  Assessment & Plan  Hemoglobin stable, around 10-11    Bilateral lower leg cellulitis  Assessment & Plan  +3 to 4 edema bilateral lower extremities, consult wound care  With increasing warmth and low grade fever will treat with rocephin   Day 4 of Rocephin  Will administer an additional 20 mg of Lasix        Progress Note - Zonia Recinos 80 y o  female MRN: [id number]    Unit/Bed#: 421-01 Encounter: [id number]        Subjective:   Patient seen and examined at bedside, she is awake and alert but appears to be breathing a little shallow, she denies any shortness of breath or chest pain    Objective:     Vitals:   Vitals:    05/20/21 0710   BP: 130/81   Pulse: 87   Resp: 16   Temp: 98 1 °F (36 7 °C)   SpO2: 96%     Body mass index is 28 11 kg/m²      Intake/Output Summary (Last 24 hours) at 5/20/2021 0854  Last data filed at 5/19/2021 1901  Gross per 24 hour   Intake 540 ml   Output 450 ml   Net 90 ml       Physical Exam:   /81 (BP Location: Left arm)   Pulse 87   Temp 98 1 °F (36 7 °C) (Oral)   Resp 16   Ht 4' 10" (1 473 m)   Wt 61 kg (134 lb 7 7 oz)   LMP  (LMP Unknown)   SpO2 96%   BMI 28 11 kg/m²   General appearance: alert and oriented, in no acute distress  Head: Normocephalic, without obvious abnormality, atraumatic  Lungs: clear to auscultation bilaterally  Heart: regular rate and rhythm, S1, S2 normal, no murmur, click, rub or gallop  Abdomen: soft, non-tender; bowel sounds normal; no masses,  no organomegaly  Extremities: extremities normal, warm and well-perfused; no cyanosis, clubbing, or edema  Pulses: 2+ and symmetric  Neurologic: Grossly normal     Invasive Devices     Peripheral Intravenous Line            Peripheral IV 05/17/21 Right;Ventral (anterior) Hand 3 days                Results from last 7 days   Lab Units 05/19/21  0514 05/18/21  0438 05/17/21  0438   WBC Thousand/uL 7 04 8 55 8 22   HEMOGLOBIN g/dL 10 8* 11 4* 10 2*   HEMATOCRIT % 34 4* 35 3 31 8*   PLATELETS Thousands/uL 150 161 162       Results from last 7 days   Lab Units 05/20/21  0736 05/19/21  0514 05/18/21  0438 05/17/21  0438 05/16/21  0450  05/15/21  1052   POTASSIUM mmol/L 4 0 3 7 3 0* 3 2* 3 8   < > 3 9   CHLORIDE mmol/L 100 101 99* 101 101   < > 101   CO2 mmol/L 29 29 26 28 27   < > 30   BUN mg/dL 55* 54* 62* 71* 76*   < > 80*   CREATININE mg/dL 1 67* 1 74* 2 01* 2 46* 2 95*   < > 3 44*   CALCIUM mg/dL 9 2 8 9 8 5 8 1* 8 2*   < > 8 0*   ALK PHOS U/L  --   --   --  91 104  --  107   ALT U/L  --   --   --  14 25  --  27   AST U/L  --   --   --  21 28  --  31    < > = values in this interval not displayed         Medication Administration - last 24 hours from 05/19/2021 0854 to 05/20/2021 3526       Date/Time Order Dose Route Action Action by     05/20/2021 0873 rOPINIRole (REQUIP) tablet 2 mg 2 mg Oral Given Ernestine Rush RN 05/19/2021 2049 rOPINIRole (REQUIP) tablet 2 mg 2 mg Oral Given Zaria Emery RN     05/19/2021 2049 acetaminophen (TYLENOL) tablet 650 mg 650 mg Oral Given Zaria Emery RN     05/19/2021 1042 cefTRIAXone (ROCEPHIN) IVPB (premix in dextrose) 1,000 mg 50 mL 1,000 mg Intravenous New Bag [facility]     05/20/2021 0826 ammonium lactate (LAC-HYDRIN) 12 % lotion   Topical Given Peter Dawson RN     05/19/2021 1744 ammonium lactate (LAC-HYDRIN) 12 % lotion   Topical Given Zaria Emery RN     05/20/2021 3906 gabapentin (NEURONTIN) capsule 100 mg 100 mg Oral Given Peter Dawson RN     05/19/2021 1740 gabapentin (NEURONTIN) capsule 100 mg 100 mg Oral Given Zaria Emery RN     05/19/2021 1136 gabapentin (NEURONTIN) capsule 100 mg 100 mg Oral Given Dominique [facility], RN     05/19/2021 1143 furosemide (LASIX) injection 20 mg 20 mg Intravenous Given Dominique Coppersmith, RN     05/20/2021 0556 albumin human (FLEXBUMIN) 25 % injection 25 g 25 g Intravenous [address] Dominique Coppersmith, RN     05/20/2021 0034 albumin human (FLEXBUMIN) 25 % injection 25 g 25 g Intravenous New [address] [facility], [address] [place] [address]     05/19/2021 1741 albumin human (FLEXBUMIN) 25 % injection 25 g 25 g Intravenous New [address] [facility], [address] [place] [address]     05/19/2021 1138 albumin human (FLEXBUMIN) 25 % injection 25 g 25 g Intravenous New Bag Dominique Coppersmith, RN     05/20/2021 0304 traMADol Farshad Brow) tablet 50 mg 50 mg Oral Given Zaria Emery RN            Lab, Imaging and other studies: I have personally reviewed pertinent reports      VTE Pharmacologic Prophylaxis:  Warfarin  VTE Mechanical Prophylaxis: sequential compression device     Helena Isaacs MD  5/20/2021,8:54 AM

## 2021-05-20 NOTE — ASSESSMENT & PLAN NOTE
+3 to 4 edema bilateral lower extremities, consult wound care  With increasing warmth and low grade fever will treat with rocephin   Day 4 of Rocephin  Will administer an additional 20 mg of Lasix

## 2021-05-21 LAB
ANION GAP SERPL CALCULATED.3IONS-SCNC: 9 MMOL/L (ref 4–13)
BASOPHILS # BLD AUTO: 0.04 THOUSANDS/ΜL (ref 0–0.1)
BASOPHILS NFR BLD AUTO: 0 % (ref 0–1)
BUN SERPL-MCNC: 55 MG/DL (ref 5–25)
CALCIUM SERPL-MCNC: 9.3 MG/DL (ref 8.3–10.1)
CHLORIDE SERPL-SCNC: 101 MMOL/L (ref 100–108)
CO2 SERPL-SCNC: 29 MMOL/L (ref 21–32)
CREAT SERPL-MCNC: 1.6 MG/DL (ref 0.6–1.3)
EOSINOPHIL # BLD AUTO: 0.1 THOUSAND/ΜL (ref 0–0.61)
EOSINOPHIL NFR BLD AUTO: 1 % (ref 0–6)
ERYTHROCYTE [DISTWIDTH] IN BLOOD BY AUTOMATED COUNT: 14 % (ref 11.6–15.1)
GFR SERPL CREATININE-BSD FRML MDRD: 28 ML/MIN/1.73SQ M
GLUCOSE SERPL-MCNC: 85 MG/DL (ref 65–140)
HCT VFR BLD AUTO: 34.1 % (ref 34.8–46.1)
HGB BLD-MCNC: 10.9 G/DL (ref 11.5–15.4)
IMM GRANULOCYTES # BLD AUTO: 0.04 THOUSAND/UL (ref 0–0.2)
IMM GRANULOCYTES NFR BLD AUTO: 0 % (ref 0–2)
INR PPP: 1.41 (ref 0.84–1.19)
LYMPHOCYTES # BLD AUTO: 1.54 THOUSANDS/ΜL (ref 0.6–4.47)
LYMPHOCYTES NFR BLD AUTO: 17 % (ref 14–44)
MCH RBC QN AUTO: 30.9 PG (ref 26.8–34.3)
MCHC RBC AUTO-ENTMCNC: 32 G/DL (ref 31.4–37.4)
MCV RBC AUTO: 97 FL (ref 82–98)
MONOCYTES # BLD AUTO: 0.78 THOUSAND/ΜL (ref 0.17–1.22)
MONOCYTES NFR BLD AUTO: 8 % (ref 4–12)
NEUTROPHILS # BLD AUTO: 6.82 THOUSANDS/ΜL (ref 1.85–7.62)
NEUTS SEG NFR BLD AUTO: 74 % (ref 43–75)
NRBC BLD AUTO-RTO: 0 /100 WBCS
PLATELET # BLD AUTO: 136 THOUSANDS/UL (ref 149–390)
PMV BLD AUTO: 11.1 FL (ref 8.9–12.7)
POTASSIUM SERPL-SCNC: 4.4 MMOL/L (ref 3.5–5.3)
PROTHROMBIN TIME: 16.9 SECONDS (ref 11.6–14.5)
RBC # BLD AUTO: 3.53 MILLION/UL (ref 3.81–5.12)
SODIUM SERPL-SCNC: 139 MMOL/L (ref 136–145)
WBC # BLD AUTO: 9.32 THOUSAND/UL (ref 4.31–10.16)

## 2021-05-21 PROCEDURE — 99232 SBSQ HOSP IP/OBS MODERATE 35: CPT | Performed by: PHYSICIAN ASSISTANT

## 2021-05-21 PROCEDURE — 80048 BASIC METABOLIC PNL TOTAL CA: CPT | Performed by: FAMILY MEDICINE

## 2021-05-21 PROCEDURE — 85025 COMPLETE CBC W/AUTO DIFF WBC: CPT | Performed by: FAMILY MEDICINE

## 2021-05-21 PROCEDURE — 85610 PROTHROMBIN TIME: CPT | Performed by: FAMILY MEDICINE

## 2021-05-21 PROCEDURE — 97116 GAIT TRAINING THERAPY: CPT

## 2021-05-21 PROCEDURE — 97530 THERAPEUTIC ACTIVITIES: CPT

## 2021-05-21 RX ORDER — FUROSEMIDE 10 MG/ML
40 INJECTION INTRAMUSCULAR; INTRAVENOUS ONCE
Status: COMPLETED | OUTPATIENT
Start: 2021-05-21 | End: 2021-05-21

## 2021-05-21 RX ADMIN — GABAPENTIN 100 MG: 100 CAPSULE ORAL at 18:54

## 2021-05-21 RX ADMIN — WARFARIN SODIUM 5 MG: 5 TABLET ORAL at 18:54

## 2021-05-21 RX ADMIN — FUROSEMIDE 40 MG: 10 INJECTION, SOLUTION INTRAMUSCULAR; INTRAVENOUS at 18:54

## 2021-05-21 RX ADMIN — GABAPENTIN 100 MG: 100 CAPSULE ORAL at 11:14

## 2021-05-21 RX ADMIN — ROPINIROLE HYDROCHLORIDE 2 MG: 1 TABLET, FILM COATED ORAL at 11:14

## 2021-05-21 RX ADMIN — ROPINIROLE HYDROCHLORIDE 2 MG: 1 TABLET, FILM COATED ORAL at 22:18

## 2021-05-21 RX ADMIN — Medication: at 18:54

## 2021-05-21 RX ADMIN — Medication: at 11:14

## 2021-05-21 RX ADMIN — CEFTRIAXONE 1000 MG: 1 INJECTION, SOLUTION INTRAVENOUS at 11:14

## 2021-05-21 NOTE — PLAN OF CARE
Problem: PHYSICAL THERAPY ADULT  Goal: Performs mobility at highest level of function for planned discharge setting  See evaluation for individualized goals  Description: Treatment/Interventions: Functional transfer training, LE strengthening/ROM, Therapeutic exercise, Endurance training, Bed mobility, Gait training          See flowsheet documentation for full assessment, interventions and recommendations  Outcome: Progressing  Note: Prognosis: Fair  Problem List: Decreased strength, Decreased endurance, Impaired balance, Decreased mobility, Decreased cognition, Impaired judgement, Decreased safety awareness, Pain  Assessment: Pt  seen for PT treatment session this date with interventions consisting of  transfers and  gait training w/ emphasis on improving pt's ability to ambulate  Pt  Currently performing  tx and ambulation at ( min) x 1 level of function  The patient's AM-PAC Basic Mobility Inpatient Short Form Raw Score is 14, Standardized Score is 35 55  A standardized score less than 42 9 suggests the patient may benefit from discharge to post-acute rehabilitation services  Please also refer to physical therapy recommendation for safe DC planning  In comparison to previous session, Pt  With improvements in activity tolerance  Limited by weakness and fatigue  Excessively slow gait increasing risk for falls  Pt is in need of continued activity in PT to improve strength balance endurance mobility transfers and ambulation with return to maximize LOF  From PT/mobility standpoint, recommendation at time of d/c would be post acute rehab in order to promote return to PLOF and independence  PT Discharge Recommendation: Post acute rehabilitation services          See flowsheet documentation for full assessment

## 2021-05-21 NOTE — ASSESSMENT & PLAN NOTE
+3 to 4 edema bilateral lower extremities, consult wound care  With increasing warmth and low grade fever will treat with rocephin   Day 5 of Rocephin  Will administer an additional 40 mg of Lasix

## 2021-05-21 NOTE — ASSESSMENT & PLAN NOTE
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

## 2021-05-21 NOTE — PLAN OF CARE
Problem: Potential for Falls  Goal: Patient will remain free of falls  Description: INTERVENTIONS:  - Assess patient frequently for physical needs  -  Identify cognitive and physical deficits and behaviors that affect risk of falls    -  [place] fall precautions as indicated by assessment   - Educate patient/family on patient safety including physical limitations  - Instruct patient to call for assistance with activity based on assessment  - Modify environment to reduce risk of injury  - Consider OT/PT consult to assist with strengthening/mobility  Outcome: Progressing     Problem: Prexisting or High Potential for Compromised Skin Integrity  Goal: Skin integrity is maintained or improved  Description: INTERVENTIONS:  - Identify patients at risk for skin breakdown  - Assess and monitor skin integrity  - Assess and monitor nutrition and hydration status  - Monitor labs   - Assess for incontinence   - Turn and reposition patient  - Assist with mobility/ambulation  - Relieve pressure over bony prominences  - Avoid friction and shearing  - Provide appropriate hygiene as needed including keeping skin clean and dry  - Evaluate need for skin moisturizer/barrier cream  - Collaborate with interdisciplinary team   - Patient/family teaching  - Consider wound care consult   Outcome: Progressing     Problem: PAIN - ADULT  Goal: Verbalizes/displays adequate comfort level or baseline comfort level  Description: Interventions:  - Encourage patient to monitor pain and request assistance  - Assess pain using appropriate pain scale  - Administer analgesics based on type and severity of pain and evaluate response  - Implement non-pharmacological measures as appropriate and evaluate response  - Consider cultural and social influences on pain and pain management  - Notify physician/advanced practitioner if interventions unsuccessful or patient reports new pain  Outcome: Progressing     Problem: INFECTION - ADULT  Goal: Absence or prevention of progression during hospitalization  Description: INTERVENTIONS:  - Assess and monitor for signs and symptoms of infection  - Monitor lab/diagnostic results  - Monitor all insertion sites, i e  indwelling lines, tubes, and drains  - Monitor endotracheal if appropriate and nasal secretions for changes in amount and color  - [place] appropriate cooling/warming therapies per order  - Administer medications as ordered  - Instruct and encourage patient and family to use good hand hygiene technique  - Identify and instruct in appropriate isolation precautions for identified infection/condition  Outcome: Progressing  Goal: Absence of fever/infection during neutropenic period  Description: INTERVENTIONS:  - Monitor WBC    Outcome: Progressing     Problem: SAFETY ADULT  Goal: Patient will remain free of falls  Description: INTERVENTIONS:  - Assess patient frequently for physical needs  -  Identify cognitive and physical deficits and behaviors that affect risk of falls    -  [place] fall precautions as indicated by assessment   - Educate patient/family on patient safety including physical limitations  - Instruct patient to call for assistance with activity based on assessment  - Modify environment to reduce risk of injury  - Consider OT/PT consult to assist with strengthening/mobility  Outcome: Progressing  Goal: Maintain or return to baseline ADL function  Description: INTERVENTIONS:  -  Assess patient's ability to carry out ADLs; assess patient's baseline for ADL function and identify physical deficits which impact ability to perform ADLs (bathing, care of mouth/teeth, toileting, grooming, dressing, etc )  - Assess/evaluate cause of self-care deficits   - Assess range of motion  - Assess patient's mobility; develop plan if impaired  - Assess patient's need for assistive devices and provide as appropriate  - Encourage maximum independence but intervene and supervise when necessary  - Involve family in performance of ADLs  - Assess for home care needs following discharge   - Consider OT consult to assist with ADL evaluation and planning for discharge  - Provide patient education as appropriate  Outcome: Progressing  Goal: Maintain or return mobility status to optimal level  Description: INTERVENTIONS:  - Assess patient's baseline mobility status (ambulation, transfers, stairs, etc )    - Identify cognitive and physical deficits and behaviors that affect mobility  - Identify mobility aids required to assist with transfers and/or ambulation (gait belt, sit-to-stand, lift, walker, cane, etc )  - [place] fall precautions as indicated by assessment  - Record patient progress and toleration of activity level on Mobility SBAR; progress patient to next Phase/Stage  - Instruct patient to call for assistance with activity based on assessment  - Consider rehabilitation consult to assist with strengthening/weightbearing, etc   Outcome: Progressing     Problem: DISCHARGE PLANNING  Goal: Discharge to home or other facility with appropriate resources  Description: INTERVENTIONS:  - Identify barriers to discharge w/patient and caregiver  - Arrange for needed discharge resources and transportation as appropriate  - Identify discharge learning needs (meds, wound care, etc )  - Arrange for interpretive services to assist at discharge as needed  - Refer to Case Management Department for coordinating discharge planning if the patient needs post-hospital services based on physician/advanced practitioner order or complex needs related to functional status, cognitive ability, or social support system  Outcome: Progressing     Problem: Knowledge Deficit  Goal: Patient/family/caregiver demonstrates understanding of disease process, treatment plan, medications, and discharge instructions  Description: Complete learning assessment and assess knowledge base    Interventions:  - Provide teaching at level of understanding  - Provide teaching via preferred learning methods  Outcome: Progressing     Problem: Nutrition/Hydration-ADULT  Goal: Nutrient/Hydration intake appropriate for improving, restoring or maintaining nutritional needs  Description: Monitor and assess patient's nutrition/hydration status for malnutrition  Collaborate with interdisciplinary team and initiate plan and interventions as ordered  Monitor patient's weight and dietary intake as ordered or per policy  Utilize nutrition screening tool and intervene as necessary  Determine patient's food preferences and provide high-protein, high-caloric foods as appropriate       INTERVENTIONS:  - Monitor oral intake, urinary output, labs, and treatment plans  - Assess nutrition and hydration status and recommend course of action  - Evaluate amount of meals eaten  - Assist patient with eating if necessary   - Allow adequate time for meals  - Recommend/ encourage appropriate diets, oral nutritional supplements, and vitamin/mineral supplements  - Order, calculate, and assess calorie counts as needed  - Recommend, monitor, and adjust tube feedings and TPN/PPN based on assessed needs  - Assess need for intravenous fluids  - Provide specific nutrition/hydration education as appropriate  - Include patient/family/caregiver in decisions related to nutrition  Outcome: Progressing     Problem: NEUROSENSORY - ADULT  Goal: Achieves stable or improved neurological status  Description: INTERVENTIONS  - Monitor and report changes in neurological status  - Monitor vital signs such as temperature, blood pressure, glucose, and any other labs ordered   - Initiate measures to prevent increased intracranial pressure  - Monitor for seizure activity and implement precautions if appropriate      Outcome: Progressing  Goal: Achieves maximal functionality and self care  Description: INTERVENTIONS  - Monitor swallowing and airway patency with patient fatigue and changes in neurological status  - Encourage and assist patient to increase activity and self care     - Encourage visually impaired, hearing impaired and aphasic patients to use assistive/communication devices  Outcome: Progressing     Problem: RESPIRATORY - ADULT  Goal: Achieves optimal ventilation and oxygenation  Description: INTERVENTIONS:  - Assess for changes in respiratory status  - Assess for changes in mentation and behavior  - Position to facilitate oxygenation and minimize respiratory effort  - Oxygen administered by appropriate delivery if ordered  - Initiate smoking cessation education as indicated  - Encourage broncho-pulmonary hygiene including cough, deep breathe, Incentive Spirometry  - Assess the need for suctioning and aspirate as needed  - Assess and instruct to report SOB or any respiratory difficulty  - Respiratory Therapy support as indicated  Outcome: Progressing     Problem: GASTROINTESTINAL - ADULT  Goal: Maintains adequate nutritional intake  Description: INTERVENTIONS:  - Monitor percentage of each meal consumed  - Identify factors contributing to decreased intake, treat as appropriate  - Assist with meals as needed  - Monitor I&O, weight, and lab values if indicated  - Obtain nutrition services referral as needed  Outcome: Progressing     Problem: GENITOURINARY - ADULT  Goal: Maintains or returns to baseline urinary function  Description: INTERVENTIONS:  - Assess urinary function  - Encourage oral fluids to ensure adequate hydration if ordered  - Administer IV fluids as ordered to ensure adequate hydration  - Administer ordered medications as needed  - Offer frequent toileting  - Follow urinary retention protocol if ordered  Outcome: Progressing  Goal: Absence of urinary retention  Description: INTERVENTIONS:  - Assess patients ability to void and empty bladder  - Monitor I/O  - Bladder scan as needed  - Discuss with physician/AP medications to alleviate retention as needed  - Discuss catheterization for long term situations as appropriate  Outcome: Progressing     Problem: METABOLIC, FLUID AND ELECTROLYTES - ADULT  Goal: Electrolytes maintained within normal limits  Description: INTERVENTIONS:  - Monitor labs and assess patient for signs and symptoms of electrolyte imbalances  - Administer electrolyte replacement as ordered  - Monitor response to electrolyte replacements, including repeat lab results as appropriate  - Instruct patient on fluid and nutrition as appropriate  Outcome: Progressing  Goal: Fluid balance maintained  Description: INTERVENTIONS:  - Monitor labs   - Monitor I/O and WT  - Instruct patient on fluid and nutrition as appropriate  - Assess for signs & symptoms of volume excess or deficit  Outcome: Progressing  Goal: Glucose maintained within target range  Description: INTERVENTIONS:  - Monitor Blood Glucose as ordered  - Assess for signs and symptoms of hyperglycemia and hypoglycemia  - Administer ordered medications to maintain glucose within target range  - Assess nutritional intake and initiate nutrition service referral as needed  Outcome: Progressing     Problem: SKIN/TISSUE INTEGRITY - ADULT  Goal: Skin integrity remains intact  Description: INTERVENTIONS  - Identify patients at risk for skin breakdown  - Assess and monitor skin integrity  - Assess and monitor nutrition and hydration status  - Monitor labs (i e  albumin)  - Assess for incontinence   - Turn and reposition patient  - Assist with mobility/ambulation  - Relieve pressure over bony prominences  - Avoid friction and shearing  - Provide appropriate hygiene as needed including keeping skin clean and dry  - Evaluate need for skin moisturizer/barrier cream  - Collaborate with interdisciplinary team (i e  Nutrition, Rehabilitation, etc )   - Patient/family teaching  Outcome: Progressing  Goal: Incision(s), wounds(s) or drain site(s) healing without S/S of infection  Description: INTERVENTIONS  - Assess and document risk factors for skin impairment   - Assess and document dressing, incision, wound bed, drain sites and surrounding tissue  - Consider nutrition services referral as needed  - Oral mucous membranes remain intact  - Provide patient/ family education  Outcome: Progressing  Goal: Oral mucous membranes remain intact  Description: INTERVENTIONS  - Assess oral mucosa and hygiene practices  - Implement preventative oral hygiene regimen  - Implement oral medicated treatments as ordered  - Initiate Nutrition services referral as needed  Outcome: Progressing     Problem: MUSCULOSKELETAL - ADULT  Goal: Maintain or return mobility to safest level of function  Description: INTERVENTIONS:  - Assess patient's ability to carry out ADLs; assess patient's baseline for ADL function and identify physical deficits which impact ability to perform ADLs (bathing, care of mouth/teeth, toileting, grooming, dressing, etc )  - Assess/evaluate cause of self-care deficits   - Assess range of motion  - Assess patient's mobility  - Assess patient's need for assistive devices and provide as appropriate  - Encourage maximum independence but intervene and supervise when necessary  - Involve family in performance of ADLs  - Assess for home care needs following discharge   - Consider OT consult to assist with ADL evaluation and planning for discharge  - Provide patient education as appropriate  Outcome: Progressing  Goal: Maintain proper alignment of affected body part  Description: INTERVENTIONS:  - Support, maintain and protect limb and body alignment  - Provide patient/ family with appropriate education  Outcome: Progressing

## 2021-05-21 NOTE — PHYSICAL THERAPY NOTE
PHYSICAL THERAPY NOTE          Patient Name: Jesse Mccoy  [id number] Date: 5/21/2021 05/21/21 0930   Note Type   Note Type Treatment   Restrictions/Precautions   Weight Bearing Precautions Per Order No   Other Precautions Multiple lines; Fall Risk   General   Family/Caregiver Present No   Cognition   Overall Cognitive Status Impaired   Arousal/Participation Alert; Cooperative   Subjective   Subjective Agreeable to therapy  reports her legs are not as sore as they were  Transfers   Sit to Stand 4  Minimal assistance   Additional items Assist x 1; Armrests; Increased time required;Verbal cues   Stand to Sit 4  Minimal assistance   Additional items Assist x 1; Armrests; Increased time required;Verbal cues   Stand pivot 4  Minimal assistance   Additional items Assist x 1; Armrests; Increased time required;Verbal cues   Ambulation/Elevation   Gait pattern Excessively slow; Short stride; Foward flexed;Decreased foot clearance; Improper Weight shift   Gait Assistance 4  Minimal assist   Additional items Assist x 1;Verbal cues   Assistive Device Rolling walker   Distance 20'   Balance   Static Sitting Fair   Dynamic Sitting Fair   Static Standing Fair -   Dynamic Standing Fair -   Ambulatory Poor +  (RW)   Endurance Deficit   Endurance Deficit Yes   Activity Tolerance   Activity Tolerance Patient limited by fatigue;Patient limited by pain   Assessment   Prognosis Fair   Problem List Decreased strength;Decreased endurance; Impaired balance;Decreased mobility; Decreased cognition; Impaired judgement;Decreased safety awareness;Pain   Assessment Pt  seen for PT treatment session this date with interventions consisting of  transfers and  gait training w/ emphasis on improving pt's ability to ambulate  Pt  Currently performing  tx and ambulation at ( min) x 1 level of function   The patient's AM-PAC Basic Mobility Inpatient Short Form Raw Score is 14, Standardized Score is 35 55  A standardized score less than 42 9 suggests the patient may benefit from discharge to post-acute rehabilitation services  Please also refer to physical therapy recommendation for safe DC planning  In comparison to previous session, Pt  With improvements in activity tolerance  Limited by weakness and fatigue  Excessively slow gait increasing risk for falls  Pt is in need of continued activity in PT to improve strength balance endurance mobility transfers and ambulation with return to maximize LOF  From PT/mobility standpoint, recommendation at time of d/c would be post acute rehab in order to promote return to PLOF and independence  Goals   LTG Expiration Date 05/31/21   Plan   Treatment/Interventions Functional transfer training;LE strengthening/ROM; Therapeutic exercise; Endurance training;Bed mobility;Gait training   Progress Slow progress, decreased activity tolerance   Recommendation   PT Discharge Recommendation Post acute rehabilitation services   AM-PAC Basic Mobility Inpatient   Turning in Bed Without Bedrails 3   Lying on Back to Sitting on Edge of Flat Bed 2   Moving Bed to Chair 2   Standing Up From Chair 3   Walk in Room 2   Climb 3-5 Stairs 2   Basic Mobility Inpatient Raw Score 14   Basic Mobility Standardized Score 35 55   Pt  OOB in chair  with call bell within reach, all lines intact and alarm on at end of PT session  Discussed with  PT today's treatment and patient's current level of function for care coordination

## 2021-05-21 NOTE — PROGRESS NOTES
[address] [facility]  Progress Note - Reginold Pasquale 7/3/1931, 80 y o  female MRN: [id number]  Unit/Bed#: 079-16 Encounter: [id number]  Primary Care Provider: Spring Tellez DO   Date and time admitted to hospital: 5/15/2021  9:38 AM    * Acute encephalopathy  Assessment & Plan  Acute metabolic encephalopathy, present on admission, as evidenced by increased confusion, visual hallucinations per the family, likely secondary to uremia in the setting of acute kidney injury      Resolved     Chronic combined systolic and diastolic CHF (congestive heart failure) (HCC)  Assessment & Plan  Wt Readings from Last 3 Encounters:   05/21/21 60 4 kg (133 lb 2 5 oz)   03/19/21 68 kg (150 lb)   11/20/20 65 3 kg (144 lb)       Patient with bioprosthetic aortic valve, reduced EF on recent echocardiogram    Will provide an additional L of normal saline given significant renal improvement 05/17/2021  Patient with mild shortness of breath  CXR 5/20- There is now mild increased vascular prominence suspicious for mild CHF  Will given an additional IV lasix 40 mg x 1 now    Acute kidney injury superimposed on chronic kidney disease [facility])  Assessment & Plan  Lab Results   Component Value Date    EGFR 28 05/21/2021    EGFR 27 05/20/2021    EGFR 26 05/19/2021    CREATININE 1 60 (H) 05/21/2021    CREATININE 1 67 (H) 05/20/2021    CREATININE 1 74 (H) 05/19/2021     Provide an additional L of normal saline 5/17/21  Will hold off on additional IVF, encourage PO intake and provide albumin 5/18/21  Renal function slowly improving  Patient with mild shortness of breath, will administer an additional 40 mg of IV Lasix    Bilateral lower leg cellulitis  Assessment & Plan  +3 to 4 edema bilateral lower extremities, consult wound care  With increasing warmth and low grade fever will treat with rocephin   Day 5 of Rocephin  Will administer an additional 40 mg of Lasix    Anemia  Assessment & Plan  Hemoglobin stable, around 10-11    Atrial fibrillation, chronic  Assessment & Plan  Rate controlled, resume Coumadin    Elevated troponin  Assessment & Plan  Non MI troponin elevation    CPK level not significantly elevated      VTE Pharmacologic Prophylaxis:   Pharmacologic: Warfarin (Coumadin)  Mechanical VTE Prophylaxis in Place: Yes    Patient Centered Rounds: I have performed bedside rounds with nursing staff today  Discussions with Specialists or Other Care Team Provider: nursing, CM    Education and Discussions with Family / Patient: patient    Time Spent for Care: 20 minutes  More than 50% of total time spent on counseling and coordination of care as described above  Current Length of Stay: 6 day(s)    Current Patient Status: Inpatient   Certification Statement: The patient will continue to require additional inpatient hospital stay due to continued need for IV lasix, monitoring labs    Discharge Plan: TBD    Code Status: Level 3 - DNAR and DNI      Subjective: The patient was seen and examined  The patient states she continues to have intermitted shortness of breath  Objective:     Vitals:   Temp (24hrs), Av 6 °F (37 °C), Min:98 2 °F (36 8 °C), Max:99 7 °F (37 6 °C)    Temp:  [98 2 °F (36 8 °C)-99 7 °F (37 6 °C)] 98 5 °F (36 9 °C)  HR:  [71-95] 83  Resp:  [16-18] 18  BP: (124-169)/() 139/90  SpO2:  [93 %-98 %] 96 %  Body mass index is 27 83 kg/m²  Input and Output Summary (last 24 hours): Intake/Output Summary (Last 24 hours) at 2021 1816  Last data filed at 2021 2153  Gross per 24 hour   Intake --   Output 400 ml   Net -400 ml       Physical Exam:     Physical Exam  Vitals signs and nursing note reviewed  Constitutional:       General: She is awake  Appearance: Normal appearance  Cardiovascular:      Rate and Rhythm: Normal rate and regular rhythm  Pulmonary:      Effort: Pulmonary effort is normal       Breath sounds: Examination of the left-lower field reveals rales   Rales present  Abdominal:      General: Bowel sounds are normal       Palpations: Abdomen is soft  Tenderness: There is no abdominal tenderness  Skin:     General: Skin is warm and dry  Neurological:      General: No focal deficit present  Mental Status: She is alert and oriented to person, place, and time  Psychiatric:         Attention and Perception: Attention normal          Mood and Affect: Mood normal          Speech: Speech normal          Behavior: Behavior is cooperative  Additional Data:     Labs:    Results from last 7 days   Lab Units 05/21/21  0438   WBC Thousand/uL 9 32   HEMOGLOBIN g/dL 10 9*   HEMATOCRIT % 34 1*   PLATELETS Thousands/uL 136*   NEUTROS PCT % 74   LYMPHS PCT % 17   MONOS PCT % 8   EOS PCT % 1     Results from last 7 days   Lab Units 05/21/21  0438  05/17/21  0438   SODIUM mmol/L 139   < > 138   POTASSIUM mmol/L 4 4   < > 3 2*   CHLORIDE mmol/L 101   < > 101   CO2 mmol/L 29   < > 28   BUN mg/dL 55*   < > 71*   CREATININE mg/dL 1 60*   < > 2 46*   ANION GAP mmol/L 9   < > 9   CALCIUM mg/dL 9 3   < > 8 1*   ALBUMIN g/dL  --   --  2 2*   TOTAL BILIRUBIN mg/dL  --   --  0 99   ALK PHOS U/L  --   --  91   ALT U/L  --   --  14   AST U/L  --   --  21   GLUCOSE RANDOM mg/dL 85   < > 86    < > = values in this interval not displayed  Results from last 7 days   Lab Units 05/21/21  0438   INR  1 41*             Results from last 7 days   Lab Units 05/18/21  0438 05/17/21  0438   PROCALCITONIN ng/ml 0 33* 0 40*           * I Have Reviewed All Lab Data Listed Above  * Additional Pertinent Lab Tests Reviewed:  All Labs Within Last 24 Hours Reviewed    Imaging:    Imaging Reports Reviewed Today Include: none  Imaging Personally Reviewed by Myself Includes:  none    Recent Cultures (last 7 days):           Last 24 Hours Medication List:   Current Facility-Administered Medications   Medication Dose Route Frequency Provider Last Rate    acetaminophen  650 mg Oral Q6H PRN Donovan Conception Esperanza, DO      ammonium lactate   Topical BID Valerie Hutchins MD      cefTRIAXone  1,000 mg Intravenous Q24H Radha Ochoa MD 1,000 mg (05/21/21 1114)    furosemide  40 mg Intravenous Once Frank Chandler PA-C      gabapentin  100 mg Oral BID Valerie Hutchins MD      ondansetron  4 mg Intravenous Q6H PRN De Bibles, DO      rOPINIRole  2 mg Oral BID De Bibles, DO      warfarin  5 mg Oral Daily (warfarin) Valerie Hutchins MD          Today, Patient Was Seen By: Frank Chandler PA-C    ** Please Note: Dictation voice to text software may have been used in the creation of this document   **

## 2021-05-21 NOTE — ASSESSMENT & PLAN NOTE
Lab Results   Component Value Date    EGFR 28 05/21/2021    EGFR 27 05/20/2021    EGFR 26 05/19/2021    CREATININE 1 60 (H) 05/21/2021    CREATININE 1 67 (H) 05/20/2021    CREATININE 1 74 (H) 05/19/2021     Provide an additional L of normal saline 5/17/21  Will hold off on additional IVF, encourage PO intake and provide albumin 5/18/21  Renal function slowly improving  Patient with mild shortness of breath, will administer an additional 40 mg of IV Lasix

## 2021-05-22 LAB
ANION GAP SERPL CALCULATED.3IONS-SCNC: 14 MMOL/L (ref 4–13)
BASOPHILS # BLD AUTO: 0.04 THOUSANDS/ΜL (ref 0–0.1)
BASOPHILS NFR BLD AUTO: 1 % (ref 0–1)
BUN SERPL-MCNC: 60 MG/DL (ref 5–25)
CALCIUM SERPL-MCNC: 9.2 MG/DL (ref 8.3–10.1)
CHLORIDE SERPL-SCNC: 100 MMOL/L (ref 100–108)
CO2 SERPL-SCNC: 25 MMOL/L (ref 21–32)
CREAT SERPL-MCNC: 1.8 MG/DL (ref 0.6–1.3)
EOSINOPHIL # BLD AUTO: 0.12 THOUSAND/ΜL (ref 0–0.61)
EOSINOPHIL NFR BLD AUTO: 2 % (ref 0–6)
ERYTHROCYTE [DISTWIDTH] IN BLOOD BY AUTOMATED COUNT: 14 % (ref 11.6–15.1)
GFR SERPL CREATININE-BSD FRML MDRD: 25 ML/MIN/1.73SQ M
GLUCOSE SERPL-MCNC: 89 MG/DL (ref 65–140)
HCT VFR BLD AUTO: 33 % (ref 34.8–46.1)
HGB BLD-MCNC: 10.4 G/DL (ref 11.5–15.4)
IMM GRANULOCYTES # BLD AUTO: 0.02 THOUSAND/UL (ref 0–0.2)
IMM GRANULOCYTES NFR BLD AUTO: 0 % (ref 0–2)
LYMPHOCYTES # BLD AUTO: 1.47 THOUSANDS/ΜL (ref 0.6–4.47)
LYMPHOCYTES NFR BLD AUTO: 18 % (ref 14–44)
MCH RBC QN AUTO: 30.2 PG (ref 26.8–34.3)
MCHC RBC AUTO-ENTMCNC: 31.5 G/DL (ref 31.4–37.4)
MCV RBC AUTO: 96 FL (ref 82–98)
MONOCYTES # BLD AUTO: 0.79 THOUSAND/ΜL (ref 0.17–1.22)
MONOCYTES NFR BLD AUTO: 10 % (ref 4–12)
NEUTROPHILS # BLD AUTO: 5.83 THOUSANDS/ΜL (ref 1.85–7.62)
NEUTS SEG NFR BLD AUTO: 69 % (ref 43–75)
NRBC BLD AUTO-RTO: 0 /100 WBCS
PLATELET # BLD AUTO: 136 THOUSANDS/UL (ref 149–390)
PMV BLD AUTO: 11.2 FL (ref 8.9–12.7)
POTASSIUM SERPL-SCNC: 3.4 MMOL/L (ref 3.5–5.3)
RBC # BLD AUTO: 3.44 MILLION/UL (ref 3.81–5.12)
SODIUM SERPL-SCNC: 139 MMOL/L (ref 136–145)
WBC # BLD AUTO: 8.27 THOUSAND/UL (ref 4.31–10.16)

## 2021-05-22 PROCEDURE — 99232 SBSQ HOSP IP/OBS MODERATE 35: CPT | Performed by: PHYSICIAN ASSISTANT

## 2021-05-22 PROCEDURE — 80048 BASIC METABOLIC PNL TOTAL CA: CPT | Performed by: PHYSICIAN ASSISTANT

## 2021-05-22 PROCEDURE — 85025 COMPLETE CBC W/AUTO DIFF WBC: CPT | Performed by: PHYSICIAN ASSISTANT

## 2021-05-22 RX ORDER — POTASSIUM CHLORIDE 20 MEQ/1
40 TABLET, EXTENDED RELEASE ORAL ONCE
Status: COMPLETED | OUTPATIENT
Start: 2021-05-22 | End: 2021-05-22

## 2021-05-22 RX ORDER — CEPHALEXIN 250 MG/1
250 CAPSULE ORAL EVERY 12 HOURS SCHEDULED
Status: DISCONTINUED | OUTPATIENT
Start: 2021-05-22 | End: 2021-05-24

## 2021-05-22 RX ORDER — ALBUMIN (HUMAN) 12.5 G/50ML
12.5 SOLUTION INTRAVENOUS 2 TIMES DAILY
Status: COMPLETED | OUTPATIENT
Start: 2021-05-22 | End: 2021-05-22

## 2021-05-22 RX ADMIN — GABAPENTIN 100 MG: 100 CAPSULE ORAL at 17:40

## 2021-05-22 RX ADMIN — ALBUMIN (HUMAN) 12.5 G: 0.25 INJECTION, SOLUTION INTRAVENOUS at 12:32

## 2021-05-22 RX ADMIN — ALBUMIN (HUMAN) 12.5 G: 0.25 INJECTION, SOLUTION INTRAVENOUS at 20:05

## 2021-05-22 RX ADMIN — Medication: at 17:40

## 2021-05-22 RX ADMIN — ROPINIROLE HYDROCHLORIDE 2 MG: 1 TABLET, FILM COATED ORAL at 20:05

## 2021-05-22 RX ADMIN — GABAPENTIN 100 MG: 100 CAPSULE ORAL at 09:06

## 2021-05-22 RX ADMIN — ROPINIROLE HYDROCHLORIDE 2 MG: 1 TABLET, FILM COATED ORAL at 09:06

## 2021-05-22 RX ADMIN — WARFARIN SODIUM 5 MG: 5 TABLET ORAL at 17:40

## 2021-05-22 RX ADMIN — POTASSIUM CHLORIDE 40 MEQ: 1500 TABLET, EXTENDED RELEASE ORAL at 09:05

## 2021-05-22 RX ADMIN — CEPHALEXIN 250 MG: 250 CAPSULE ORAL at 20:05

## 2021-05-22 RX ADMIN — Medication 1 APPLICATION: at 09:06

## 2021-05-22 NOTE — ASSESSMENT & PLAN NOTE
Wt Readings from Last 3 Encounters:   05/22/21 61 kg (134 lb 7 7 oz)   03/19/21 68 kg (150 lb)   11/20/20 65 3 kg (144 lb)       Patient with bioprosthetic aortic valve, reduced EF on recent echocardiogram    Will provide an additional L of normal saline given significant renal improvement 05/17/2021  Patient with mild shortness of breath  CXR 5/20- There is now mild increased vascular prominence suspicious for mild CHF  Patient given an additional IV lasix 40 mg x 1 5/21- hold off on additional doses     If Renal function stable or improved will plan to restart home lasix tomorrow 5/23

## 2021-05-22 NOTE — ASSESSMENT & PLAN NOTE
Lab Results   Component Value Date    EGFR 25 05/22/2021    EGFR 28 05/21/2021    EGFR 27 05/20/2021    CREATININE 1 80 (H) 05/22/2021    CREATININE 1 60 (H) 05/21/2021    CREATININE 1 67 (H) 05/20/2021     Provide an additional L of normal saline 5/17/21  Will hold off on additional IVF, encourage PO intake and provide albumin 5/18/21  Renal function slowly improving  Patient with mild shortness of breath, received additional 40 mg of IV Lasix on 5/21, will hold off on additional doses for now     Will give albumin today 5/22

## 2021-05-22 NOTE — PROGRESS NOTES
[address] [facility]  Progress Note - Jackelyn Frontier 7/3/1931, 80 y o  female MRN: [id number]  Unit/Bed#: 099-83 Encounter: [id number]  Primary Care Provider: Zak Alvarez DO   Date and time admitted to hospital: 5/15/2021  9:38 AM    * Acute encephalopathy  Assessment & Plan  Acute metabolic encephalopathy, present on admission, as evidenced by increased confusion, visual hallucinations per the family, likely secondary to uremia in the setting of acute kidney injury      Resolved     Chronic combined systolic and diastolic CHF (congestive heart failure) (HCC)  Assessment & Plan  Wt Readings from Last 3 Encounters:   05/22/21 61 kg (134 lb 7 7 oz)   03/19/21 68 kg (150 lb)   11/20/20 65 3 kg (144 lb)       Patient with bioprosthetic aortic valve, reduced EF on recent echocardiogram    Will provide an additional L of normal saline given significant renal improvement 05/17/2021  Patient with mild shortness of breath  CXR 5/20- There is now mild increased vascular prominence suspicious for mild CHF  Patient given an additional IV lasix 40 mg x 1 5/21- hold off on additional doses  If Renal function stable or improved will plan to restart home lasix tomorrow 5/23    Acute kidney injury superimposed on chronic kidney disease [facility])  Assessment & Plan  Lab Results   Component Value Date    EGFR 25 05/22/2021    EGFR 28 05/21/2021    EGFR 27 05/20/2021    CREATININE 1 80 (H) 05/22/2021    CREATININE 1 60 (H) 05/21/2021    CREATININE 1 67 (H) 05/20/2021     Provide an additional L of normal saline 5/17/21  Will hold off on additional IVF, encourage PO intake and provide albumin 5/18/21  Renal function slowly improving  Patient with mild shortness of breath, received additional 40 mg of IV Lasix on 5/21, will hold off on additional doses for now     Will give albumin today 5/22    Bilateral lower leg cellulitis  Assessment & Plan  +3 to 4 edema bilateral lower extremities, consult wound care  With increasing warmth and low grade fever will treat with rocephin   Completed 5 days of Rocephin, will switch to po keflex       Anemia  Assessment & Plan  Hemoglobin stable, around 10-11    Atrial fibrillation, chronic  Assessment & Plan  Rate controlled, resume Coumadin    Elevated troponin  Assessment & Plan  Non MI troponin elevation    CPK level not significantly elevated      VTE Pharmacologic Prophylaxis:   Pharmacologic: Warfarin (Coumadin)  Mechanical VTE Prophylaxis in Place: Yes    Patient Centered Rounds: I have performed bedside rounds with nursing staff today  Discussions with Specialists or Other Care Team Provider: nursing, CM    Education and Discussions with Family / Patient: family updated at bedside    Time Spent for Care: 20 minutes  More than 50% of total time spent on counseling and coordination of care as described above  Current Length of Stay: 7 day(s)    Current Patient Status: Inpatient   Certification Statement: The patient will continue to require additional inpatient hospital stay due to continued monitoring of labs, need for STR on discharge    Discharge Plan: likely placement to STR when medically stable    Code Status: Level 3 - DNAR and DNI      Subjective: The patient was seen and examined  The patient denies any complaints  Objective:     Vitals:   Temp (24hrs), Av 1 °F (36 7 °C), Min:97 4 °F (36 3 °C), Max:98 5 °F (36 9 °C)    Temp:  [97 4 °F (36 3 °C)-98 5 °F (36 9 °C)] 97 8 °F (36 6 °C)  HR:  [] 98  Resp:  [18-20] 18  BP: (119-162)/() 146/96  SpO2:  [95 %-97 %] 97 %  Body mass index is 28 11 kg/m²  Input and Output Summary (last 24 hours): Intake/Output Summary (Last 24 hours) at 2021 1532  Last data filed at 2021 0900  Gross per 24 hour   Intake 420 ml   Output 850 ml   Net -430 ml       Physical Exam:     Physical Exam  Vitals signs and nursing note reviewed  Constitutional:       General: She is awake        Appearance: Normal appearance  Cardiovascular:      Rate and Rhythm: Normal rate and regular rhythm  Pulmonary:      Effort: Pulmonary effort is normal       Breath sounds: Normal breath sounds  No wheezing, rhonchi or rales  Abdominal:      General: Bowel sounds are normal       Palpations: Abdomen is soft  Tenderness: There is no abdominal tenderness  Skin:     General: Skin is warm and dry  Neurological:      General: No focal deficit present  Mental Status: She is alert  Psychiatric:         Attention and Perception: Attention normal          Speech: Speech normal          Behavior: Behavior is cooperative  Additional Data:     Labs:    Results from last 7 days   Lab Units 05/22/21  0450   WBC Thousand/uL 8 27   HEMOGLOBIN g/dL 10 4*   HEMATOCRIT % 33 0*   PLATELETS Thousands/uL 136*   NEUTROS PCT % 69   LYMPHS PCT % 18   MONOS PCT % 10   EOS PCT % 2     Results from last 7 days   Lab Units 05/22/21 0450  05/17/21  0438   SODIUM mmol/L 139   < > 138   POTASSIUM mmol/L 3 4*   < > 3 2*   CHLORIDE mmol/L 100   < > 101   CO2 mmol/L 25   < > 28   BUN mg/dL 60*   < > 71*   CREATININE mg/dL 1 80*   < > 2 46*   ANION GAP mmol/L 14*   < > 9   CALCIUM mg/dL 9 2   < > 8 1*   ALBUMIN g/dL  --   --  2 2*   TOTAL BILIRUBIN mg/dL  --   --  0 99   ALK PHOS U/L  --   --  91   ALT U/L  --   --  14   AST U/L  --   --  21   GLUCOSE RANDOM mg/dL 89   < > 86    < > = values in this interval not displayed  Results from last 7 days   Lab Units 05/21/21  0438   INR  1 41*             Results from last 7 days   Lab Units 05/18/21  0438 05/17/21  0438   PROCALCITONIN ng/ml 0 33* 0 40*           * I Have Reviewed All Lab Data Listed Above  * Additional Pertinent Lab Tests Reviewed:  All Labs Within Last 24 Hours Reviewed    Imaging:    Imaging Reports Reviewed Today Include: none  Imaging Personally Reviewed by Myself Includes:  none    Recent Cultures (last 7 days):           Last 24 Hours Medication List:   Current Facility-Administered Medications   Medication Dose Route Frequency Provider Last Rate    acetaminophen  650 mg Oral Q6H PRN Allayne Dial, DO      albumin human  12 5 g Intravenous BID Jody Rosario PA-C      ammonium lactate   Topical BID Santy Marie MD      cephalexin  250 mg Oral Q12H [address] Jody Rosario PA-C      gabapentin  100 mg Oral BID Santy Marie MD      ondansetron  4 mg Intravenous Q6H PRN Allayne Dial, DO      rOPINIRole  2 mg Oral BID Allayne Dial, DO      warfarin  5 mg Oral Daily (warfarin) Satny Marie MD          Today, Patient Was Seen By: Jody Rosario PA-C    ** Please Note: Dictation voice to text software may have been used in the creation of this document   **

## 2021-05-22 NOTE — ASSESSMENT & PLAN NOTE
+3 to 4 edema bilateral lower extremities, consult wound care  With increasing warmth and low grade fever will treat with rocephin   Completed 5 days of Rocephin, will switch to po keflex

## 2021-05-22 NOTE — PLAN OF CARE
Problem: Potential for Falls  Goal: Patient will remain free of falls  Description: INTERVENTIONS:  - Assess patient frequently for physical needs  -  Identify cognitive and physical deficits and behaviors that affect risk of falls    -  [place] fall precautions as indicated by assessment   - Educate patient/family on patient safety including physical limitations  - Instruct patient to call for assistance with activity based on assessment  - Modify environment to reduce risk of injury  - Consider OT/PT consult to assist with strengthening/mobility  Outcome: Progressing     Problem: Prexisting or High Potential for Compromised Skin Integrity  Goal: Skin integrity is maintained or improved  Description: INTERVENTIONS:  - Identify patients at risk for skin breakdown  - Assess and monitor skin integrity  - Assess and monitor nutrition and hydration status  - Monitor labs   - Assess for incontinence   - Turn and reposition patient  - Assist with mobility/ambulation  - Relieve pressure over bony prominences  - Avoid friction and shearing  - Provide appropriate hygiene as needed including keeping skin clean and dry  - Evaluate need for skin moisturizer/barrier cream  - Collaborate with interdisciplinary team   - Patient/family teaching  - Consider wound care consult   Outcome: Progressing     Problem: PAIN - ADULT  Goal: Verbalizes/displays adequate comfort level or baseline comfort level  Description: Interventions:  - Encourage patient to monitor pain and request assistance  - Assess pain using appropriate pain scale  - Administer analgesics based on type and severity of pain and evaluate response  - Implement non-pharmacological measures as appropriate and evaluate response  - Consider cultural and social influences on pain and pain management  - Notify physician/advanced practitioner if interventions unsuccessful or patient reports new pain  Outcome: Progressing     Problem: INFECTION - ADULT  Goal: Absence or prevention of progression during hospitalization  Description: INTERVENTIONS:  - Assess and monitor for signs and symptoms of infection  - Monitor lab/diagnostic results  - Monitor all insertion sites, i e  indwelling lines, tubes, and drains  - Monitor endotracheal if appropriate and nasal secretions for changes in amount and color  - [place] appropriate cooling/warming therapies per order  - Administer medications as ordered  - Instruct and encourage patient and family to use good hand hygiene technique  - Identify and instruct in appropriate isolation precautions for identified infection/condition  Outcome: Progressing  Goal: Absence of fever/infection during neutropenic period  Description: INTERVENTIONS:  - Monitor WBC    Outcome: Progressing     Problem: SAFETY ADULT  Goal: Patient will remain free of falls  Description: INTERVENTIONS:  - Assess patient frequently for physical needs  -  Identify cognitive and physical deficits and behaviors that affect risk of falls    -  [place] fall precautions as indicated by assessment   - Educate patient/family on patient safety including physical limitations  - Instruct patient to call for assistance with activity based on assessment  - Modify environment to reduce risk of injury  - Consider OT/PT consult to assist with strengthening/mobility  Outcome: Progressing  Goal: Maintain or return to baseline ADL function  Description: INTERVENTIONS:  -  Assess patient's ability to carry out ADLs; assess patient's baseline for ADL function and identify physical deficits which impact ability to perform ADLs (bathing, care of mouth/teeth, toileting, grooming, dressing, etc )  - Assess/evaluate cause of self-care deficits   - Assess range of motion  - Assess patient's mobility; develop plan if impaired  - Assess patient's need for assistive devices and provide as appropriate  - Encourage maximum independence but intervene and supervise when necessary  - Involve family in performance of ADLs  - Assess for home care needs following discharge   - Consider OT consult to assist with ADL evaluation and planning for discharge  - Provide patient education as appropriate  Outcome: Progressing  Goal: Maintain or return mobility status to optimal level  Description: INTERVENTIONS:  - Assess patient's baseline mobility status (ambulation, transfers, stairs, etc )    - Identify cognitive and physical deficits and behaviors that affect mobility  - Identify mobility aids required to assist with transfers and/or ambulation (gait belt, sit-to-stand, lift, walker, cane, etc )  - [place] fall precautions as indicated by assessment  - Record patient progress and toleration of activity level on Mobility SBAR; progress patient to next Phase/Stage  - Instruct patient to call for assistance with activity based on assessment  - Consider rehabilitation consult to assist with strengthening/weightbearing, etc   Outcome: Progressing     Problem: DISCHARGE PLANNING  Goal: Discharge to home or other facility with appropriate resources  Description: INTERVENTIONS:  - Identify barriers to discharge w/patient and caregiver  - Arrange for needed discharge resources and transportation as appropriate  - Identify discharge learning needs (meds, wound care, etc )  - Arrange for interpretive services to assist at discharge as needed  - Refer to Case Management Department for coordinating discharge planning if the patient needs post-hospital services based on physician/advanced practitioner order or complex needs related to functional status, cognitive ability, or social support system  Outcome: Progressing     Problem: Knowledge Deficit  Goal: Patient/family/caregiver demonstrates understanding of disease process, treatment plan, medications, and discharge instructions  Description: Complete learning assessment and assess knowledge base    Interventions:  - Provide teaching at level of understanding  - Provide teaching via preferred learning methods  Outcome: Progressing     Problem: Nutrition/Hydration-ADULT  Goal: Nutrient/Hydration intake appropriate for improving, restoring or maintaining nutritional needs  Description: Monitor and assess patient's nutrition/hydration status for malnutrition  Collaborate with interdisciplinary team and initiate plan and interventions as ordered  Monitor patient's weight and dietary intake as ordered or per policy  Utilize nutrition screening tool and intervene as necessary  Determine patient's food preferences and provide high-protein, high-caloric foods as appropriate       INTERVENTIONS:  - Monitor oral intake, urinary output, labs, and treatment plans  - Assess nutrition and hydration status and recommend course of action  - Evaluate amount of meals eaten  - Assist patient with eating if necessary   - Allow adequate time for meals  - Recommend/ encourage appropriate diets, oral nutritional supplements, and vitamin/mineral supplements  - Order, calculate, and assess calorie counts as needed  - Recommend, monitor, and adjust tube feedings and TPN/PPN based on assessed needs  - Assess need for intravenous fluids  - Provide specific nutrition/hydration education as appropriate  - Include patient/family/caregiver in decisions related to nutrition  Outcome: Progressing     Problem: NEUROSENSORY - ADULT  Goal: Achieves stable or improved neurological status  Description: INTERVENTIONS  - Monitor and report changes in neurological status  - Monitor vital signs such as temperature, blood pressure, glucose, and any other labs ordered   - Initiate measures to prevent increased intracranial pressure  - Monitor for seizure activity and implement precautions if appropriate      Outcome: Progressing  Goal: Achieves maximal functionality and self care  Description: INTERVENTIONS  - Monitor swallowing and airway patency with patient fatigue and changes in neurological status  - Encourage and assist patient to increase activity and self care     - Encourage visually impaired, hearing impaired and aphasic patients to use assistive/communication devices  Outcome: Progressing     Problem: RESPIRATORY - ADULT  Goal: Achieves optimal ventilation and oxygenation  Description: INTERVENTIONS:  - Assess for changes in respiratory status  - Assess for changes in mentation and behavior  - Position to facilitate oxygenation and minimize respiratory effort  - Oxygen administered by appropriate delivery if ordered  - Initiate smoking cessation education as indicated  - Encourage broncho-pulmonary hygiene including cough, deep breathe, Incentive Spirometry  - Assess the need for suctioning and aspirate as needed  - Assess and instruct to report SOB or any respiratory difficulty  - Respiratory Therapy support as indicated  Outcome: Progressing     Problem: GASTROINTESTINAL - ADULT  Goal: Maintains adequate nutritional intake  Description: INTERVENTIONS:  - Monitor percentage of each meal consumed  - Identify factors contributing to decreased intake, treat as appropriate  - Assist with meals as needed  - Monitor I&O, weight, and lab values if indicated  - Obtain nutrition services referral as needed  Outcome: Progressing     Problem: GENITOURINARY - ADULT  Goal: Maintains or returns to baseline urinary function  Description: INTERVENTIONS:  - Assess urinary function  - Encourage oral fluids to ensure adequate hydration if ordered  - Administer IV fluids as ordered to ensure adequate hydration  - Administer ordered medications as needed  - Offer frequent toileting  - Follow urinary retention protocol if ordered  Outcome: Progressing  Goal: Absence of urinary retention  Description: INTERVENTIONS:  - Assess patients ability to void and empty bladder  - Monitor I/O  - Bladder scan as needed  - Discuss with physician/AP medications to alleviate retention as needed  - Discuss catheterization for long term situations as appropriate  Outcome: Progressing     Problem: METABOLIC, FLUID AND ELECTROLYTES - ADULT  Goal: Electrolytes maintained within normal limits  Description: INTERVENTIONS:  - Monitor labs and assess patient for signs and symptoms of electrolyte imbalances  - Administer electrolyte replacement as ordered  - Monitor response to electrolyte replacements, including repeat lab results as appropriate  - Instruct patient on fluid and nutrition as appropriate  Outcome: Progressing  Goal: Fluid balance maintained  Description: INTERVENTIONS:  - Monitor labs   - Monitor I/O and WT  - Instruct patient on fluid and nutrition as appropriate  - Assess for signs & symptoms of volume excess or deficit  Outcome: Progressing  Goal: Glucose maintained within target range  Description: INTERVENTIONS:  - Monitor Blood Glucose as ordered  - Assess for signs and symptoms of hyperglycemia and hypoglycemia  - Administer ordered medications to maintain glucose within target range  - Assess nutritional intake and initiate nutrition service referral as needed  Outcome: Progressing     Problem: SKIN/TISSUE INTEGRITY - ADULT  Goal: Skin integrity remains intact  Description: INTERVENTIONS  - Identify patients at risk for skin breakdown  - Assess and monitor skin integrity  - Assess and monitor nutrition and hydration status  - Monitor labs (i e  albumin)  - Assess for incontinence   - Turn and reposition patient  - Assist with mobility/ambulation  - Relieve pressure over bony prominences  - Avoid friction and shearing  - Provide appropriate hygiene as needed including keeping skin clean and dry  - Evaluate need for skin moisturizer/barrier cream  - Collaborate with interdisciplinary team (i e  Nutrition, Rehabilitation, etc )   - Patient/family teaching  Outcome: Progressing  Goal: Incision(s), wounds(s) or drain site(s) healing without S/S of infection  Description: INTERVENTIONS  - Assess and document risk factors for skin impairment   - Assess and document dressing, incision, wound bed, drain sites and surrounding tissue  - Consider nutrition services referral as needed  - Oral mucous membranes remain intact  - Provide patient/ family education  Outcome: Progressing  Goal: Oral mucous membranes remain intact  Description: INTERVENTIONS  - Assess oral mucosa and hygiene practices  - Implement preventative oral hygiene regimen  - Implement oral medicated treatments as ordered  - Initiate Nutrition services referral as needed  Outcome: Progressing     Problem: MUSCULOSKELETAL - ADULT  Goal: Maintain or return mobility to safest level of function  Description: INTERVENTIONS:  - Assess patient's ability to carry out ADLs; assess patient's baseline for ADL function and identify physical deficits which impact ability to perform ADLs (bathing, care of mouth/teeth, toileting, grooming, dressing, etc )  - Assess/evaluate cause of self-care deficits   - Assess range of motion  - Assess patient's mobility  - Assess patient's need for assistive devices and provide as appropriate  - Encourage maximum independence but intervene and supervise when necessary  - Involve family in performance of ADLs  - Assess for home care needs following discharge   - Consider OT consult to assist with ADL evaluation and planning for discharge  - Provide patient education as appropriate  Outcome: Progressing  Goal: Maintain proper alignment of affected body part  Description: INTERVENTIONS:  - Support, maintain and protect limb and body alignment  - Provide patient/ family with appropriate education  Outcome: Progressing

## 2021-05-23 LAB
ANION GAP SERPL CALCULATED.3IONS-SCNC: 15 MMOL/L (ref 4–13)
BASOPHILS # BLD AUTO: 0.06 THOUSANDS/ΜL (ref 0–0.1)
BASOPHILS NFR BLD AUTO: 1 % (ref 0–1)
BUN SERPL-MCNC: 69 MG/DL (ref 5–25)
CALCIUM SERPL-MCNC: 9.6 MG/DL (ref 8.3–10.1)
CHLORIDE SERPL-SCNC: 100 MMOL/L (ref 100–108)
CO2 SERPL-SCNC: 23 MMOL/L (ref 21–32)
CREAT SERPL-MCNC: 1.88 MG/DL (ref 0.6–1.3)
EOSINOPHIL # BLD AUTO: 0.13 THOUSAND/ΜL (ref 0–0.61)
EOSINOPHIL NFR BLD AUTO: 2 % (ref 0–6)
ERYTHROCYTE [DISTWIDTH] IN BLOOD BY AUTOMATED COUNT: 14 % (ref 11.6–15.1)
GFR SERPL CREATININE-BSD FRML MDRD: 23 ML/MIN/1.73SQ M
GLUCOSE SERPL-MCNC: 90 MG/DL (ref 65–140)
HCT VFR BLD AUTO: 35.3 % (ref 34.8–46.1)
HGB BLD-MCNC: 11.4 G/DL (ref 11.5–15.4)
IMM GRANULOCYTES # BLD AUTO: 0.04 THOUSAND/UL (ref 0–0.2)
IMM GRANULOCYTES NFR BLD AUTO: 1 % (ref 0–2)
INR PPP: 1.81 (ref 0.84–1.19)
LYMPHOCYTES # BLD AUTO: 1.44 THOUSANDS/ΜL (ref 0.6–4.47)
LYMPHOCYTES NFR BLD AUTO: 17 % (ref 14–44)
MCH RBC QN AUTO: 30.7 PG (ref 26.8–34.3)
MCHC RBC AUTO-ENTMCNC: 32.3 G/DL (ref 31.4–37.4)
MCV RBC AUTO: 95 FL (ref 82–98)
MONOCYTES # BLD AUTO: 0.84 THOUSAND/ΜL (ref 0.17–1.22)
MONOCYTES NFR BLD AUTO: 10 % (ref 4–12)
NEUTROPHILS # BLD AUTO: 5.78 THOUSANDS/ΜL (ref 1.85–7.62)
NEUTS SEG NFR BLD AUTO: 69 % (ref 43–75)
NRBC BLD AUTO-RTO: 0 /100 WBCS
PLATELET # BLD AUTO: 158 THOUSANDS/UL (ref 149–390)
PMV BLD AUTO: 11.2 FL (ref 8.9–12.7)
POTASSIUM SERPL-SCNC: 4.4 MMOL/L (ref 3.5–5.3)
PROCALCITONIN SERPL-MCNC: 0.26 NG/ML
PROTHROMBIN TIME: 20.6 SECONDS (ref 11.6–14.5)
RBC # BLD AUTO: 3.71 MILLION/UL (ref 3.81–5.12)
SODIUM SERPL-SCNC: 138 MMOL/L (ref 136–145)
WBC # BLD AUTO: 8.29 THOUSAND/UL (ref 4.31–10.16)

## 2021-05-23 PROCEDURE — 99232 SBSQ HOSP IP/OBS MODERATE 35: CPT | Performed by: PHYSICIAN ASSISTANT

## 2021-05-23 PROCEDURE — 80048 BASIC METABOLIC PNL TOTAL CA: CPT | Performed by: PHYSICIAN ASSISTANT

## 2021-05-23 PROCEDURE — 84145 PROCALCITONIN (PCT): CPT | Performed by: PHYSICIAN ASSISTANT

## 2021-05-23 PROCEDURE — 85025 COMPLETE CBC W/AUTO DIFF WBC: CPT | Performed by: PHYSICIAN ASSISTANT

## 2021-05-23 PROCEDURE — 85610 PROTHROMBIN TIME: CPT | Performed by: PHYSICIAN ASSISTANT

## 2021-05-23 RX ORDER — FUROSEMIDE 80 MG
80 TABLET ORAL DAILY
Status: DISCONTINUED | OUTPATIENT
Start: 2021-05-23 | End: 2021-05-26 | Stop reason: HOSPADM

## 2021-05-23 RX ADMIN — GABAPENTIN 100 MG: 100 CAPSULE ORAL at 17:28

## 2021-05-23 RX ADMIN — Medication: at 17:29

## 2021-05-23 RX ADMIN — GABAPENTIN 100 MG: 100 CAPSULE ORAL at 08:53

## 2021-05-23 RX ADMIN — CEPHALEXIN 250 MG: 250 CAPSULE ORAL at 08:53

## 2021-05-23 RX ADMIN — ROPINIROLE HYDROCHLORIDE 2 MG: 1 TABLET, FILM COATED ORAL at 08:53

## 2021-05-23 RX ADMIN — ROPINIROLE HYDROCHLORIDE 2 MG: 1 TABLET, FILM COATED ORAL at 20:49

## 2021-05-23 RX ADMIN — FUROSEMIDE 80 MG: 80 TABLET ORAL at 10:58

## 2021-05-23 RX ADMIN — CEPHALEXIN 250 MG: 250 CAPSULE ORAL at 20:49

## 2021-05-23 RX ADMIN — Medication: at 08:54

## 2021-05-23 RX ADMIN — WARFARIN SODIUM 5 MG: 5 TABLET ORAL at 17:28

## 2021-05-23 NOTE — ASSESSMENT & PLAN NOTE
+3 to 4 edema bilateral lower extremities, consult wound care  With increasing warmth and low grade fever   IV Rocephin, switched to po keflex 5/22

## 2021-05-23 NOTE — ASSESSMENT & PLAN NOTE
Lab Results   Component Value Date    EGFR 23 05/23/2021    EGFR 25 05/22/2021    EGFR 28 05/21/2021    CREATININE 1 88 (H) 05/23/2021    CREATININE 1 80 (H) 05/22/2021    CREATININE 1 60 (H) 05/21/2021     Provide an additional L of normal saline 5/17/21  Will hold off on additional IVF, encourage PO intake and provide albumin 5/18/21  Patient with mild shortness of breath, received additional 40 mg of IV Lasix on 5/21, will hold off on additional doses for now     Given albumin 5/22  Will restart home Lasix 80 mg po daily

## 2021-05-23 NOTE — ASSESSMENT & PLAN NOTE
Wt Readings from Last 3 Encounters:   05/23/21 61 8 kg (136 lb 3 9 oz)   03/19/21 68 kg (150 lb)   11/20/20 65 3 kg (144 lb)       Patient with bioprosthetic aortic valve, reduced EF on recent echocardiogram    Will provide an additional L of normal saline given significant renal improvement 05/17/2021  Patient with mild shortness of breath  CXR 5/20- There is now mild increased vascular prominence suspicious for mild CHF  Patient given an additional IV lasix 40 mg x 1 5/21-   Renal function stable will restart home dose lasix of 80 mg po daily today 5/23

## 2021-05-23 NOTE — PROGRESS NOTES
[address]  Progress Note - Lauren Syed 7/3/1931, 80 y o  female MRN: [id number]  Unit/Bed#: 966-44 Encounter: [id number]  Primary Care Provider: Zaira Self DO   Date and time admitted to hospital: 5/15/2021  9:38 AM    * Acute encephalopathy  Assessment & Plan  Acute metabolic encephalopathy, present on admission, as evidenced by increased confusion, visual hallucinations per the family, likely secondary to uremia in the setting of acute kidney injury      Improved but not back to baseline per family at bedside    Chronic combined systolic and diastolic CHF (congestive heart failure) (HCC)  Assessment & Plan  Wt Readings from Last 3 Encounters:   05/23/21 61 8 kg (136 lb 3 9 oz)   03/19/21 68 kg (150 lb)   11/20/20 65 3 kg (144 lb)       Patient with bioprosthetic aortic valve, reduced EF on recent echocardiogram    Will provide an additional L of normal saline given significant renal improvement 05/17/2021  Patient with mild shortness of breath  CXR 5/20- There is now mild increased vascular prominence suspicious for mild CHF  Patient given an additional IV lasix 40 mg x 1 5/21-   Renal function stable will restart home dose lasix of 80 mg po daily today 5/23    Acute kidney injury superimposed on chronic kidney disease [facility])  Assessment & Plan  Lab Results   Component Value Date    EGFR 23 05/23/2021    EGFR 25 05/22/2021    EGFR 28 05/21/2021    CREATININE 1 88 (H) 05/23/2021    CREATININE 1 80 (H) 05/22/2021    CREATININE 1 60 (H) 05/21/2021     Provide an additional L of normal saline 5/17/21  Will hold off on additional IVF, encourage PO intake and provide albumin 5/18/21  Patient with mild shortness of breath, received additional 40 mg of IV Lasix on 5/21, will hold off on additional doses for now     Given albumin 5/22  Will restart home Lasix 80 mg po daily    Bilateral lower leg cellulitis  Assessment & Plan  +3 to 4 edema bilateral lower extremities, consult wound care  With increasing warmth and low grade fever   IV Rocephin, switched to po keflex       Anemia  Assessment & Plan  Hemoglobin stable, around 10-11    Atrial fibrillation, chronic  Assessment & Plan  Rate controlled, Coumadin resumed    Elevated troponin  Assessment & Plan  Non MI troponin elevation    CPK level not significantly elevated      VTE Pharmacologic Prophylaxis:   Pharmacologic: Warfarin (Coumadin)  Mechanical VTE Prophylaxis in Place: Yes    Patient Centered Rounds: I have performed bedside rounds with nursing staff today  Discussions with Specialists or Other Care Team Provider: nursing    Education and Discussions with Family / Patient: patient    Time Spent for Care: 20 minutes  More than 50% of total time spent on counseling and coordination of care as described above  Current Length of Stay: 8 day(s)    Current Patient Status: Inpatient   Certification Statement: The patient will continue to require additional inpatient hospital stay due to need for monitoring of labs with restarting lasix, need for placement to STR    Discharge Plan: Planned discharge to [facility] for STR when medically stable    Code Status: Level 3 - DNAR and DNI      Subjective: The patient was seen and examined  The patient herself denies any complaints  Patient looks likely she has some mild shortness of breath  Objective:     Vitals:   Temp (24hrs), Av 8 °F (36 6 °C), Min:97 8 °F (36 6 °C), Max:97 8 °F (36 6 °C)    Temp:  [97 8 °F (36 6 °C)] 97 8 °F (36 6 °C)  HR:  [] 103  Resp:  [16-18] 16  BP: (127-146)/(86-96) 127/86  SpO2:  [97 %] 97 %  Body mass index is 28 48 kg/m²  Input and Output Summary (last 24 hours):     No intake or output data in the 24 hours ending 21 1104    Physical Exam:     Physical Exam  Vitals signs and nursing note reviewed  Constitutional:       General: She is awake  Appearance: She is obese     Cardiovascular:      Rate and Rhythm: Normal rate and regular rhythm  Pulmonary:      Effort: Tachypnea (mild) present  Breath sounds: Rales (fine rales at bases) present  Abdominal:      General: Bowel sounds are normal       Palpations: Abdomen is soft  Tenderness: There is no abdominal tenderness  Musculoskeletal:      Right lower leg: Edema (chronic per family) present  Left lower leg: Edema (chronic per family) present  Skin:     Findings: Erythema (bilateral lower extremites- chronic per family  ) present  Neurological:      General: No focal deficit present  Mental Status: She is alert  Psychiatric:         Attention and Perception: Attention normal          Mood and Affect: Mood normal          Speech: Speech normal          Behavior: Behavior is cooperative  Additional Data:     Labs:    Results from last 7 days   Lab Units 05/23/21  0430   WBC Thousand/uL 8 29   HEMOGLOBIN g/dL 11 4*   HEMATOCRIT % 35 3   PLATELETS Thousands/uL 158   NEUTROS PCT % 69   LYMPHS PCT % 17   MONOS PCT % 10   EOS PCT % 2     Results from last 7 days   Lab Units 05/23/21  0430  05/17/21  0438   SODIUM mmol/L 138   < > 138   POTASSIUM mmol/L 4 4   < > 3 2*   CHLORIDE mmol/L 100   < > 101   CO2 mmol/L 23   < > 28   BUN mg/dL 69*   < > 71*   CREATININE mg/dL 1 88*   < > 2 46*   ANION GAP mmol/L 15*   < > 9   CALCIUM mg/dL 9 6   < > 8 1*   ALBUMIN g/dL  --   --  2 2*   TOTAL BILIRUBIN mg/dL  --   --  0 99   ALK PHOS U/L  --   --  91   ALT U/L  --   --  14   AST U/L  --   --  21   GLUCOSE RANDOM mg/dL 90   < > 86    < > = values in this interval not displayed  Results from last 7 days   Lab Units 05/23/21  0430   INR  1 81*             Results from last 7 days   Lab Units 05/18/21  0438 05/17/21  0438   PROCALCITONIN ng/ml 0 33* 0 40*           * I Have Reviewed All Lab Data Listed Above  * Additional Pertinent Lab Tests Reviewed:  All Labs Within Last 24 Hours Reviewed    Imaging:    Imaging Reports Reviewed Today Include: none  Imaging Personally Reviewed by Myself Includes:  none    Recent Cultures (last 7 days):           Last 24 Hours Medication List:   Current Facility-Administered Medications   Medication Dose Route Frequency Provider Last Rate    acetaminophen  650 mg Oral Q6H PRN De Bibles, DO      ammonium lactate   Topical BID Valerie Hutchins MD      cephalexin  250 mg Oral Q12H [address] Frank Chanlder PA-C      furosemide  80 mg Oral Daily Frank Chandler PA-C      gabapentin  100 mg Oral BID Valerie Hutchins MD      ondansetron  4 mg Intravenous Q6H PRN De Bibles, DO      rOPINIRole  2 mg Oral BID De Bibles, DO      warfarin  5 mg Oral Daily (warfarin) Valerie Hutchins MD          Today, Patient Was Seen By: Frank Chandler PA-C    ** Please Note: Dictation voice to text software may have been used in the creation of this document   **

## 2021-05-23 NOTE — ASSESSMENT & PLAN NOTE
Acute metabolic encephalopathy, present on admission, as evidenced by increased confusion, visual hallucinations per the family, likely secondary to uremia in the setting of acute kidney injury      Improved but not back to baseline per family at bedside

## 2021-05-24 ENCOUNTER — APPOINTMENT (INPATIENT)
Dept: RADIOLOGY | Facility: HOSPITAL | Age: 86
DRG: 682 | End: 2021-05-24
Payer: MEDICARE

## 2021-05-24 ENCOUNTER — APPOINTMENT (INPATIENT)
Dept: ULTRASOUND IMAGING | Facility: HOSPITAL | Age: 86
DRG: 682 | End: 2021-05-24
Payer: MEDICARE

## 2021-05-24 LAB
ANION GAP SERPL CALCULATED.3IONS-SCNC: 11 MMOL/L (ref 4–13)
ARTERIAL PATENCY WRIST A: NO
ARTERIAL PATENCY WRIST A: YES
BASE EXCESS BLDA CALC-SCNC: -1.5 MMOL/L
BASE EXCESS BLDA CALC-SCNC: 2.8 MMOL/L
BASOPHILS # BLD AUTO: 0.05 THOUSANDS/ΜL (ref 0–0.1)
BASOPHILS # BLD AUTO: 0.06 THOUSANDS/ΜL (ref 0–0.1)
BASOPHILS NFR BLD AUTO: 1 % (ref 0–1)
BASOPHILS NFR BLD AUTO: 1 % (ref 0–1)
BUN SERPL-MCNC: 70 MG/DL (ref 5–25)
CALCIUM SERPL-MCNC: 9.4 MG/DL (ref 8.3–10.1)
CHLORIDE SERPL-SCNC: 104 MMOL/L (ref 100–108)
CO2 SERPL-SCNC: 27 MMOL/L (ref 21–32)
CREAT SERPL-MCNC: 1.86 MG/DL (ref 0.6–1.3)
EOSINOPHIL # BLD AUTO: 0.06 THOUSAND/ΜL (ref 0–0.61)
EOSINOPHIL # BLD AUTO: 0.07 THOUSAND/ΜL (ref 0–0.61)
EOSINOPHIL NFR BLD AUTO: 1 % (ref 0–6)
EOSINOPHIL NFR BLD AUTO: 1 % (ref 0–6)
ERYTHROCYTE [DISTWIDTH] IN BLOOD BY AUTOMATED COUNT: 14.3 % (ref 11.6–15.1)
ERYTHROCYTE [DISTWIDTH] IN BLOOD BY AUTOMATED COUNT: 14.3 % (ref 11.6–15.1)
GFR SERPL CREATININE-BSD FRML MDRD: 24 ML/MIN/1.73SQ M
GLUCOSE SERPL-MCNC: 84 MG/DL (ref 65–140)
HCO3 BLDA-SCNC: 22.1 MMOL/L (ref 22–28)
HCO3 BLDA-SCNC: 26.6 MMOL/L (ref 22–28)
HCT VFR BLD AUTO: 32.9 % (ref 34.8–46.1)
HCT VFR BLD AUTO: 35.8 % (ref 34.8–46.1)
HGB BLD-MCNC: 10.4 G/DL (ref 11.5–15.4)
HGB BLD-MCNC: 11.1 G/DL (ref 11.5–15.4)
IMM GRANULOCYTES # BLD AUTO: 0.03 THOUSAND/UL (ref 0–0.2)
IMM GRANULOCYTES # BLD AUTO: 0.03 THOUSAND/UL (ref 0–0.2)
IMM GRANULOCYTES NFR BLD AUTO: 0 % (ref 0–2)
IMM GRANULOCYTES NFR BLD AUTO: 0 % (ref 0–2)
IPAP: 10
IRON SATN MFR SERPL: 10 %
IRON SERPL-MCNC: 33 UG/DL (ref 50–170)
LYMPHOCYTES # BLD AUTO: 1.09 THOUSANDS/ΜL (ref 0.6–4.47)
LYMPHOCYTES # BLD AUTO: 1.3 THOUSANDS/ΜL (ref 0.6–4.47)
LYMPHOCYTES NFR BLD AUTO: 15 % (ref 14–44)
LYMPHOCYTES NFR BLD AUTO: 19 % (ref 14–44)
MCH RBC QN AUTO: 30.1 PG (ref 26.8–34.3)
MCH RBC QN AUTO: 30.5 PG (ref 26.8–34.3)
MCHC RBC AUTO-ENTMCNC: 31 G/DL (ref 31.4–37.4)
MCHC RBC AUTO-ENTMCNC: 31.6 G/DL (ref 31.4–37.4)
MCV RBC AUTO: 95 FL (ref 82–98)
MCV RBC AUTO: 98 FL (ref 82–98)
MONOCYTES # BLD AUTO: 0.79 THOUSAND/ΜL (ref 0.17–1.22)
MONOCYTES # BLD AUTO: 0.8 THOUSAND/ΜL (ref 0.17–1.22)
MONOCYTES NFR BLD AUTO: 11 % (ref 4–12)
MONOCYTES NFR BLD AUTO: 11 % (ref 4–12)
NASAL CANNULA: 3
NEUTROPHILS # BLD AUTO: 4.66 THOUSANDS/ΜL (ref 1.85–7.62)
NEUTROPHILS # BLD AUTO: 5.31 THOUSANDS/ΜL (ref 1.85–7.62)
NEUTS SEG NFR BLD AUTO: 68 % (ref 43–75)
NEUTS SEG NFR BLD AUTO: 72 % (ref 43–75)
NON VENT- BIPAP: ABNORMAL
NRBC BLD AUTO-RTO: 0 /100 WBCS
NRBC BLD AUTO-RTO: 0 /100 WBCS
O2 CT BLDA-SCNC: 14.7 ML/DL (ref 16–23)
O2 CT BLDA-SCNC: 16 ML/DL (ref 16–23)
OXYHGB MFR BLDA: 98.3 % (ref 94–97)
OXYHGB MFR BLDA: 98.5 % (ref 94–97)
PCO2 BLDA: 33.1 MM HG (ref 36–44)
PCO2 BLDA: 38.1 MM HG (ref 36–44)
PEEP MAX SETTING VENT: 5 CM[H2O]
PH BLDA: 7.44 [PH] (ref 7.35–7.45)
PH BLDA: 7.46 [PH] (ref 7.35–7.45)
PLATELET # BLD AUTO: 164 THOUSANDS/UL (ref 149–390)
PLATELET # BLD AUTO: 167 THOUSANDS/UL (ref 149–390)
PMV BLD AUTO: 11 FL (ref 8.9–12.7)
PMV BLD AUTO: 11.2 FL (ref 8.9–12.7)
PO2 BLDA: 135.6 MM HG (ref 75–129)
PO2 BLDA: 158.2 MM HG (ref 75–129)
POTASSIUM SERPL-SCNC: 4.6 MMOL/L (ref 3.5–5.3)
PROCALCITONIN SERPL-MCNC: 0.3 NG/ML
RBC # BLD AUTO: 3.45 MILLION/UL (ref 3.81–5.12)
RBC # BLD AUTO: 3.64 MILLION/UL (ref 3.81–5.12)
SODIUM SERPL-SCNC: 142 MMOL/L (ref 136–145)
SPECIMEN SOURCE: ABNORMAL
SPECIMEN SOURCE: ABNORMAL
TIBC SERPL-MCNC: 336 UG/DL (ref 250–450)
TROPONIN I SERPL-MCNC: 0.05 NG/ML
TROPONIN I SERPL-MCNC: 0.06 NG/ML
VENT BIPAP FIO2: 45 %
WBC # BLD AUTO: 6.9 THOUSAND/UL (ref 4.31–10.16)
WBC # BLD AUTO: 7.35 THOUSAND/UL (ref 4.31–10.16)

## 2021-05-24 PROCEDURE — 93005 ELECTROCARDIOGRAM TRACING: CPT

## 2021-05-24 PROCEDURE — 94760 N-INVAS EAR/PLS OXIMETRY 1: CPT

## 2021-05-24 PROCEDURE — 36600 WITHDRAWAL OF ARTERIAL BLOOD: CPT

## 2021-05-24 PROCEDURE — 82805 BLOOD GASES W/O2 SATURATION: CPT | Performed by: PHYSICIAN ASSISTANT

## 2021-05-24 PROCEDURE — 82948 REAGENT STRIP/BLOOD GLUCOSE: CPT

## 2021-05-24 PROCEDURE — 94664 DEMO&/EVAL PT USE INHALER: CPT

## 2021-05-24 PROCEDURE — 71045 X-RAY EXAM CHEST 1 VIEW: CPT

## 2021-05-24 PROCEDURE — 94002 VENT MGMT INPAT INIT DAY: CPT

## 2021-05-24 PROCEDURE — 99232 SBSQ HOSP IP/OBS MODERATE 35: CPT | Performed by: PHYSICIAN ASSISTANT

## 2021-05-24 PROCEDURE — 76770 US EXAM ABDO BACK WALL COMP: CPT

## 2021-05-24 PROCEDURE — 99223 1ST HOSP IP/OBS HIGH 75: CPT | Performed by: INTERNAL MEDICINE

## 2021-05-24 PROCEDURE — 82272 OCCULT BLD FECES 1-3 TESTS: CPT | Performed by: PHYSICIAN ASSISTANT

## 2021-05-24 PROCEDURE — 80048 BASIC METABOLIC PNL TOTAL CA: CPT | Performed by: PHYSICIAN ASSISTANT

## 2021-05-24 PROCEDURE — 84145 PROCALCITONIN (PCT): CPT | Performed by: PHYSICIAN ASSISTANT

## 2021-05-24 PROCEDURE — 83550 IRON BINDING TEST: CPT | Performed by: INTERNAL MEDICINE

## 2021-05-24 PROCEDURE — 85025 COMPLETE CBC W/AUTO DIFF WBC: CPT | Performed by: PHYSICIAN ASSISTANT

## 2021-05-24 PROCEDURE — 84484 ASSAY OF TROPONIN QUANT: CPT | Performed by: PHYSICIAN ASSISTANT

## 2021-05-24 PROCEDURE — 83540 ASSAY OF IRON: CPT | Performed by: INTERNAL MEDICINE

## 2021-05-24 RX ORDER — ALBUTEROL SULFATE 2.5 MG/3ML
5 SOLUTION RESPIRATORY (INHALATION) ONCE
Status: DISCONTINUED | OUTPATIENT
Start: 2021-05-24 | End: 2021-05-24

## 2021-05-24 RX ORDER — NYSTATIN 100000 [USP'U]/G
POWDER TOPICAL 2 TIMES DAILY
Status: DISCONTINUED | OUTPATIENT
Start: 2021-05-24 | End: 2021-05-26 | Stop reason: HOSPADM

## 2021-05-24 RX ORDER — CEFTRIAXONE 1 G/50ML
1000 INJECTION, SOLUTION INTRAVENOUS EVERY 24 HOURS
Status: DISCONTINUED | OUTPATIENT
Start: 2021-05-24 | End: 2021-05-26 | Stop reason: HOSPADM

## 2021-05-24 RX ORDER — FUROSEMIDE 10 MG/ML
40 INJECTION INTRAMUSCULAR; INTRAVENOUS ONCE
Status: COMPLETED | OUTPATIENT
Start: 2021-05-24 | End: 2021-05-24

## 2021-05-24 RX ORDER — ALBUTEROL SULFATE 2.5 MG/3ML
2.5 SOLUTION RESPIRATORY (INHALATION) ONCE
Status: COMPLETED | OUTPATIENT
Start: 2021-05-24 | End: 2021-05-24

## 2021-05-24 RX ADMIN — GABAPENTIN 100 MG: 100 CAPSULE ORAL at 18:09

## 2021-05-24 RX ADMIN — CEFTRIAXONE 1000 MG: 1 INJECTION, SOLUTION INTRAVENOUS at 15:33

## 2021-05-24 RX ADMIN — Medication: at 18:09

## 2021-05-24 RX ADMIN — ROPINIROLE HYDROCHLORIDE 2 MG: 1 TABLET, FILM COATED ORAL at 09:24

## 2021-05-24 RX ADMIN — GABAPENTIN 100 MG: 100 CAPSULE ORAL at 09:23

## 2021-05-24 RX ADMIN — ACETAMINOPHEN 650 MG: 325 TABLET, FILM COATED ORAL at 18:13

## 2021-05-24 RX ADMIN — NYSTATIN: 100000 POWDER TOPICAL at 18:13

## 2021-05-24 RX ADMIN — Medication: at 09:24

## 2021-05-24 RX ADMIN — FUROSEMIDE 80 MG: 80 TABLET ORAL at 09:23

## 2021-05-24 RX ADMIN — CEPHALEXIN 250 MG: 250 CAPSULE ORAL at 09:23

## 2021-05-24 RX ADMIN — WARFARIN SODIUM 5 MG: 5 TABLET ORAL at 18:09

## 2021-05-24 RX ADMIN — ALBUTEROL SULFATE 2.5 MG: 2.5 SOLUTION RESPIRATORY (INHALATION) at 18:40

## 2021-05-24 RX ADMIN — FUROSEMIDE 40 MG: 10 INJECTION, SOLUTION INTRAMUSCULAR; INTRAVENOUS at 18:32

## 2021-05-24 NOTE — PROGRESS NOTES
[facility] [address] [place]  Progress Note - Gilbert Mendoza 7/3/1931, 80 y o  female MRN: [id number]  Unit/Bed#: 421-01 Encounter: [id number]  Primary Care Provider: Sunita Gallo DO   Date and time admitted to hospital: 5/15/2021  9:38 AM    Acute kidney injury superimposed on chronic kidney disease [facility])  Assessment & Plan  Lab Results   Component Value Date    EGFR 24 05/24/2021    EGFR 23 05/23/2021    EGFR 25 05/22/2021    CREATININE 1 86 (H) 05/24/2021    CREATININE 1 88 (H) 05/23/2021    CREATININE 1 80 (H) 05/22/2021     Received IV fluids initially, then noted increase in creatinine, patient with mild shortness of breath, so changed to IV diuresis with some improvement in creatinine  Appreciate nephrology consultation = "Initial high creatinine was probably due to metolazone 2 5, furosemide 80 mg daily and the use of lisinopril 2 5 affecting renal auto regulation  Metolazone should be avoided"  Currently volume status is difficult to   Will obtain renal ultrasound  Bilateral lower leg cellulitis  Assessment & Plan  +3 to 4 edema bilateral lower extremities, consult wound care  With increasing warmth and low grade fever  IV Rocephin, switched to po Keflex 5/22,  Noted Keflex was dosed at lower dosing due to renal function, clinically does not appear to be improving  Still with BLE erythema and increased warmth, particularly the LLE  Will change back to IV rocephin for now  Consult podiatry  * Acute encephalopathy  Assessment & Plan  Acute metabolic encephalopathy, present on admission, as evidenced by increased confusion, visual hallucinations per the family, likely secondary to uremia in the setting of acute kidney injury, cellulitis       Improved but not back to baseline per family at bedside    Chronic combined systolic and diastolic CHF (congestive heart failure) (HCC)  Assessment & Plan  Wt Readings from Last 3 Encounters:   05/24/21 60 9 kg (134 lb 4 2 oz)   03/19/21 68 kg (150 lb)   20 65 3 kg (144 lb)       Patient with bioprosthetic aortic valve, reduced EF on recent echocardiogram   CXR  - There is now mild increased vascular prominence suspicious for mild CHF  Patient given an additional IV lasix 40mg x 1   Renal function stable will restart home dose lasix of 80 mg po daily   Hyperlipidemia  Assessment & Plan  Hold statin    Atrial fibrillation, chronic  Assessment & Plan  Rate controlled, Coumadin resumed    Anemia  Assessment & Plan  Hemoglobin stable, around 10-11    Restless leg syndrome  Assessment & Plan  Continue Requip    Elevated troponin  Assessment & Plan  Non MI troponin elevation    CPK level not significantly elevated    S/P AVR  Assessment & Plan  History of bioprosthetic aortic valve, continue outpatient follow-up with Cardiology  Recheck INR  VTE Pharmacologic Prophylaxis:    Pharmacologic: Warfarin (Coumadin)  Mechanical VTE Prophylaxis in Place: Yes    Patient Centered Rounds: I have performed bedside rounds with nursing staff today  Time Spent for Care: 30 minutes  More than 50% of total time spent on counseling and coordination of care as described above  Current Length of Stay: 9 day(s)    Current Patient Status: Inpatient   Certification Statement: The patient will continue to require additional inpatient hospital stay due to need for IV antibiotics, podiatry consult, renal ultrasound  Discharge Plan / Estimated Discharge Date: TBD      Code Status: Level 3 - DNAR and DNI      Subjective:   Patient     Objective:   Vitals:   Temp (24hrs), Av °F (36 7 °C), Min:97 8 °F (36 6 °C), Max:98 2 °F (36 8 °C)    Temp:  [97 8 °F (36 6 °C)-98 2 °F (36 8 °C)] 98 °F (36 7 °C)  HR:  [] 82  Resp:  [16-19] 16  BP: (129-144)/(78-87) 144/86  SpO2:  [97 %-100 %] 100 %  Body mass index is 28 06 kg/m²  Input and Output Summary (last 24 hours):      Intake/Output Summary (Last 24 hours) at 2021 1525  Last data filed at 5/24/2021 1324  Gross per 24 hour   Intake 360 ml   Output 200 ml   Net 160 ml       Physical Exam:   Physical Exam  Vitals signs and nursing note reviewed  Constitutional:       General: She is not in acute distress  HENT:      Head: Normocephalic  Mouth/Throat:      Mouth: Mucous membranes are moist    Cardiovascular:      Rate and Rhythm: Normal rate and regular rhythm  Pulmonary:      Effort: Pulmonary effort is normal       Breath sounds: Normal breath sounds  No wheezing  Musculoskeletal:      Right lower leg: No edema  Left lower leg: No edema  Skin:     General: Skin is warm and dry  Findings: Erythema (diffuse erythema of the BLE with superficial wounds present  Increased warmth to the LLE) present  Neurological:      General: No focal deficit present  Mental Status: She is alert  Mental status is at baseline  Psychiatric:         Mood and Affect: Mood normal            Additional Data:   Labs:  Results from last 7 days   Lab Units 05/24/21  0504   WBC Thousand/uL 7 35   HEMOGLOBIN g/dL 10 4*   HEMATOCRIT % 32 9*   PLATELETS Thousands/uL 167   NEUTROS PCT % 72   LYMPHS PCT % 15   MONOS PCT % 11   EOS PCT % 1     Results from last 7 days   Lab Units 05/24/21  0504   POTASSIUM mmol/L 4 6   CHLORIDE mmol/L 104   CO2 mmol/L 27   BUN mg/dL 70*   CREATININE mg/dL 1 86*   CALCIUM mg/dL 9 4     Results from last 7 days   Lab Units 05/23/21  0430   INR  1 81*         * I Have Reviewed All Lab Data Listed Above  * Additional Pertinent Lab Tests Reviewed: All Labs Within Last 24 Hours Reviewed      Imaging:  Imaging Reports Reviewed Today Include: no new imaging to review          Recent Cultures (last 7 days):         Last 24 Hours Medication List:   Current Facility-Administered Medications   Medication Dose Route Frequency Provider Last Rate    acetaminophen  650 mg Oral Q6H PRN Deandre Dus, DO      ammonium lactate   Topical BID Jannie Armas MD      cefTRIAXone  1,000 mg Intravenous Q24H Christiana Langston PA-C      furosemide  80 mg Oral Daily Simran Gabriel PA-C      gabapentin  100 mg Oral BID Melinda Alonso MD      nystatin   Topical BID Christiana Langston PA-C      ondansetron  4 mg Intravenous Q6H PRN Netta Mojica,       rOPINIRole  2 mg Oral BID Netta Mojica, DO      warfarin  5 mg Oral Daily (warfarin) Melinda Alonso MD          Today, Patient Was Seen By: Christiana Langston PA-C    ** Please Note: Dragon 360 Dictation voice to text software may have been used in the creation of this document   **

## 2021-05-24 NOTE — ASSESSMENT & PLAN NOTE
Wt Readings from Last 3 Encounters:   05/24/21 60 9 kg (134 lb 4 2 oz)   03/19/21 68 kg (150 lb)   11/20/20 65 3 kg (144 lb)       Patient with bioprosthetic aortic valve, reduced EF on recent echocardiogram   CXR 5/20 - There is now mild increased vascular prominence suspicious for mild CHF  Patient given an additional IV lasix 40mg x 1 5/21  Renal function stable will restart home dose lasix of 80 mg po daily 5/23

## 2021-05-24 NOTE — WOUND OSTOMY CARE
Progress Note - Wound   Sheeba Franklin 80 y o  female MRN: [id number]  Unit/Bed#: 790-37 Encounter: [id number]        Assessment:   Wound care weekly follow up for patient admitted with acute kidney injury with bilateral lower extremity cellulitis  Patient is in bed, she responds to questions and follows commands to turn, but she has kept her eyes closed during the assessment  Ace wraps to b/l lower extremities removed and Allevyn heel foam dressings  Skin to b/l lower extremities with erythema, very moist skin with slight macerated areas to the intact scabbed areas, scaly skin resolving with applications of Lac-Hydrin BID as ordered by provider  Patient is not incontinent of stool or urine, she is an assist x1 to the bedside commode  Findings  1  Bilateral heels intact, blanching areas of erythema  2  Blanchable erythema to the right inner buttock and gluteal cleft  3  Left breast fold and left groin fungal areas, satellite lesions noted beneath the left breast, nystatin powder order requested  4  Bilateral lower extremity erythema, no open wound beds, scaly areas macerated, dried scabbed areas noted bilaterally, no drainage  5  Bilateral upper extremities with large ecchymotic areas  6  Bilateral elbows intact  7  POA-2nd toe left plantar surface resolved DTI, pale brown discoloration to tip of toe, calloused periwound    Plan:   Spoke with Rojelio Ventura regarding fungal areas beneath the left breast and left groin  Discussed lower extremity cellulitis warmth and swelling, requested podiatry, she agreed with plan, consult placed  1  Apply skin nourishing cream to the skin daily  2  Ehob offloading cushion to chair when OOB  3  Elevate heels off of bed and recliner with pillows to offload  4  Wash lower legs daily with soap and water, pat dry, apply Lac-hydrin to bilateral lower legs BID and PRN as ordered by provider  5  Turn and reposition patient Q2 hours  6   Allevyn life heel foams to bilateral heels, and sacral Allevyn to upper buttocks/sacral area, darcy with P, date, and peel back daily for skin assessment, change every 3 days or with soilage or dislodgement  Wound 05/17/21 Cellulitis Pretibial Left (Active)   Wound Image   05/24/21 1440   Wound Description Beefy red;Brown 05/24/21 1440   Josi-wound Assessment Erythema; Maceration;Scaly 05/24/21 1440   Wound Site Closure Unable to assess 05/23/21 1935   Drainage Amount None 05/24/21 1440   Drainage Description TUYET 05/23/21 1935   Treatments Elevated 05/24/21 1440   Dressing Open to air 05/24/21 1440   Patient Tolerance Tolerated well 05/24/21 1440   Dressing Status Clean;Dry; Intact 05/24/21 0730       Wound 05/17/21 Cellulitis Pretibial Right (Active)   Wound Image   05/24/21 1441   Wound Description Beefy red;Brown 05/24/21 1440   Josi-wound Assessment Erythema; Maceration;Scaly 05/24/21 1440   Drainage Amount None 05/24/21 1440   Drainage Description TUYET 05/23/21 1935   Treatments Elevated 05/24/21 1440   Dressing Open to air 05/24/21 1440   Patient Tolerance Tolerated well 05/24/21 1440   Dressing Status Clean;Dry; Intact 05/24/21 0730       Wound 05/17/21 Pressure Injury Toe (Comment  which one) Left (Active)   Wound Image   05/24/21 1429   Wound Description Dry;Brown;Pale 05/24/21 1429   Pressure Injury Stage DTPI 05/24/21 1429   Josi-wound Assessment Dry;Callus 05/24/21 1429   Wound Length (cm) 0 5 cm 05/24/21 1429   Wound Width (cm) 0 5 cm 05/24/21 1429   Wound Depth (cm) 0 cm 05/24/21 1429   Wound Surface Area (cm^2) 0 25 cm^2 05/24/21 1429   Wound Volume (cm^3) 0 cm^3 05/24/21 1429   Calculated Wound Volume (cm^3) 0 cm^3 05/24/21 1429   Drainage Amount None 05/24/21 1429   Treatments Cleansed;Site care 05/18/21 0829   Dressing Open to air 05/24/21 1429   Patient Tolerance Tolerated well 05/24/21 7237   Dressing Status Clean;Dry; Intact 05/24/21 0730     Reviewed plan of care with primary RN Jayden  Recommendations written as orders  Wound care team to follow weekly while admitted  Questions or concerns Marichuy Tiwari BSN, RN, [facility]

## 2021-05-24 NOTE — CASE MANAGEMENT
As per physician pt is ready for discharge in the am  Pt will be going to the [facility], I notified the [facility] of same   [facility] [facility] will pick the patient up at 3pm

## 2021-05-24 NOTE — ASSESSMENT & PLAN NOTE
Acute metabolic encephalopathy, present on admission, as evidenced by increased confusion, visual hallucinations per the family, likely secondary to uremia in the setting of acute kidney injury, cellulitis  Due to minimal improvement overall, will obtain CT head

## 2021-05-24 NOTE — ASSESSMENT & PLAN NOTE
+3 to 4 edema bilateral lower extremities, consult wound care  With increasing warmth and low grade fever  IV Rocephin, switched to po Keflex 5/22,  Noted Keflex was dosed at lower dosing due to renal function, clinically does not appear to be improving  Still with BLE erythema and increased warmth, particularly the LLE  Will change back to IV rocephin for now  Consult podiatry

## 2021-05-24 NOTE — CONSULTS
Consultation - Nephrology   Gilbert Mendoza 80 y o  female MRN: [id number]  Unit/Bed#: 698-84 Encounter: [id number]      A/P:  1  Acute kidney injury present on admission  Admitted with a creatinine of 3 49 mg/dL  Treated with IV fluids due to evidence for volume depletion  Creatinine has trended down daily to 1 6 on May 21st   Creatinine alan with institution of diuretic therapy to 1 86 mg/dl  It appears that her baseline is 1 6-1 8 mg/dl and she is very diuretic and volume sensitive  She has pulmonary hypertension contributing to lower extremity edema and diuretic sensitivity  Initial high creatinine was probably due to metolazone 2 5, furosemide 80 mg daily and the use of lisinopril 2 5 affecting renal auto regulation  Metolazone should be avoided  Will check a kidney ultrasound and repeat urine electrolytes to try to evaluate her volume status more fully  She will need weight based diuretic management  Avoid nephrotoxins  Avoid ACE/ARB    2  Chronic kidney disease stage 3  As noted baseline creatinine appears to be 1 6-1 8 mg/dL  Due to underlying vascular disease/age-related nephron loss    3  Bilateral lower extremity cellulitis  Receiving wound care    4  Anemia  Check iron saturation and an SPEP           Thank you for allowing us to participate in the care of your patient  Please feel free to contact us regarding the care of this patient, or any other questions/concerns that may be applicable      Patient Active Problem List   Diagnosis    S/P AVR    Bilateral lower leg cellulitis    Elevated troponin    Restless leg syndrome    Anemia    Acute encephalopathy    Acute kidney injury superimposed on chronic kidney disease (HCC)    Atrial fibrillation, chronic    Right foot drop    Left shoulder pain    Hyperlipidemia    Insomnia    Ambulatory dysfunction    Chronic combined systolic and diastolic CHF (congestive heart failure) ([facility] [address]    Atherosclerosis of native artery of both lower extremities with intermittent claudication (HCC)    PRASHANT (acute kidney injury) ([facility] [address] )       History of Present Illness   Physician Requesting Consult: Tanja Brown MD  Reason for Consult / Principal Problem: acute kidney injury  Hx and PE limited by:   HPI: Zonia Recinos is a 80y o  year old female who presents with a creatinine of 1 8 mg/dl   She was admitted 5/15 with evidence of volume depletion with decreased skin turgor and dry mucus membranes  Dual diuretics (furosemide and metolazone) and lisinopril were held  She did have lower extremity edema and has severe pulmonary hypertension  She has a mildly reduced LV EF of 40-45% and has a bioprosthetic aortic valve  The following day 5/16 she was treated with LE elevation and NS at 50 ml/hr due to a FeUrea of 29 7%  With fluid treatment her creatinine dropped from 3 49 mg/dl to 2 45 mg/dl  On 5/18 she was treated with albumin  On 5/20 IVF were held and furosemide 20mg IV was given  On 5/21 Lasix 40 IV was given and home furosemide 80mg po was started  I/O since admission was -2400 ml  Today her creatinine is 1 86 mg/dl  Historically she had a creatinine of 1 61 in 2/28/21 and has ranged from 1/6-1/7 mg/dl as a baseline  A cT this admission demonstrated normal kidneys          History obtained from chart review and unobtainable from patient due to mental status    Review of Systems - Negative except as mentioned above in HPI, more specifics as mentioned below  Review of Systems - Negative for chest pain, dyspnea, abdominal pain, NVD or dysuria   She knows she is in the hospital and looks comfortable lying flat    Historical Information   Past Medical History:   Diagnosis Date    A-fib [facility])     Aortic insufficiency     Arthritis     L shoulder,fingers    Cardiac disease     valve replacement    Carpal tunnel syndrome, bilateral     Chronic anemia     Chronic diastolic congestive heart failure (HCC) 3/19/2021    Chronic lower back pain     DDD (degenerative disc disease), lumbar     Dropfoot     right    Edema     History of echocardiogram 04/20/2016    EF 75%, Hyperdynamic LVSF  Mild concentric LVH  Normal functioning bioprosthetic AV  Trace MR  Mild pulm htn   HTN (hypertension)     Hyperlipidemia     Hypertension     Inguinal hernia, left     Insomnia     Lymphedema     Parkinson disease (HCC)     Peripheral neuropathy     Phlebitis     Psoriasis     Renal insufficiency     Grade 1    Restless leg syndrome     Rhabdomyolysis 8/23/2016    Right bundle branch block     Senile keratosis     Spinal stenosis     Stasis ulcer of right lower extremity ([address] )     Stroke (cerebrum) ([facility] [address] ) 6/29/2018    Stroke ([facility] [address] )     Supraventricular tachycardia ([facility] [address] )     Vitamin D deficiency      Past Surgical History:   Procedure Laterality Date    AORTIC VALVE REPLACEMENT  11/25/2002    Tissue AVR #21    AORTIC VALVULOPLASTY  11/25/2002    Bovine Pericardial heart valve    BACK SURGERY      CARDIAC CATHETERIZATION  11/22/2002    Sever critical Aortic stenosis  Pulm Htn  Normal coronary arteries      HAND SURGERY      right hand    HYSTERECTOMY      ROTATOR CUFF REPAIR Right     VASCULAR SURGERY      bilateral vein ligation & stripping     Social History   Social History     Substance and Sexual Activity   Alcohol Use Never    Frequency: Never     Social History     Substance and Sexual Activity   Drug Use No     Social History     Tobacco Use   Smoking Status Never Smoker   Smokeless Tobacco Never Used     Family History   Problem Relation Age of Onset    No Known Problems Mother     No Known Problems Father     No Known Problems Sister     No Known Problems Brother     Hyperlipidemia Son     Hypertension Son     No Known Problems Sister     No Known Problems Brother     Hyperlipidemia Son     Hypertension Son     Hyperlipidemia Son     Hypertension Son        Meds/Allergies   all current active meds have been reviewed, current meds:   Current Facility-Administered Medications   Medication Dose Route Frequency    acetaminophen (TYLENOL) tablet 650 mg  650 mg Oral Q6H PRN    ammonium lactate (LAC-HYDRIN) 12 % lotion   Topical BID    cephalexin (KEFLEX) capsule 250 mg  250 mg Oral Q12H [address]    furosemide (LASIX) tablet 80 mg  80 mg Oral Daily    gabapentin (NEURONTIN) capsule 100 mg  100 mg Oral BID    ondansetron (ZOFRAN) injection 4 mg  4 mg Intravenous Q6H PRN    rOPINIRole (REQUIP) tablet 2 mg  2 mg Oral BID    warfarin (COUMADIN) tablet 5 mg  5 mg Oral Daily (warfarin)    and PTA meds:    Medications Prior to Admission   Medication    atorvastatin (LIPITOR) 40 mg tablet    furosemide (LASIX) 80 mg tablet    gabapentin (NEURONTIN) 100 mg capsule    metolazone (ZAROXOLYN) 2 5 mg tablet    potassium chloride (K-DUR,KLOR-CON) 20 mEq tablet    rOPINIRole (REQUIP) 2 mg tablet    traMADol (ULTRAM) 50 mg tablet    warfarin (COUMADIN) 5 mg tablet    acetaminophen (TYLENOL) 500 mg tablet    diclofenac sodium (VOLTAREN) 1 %    Ergocalciferol (VITAMIN D2 PO)    lisinopril (ZESTRIL) 2 5 mg tablet    multivitamin (THERAGRAN) TABS    Turmeric 500 MG CAPS         Allergies   Allergen Reactions    Baclofen      Patient developed acute encephalopathy on dosing of 10 mg 3 times a day, patient could possibly be tolerant of smaller doses, would advise cautious use in the future or no use at all       Objective     Intake/Output Summary (Last 24 hours) at 5/24/2021 1410  Last data filed at 5/24/2021 1324  Gross per 24 hour   Intake 360 ml   Output 200 ml   Net 160 ml       Invasive Devices:        Physical Exam      I/O last 3 completed shifts:   In: 360 [P O :360]  Out: 300 [Urine:300]    Vitals:    05/24/21 0709   BP: 138/78   Pulse: (!) 111   Resp: 16   Temp: 98 2 °F (36 8 °C)   SpO2: 97%       Gen: frail able to converse  HEENT: no sclerous icterus, MMM, neck supple  CV: +S1/S2, irregular with a systolic murmur  Lungs: CTA bilaterally  Abd: +BS, soft NT/ND  Ext: all four extremities are warm  Legs are wrapped with 2+ edema   Skin: no rashes noted  Neuro: CN II-XII conversant andmoving arms and legs    Current Weight: Weight - Scale: 60 9 kg (134 lb 4 2 oz)  First Weight: Weight - Scale: 65 8 kg (145 lb)    Lab Results:  I have personally reviewed pertinent labs  CBC:   Lab Results   Component Value Date    WBC 7 35 05/24/2021    HGB 10 4 (L) 05/24/2021    HCT 32 9 (L) 05/24/2021    MCV 95 05/24/2021     05/24/2021    MCH 30 1 05/24/2021    MCHC 31 6 05/24/2021    RDW 14 3 05/24/2021    MPV 11 2 05/24/2021    NRBC 0 05/24/2021     CMP:   Lab Results   Component Value Date    K 4 6 05/24/2021     05/24/2021    CO2 27 05/24/2021    BUN 70 (H) 05/24/2021    CREATININE 1 86 (H) 05/24/2021    CALCIUM 9 4 05/24/2021    EGFR 24 05/24/2021     Phosphorus: No results found for: PHOS  Magnesium: No results found for: MG  Urinalysis: No results found for: COLORU, CLARITYU, SPECGRAV, PHUR, LEUKOCYTESUR, NITRITE, PROTEINUA, GLUCOSEU, KETONESU, BILIRUBINUR, BLOODU  Ionized Calcium: No results found for: CAION  Coagulation: No results found for: PT, INR, APTT  Troponin: No results found for: TROPONINI  ABG: No results found for: PHART, BMY7YCA, PO2ART, HAX9KHF, E3ZKSJRD, BEART, SOURCE    Results from last 7 days   Lab Units 05/24/21  0504 05/23/21  0430 05/22/21  0450   POTASSIUM mmol/L 4 6 4 4 3 4*   CHLORIDE mmol/L 104 100 100   CO2 mmol/L 27 23 25   BUN mg/dL 70* 69* 60*   CREATININE mg/dL 1 86* 1 88* 1 80*   CALCIUM mg/dL 9 4 9 6 9 2       Radiology review:  No results found  EKG, Pathology, and Other Studies:  Have reviewed historic labs, current labs, radiographic studies and all nodes      Counseling / Coordination of Care  Total ADDITIONAL floor / unit time spent today 40 minutes  Greater than 50% of total time was spent with the patient and / or family counseling and / or coordination of care   A description of the counseling / coordination of care: follows    Tana Moffett MD      This consultation note was produced in part using a dictation device which may document imprecise wording from author's original intent

## 2021-05-24 NOTE — RESPIRATORY THERAPY NOTE
1835 called to do resp tx due to pt having increased WOB and  Audible wheezing, pulse ox not reading   Placed tx on and noticed pt fingers cyanotic, increased oxygen and obtained bipap order placed pt on 10/5,14,45% pulse ox 100%

## 2021-05-24 NOTE — PLAN OF CARE
Problem: Potential for Falls  Goal: Patient will remain free of falls  Description: INTERVENTIONS:  - Assess patient frequently for physical needs  -  Identify cognitive and physical deficits and behaviors that affect risk of falls    -  [place] fall precautions as indicated by assessment   - Educate patient/family on patient safety including physical limitations  - Instruct patient to call for assistance with activity based on assessment  - Modify environment to reduce risk of injury  - Consider OT/PT consult to assist with strengthening/mobility  Outcome: Progressing     Problem: Prexisting or High Potential for Compromised Skin Integrity  Goal: Skin integrity is maintained or improved  Description: INTERVENTIONS:  - Identify patients at risk for skin breakdown  - Assess and monitor skin integrity  - Assess and monitor nutrition and hydration status  - Monitor labs   - Assess for incontinence   - Turn and reposition patient  - Assist with mobility/ambulation  - Relieve pressure over bony prominences  - Avoid friction and shearing  - Provide appropriate hygiene as needed including keeping skin clean and dry  - Evaluate need for skin moisturizer/barrier cream  - Collaborate with interdisciplinary team   - Patient/family teaching  - Consider wound care consult   Outcome: Progressing     Problem: PAIN - ADULT  Goal: Verbalizes/displays adequate comfort level or baseline comfort level  Description: Interventions:  - Encourage patient to monitor pain and request assistance  - Assess pain using appropriate pain scale  - Administer analgesics based on type and severity of pain and evaluate response  - Implement non-pharmacological measures as appropriate and evaluate response  - Consider cultural and social influences on pain and pain management  - Notify physician/advanced practitioner if interventions unsuccessful or patient reports new pain  Outcome: Progressing     Problem: INFECTION - ADULT  Goal: Absence or prevention of progression during hospitalization  Description: INTERVENTIONS:  - Assess and monitor for signs and symptoms of infection  - Monitor lab/diagnostic results  - Monitor all insertion sites, i e  indwelling lines, tubes, and drains  - Monitor endotracheal if appropriate and nasal secretions for changes in amount and color  - [place] appropriate cooling/warming therapies per order  - Administer medications as ordered  - Instruct and encourage patient and family to use good hand hygiene technique  - Identify and instruct in appropriate isolation precautions for identified infection/condition  Outcome: Progressing  Goal: Absence of fever/infection during neutropenic period  Description: INTERVENTIONS:  - Monitor WBC    Outcome: Progressing     Problem: SAFETY ADULT  Goal: Patient will remain free of falls  Description: INTERVENTIONS:  - Assess patient frequently for physical needs  -  Identify cognitive and physical deficits and behaviors that affect risk of falls    -  [place] fall precautions as indicated by assessment   - Educate patient/family on patient safety including physical limitations  - Instruct patient to call for assistance with activity based on assessment  - Modify environment to reduce risk of injury  - Consider OT/PT consult to assist with strengthening/mobility  Outcome: Progressing  Goal: Maintain or return to baseline ADL function  Description: INTERVENTIONS:  -  Assess patient's ability to carry out ADLs; assess patient's baseline for ADL function and identify physical deficits which impact ability to perform ADLs (bathing, care of mouth/teeth, toileting, grooming, dressing, etc )  - Assess/evaluate cause of self-care deficits   - Assess range of motion  - Assess patient's mobility; develop plan if impaired  - Assess patient's need for assistive devices and provide as appropriate  - Encourage maximum independence but intervene and supervise when necessary  - Involve family in performance of ADLs  - Assess for home care needs following discharge   - Consider OT consult to assist with ADL evaluation and planning for discharge  - Provide patient education as appropriate  Outcome: Progressing  Goal: Maintain or return mobility status to optimal level  Description: INTERVENTIONS:  - Assess patient's baseline mobility status (ambulation, transfers, stairs, etc )    - Identify cognitive and physical deficits and behaviors that affect mobility  - Identify mobility aids required to assist with transfers and/or ambulation (gait belt, sit-to-stand, lift, walker, cane, etc )  - [place] fall precautions as indicated by assessment  - Record patient progress and toleration of activity level on Mobility SBAR; progress patient to next Phase/Stage  - Instruct patient to call for assistance with activity based on assessment  - Consider rehabilitation consult to assist with strengthening/weightbearing, etc   Outcome: Progressing     Problem: DISCHARGE PLANNING  Goal: Discharge to home or other facility with appropriate resources  Description: INTERVENTIONS:  - Identify barriers to discharge w/patient and caregiver  - Arrange for needed discharge resources and transportation as appropriate  - Identify discharge learning needs (meds, wound care, etc )  - Arrange for interpretive services to assist at discharge as needed  - Refer to Case Management Department for coordinating discharge planning if the patient needs post-hospital services based on physician/advanced practitioner order or complex needs related to functional status, cognitive ability, or social support system  Outcome: Progressing     Problem: Knowledge Deficit  Goal: Patient/family/caregiver demonstrates understanding of disease process, treatment plan, medications, and discharge instructions  Description: Complete learning assessment and assess knowledge base    Interventions:  - Provide teaching at level of understanding  - Provide teaching via preferred learning methods  Outcome: Progressing     Problem: Nutrition/Hydration-ADULT  Goal: Nutrient/Hydration intake appropriate for improving, restoring or maintaining nutritional needs  Description: Monitor and assess patient's nutrition/hydration status for malnutrition  Collaborate with interdisciplinary team and initiate plan and interventions as ordered  Monitor patient's weight and dietary intake as ordered or per policy  Utilize nutrition screening tool and intervene as necessary  Determine patient's food preferences and provide high-protein, high-caloric foods as appropriate       INTERVENTIONS:  - Monitor oral intake, urinary output, labs, and treatment plans  - Assess nutrition and hydration status and recommend course of action  - Evaluate amount of meals eaten  - Assist patient with eating if necessary   - Allow adequate time for meals  - Recommend/ encourage appropriate diets, oral nutritional supplements, and vitamin/mineral supplements  - Order, calculate, and assess calorie counts as needed  - Recommend, monitor, and adjust tube feedings and TPN/PPN based on assessed needs  - Assess need for intravenous fluids  - Provide specific nutrition/hydration education as appropriate  - Include patient/family/caregiver in decisions related to nutrition  Outcome: Progressing     Problem: NEUROSENSORY - ADULT  Goal: Achieves stable or improved neurological status  Description: INTERVENTIONS  - Monitor and report changes in neurological status  - Monitor vital signs such as temperature, blood pressure, glucose, and any other labs ordered   - Initiate measures to prevent increased intracranial pressure  - Monitor for seizure activity and implement precautions if appropriate      Outcome: Progressing  Goal: Achieves maximal functionality and self care  Description: INTERVENTIONS  - Monitor swallowing and airway patency with patient fatigue and changes in neurological status  - Encourage and assist patient to increase activity and self care     - Encourage visually impaired, hearing impaired and aphasic patients to use assistive/communication devices  Outcome: Progressing     Problem: RESPIRATORY - ADULT  Goal: Achieves optimal ventilation and oxygenation  Description: INTERVENTIONS:  - Assess for changes in respiratory status  - Assess for changes in mentation and behavior  - Position to facilitate oxygenation and minimize respiratory effort  - Oxygen administered by appropriate delivery if ordered  - Initiate smoking cessation education as indicated  - Encourage broncho-pulmonary hygiene including cough, deep breathe, Incentive Spirometry  - Assess the need for suctioning and aspirate as needed  - Assess and instruct to report SOB or any respiratory difficulty  - Respiratory Therapy support as indicated  Outcome: Progressing     Problem: GASTROINTESTINAL - ADULT  Goal: Maintains adequate nutritional intake  Description: INTERVENTIONS:  - Monitor percentage of each meal consumed  - Identify factors contributing to decreased intake, treat as appropriate  - Assist with meals as needed  - Monitor I&O, weight, and lab values if indicated  - Obtain nutrition services referral as needed  Outcome: Progressing     Problem: GENITOURINARY - ADULT  Goal: Maintains or returns to baseline urinary function  Description: INTERVENTIONS:  - Assess urinary function  - Encourage oral fluids to ensure adequate hydration if ordered  - Administer IV fluids as ordered to ensure adequate hydration  - Administer ordered medications as needed  - Offer frequent toileting  - Follow urinary retention protocol if ordered  Outcome: Progressing  Goal: Absence of urinary retention  Description: INTERVENTIONS:  - Assess patients ability to void and empty bladder  - Monitor I/O  - Bladder scan as needed  - Discuss with physician/AP medications to alleviate retention as needed  - Discuss catheterization for long term situations as appropriate  Outcome: Progressing     Problem: METABOLIC, FLUID AND ELECTROLYTES - ADULT  Goal: Electrolytes maintained within normal limits  Description: INTERVENTIONS:  - Monitor labs and assess patient for signs and symptoms of electrolyte imbalances  - Administer electrolyte replacement as ordered  - Monitor response to electrolyte replacements, including repeat lab results as appropriate  - Instruct patient on fluid and nutrition as appropriate  Outcome: Progressing  Goal: Fluid balance maintained  Description: INTERVENTIONS:  - Monitor labs   - Monitor I/O and WT  - Instruct patient on fluid and nutrition as appropriate  - Assess for signs & symptoms of volume excess or deficit  Outcome: Progressing  Goal: Glucose maintained within target range  Description: INTERVENTIONS:  - Monitor Blood Glucose as ordered  - Assess for signs and symptoms of hyperglycemia and hypoglycemia  - Administer ordered medications to maintain glucose within target range  - Assess nutritional intake and initiate nutrition service referral as needed  Outcome: Progressing     Problem: SKIN/TISSUE INTEGRITY - ADULT  Goal: Skin integrity remains intact  Description: INTERVENTIONS  - Identify patients at risk for skin breakdown  - Assess and monitor skin integrity  - Assess and monitor nutrition and hydration status  - Monitor labs (i e  albumin)  - Assess for incontinence   - Turn and reposition patient  - Assist with mobility/ambulation  - Relieve pressure over bony prominences  - Avoid friction and shearing  - Provide appropriate hygiene as needed including keeping skin clean and dry  - Evaluate need for skin moisturizer/barrier cream  - Collaborate with interdisciplinary team (i e  Nutrition, Rehabilitation, etc )   - Patient/family teaching  Outcome: Progressing  Goal: Incision(s), wounds(s) or drain site(s) healing without S/S of infection  Description: INTERVENTIONS  - Assess and document risk factors for skin impairment   - Assess and document dressing, incision, wound bed, drain sites and surrounding tissue  - Consider nutrition services referral as needed  - Oral mucous membranes remain intact  - Provide patient/ family education  Outcome: Progressing  Goal: Oral mucous membranes remain intact  Description: INTERVENTIONS  - Assess oral mucosa and hygiene practices  - Implement preventative oral hygiene regimen  - Implement oral medicated treatments as ordered  - Initiate Nutrition services referral as needed  Outcome: Progressing     Problem: MUSCULOSKELETAL - ADULT  Goal: Maintain or return mobility to safest level of function  Description: INTERVENTIONS:  - Assess patient's ability to carry out ADLs; assess patient's baseline for ADL function and identify physical deficits which impact ability to perform ADLs (bathing, care of mouth/teeth, toileting, grooming, dressing, etc )  - Assess/evaluate cause of self-care deficits   - Assess range of motion  - Assess patient's mobility  - Assess patient's need for assistive devices and provide as appropriate  - Encourage maximum independence but intervene and supervise when necessary  - Involve family in performance of ADLs  - Assess for home care needs following discharge   - Consider OT consult to assist with ADL evaluation and planning for discharge  - Provide patient education as appropriate  Outcome: Progressing  Goal: Maintain proper alignment of affected body part  Description: INTERVENTIONS:  - Support, maintain and protect limb and body alignment  - Provide patient/ family with appropriate education  Outcome: Progressing

## 2021-05-24 NOTE — ASSESSMENT & PLAN NOTE
Lab Results   Component Value Date    EGFR 24 05/24/2021    EGFR 23 05/23/2021    EGFR 25 05/22/2021    CREATININE 1 86 (H) 05/24/2021    CREATININE 1 88 (H) 05/23/2021    CREATININE 1 80 (H) 05/22/2021     Received IV fluids initially, then noted increase in creatinine, patient with mild shortness of breath, so changed to IV diuresis with some improvement in creatinine  Appreciate nephrology consultation = "Initial high creatinine was probably due to metolazone 2 5, furosemide 80 mg daily and the use of lisinopril 2 5 affecting renal auto regulation  Metolazone should be avoided"  Currently volume status is difficult to   Will obtain renal ultrasound

## 2021-05-24 NOTE — QUICK NOTE
Patient with increased work of breathing, acute SOB  CXR suspicious for worsening pulmonary edema  Will give 1 time dose IV lasix, albuterol neb, Bipap  Follow up ABG ordered for 2 hours  EKG, troponin pending   Concern for potential aspiration event - will make NPO with speech eval

## 2021-05-25 ENCOUNTER — APPOINTMENT (INPATIENT)
Dept: CT IMAGING | Facility: HOSPITAL | Age: 86
DRG: 682 | End: 2021-05-25
Payer: MEDICARE

## 2021-05-25 PROBLEM — J96.01 ACUTE RESPIRATORY FAILURE WITH HYPOXIA (HCC): Status: ACTIVE | Noted: 2021-05-25

## 2021-05-25 LAB
ANION GAP SERPL CALCULATED.3IONS-SCNC: 10 MMOL/L (ref 4–13)
ATRIAL RATE: 125 BPM
BUN SERPL-MCNC: 68 MG/DL (ref 5–25)
CALCIUM SERPL-MCNC: 9.3 MG/DL (ref 8.3–10.1)
CHLORIDE SERPL-SCNC: 104 MMOL/L (ref 100–108)
CO2 SERPL-SCNC: 28 MMOL/L (ref 21–32)
CREAT SERPL-MCNC: 1.96 MG/DL (ref 0.6–1.3)
CREAT UR-MCNC: 37.6 MG/DL
GFR SERPL CREATININE-BSD FRML MDRD: 22 ML/MIN/1.73SQ M
GLUCOSE SERPL-MCNC: 137 MG/DL (ref 65–140)
GLUCOSE SERPL-MCNC: 96 MG/DL (ref 65–140)
INR PPP: 2.07 (ref 0.84–1.19)
MICROALBUMIN UR-MCNC: 39.6 MG/L (ref 0–20)
MICROALBUMIN/CREAT 24H UR: 105 MG/G CREATININE (ref 0–30)
POTASSIUM SERPL-SCNC: 4 MMOL/L (ref 3.5–5.3)
PROT UR-MCNC: 16 MG/DL
PROT/CREAT UR: 0.43 MG/G{CREAT} (ref 0–0.1)
PROTHROMBIN TIME: 22.8 SECONDS (ref 11.6–14.5)
QRS AXIS: -60 DEGREES
QRSD INTERVAL: 140 MS
QT INTERVAL: 372 MS
QTC INTERVAL: 487 MS
SODIUM SERPL-SCNC: 142 MMOL/L (ref 136–145)
T WAVE AXIS: 49 DEGREES
TROPONIN I SERPL-MCNC: 0.06 NG/ML
UUN 24H UR-MCNC: 539 MG/DL
VENTRICULAR RATE: 103 BPM

## 2021-05-25 PROCEDURE — 86334 IMMUNOFIX E-PHORESIS SERUM: CPT | Performed by: INTERNAL MEDICINE

## 2021-05-25 PROCEDURE — 85610 PROTHROMBIN TIME: CPT | Performed by: PHYSICIAN ASSISTANT

## 2021-05-25 PROCEDURE — 93010 ELECTROCARDIOGRAM REPORT: CPT | Performed by: INTERNAL MEDICINE

## 2021-05-25 PROCEDURE — 84165 PROTEIN E-PHORESIS SERUM: CPT | Performed by: INTERNAL MEDICINE

## 2021-05-25 PROCEDURE — 84156 ASSAY OF PROTEIN URINE: CPT | Performed by: INTERNAL MEDICINE

## 2021-05-25 PROCEDURE — 84165 PROTEIN E-PHORESIS SERUM: CPT | Performed by: PATHOLOGY

## 2021-05-25 PROCEDURE — 92610 EVALUATE SWALLOWING FUNCTION: CPT

## 2021-05-25 PROCEDURE — 99232 SBSQ HOSP IP/OBS MODERATE 35: CPT | Performed by: PHYSICIAN ASSISTANT

## 2021-05-25 PROCEDURE — 80048 BASIC METABOLIC PNL TOTAL CA: CPT | Performed by: INTERNAL MEDICINE

## 2021-05-25 PROCEDURE — 94760 N-INVAS EAR/PLS OXIMETRY 1: CPT

## 2021-05-25 PROCEDURE — 97110 THERAPEUTIC EXERCISES: CPT

## 2021-05-25 PROCEDURE — 70450 CT HEAD/BRAIN W/O DYE: CPT

## 2021-05-25 PROCEDURE — 99232 SBSQ HOSP IP/OBS MODERATE 35: CPT | Performed by: INTERNAL MEDICINE

## 2021-05-25 PROCEDURE — 99223 1ST HOSP IP/OBS HIGH 75: CPT | Performed by: PHYSICIAN ASSISTANT

## 2021-05-25 PROCEDURE — 94003 VENT MGMT INPAT SUBQ DAY: CPT

## 2021-05-25 PROCEDURE — 84540 ASSAY OF URINE/UREA-N: CPT | Performed by: INTERNAL MEDICINE

## 2021-05-25 PROCEDURE — 82043 UR ALBUMIN QUANTITATIVE: CPT | Performed by: INTERNAL MEDICINE

## 2021-05-25 PROCEDURE — 86334 IMMUNOFIX E-PHORESIS SERUM: CPT | Performed by: PATHOLOGY

## 2021-05-25 PROCEDURE — 82570 ASSAY OF URINE CREATININE: CPT | Performed by: INTERNAL MEDICINE

## 2021-05-25 PROCEDURE — 97530 THERAPEUTIC ACTIVITIES: CPT

## 2021-05-25 PROCEDURE — G1004 CDSM NDSC: HCPCS

## 2021-05-25 PROCEDURE — 97116 GAIT TRAINING THERAPY: CPT

## 2021-05-25 PROCEDURE — 84484 ASSAY OF TROPONIN QUANT: CPT | Performed by: PHYSICIAN ASSISTANT

## 2021-05-25 RX ADMIN — ROPINIROLE HYDROCHLORIDE 2 MG: 1 TABLET, FILM COATED ORAL at 21:27

## 2021-05-25 RX ADMIN — GABAPENTIN 100 MG: 100 CAPSULE ORAL at 17:18

## 2021-05-25 RX ADMIN — CEFTRIAXONE 1000 MG: 1 INJECTION, SOLUTION INTRAVENOUS at 17:19

## 2021-05-25 RX ADMIN — NYSTATIN: 100000 POWDER TOPICAL at 08:33

## 2021-05-25 RX ADMIN — NYSTATIN: 100000 POWDER TOPICAL at 17:19

## 2021-05-25 RX ADMIN — ROPINIROLE HYDROCHLORIDE 2 MG: 1 TABLET, FILM COATED ORAL at 08:33

## 2021-05-25 RX ADMIN — WARFARIN SODIUM 5 MG: 5 TABLET ORAL at 17:18

## 2021-05-25 RX ADMIN — Medication: at 08:33

## 2021-05-25 RX ADMIN — Medication: at 17:19

## 2021-05-25 RX ADMIN — GABAPENTIN 100 MG: 100 CAPSULE ORAL at 08:33

## 2021-05-25 RX ADMIN — FUROSEMIDE 80 MG: 80 TABLET ORAL at 08:33

## 2021-05-25 NOTE — ASSESSMENT & PLAN NOTE
In the setting of chronic CHF, pulmonary hypertension  Currently on 2L oxygen per NC, transitioned off Bipap - needed temporarily yesterday evening and overnight  Plan to continue oxygen at 2L/min per NC on discharge to STR

## 2021-05-25 NOTE — PHYSICAL THERAPY NOTE
PHYSICAL THERAPY NOTE          Patient Name: Ibeth Davidson  KVOYB'C Date: 5/25/2021 05/25/21 1038   Note Type   Note Type Treatment   Restrictions/Precautions   Weight Bearing Precautions Per Order No   Other Precautions Multiple lines; Fall Risk;O2   Cognition   Overall Cognitive Status Impaired   Arousal/Participation Alert; Cooperative   Following Commands Follows one step commands with increased time or repetition   Subjective   Subjective Agreeable to therapy  My legs are sore   Bed Mobility   Supine to Sit 4  Minimal assistance   Additional items Assist x 1;HOB elevated; Bedrails; Increased time required;Verbal cues   Transfers   Sit to Stand 4  Minimal assistance   Additional items Assist x 2;Armrests; Increased time required;Verbal cues   Stand to Sit 4  Minimal assistance   Additional items Assist x 2;Armrests; Increased time required;Verbal cues   Stand pivot 4  Minimal assistance   Additional items Assist x 2; Increased time required;Verbal cues   Ambulation/Elevation   Gait pattern Excessively slow; Short stride; Foward flexed;Decreased foot clearance; Improper Weight shift   Gait Assistance 4  Minimal assist   Additional items Assist x 1;Verbal cues   Assistive Device Rolling walker   Distance 15'   Balance   Static Sitting Fair   Dynamic Sitting Fair   Static Standing Fair -   Dynamic Standing Poor +   Ambulatory Poor +  (RW)   Endurance Deficit   Endurance Deficit Yes   Activity Tolerance   Activity Tolerance Patient limited by fatigue;Patient limited by pain   Exercises   Hip Flexion Sitting;15 reps   Hip Abduction Sitting;10 reps   Hip Adduction Sitting;10 reps   Knee AROM Long Arc Quad Sitting;15 reps   Ankle Pumps Sitting;15 reps   Assessment   Prognosis Good   Problem List Decreased strength;Decreased endurance; Impaired balance;Decreased mobility; Decreased cognition; Impaired judgement;Decreased safety awareness;Pain Assessment Pt  seen for PT treatment session this date with interventions consisting of  therapeutic exercises, bed mobility, transfers and  gait training w/ emphasis on improving pt's ability to ambulate  Pt  Currently performing  tx and ambulation at (min ) x 1 level of function  The patient's AM-PAC Basic Mobility Inpatient Short Form Raw Score is 15, Standardized Score is 36 97  A standardized score less than 42 9 suggests the patient may benefit from discharge to post-acute rehabilitation services  Please also refer to physical therapy recommendation for safe DC planning  In comparison to previous session, Pt  With improvements in activity tolerance  Limited by weakness, fatigue and LE pain  Pt is in need of continued activity in PT to improve strength balance endurance mobility transfers and ambulation with return to maximize LOF  From PT/mobility standpoint, recommendation at time of d/c would be post acute rehab  in order to promote return to PLOF and independence  Goals   LTG Expiration Date 05/31/21   Plan   Treatment/Interventions Functional transfer training;LE strengthening/ROM; Therapeutic exercise; Endurance training;Bed mobility;Gait training   Progress Slow progress, decreased activity tolerance   Recommendation   PT Discharge Recommendation Post acute rehabilitation services   AM-[facility] Basic Mobility Inpatient   Turning in Bed Without Bedrails 3   Lying on Back to Sitting on Edge of Flat Bed 2   Moving Bed to Chair 3   Standing Up From Chair 3   Walk in Room 2   Climb 3-5 Stairs 2   Basic Mobility Inpatient Raw Score 15   Basic Mobility Standardized Score 36 97   Pt  OOB in chair  with call bell within reach, all lines intact and alarm on at end of PT session  Discussed with  PT today's treatment and patient's current level of function for care coordination

## 2021-05-25 NOTE — PLAN OF CARE
Problem: PHYSICAL THERAPY ADULT  Goal: Performs mobility at highest level of function for planned discharge setting  See evaluation for individualized goals  Description: Treatment/Interventions: Functional transfer training, LE strengthening/ROM, Therapeutic exercise, Endurance training, Bed mobility, Gait training          See flowsheet documentation for full assessment, interventions and recommendations  Outcome: Progressing  Note: Prognosis: Good  Problem List: Decreased strength, Decreased endurance, Impaired balance, Decreased mobility, Decreased cognition, Impaired judgement, Decreased safety awareness, Pain  Assessment: Pt  seen for PT treatment session this date with interventions consisting of  therapeutic exercises, bed mobility, transfers and  gait training w/ emphasis on improving pt's ability to ambulate  Pt  Currently performing  tx and ambulation at (min ) x 1 level of function  The patient's AM-PAC Basic Mobility Inpatient Short Form Raw Score is 15, Standardized Score is 36 97  A standardized score less than 42 9 suggests the patient may benefit from discharge to post-acute rehabilitation services  Please also refer to physical therapy recommendation for safe DC planning  In comparison to previous session, Pt  With improvements in activity tolerance  Limited by weakness, fatigue and LE pain  Pt is in need of continued activity in PT to improve strength balance endurance mobility transfers and ambulation with return to maximize LOF  From PT/mobility standpoint, recommendation at time of d/c would be post acute rehab  in order to promote return to PLOF and independence  PT Discharge Recommendation: Post acute rehabilitation services          See flowsheet documentation for full assessment

## 2021-05-25 NOTE — PROGRESS NOTES
Progress Note - Nephrology   Lauren Syed 80 y o  female MRN: [id number]  Unit/Bed#: 537-07 Encounter: [id number]    A/P:  1  Acute kidney injury present on admission  Was admitted with a creatinine of 3 49  Mg/dLon 5/15  Initially treated with IV fluids and then switched to diuretic therapy  Creatinine has improved daily and is 1 96 mg/dl today  Nonoliguric:  540/450 (-2680 since admission)  She does not appear volume overloaded and is maintained on home po Lasix  Will need outpatient evaluation    2  Chronic combined systolic and diastolic congestive heart failure  She has reduced systolic function and a bioprosthetic aortic valve  Continue diuretic as per primary team  Salt avoidance, daily weights and mild fluid restriction would be recommended    3  Iron deficiency anemia  Iron saturation is 10%  She can not take oral iron at this time  Avoid Venofer due to lower extremity cellulitis/inflammation    4  Bilateral lower extremity cellulitis  As per primary team  Is improving daily    5  Chronic atrial fibrillation  Sounds rate controlled        Follow up reason for today's visit: Acute kidney injury POA    Acute encephalopathy    Patient Active Problem List   Diagnosis    S/P AVR    Bilateral lower leg cellulitis    Elevated troponin    Restless leg syndrome    Anemia    Acute encephalopathy    Acute kidney injury superimposed on chronic kidney disease (HCC)    Atrial fibrillation, chronic    Right foot drop    Left shoulder pain    Hyperlipidemia    Insomnia    Ambulatory dysfunction    Chronic combined systolic and diastolic CH[facility]congestive heart failure) [facility]    Atherosclerosis of native artery of both lower extremities with intermittent claudication (HCC)    PRASHANT (acute kidney injury) ([facility] [address] )    Acute respiratory failure with hypoxia (HCC)         Subjective:   A 10 point ROS was negative   She says she feels much improved    Objective:     Vitals: Blood pressure 140/85, pulse 92, temperature 98 4 °F (36 9 °C), resp  rate 20, height 4' 10" (1 473 m), weight 60 5 kg (133 lb 6 1 oz), SpO2 99 %, not currently breastfeeding  ,Body mass index is 27 88 kg/m²  Weight (last 2 days)     Date/Time   Weight    05/25/21 0600   60 5 (133 38)    05/24/21 0600   60 9 (134 26)    05/23/21 0533   61 8 (136 24)                Intake/Output Summary (Last 24 hours) at 5/25/2021 1712  Last data filed at 5/25/2021 1156  Gross per 24 hour   Intake 180 ml   Output 460 ml   Net -280 ml     I/O last 3 completed shifts: In: 5 [P O :540]  Out: 450 [Urine:450]         Physical Exam: /85   Pulse 92   Temp 98 4 °F (36 9 °C)   Resp 20   Ht 4' 10" (1 473 m)   Wt 60 5 kg (133 lb 6 1 oz)   LMP  (LMP Unknown)   SpO2 99%   BMI 27 88 kg/m²     General Appearance:    Alert, frail elderly eating her supper has systolicperative, no distress, appears stated age   Head:    Normocephalic, without obvious abnormality, atraumatic   Eyes:    Conjunctiva/corneas clear   Ears:    Normal external ears   Nose:   Nares normal, septum midline, mucosa normal, no drainage    or sinus tenderness   Throat:   Lips, mucosa, and tongue normal; teeth and gums normal   Neck:   Supple, symmetrical, trachea midline, no adenopathy;        thyroid:  No enlargement/tenderness/nodules; no carotid    bruit or JVD   Back:     Symmetric, no curvature, ROM normal, no CVA tenderness   Lungs:     Clear to auscultation bilaterally, respirations unlabored   Chest wall:    No tenderness or deformity   Heart:    Regular rate and rhythm, S1 and S2 normal, no murmur, rub   or gallop   Abdomen:     Soft, non-tender, bowel sounds active   Extremities:   Extremities normal, atraumatic, no cyanosis trace edema   Skin:   Skin color, texture, turgor normal, no rashes or lesions   Lymph nodes:   Cervical normal   Neurologic:   CNII-XII intact            Lab, Imaging and other studies: I have personally reviewed pertinent labs    CBC: No results found for: WBC, HGB, HCT, MCV, PLT, ADJUSTEDWBC, MCH, MCHC, RDW, MPV, NRBC  CMP:   Lab Results   Component Value Date    K 4 0 05/25/2021     05/25/2021    CO2 28 05/25/2021    BUN 68 (H) 05/25/2021    CREATININE 1 96 (H) 05/25/2021    CALCIUM 9 3 05/25/2021    EGFR 22 05/25/2021         Results from last 7 days   Lab Units 05/25/21  0530 05/24/21  0504 05/23/21  0430   POTASSIUM mmol/L 4 0 4 6 4 4   CHLORIDE mmol/L 104 104 100   CO2 mmol/L 28 27 23   BUN mg/dL 68* 70* 69*   CREATININE mg/dL 1 96* 1 86* 1 88*   CALCIUM mg/dL 9 3 9 4 9 6         Phosphorus: No results found for: PHOS  Magnesium: No results found for: MG  Urinalysis: No results found for: COLORU, CLARITYU, SPECGRAV, PHUR, LEUKOCYTESUR, NITRITE, PROTEINUA, GLUCOSEU, KETONESU, BILIRUBINUR, BLOODU  Ionized Calcium: No results found for: CAION  Coagulation:   Lab Results   Component Value Date    INR 2 07 (H) 05/25/2021     Troponin:   Lab Results   Component Value Date    TROPONINI 0 06 (H) 05/25/2021     ABG:   Lab Results   Component Value Date    PHART 7 443 05/24/2021    HAL2YAO 33 1 (L) 05/24/2021    PO2ART 158 2 (H) 05/24/2021    GRJ3VJD 22 1 05/24/2021    BEART -1 5 05/24/2021    SOURCE Brachial, Left 05/24/2021     Radiology review:     IMAGING  Procedure: Xr Chest Portable    Result Date: 5/25/2021  Narrative: CHEST INDICATION:   SOB  COMPARISON:  5/20/2021 EXAM PERFORMED/VIEWS:  XR CHEST PORTABLE FINDINGS:  Sternotomy wires are present, unchanged  Previous thoracic aortic valve replacement  Stable cardiomegaly  No congestive failure  No pneumothorax  Small left basilar effusion  Left basilar opacity suggestive of subsegmental atelectasis  Stable bony structures including advanced degenerative osteoarthritis of the shoulders, left more so than right with high riding humeral heads suggesting chronic rotator cuff arthropathy  Impression: Small left effusion and left basilar opacity suggestive of subsegmental atelectasis   Workstation performed: [id number]     Procedure: Ct Head Wo Contrast    Result Date: 5/25/2021  Narrative: CT BRAIN - WITHOUT CONTRAST INDICATION:   Altered mental status encephalopathy, not improved with IV abx treatment of PRASHANT  Anisha Prows COMPARISON:  3/20/2019 TECHNIQUE:  CT examination of the brain was performed  In addition to axial images, sagittal and coronal 2D reformatted images were created and submitted for interpretation  Radiation dose length product (DLP) for this visit:  793 82 mGy-cm   This examination, like all CT scans performed in the [facility], was performed utilizing techniques to minimize radiation dose exposure, including the use of iterative  reconstruction and automated exposure control  IMAGE QUALITY:  Diagnostic  FINDINGS: PARENCHYMA:  Stable left parietal encephalomalacia and gliosis suggestive of an old infarct  There is also a small old cortical and juxtacortical white matter infarct within the left frontal lobe more anteriorly  Scattered white matter change consistent with mild to moderate chronic microangiopathy  Old lacunar infarcts are seen within the right caudate head and left medial basal ganglia  No mass, mass effect or midline shift  No hemorrhage  No signs of acute territorial infarction  Stable vascular calcification  VENTRICLES AND EXTRA-AXIAL SPACES:  Normal for the patient's age  VISUALIZED ORBITS AND PARANASAL SINUSES:  Unremarkable  CALVARIUM AND EXTRACRANIAL SOFT TISSUES:  Normal      Impression: 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, left parietal lobe and old lacunar infarcts within the basal ganglia  Stable chronic microangiopathic change  Workstation performed: [id number]     Procedure: Us Kidney And Bladder    Result Date: 5/25/2021  Narrative: RENAL ULTRASOUND INDICATION:   prashant/ckd  COMPARISON: CT dated May 15, 2021   TECHNIQUE:   Ultrasound of the retroperitoneum was performed with a curvilinear transducer utilizing volumetric sweeps and still imaging techniques  FINDINGS: KIDNEYS: Relatively symmetric, slightly small in size  Right kidney:  7 6 x 4 4 x 3 6 cm  Left kidney:  7 8 x 3 6 x 3 5 cm  Right kidney Slightly increased cortical echogenicity  Limited visualization  No suspicious masses detected  No hydronephrosis  No shadowing calculi  No perinephric fluid collections  Left kidney Markedly limited visualization  No definite evidence of hydronephrosis  URETERS: Nonvisualized  BLADDER: Normally distended  No focal thickening or mass lesions demonstrated  Bilateral ureteral jets detected  Impression: Technically limited study  Evaluation of the left kidney is markedly limited  The kidneys are slightly small bilaterally  The right kidney demonstrates slightly increased cortical echogenicity without hydronephrosis  Workstation performed: [id number]       Current Facility-Administered Medications   Medication Dose Route Frequency    acetaminophen (TYLENOL) tablet 650 mg  650 mg Oral Q6H PRN    ammonium lactate (LAC-HYDRIN) 12 % lotion   Topical BID    cefTRIAXone (ROCEPHIN) IVPB (premix in dextrose) 1,000 mg 50 mL  1,000 mg Intravenous Q24H    furosemide (LASIX) tablet 80 mg  80 mg Oral Daily    gabapentin (NEURONTIN) capsule 100 mg  100 mg Oral BID    nystatin (MYCOSTATIN) powder   Topical BID    ondansetron (ZOFRAN) injection 4 mg  4 mg Intravenous Q6H PRN    rOPINIRole (REQUIP) tablet 2 mg  2 mg Oral BID    warfarin (COUMADIN) tablet 5 mg  5 mg Oral Daily (warfarin)     Medications Discontinued During This Encounter   Medication Reason    sodium chloride 0 9 % infusion     cefTRIAXone (ROCEPHIN) IVPB (premix in dextrose) 1,000 mg 50 mL     cephalexin (KEFLEX) capsule 250 mg     albuterol inhalation solution 5 mg        Eric Carr MD      This progress note was produced in part using a dictation device which may document imprecise wording from author's original intent

## 2021-05-25 NOTE — ASSESSMENT & PLAN NOTE
Wt Readings from Last 3 Encounters:   05/25/21 60 5 kg (133 lb 6 1 oz)   03/19/21 68 kg (150 lb)   11/20/20 65 3 kg (144 lb)       Patient with bioprosthetic aortic valve, reduced EF on recent echocardiogram   CXR 5/20 - There is now mild increased vascular prominence suspicious for mild CHF  Patient given an additional IV lasix 40mg x 1 5/21  Renal function stable, restarted home dose lasix of 80 mg po daily 5/23  Concern for volume overload with acute respiratory failure requiring Bipap on 5/24 - received IV lasix 40mg x 1 dose  5/25 - appears euvolemic at present, continue PO Lasix

## 2021-05-25 NOTE — ASSESSMENT & PLAN NOTE
Lab Results   Component Value Date    EGFR 22 05/25/2021    EGFR 24 05/24/2021    EGFR 23 05/23/2021    CREATININE 1 96 (H) 05/25/2021    CREATININE 1 86 (H) 05/24/2021    CREATININE 1 88 (H) 05/23/2021     Received IV fluids initially, then noted increase in creatinine, patient with mild shortness of breath, so changed to IV diuresis with some improvement in creatinine  Appreciate nephrology consultation = "Initial high creatinine was probably due to metolazone 2 5, furosemide 80 mg daily and the use of lisinopril 2 5 affecting renal auto regulation  Metolazone should be avoided"  Currently volume status is difficult to   Renal ultrasound - "Technically limited study  Evaluation of the left kidney is markedly limited  The kidneys are slightly small bilaterally    The right kidney demonstrates slightly increased cortical echogenicity without hydronephrosis "

## 2021-05-25 NOTE — PLAN OF CARE
Problem: Potential for Falls  Goal: Patient will remain free of falls  Description: INTERVENTIONS:  - Assess patient frequently for physical needs  -  Identify cognitive and physical deficits and behaviors that affect risk of falls    -  [place] fall precautions as indicated by assessment   - Educate patient/family on patient safety including physical limitations  - Instruct patient to call for assistance with activity based on assessment  - Modify environment to reduce risk of injury  - Consider OT/PT consult to assist with strengthening/mobility  Outcome: Progressing     Problem: Prexisting or High Potential for Compromised Skin Integrity  Goal: Skin integrity is maintained or improved  Description: INTERVENTIONS:  - Identify patients at risk for skin breakdown  - Assess and monitor skin integrity  - Assess and monitor nutrition and hydration status  - Monitor labs   - Assess for incontinence   - Turn and reposition patient  - Assist with mobility/ambulation  - Relieve pressure over bony prominences  - Avoid friction and shearing  - Provide appropriate hygiene as needed including keeping skin clean and dry  - Evaluate need for skin moisturizer/barrier cream  - Collaborate with interdisciplinary team   - Patient/family teaching  - Consider wound care consult   Outcome: Progressing     Problem: PAIN - ADULT  Goal: Verbalizes/displays adequate comfort level or baseline comfort level  Description: Interventions:  - Encourage patient to monitor pain and request assistance  - Assess pain using appropriate pain scale  - Administer analgesics based on type and severity of pain and evaluate response  - Implement non-pharmacological measures as appropriate and evaluate response  - Consider cultural and social influences on pain and pain management  - Notify physician/advanced practitioner if interventions unsuccessful or patient reports new pain  Outcome: Progressing     Problem: INFECTION - ADULT  Goal: Absence or prevention of progression during hospitalization  Description: INTERVENTIONS:  - Assess and monitor for signs and symptoms of infection  - Monitor lab/diagnostic results  - Monitor all insertion sites, i e  indwelling lines, tubes, and drains  - Monitor endotracheal if appropriate and nasal secretions for changes in amount and color  - [place] appropriate cooling/warming therapies per order  - Administer medications as ordered  - Instruct and encourage patient and family to use good hand hygiene technique  - Identify and instruct in appropriate isolation precautions for identified infection/condition  Outcome: Progressing  Goal: Absence of fever/infection during neutropenic period  Description: INTERVENTIONS:  - Monitor WBC    Outcome: Progressing     Problem: SAFETY ADULT  Goal: Patient will remain free of falls  Description: INTERVENTIONS:  - Assess patient frequently for physical needs  -  Identify cognitive and physical deficits and behaviors that affect risk of falls    -  [place] fall precautions as indicated by assessment   - Educate patient/family on patient safety including physical limitations  - Instruct patient to call for assistance with activity based on assessment  - Modify environment to reduce risk of injury  - Consider OT/PT consult to assist with strengthening/mobility  Outcome: Progressing  Goal: Maintain or return to baseline ADL function  Description: INTERVENTIONS:  -  Assess patient's ability to carry out ADLs; assess patient's baseline for ADL function and identify physical deficits which impact ability to perform ADLs (bathing, care of mouth/teeth, toileting, grooming, dressing, etc )  - Assess/evaluate cause of self-care deficits   - Assess range of motion  - Assess patient's mobility; develop plan if impaired  - Assess patient's need for assistive devices and provide as appropriate  - Encourage maximum independence but intervene and supervise when necessary  - Involve family in performance of ADLs  - Assess for home care needs following discharge   - Consider OT consult to assist with ADL evaluation and planning for discharge  - Provide patient education as appropriate  Outcome: Progressing  Goal: Maintain or return mobility status to optimal level  Description: INTERVENTIONS:  - Assess patient's baseline mobility status (ambulation, transfers, stairs, etc )    - Identify cognitive and physical deficits and behaviors that affect mobility  - Identify mobility aids required to assist with transfers and/or ambulation (gait belt, sit-to-stand, lift, walker, cane, etc )  - [place] fall precautions as indicated by assessment  - Record patient progress and toleration of activity level on Mobility SBAR; progress patient to next Phase/Stage  - Instruct patient to call for assistance with activity based on assessment  - Consider rehabilitation consult to assist with strengthening/weightbearing, etc   Outcome: Progressing     Problem: DISCHARGE PLANNING  Goal: Discharge to home or other facility with appropriate resources  Description: INTERVENTIONS:  - Identify barriers to discharge w/patient and caregiver  - Arrange for needed discharge resources and transportation as appropriate  - Identify discharge learning needs (meds, wound care, etc )  - Arrange for interpretive services to assist at discharge as needed  - Refer to Case Management Department for coordinating discharge planning if the patient needs post-hospital services based on physician/advanced practitioner order or complex needs related to functional status, cognitive ability, or social support system  Outcome: Progressing     Problem: Knowledge Deficit  Goal: Patient/family/caregiver demonstrates understanding of disease process, treatment plan, medications, and discharge instructions  Description: Complete learning assessment and assess knowledge base    Interventions:  - Provide teaching at level of understanding  - Provide teaching via preferred learning methods  Outcome: Progressing     Problem: Nutrition/Hydration-ADULT  Goal: Nutrient/Hydration intake appropriate for improving, restoring or maintaining nutritional needs  Description: Monitor and assess patient's nutrition/hydration status for malnutrition  Collaborate with interdisciplinary team and initiate plan and interventions as ordered  Monitor patient's weight and dietary intake as ordered or per policy  Utilize nutrition screening tool and intervene as necessary  Determine patient's food preferences and provide high-protein, high-caloric foods as appropriate       INTERVENTIONS:  - Monitor oral intake, urinary output, labs, and treatment plans  - Assess nutrition and hydration status and recommend course of action  - Evaluate amount of meals eaten  - Assist patient with eating if necessary   - Allow adequate time for meals  - Recommend/ encourage appropriate diets, oral nutritional supplements, and vitamin/mineral supplements  - Order, calculate, and assess calorie counts as needed  - Recommend, monitor, and adjust tube feedings and TPN/PPN based on assessed needs  - Assess need for intravenous fluids  - Provide specific nutrition/hydration education as appropriate  - Include patient/family/caregiver in decisions related to nutrition  Outcome: Progressing     Problem: NEUROSENSORY - ADULT  Goal: Achieves stable or improved neurological status  Description: INTERVENTIONS  - Monitor and report changes in neurological status  - Monitor vital signs such as temperature, blood pressure, glucose, and any other labs ordered   - Initiate measures to prevent increased intracranial pressure  - Monitor for seizure activity and implement precautions if appropriate      Outcome: Progressing  Goal: Achieves maximal functionality and self care  Description: INTERVENTIONS  - Monitor swallowing and airway patency with patient fatigue and changes in neurological status  - Encourage and assist patient to increase activity and self care     - Encourage visually impaired, hearing impaired and aphasic patients to use assistive/communication devices  Outcome: Progressing     Problem: RESPIRATORY - ADULT  Goal: Achieves optimal ventilation and oxygenation  Description: INTERVENTIONS:  - Assess for changes in respiratory status  - Assess for changes in mentation and behavior  - Position to facilitate oxygenation and minimize respiratory effort  - Oxygen administered by appropriate delivery if ordered  - Initiate smoking cessation education as indicated  - Encourage broncho-pulmonary hygiene including cough, deep breathe, Incentive Spirometry  - Assess the need for suctioning and aspirate as needed  - Assess and instruct to report SOB or any respiratory difficulty  - Respiratory Therapy support as indicated  Outcome: Progressing     Problem: GASTROINTESTINAL - ADULT  Goal: Maintains adequate nutritional intake  Description: INTERVENTIONS:  - Monitor percentage of each meal consumed  - Identify factors contributing to decreased intake, treat as appropriate  - Assist with meals as needed  - Monitor I&O, weight, and lab values if indicated  - Obtain nutrition services referral as needed  Outcome: Progressing     Problem: GENITOURINARY - ADULT  Goal: Maintains or returns to baseline urinary function  Description: INTERVENTIONS:  - Assess urinary function  - Encourage oral fluids to ensure adequate hydration if ordered  - Administer IV fluids as ordered to ensure adequate hydration  - Administer ordered medications as needed  - Offer frequent toileting  - Follow urinary retention protocol if ordered  Outcome: Progressing  Goal: Absence of urinary retention  Description: INTERVENTIONS:  - Assess patients ability to void and empty bladder  - Monitor I/O  - Bladder scan as needed  - Discuss with physician/AP medications to alleviate retention as needed  - Discuss catheterization for long term situations as appropriate  Outcome: Progressing     Problem: METABOLIC, FLUID AND ELECTROLYTES - ADULT  Goal: Electrolytes maintained within normal limits  Description: INTERVENTIONS:  - Monitor labs and assess patient for signs and symptoms of electrolyte imbalances  - Administer electrolyte replacement as ordered  - Monitor response to electrolyte replacements, including repeat lab results as appropriate  - Instruct patient on fluid and nutrition as appropriate  Outcome: Progressing  Goal: Fluid balance maintained  Description: INTERVENTIONS:  - Monitor labs   - Monitor I/O and WT  - Instruct patient on fluid and nutrition as appropriate  - Assess for signs & symptoms of volume excess or deficit  Outcome: Progressing  Goal: Glucose maintained within target range  Description: INTERVENTIONS:  - Monitor Blood Glucose as ordered  - Assess for signs and symptoms of hyperglycemia and hypoglycemia  - Administer ordered medications to maintain glucose within target range  - Assess nutritional intake and initiate nutrition service referral as needed  Outcome: Progressing     Problem: SKIN/TISSUE INTEGRITY - ADULT  Goal: Skin integrity remains intact  Description: INTERVENTIONS  - Identify patients at risk for skin breakdown  - Assess and monitor skin integrity  - Assess and monitor nutrition and hydration status  - Monitor labs (i e  albumin)  - Assess for incontinence   - Turn and reposition patient  - Assist with mobility/ambulation  - Relieve pressure over bony prominences  - Avoid friction and shearing  - Provide appropriate hygiene as needed including keeping skin clean and dry  - Evaluate need for skin moisturizer/barrier cream  - Collaborate with interdisciplinary team (i e  Nutrition, Rehabilitation, etc )   - Patient/family teaching  Outcome: Progressing  Goal: Incision(s), wounds(s) or drain site(s) healing without S/S of infection  Description: INTERVENTIONS  - Assess and document risk factors for skin impairment   - Assess and document dressing, incision, wound bed, drain sites and surrounding tissue  - Consider nutrition services referral as needed  - Oral mucous membranes remain intact  - Provide patient/ family education  Outcome: Progressing  Goal: Oral mucous membranes remain intact  Description: INTERVENTIONS  - Assess oral mucosa and hygiene practices  - Implement preventative oral hygiene regimen  - Implement oral medicated treatments as ordered  - Initiate Nutrition services referral as needed  Outcome: Progressing     Problem: MUSCULOSKELETAL - ADULT  Goal: Maintain or return mobility to safest level of function  Description: INTERVENTIONS:  - Assess patient's ability to carry out ADLs; assess patient's baseline for ADL function and identify physical deficits which impact ability to perform ADLs (bathing, care of mouth/teeth, toileting, grooming, dressing, etc )  - Assess/evaluate cause of self-care deficits   - Assess range of motion  - Assess patient's mobility  - Assess patient's need for assistive devices and provide as appropriate  - Encourage maximum independence but intervene and supervise when necessary  - Involve family in performance of ADLs  - Assess for home care needs following discharge   - Consider OT consult to assist with ADL evaluation and planning for discharge  - Provide patient education as appropriate  Outcome: Progressing  Goal: Maintain proper alignment of affected body part  Description: INTERVENTIONS:  - Support, maintain and protect limb and body alignment  - Provide patient/ family with appropriate education  Outcome: Progressing

## 2021-05-25 NOTE — ASSESSMENT & PLAN NOTE
+3 to 4 edema bilateral lower extremities on admission with diffuse erythema  With increasing warmth and low grade fever  Seems to be improving  IV Rocephin, switched to po Keflex 5/22,  Noted Keflex was dosed at lower dosing due to renal function, clinically does not appear to be improving  Still with BLE erythema and increased warmth, particularly the LLE  Will change back to IV rocephin for now while in the inpatient setting  Consider broadening coverage with 3rd generation cephalosporin on discharge  Consult podiatry - pending

## 2021-05-25 NOTE — CONSULTS
Consultation - [address] 80 y o  female MRN: [id number]  Unit/Bed#: 015-77 Encounter: [id number]        Assessment/Plan     Assessment:    1  Edema bilateral legs with venous stasis changes bilaterally, cellulitis appears resolved  2,  Small stable eschars bilaterally, no open/draining wounds        Plan:   Legs are stable with minimal signs of any remaining cellulitis  Only small stable eschars bilaterally, no weeping or open lesions noted, leave open to air  Instructed on leg elevation and restricting sodium in diet at home  F/U prn at [address] [facility] if any open wounds develop  History of Present Illness   HPI:  Jesse Mccoy is a 80 y o  female who presents with a eschars located to both legs  The wound has been present for several week(s)  The eschar sites were draining over a week ago  Admitted with CHF, concern of cellulitis to both legs  Consults    Review of Systems  Reviewed  noncontributory    Historical Information   Past Medical History:   Diagnosis Date    A-fib [facility])     Aortic insufficiency     Arthritis     L shoulder,fingers    Cardiac disease     valve replacement    Carpal tunnel syndrome, bilateral     Chronic anemia     Chronic diastolic congestive heart failure (HCC) 3/19/2021    Chronic lower back pain     DDD (degenerative disc disease), lumbar     Dropfoot     right    Edema     History of echocardiogram 04/20/2016    EF 75%, Hyperdynamic LVSF  Mild concentric LVH  Normal functioning bioprosthetic AV  Trace MR  Mild pulm htn       HTN (hypertension)     Hyperlipidemia     Hypertension     Inguinal hernia, left     Insomnia     Lymphedema     Parkinson disease (HCC)     Peripheral neuropathy     Phlebitis     Psoriasis     Renal insufficiency     Grade 1    Restless leg syndrome     Rhabdomyolysis 8/23/2016    Right bundle branch block     Senile keratosis     Spinal stenosis     Stasis ulcer of right lower extremity ([address] )  Stroke (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/2018    Stroke ([facility] [address] )     Supraventricular tachycardia ([facility] [address] )     Vitamin D deficiency      Past Surgical History:   Procedure Laterality Date    AORTIC VALVE REPLACEMENT  11/25/2002    Tissue AVR #21    AORTIC VALVULOPLASTY  11/25/2002    Bovine Pericardial heart valve    BACK SURGERY      CARDIAC CATHETERIZATION  11/22/2002    Sever critical Aortic stenosis  Pulm Htn  Normal coronary arteries   HAND SURGERY      right hand    HYSTERECTOMY      ROTATOR CUFF REPAIR Right     VASCULAR SURGERY      bilateral vein ligation & stripping     Social History   Social History     Substance and Sexual Activity   Alcohol Use Never    Frequency: Never     Social History     Substance and Sexual Activity   Drug Use No     E-Cigarette/Vaping    E-Cigarette Use Never User      E-Cigarette/Vaping Substances    Nicotine No     THC No     CBD No     Flavoring No     Other No     Unknown No      Social History     Tobacco Use   Smoking Status Never Smoker   Smokeless Tobacco Never Used     Family History: non-contributory    Meds/Allergies   all current active meds have been reviewed  Allergies   Allergen Reactions    Baclofen      Patient developed acute encephalopathy on dosing of 10 mg 3 times a day, patient could possibly be tolerant of smaller doses, would advise cautious use in the future or no use at all       Objective   Vitals: Blood pressure 140/85, pulse 92, temperature 98 4 °F (36 9 °C), resp  rate 20, height 4' 10" (1 473 m), weight 60 5 kg (133 lb 6 1 oz), SpO2 99 %, not currently breastfeeding       Wounds:     Wound 05/17/21 Cellulitis Pretibial Left (Active)   Wound Image   05/24/21 1440   Wound Description Beefy red;Brown 05/24/21 1440   Josi-wound Assessment Scaly;Erythema;Swelling 05/24/21 1935   Wound Site Closure Unable to assess 05/23/21 1935   Drainage Amount None 05/24/21 1440   Drainage Description TUYET 05/23/21 1935   Treatments Elevated 05/24/21 1440 Dressing Foam, Silicon (eg  Allevyn, etc); Other (Comment) 05/24/21 1935   Patient Tolerance Tolerated well 05/24/21 1440   Dressing Status Clean;Dry; Intact 05/24/21 1935       Wound 05/17/21 Cellulitis Pretibial Right (Active)   Wound Image   05/24/21 1441   Wound Description Beefy red;Brown 05/24/21 1440   Josi-wound Assessment Erythema;Swelling;Scaly 05/24/21 1935   Drainage Amount None 05/24/21 1440   Drainage Description TUYET 05/23/21 1935   Treatments Elevated 05/24/21 1440   Dressing Foam, Silicon (eg  Allevyn, etc); Other (Comment) 05/24/21 1935   Patient Tolerance Tolerated well 05/24/21 1440   Dressing Status Clean;Dry; Intact 05/24/21 1935       Wound 05/17/21 Pressure Injury Toe (Comment  which one) Left (Active)   Wound Image   05/24/21 1429   Wound Description TUYET 05/24/21 1935   Pressure Injury Stage DTPI 05/24/21 1429   Josi-wound Assessment Dry; Intact 05/24/21 1935   Wound Length (cm) 0 5 cm 05/24/21 1429   Wound Width (cm) 0 5 cm 05/24/21 1429   Wound Depth (cm) 0 cm 05/24/21 1429   Wound Surface Area (cm^2) 0 25 cm^2 05/24/21 1429   Wound Volume (cm^3) 0 cm^3 05/24/21 1429   Calculated Wound Volume (cm^3) 0 cm^3 05/24/21 1429   Drainage Amount None 05/24/21 1429   Treatments Cleansed;Site care 05/18/21 0829   Dressing Foam, Silicon (eg  Allevyn, etc); Other (Comment) 05/24/21 1935   Patient Tolerance Tolerated well 05/24/21 1429   Dressing Status Clean;Dry; Intact 05/24/21 1935       Wound 05/24/21 Skin Tear Arm Right; Anterior;Medial (Active)   Wound Description Beefy red;Fragile 05/24/21 1935   Josi-wound Assessment Dry; Intact;Fragile 05/24/21 1935   Drainage Amount None 05/24/21 1935   Dressing Dry dressing 05/24/21 1935   Dressing Changed New 05/24/21 1935   Patient Tolerance Tolerated well 05/24/21 1935   Dressing Status Clean;Dry; Intact 05/24/21 1935         Physical Exam     Derm:  Small 1cm eschars noted bilateral legs, no open areas or weeping noted today  Small DTI to toe, not open    No remaining erythema noted to legs, chronic stasis changes only  Vascular:  DP and PT pulses 1/4, 2+ edema bilateral legs  Neuro: Sensation intact both feet, no deficits  Ortho:  MMT normal    Lab Results: I have personally reviewed pertinent reports  Imaging: I have personally reviewed pertinent reports  EKG, Pathology, and Other Studies: I have personally reviewed pertinent reports  Code Status: Level 3 - DNAR and DNI  Advance Directive and Living Will: Yes    Power of :    POLST:      Counseling / Coordination of Care  Total floor / unit time spent today 45 minutes  Greater than 50% of total time was spent with the patient and / or family counseling and / or coordination of care  A description of the counseling / coordination of care: Instructed on leg elevation at home f/u at wound center prn open wound

## 2021-05-25 NOTE — SPEECH THERAPY NOTE
Speech-Language Pathology Bedside Swallow Evaluation    Patient Name: Rina Miramontes    XKBOL'H Date: 5/25/2021     Problem List  Principal Problem:    Acute encephalopathy  Active Problems:    S/P AVR    Bilateral lower leg cellulitis    Elevated troponin    Restless leg syndrome    Anemia    Acute kidney injury superimposed on chronic kidney disease ([facility])    Atrial fibrillation, chronic    Hyperlipidemia    Chronic combined systolic and diastolic CHF (congestive heart failure) ([facility])      Past Medical History  Past Medical History:   Diagnosis Date    A-fib ([address] )     Aortic insufficiency     Arthritis     L shoulder,fingers    Cardiac disease     valve replacement    Carpal tunnel syndrome, bilateral     Chronic anemia     Chronic diastolic congestive heart failure (HCC) 3/19/2021    Chronic lower back pain     DDD (degenerative disc disease), lumbar     Dropfoot     right    Edema     History of echocardiogram 04/20/2016    EF 75%, Hyperdynamic LVSF  Mild concentric LVH  Normal functioning bioprosthetic AV  Trace MR  Mild pulm htn   HTN (hypertension)     Hyperlipidemia     Hypertension     Inguinal hernia, left     Insomnia     Lymphedema     Parkinson disease ([facility])     Peripheral neuropathy     Phlebitis     Psoriasis     Renal insufficiency     Grade 1    Restless leg syndrome     Rhabdomyolysis 8/23/2016    Right bundle branch block     Senile keratosis     Spinal stenosis     Stasis ulcer of right lower extremity ([address]     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    Stroke ([address] )     Supraventricular tachycardia ([address] )     Vitamin D deficiency        Past Surgical History  Past Surgical History:   Procedure Laterality Date    AORTIC VALVE REPLACEMENT  11/25/2002    Tissue AVR #21    AORTIC VALVULOPLASTY  11/25/2002    Bovine Pericardial heart valve    BACK SURGERY      CARDIAC CATHETERIZATION  11/22/2002    Sever critical Aortic stenosis  Pulm Htn  Normal coronary arteries   HAND SURGERY      right hand    HYSTERECTOMY      ROTATOR CUFF REPAIR Right     VASCULAR SURGERY      bilateral vein ligation & stripping       Summary  Pt presented with s/s suggestive of mild oropharyngeal dysphagia  Symptoms or concerns included decreased mastication and oral residue with solids (dentures in place but pt reported sore gums), as well as suspected mild pharyngeal swallow delay  Occasional mild delayed cough noted after swallow of thin liquid, however fast rate of intake was also noted  ST will f/u for diet tolerance  Risk/s for Aspiration: mild    Recommended Diet: mechanically altered/level 2 diet and thin liquids   Recommended Form of Meds: whole with puree   Aspiration precautions and swallowing strategies: upright posture, slow rate of feeding, small bites/sips and alternating bites and sips  Other Recommendations: Continue frequent oral care      Current Medical Status per H&P 5/15/21  Sheeba Franklin is a 80 y o  female who presents with HPI as noted below per the ER physician Dr Faina Medina  "49-year-old female with past medical history of CVA, CHF, atrial fibrillation anticoagulated with Coumadin, aortic insufficiency status post bioprosthetic AVR in 2002, CKD, hypertension, hyperlipidemia, and peripheral edema who is presenting for evaluation of generalized weakness, shakiness, and confusion  The patient is a very poor historian  She reports feeling shaky over the past few weeks  She does report feeling generalized weakness but denies any focal weakness  No speech difficulty, swallowing difficulty, changes, chest pain, shortness of breath, nausea, vomiting, or abdominal pain  Urinary symptoms  I called her son for collateral history and he reported that the patient has been increasingly weak over the past few weeks  She also is seeming more confused  The patient was having some hallucinations this morning which consisted of seeing people in her house that were not really there  The patient last saw Cardiology on April 29th  She was started on lisinopril and metolazone  That note mentioned chronic peripheral edema which was unchanged  Apparently, on EMS arrival, the patient had a low-normal blood pressure in the 44A systolic  She was given 1 L of IV fluid  "  At the time my examination patient denies any specific pain, does note some lower extremity discomfort, she says this is chronic, she is lethargic, oriented to person and place, she thinks it is September of 1928, it is in fact May of 2021  No chest pain, no shortness of breath    Per PA-C 5/24/21   Patient with increased work of breathing, acute SOB  CXR suspicious for worsening pulmonary edema  Will give 1 time dose IV lasix, albuterol neb, Bipap  Follow up ABG ordered for 2 hours  EKG, troponin pending  Concern for potential aspiration event - will make NPO with speech eval      Special Studies:  CT head 5/25/21 IMPRESSION:  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, left parietal lobe and old lacunar infarcts within the basal ganglia  Stable chronic microangiopathic change  CXR 5/24/21 IMPRESSION:  Small left effusion and left basilar opacity suggestive of subsegmental atelectasis  Social/Education/Vocational Hx:  Pt lives alone    Swallow Information   Current Risks for Dysphagia & Aspiration: concern for aspiration event yesterday  Current Diet: NPO   Baseline Diet: regular diet and thin liquids      Baseline Assessment   Behavior/Cognition: alert  Speech/Language Status: able to participate in conversation and able to follow commands  Patient Positioning: upright in chair  Pain Status/Interventions/Response to Interventions: No report of or nonverbal indications of pain         Swallow Mechanism Exam  Facial: symmetrical  Labial: decreased strength  Lingual: bilateral decreased strength  Velum: symmetrical  Mandible: adequate ROM  Dentition: full dentures  Vocal quality:weak   Volitional Cough: weak Respiratory Status: on 2L O2        Consistencies Assessed and Performance   Consistencies Administered: thin liquids, puree and hard solids    Oral Stage: mild  Mastication was reduced with the solid materials administered today  Bolus formation and transfer were slow, with no oral residue of solids noted  No overt s/s reduced oral control  Pharyngeal Stage: mild  Swallow Mechanics: Swallowing initiation appeared mildly delayed  Laryngeal rise was palpated and judged to be within functional limits  No coughing, throat clearing, change in vocal quality or respiratory status noted today  Esophageal Concerns: none reported      Summary and Recommendations (see above)    Results Reviewed with: patient, RN and PA     Treatment Recommended: yes     Frequency of treatment: 2-3x/week      Dysphagia LTG  -Patient will demonstrate safe and effective oral intake (without overt s/s significant oral/pharyngeal dysphagia including s/s penetration or aspiration) for the highest appropriate diet level  Short Term Goals:  -Pt will tolerate Dysphagia 2/mechanical soft diet and thin liquid with no significant s/s oral or pharyngeal dysphagia across 1-3 diagnostic sessions     -Patient will tolerate trials of upgraded food texture with no significant s/s of oral or pharyngeal dysphagia including aspiration across 1-3 diagnostic sessions

## 2021-05-25 NOTE — PLAN OF CARE
Problem: OCCUPATIONAL THERAPY ADULT  Goal: Performs self-care activities at highest level of function for planned discharge setting  See evaluation for individualized goals  Description: Treatment Interventions: ADL retraining, Functional transfer training, UE strengthening/ROM, Patient/family training, Energy conservation, Activityengagement, Endurance training          See flowsheet documentation for full assessment, interventions and recommendations  Outcome: Progressing  Note: Limitation: Decreased ADL status, Decreased UE strength, Decreased Safe judgement during ADL, Decreased endurance, Decreased self-care trans, Decreased high-level ADLs     Assessment: Patient participated in Skilled OT session this date with interventions consisting of therapeutic exercise to: increase functional use of BUEs, increase BUE muscle strength  and  therapeutic activities to: increase activity tolerance   Co treatment with PTA secondary to complex medical condition of pt, Min A of 2 required to achieve and maintain transitional movements, requiring the need of skilled therapeutic intervention of 2 therapists to achieve delivery of services  Patient agreeable to OT treatment session, upon arrival patient was found supine in bed  Sit to stand txfrs from bed with Min Ax2, standing balance/functional mobility around room for short distances with Min Ax2 and use of RW with verbal cues throughout for safety, to increase posture, dynamic standing balance, balance during self cares, use of BUE  Pt participated in UE exercises BUE AROM 1x10 all planes with decreased shoulder ROM  To increase strength, endurance, ROM, txfrs, self cares  Pt required rest breaks throughout exercises  Pts O2 WFL throughout treatment  Patient requiring verbal cues for safety and verbal cues for correct technique   Patient continues to be functioning below baseline level, occupational performance remains limited secondary to factors listed above and increased risk for falls and injury  From OT standpoint, recommendation at time of d/c would be Post acute rehabilitation services  The patient's raw score on the AM-PAC Daily Activity inpatient short form is 17, standardized score is 37 26, less than 39 4  Patients at this level are likely to benefit from discharge to post-acute rehabilitation services  Please refer to the recommendation of the Occupational Therapist for safe discharge planning       OT Discharge Recommendation: Post acute rehabilitation services

## 2021-05-25 NOTE — ASSESSMENT & PLAN NOTE
Acute metabolic encephalopathy, present on admission, as evidenced by increased confusion, visual hallucinations per the family, likely secondary to uremia in the setting of acute kidney injury, cellulitis  CT head 5/25/21 - "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, left parietal lobe and old lacunar infarcts within the basal ganglia  Stable chronic microangiopathic change "    Seems to be improving today 5/25

## 2021-05-25 NOTE — OCCUPATIONAL THERAPY NOTE
[address] Progress Note     Patient Name: Job Celis  KQQAK'W Date: 5/25/2021  Problem List  Principal Problem:    Acute encephalopathy  Active Problems:    S/P AVR    Bilateral lower leg cellulitis    Elevated troponin    Restless leg syndrome    Anemia    Acute kidney injury superimposed on chronic kidney disease (HCC)    Atrial fibrillation, chronic    Hyperlipidemia    Chronic combined systolic and diastolic CHF (congestive heart failure) ([facility] )        05/25/21 1037   OT Last Visit   OT Visit Date 05/25/21   Note Type   Note Type Treatment   Restrictions/Precautions   Weight Bearing Precautions Per Order No   Other Precautions Multiple lines; Fall Risk;O2   Bed Mobility   Supine to Sit 4  Minimal assistance   Additional items Assist x 1; Increased time required;Verbal cues; Bedrails   Transfers   Sit to Stand 4  Minimal assistance   Additional items Assist x 2; Increased time required;Verbal cues   Stand to Sit 4  Minimal assistance   Additional items Assist x 2; Increased time required;Verbal cues   Stand pivot 4  Minimal assistance   Additional items Assist x 2; Increased time required;Verbal cues   Additional Comments Use of RW   Functional Mobility   Functional Mobility 4  Minimal assistance   Additional Comments Ax1-2 with use of RW for short distances due to fatigue and verbal cues throughout for safety   Additional items Rolling walker   Therapeutic Exercise - ROM   UE-ROM Yes   ROM- Right Upper Extremities   R Shoulder AROM; Flexion;ABduction; Extension;Horizontal ABduction; External rotation; Internal rotation   R Elbow AROM;Elbow flexion;Elbow extension   R Weight/Reps/Sets 1x10   RUE ROM Comment Decreased shoulder ROM   ROM - Left Upper Extremities    L Shoulder AROM; Flexion;ABduction; Extension;Horizontal ABduction; External rotation; Internal rotation   L Elbow AROM;Elbow flexion;Elbow extension   L Weight/Reps/Sets 1x10   LUE ROM Comment Decreased shoulder ROM   Cognition   Overall Cognitive Status Impaired   Arousal/Participation Alert; Cooperative   Attention Attends with cues to redirect   Orientation Level Oriented to person;Oriented to place; Disoriented to situation   Memory Decreased long term memory;Decreased short term memory   Following Commands Follows one step commands with increased time or repetition   Activity Tolerance   Activity Tolerance Patient limited by fatigue   Assessment   Assessment Patient participated in Skilled OT session this date with interventions consisting of therapeutic exercise to: increase functional use of BUEs, increase BUE muscle strength  and  therapeutic activities to: increase activity tolerance   Co treatment with PTA secondary to complex medical condition of pt, Min A of 2 required to achieve and maintain transitional movements, requiring the need of skilled therapeutic intervention of 2 therapists to achieve delivery of services  Patient agreeable to OT treatment session, upon arrival patient was found supine in bed  Sit to stand txfrs from bed with Min Ax2, standing balance/functional mobility around room for short distances with Min Ax2 and use of RW with verbal cues throughout for safety, to increase posture, dynamic standing balance, balance during self cares, use of BUE  Pt participated in UE exercises BUE AROM 1x10 all planes with decreased shoulder ROM  To increase strength, endurance, ROM, txfrs, self cares  Pt required rest breaks throughout exercises  Pts O2 WFL throughout treatment  Patient requiring verbal cues for safety and verbal cues for correct technique  Patient continues to be functioning below baseline level, occupational performance remains limited secondary to factors listed above and increased risk for falls and injury  From OT standpoint, recommendation at time of d/c would be Post acute rehabilitation services    The patient's raw score on the AM-PAC Daily Activity inpatient short form is 17, standardized score is 37 26, less than 39 4  Patients at this level are likely to benefit from discharge to post-acute rehabilitation services  Please refer to the recommendation of the Occupational Therapist for safe discharge planning  Plan   Goal Expiration Date 05/31/21   OT Treatment Day 1   OT Frequency 3-5x/wk   Recommendation   OT Discharge Recommendation Post acute rehabilitation services   AM-PAC Daily Activity Inpatient   Lower Body Dressing 2   Bathing 2   Toileting 2   Upper Body Dressing 3   Grooming 4   Eating 4   Daily Activity Raw Score 17   Daily Activity Standardized Score (Calc for Raw Score >=11) 37 26   AM-PAC Applied Cognition Inpatient   Following a Speech/Presentation 4   Understanding Ordinary Conversation 4   Taking Medications 4   Remembering Where Things Are Placed or Put Away 3   Remembering List of 4-5 Errands 2   Taking Care of Complicated Tasks 2   Applied Cognition Raw Score 19   Applied Cognition Standardized Score 39 77     Pt left seated in antonino chair with chair alarm on and all needs in reach

## 2021-05-25 NOTE — PROGRESS NOTES
[facility] [address]  Progress Note - Gilbert Mendoza 7/3/1931, 80 y o  female MRN: [id number]  Unit/Bed#: 421-01 Encounter: [id number]  Primary Care Provider: Sunita Gallo DO   Date and time admitted to hospital: 5/15/2021  9:38 AM    Acute kidney injury superimposed on chronic kidney disease [facility])  Assessment & Plan  Lab Results   Component Value Date    EGFR 22 05/25/2021    EGFR 24 05/24/2021    EGFR 23 05/23/2021    CREATININE 1 96 (H) 05/25/2021    CREATININE 1 86 (H) 05/24/2021    CREATININE 1 88 (H) 05/23/2021     Received IV fluids initially, then noted increase in creatinine, patient with mild shortness of breath, so changed to IV diuresis with some improvement in creatinine  Appreciate nephrology consultation = "Initial high creatinine was probably due to metolazone 2 5, furosemide 80 mg daily and the use of lisinopril 2 5 affecting renal auto regulation  Metolazone should be avoided"  Currently volume status is difficult to   Renal ultrasound - "Technically limited study  Evaluation of the left kidney is markedly limited  The kidneys are slightly small bilaterally  The right kidney demonstrates slightly increased cortical echogenicity without hydronephrosis "    Acute respiratory failure with hypoxia (HCC)  Assessment & Plan  In the setting of chronic CHF, pulmonary hypertension  Currently on 2L oxygen per NC, transitioned off Bipap - needed temporarily yesterday evening and overnight  Plan to continue oxygen at 2L/min per NC on discharge to STR  Chronic combined systolic and diastolic CHF (congestive heart failure) (HCC)  Assessment & Plan  Wt Readings from Last 3 Encounters:   05/25/21 60 5 kg (133 lb 6 1 oz)   03/19/21 68 kg (150 lb)   11/20/20 65 3 kg (144 lb)       Patient with bioprosthetic aortic valve, reduced EF on recent echocardiogram   CXR 5/20 - There is now mild increased vascular prominence suspicious for mild CHF     Patient given an additional IV lasix 40mg x 1 5/21  Renal function stable, restarted home dose lasix of 80 mg po daily 5/23  Concern for volume overload with acute respiratory failure requiring Bipap on 5/24 - received IV lasix 40mg x 1 dose  5/25 - appears euvolemic at present, continue PO Lasix  Bilateral lower leg cellulitis  Assessment & Plan  +3 to 4 edema bilateral lower extremities on admission with diffuse erythema  With increasing warmth and low grade fever  Seems to be improving  IV Rocephin, switched to po Keflex 5/22,  Noted Keflex was dosed at lower dosing due to renal function, clinically does not appear to be improving  Still with BLE erythema and increased warmth, particularly the LLE  Will change back to IV rocephin for now while in the inpatient setting  Consider broadening coverage with 3rd generation cephalosporin on discharge  Consult podiatry - pending  * Acute encephalopathy  Assessment & Plan  Acute metabolic encephalopathy, present on admission, as evidenced by increased confusion, visual hallucinations per the family, likely secondary to uremia in the setting of acute kidney injury, cellulitis  CT head 5/25/21 - "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, left parietal lobe and old lacunar infarcts within the basal ganglia  Stable chronic microangiopathic change "    Seems to be improving today 5/25  Hyperlipidemia  Assessment & Plan  Statin on hold  Recheck CMP / CK  Atrial fibrillation, chronic  Assessment & Plan  Rate controlled  Anticoagulated on coumadin  Anemia  Assessment & Plan  Hemoglobin stable, around 10-11    Restless leg syndrome  Assessment & Plan  Continue Requip  Elevated troponin  Assessment & Plan  Non MI troponin elevation  CPK level not significantly elevated  S/P AVR  Assessment & Plan  History of bioprosthetic aortic valve, which is likely failing per recent cardiology notes from 4/2021  Discussed with patient and son at bedside   May be candidate for TAVR vs  Hospice if not interested in surgical intervention  Currently patient is open to all possibilities  Continue outpatient follow-up with Cardiology  VTE Pharmacologic Prophylaxis:   Pharmacologic: Warfarin (Coumadin)  Mechanical VTE Prophylaxis in Place: Yes    Patient Centered Rounds: I have performed bedside rounds with nursing staff today  Time Spent for Care: 30 minutes  More than 50% of total time spent on counseling and coordination of care as described above  Current Length of Stay: 10 day(s)    Current Patient Status: Inpatient   Certification Statement: The patient will continue to require additional inpatient hospital stay due to need for discharge planning    Discharge Plan / Estimated Discharge Date: TBD      Code Status: Level 3 - DNAR and DNI      Subjective:   Patient is tired but feeling less SOB overall  Objective:     Vitals:   Temp (24hrs), Av 9 °F (36 6 °C), Min:97 6 °F (36 4 °C), Max:98 4 °F (36 9 °C)    Temp:  [97 6 °F (36 4 °C)-98 4 °F (36 9 °C)] 98 4 °F (36 9 °C)  HR:  [68-92] 92  Resp:  [18-20] 20  BP: (109-145)/(63-85) 140/85  SpO2:  [75 %-99 %] 99 %  Body mass index is 27 88 kg/m²  Input and Output Summary (last 24 hours): Intake/Output Summary (Last 24 hours) at 2021 1532  Last data filed at 2021 1156  Gross per 24 hour   Intake 180 ml   Output 460 ml   Net -280 ml       Physical Exam:     Physical Exam  Vitals signs and nursing note reviewed  Constitutional:       General: She is not in acute distress  Appearance: She is not ill-appearing  Interventions: Nasal cannula in place  HENT:      Head: Normocephalic  Mouth/Throat:      Mouth: Mucous membranes are moist    Cardiovascular:      Rate and Rhythm: Normal rate and regular rhythm  Heart sounds: Murmur (systolic) present  Pulmonary:      Effort: Pulmonary effort is normal  No respiratory distress  Abdominal:      Tenderness:  There is no abdominal tenderness  Musculoskeletal:      Right lower leg: No edema  Left lower leg: No edema  Skin:     General: Skin is warm and dry  Neurological:      Mental Status: She is alert and oriented to person, place, and time  Psychiatric:         Mood and Affect: Mood normal          Additional Data:   Labs:    Results from last 7 days   Lab Units 05/24/21  1649   WBC Thousand/uL 6 90   HEMOGLOBIN g/dL 11 1*   HEMATOCRIT % 35 8   PLATELETS Thousands/uL 164   NEUTROS PCT % 68   LYMPHS PCT % 19   MONOS PCT % 11   EOS PCT % 1     Results from last 7 days   Lab Units 05/25/21  0530   POTASSIUM mmol/L 4 0   CHLORIDE mmol/L 104   CO2 mmol/L 28   BUN mg/dL 68*   CREATININE mg/dL 1 96*   CALCIUM mg/dL 9 3     Results from last 7 days   Lab Units 05/25/21  0530   INR  2 07*       * I Have Reviewed All Lab Data Listed Above  * Additional Pertinent Lab Tests Reviewed: All Labs Within Last 24 Hours Reviewed    Imaging:  Imaging Reports Reviewed Today Include: no new imaging to review  Recent Cultures (last 7 days):           Last 24 Hours Medication List:   Current Facility-Administered Medications   Medication Dose Route Frequency Provider Last Rate    acetaminophen  650 mg Oral Q6H PRN Deandre Dus, DO      ammonium lactate   Topical BID Jannie Armas MD      cefTRIAXone  1,000 mg Intravenous Q24H Jesse Don PA-C 1,000 mg (05/24/21 1533)    furosemide  80 mg Oral Daily Citlaly Burr PA-C      gabapentin  100 mg Oral BID Jannie Armas MD      nystatin   Topical BID Lawyer Imani PA-C      ondansetron  4 mg Intravenous Q6H PRN Deandre Dus, DO      rOPINIRole  2 mg Oral BID Deandre Dus, DO      warfarin  5 mg Oral Daily (warfarin) Jannie Armas MD          Today, Patient Was Seen By: Lawyer Imani PA-C    ** Please Note: Dragon 360 Dictation voice to text software may have been used in the creation of this document   **

## 2021-05-25 NOTE — ASSESSMENT & PLAN NOTE
History of bioprosthetic aortic valve, which is likely failing per recent cardiology notes from 4/2021  Discussed with patient and son at bedside  May be candidate for TAVR vs  Hospice if not interested in surgical intervention  Currently patient is open to all possibilities  Continue outpatient follow-up with Cardiology

## 2021-05-25 NOTE — CONSULTS
Consultation - Pulmonary Medicine   Kari Frey 80 y o  female MRN: [id number]  Unit/Bed#: 642-75 Encounter: [id number]      Assessment/Plan:    1  Acute hypoxic respiratory failure  1  With worsening respiratory distress yesterday requiring BiPAP- now weaned to 2L NC   2  Titrate oxygen maintain SpO2 greater than or equal to 88%  3  Pulmonary toilet:  Increase activity as tolerated, out of bed as tolerated, cough and deep breathing as encouraged, incentive spirometry q 1 hour  4  No need for BiPAP- discontinue  5  Awaiting speech evaluation to rule out aspiration- remain NPO for now  2  Chronic combined systolic and diastolic CHF  1  CXR yesterday with slightly worsen pulmonary edema and small left pleural effusion  2  Continue home Lasix per primary team   3  Recommend checking NT-BNP prior to further adjustments of diuretics   4  Monitor I/Os and daily weights   3  Pulmonary hypertension  1  Likely WHO group 2 based on history but should patient want to proceed with aggressive diagnostics/interventions would recommend a full work up including serologies, PFTs, V/Q scan, right heart catheterization  2  Continue supplemental O2   4  PRASHANT superimposed on CKD stage 3   1  Nephrology following- treatment per their recommendations   5  Bilateral lower extremity cellulitis   1  ABX per primary team   2  Wound care following    History of Present Illness   Physician Requesting Consult: Kayla Juares MD  Reason for Consult / Principal Problem:  Acute hypoxic respiratory failure  Hx and PE limited by: n/a  Chief Complaint: shortness of breath  HPI: Kari Frey is a 80 y o   female who presented to [address] [facility] with complaints of generalized weakness    Patient's past medical history positive for CVA, chronic combined systolic and diastolic CHF, atrial fibrillation Check coagulated on Coumadin, aortic insufficiency status post bioprosthetic AVR in 2002, CKD, hypertension, hyperlipidemia, dependent edema  Patient is a poor historian  She has been admitted since 05/15/2021 for a KI secondary to volume depletion  This improved with IV fluids however when patient is diuretics resumed her creatinine alan  She has also been treated with Rocephin for bilateral lower extremity cellulitis  Yesterday patient had an episode of acute respiratory distress requiring BiPAP through the night  She is now on 2 L nasal cannula  No oxygen requirement at baseline  Chest x-ray revealed mild pulmonary edema and small left pleural effusion  Pulmonary was consulted to help manage this  Presently, patient feels that she more short of breath than her normal   She has cough but is unable to expectorate sputum  Reports off and on wheeze  Denies chest pain or palpitations  Denies dizziness  Denies GI or urinary symptoms  She has lower extremity edema which is chronic for  She states that her legs have with the same for the last 15 years  They are very tender to touch  From a pulmonary perspective, patient has a follow-up pulmonologist   She is a lifelong nonsmoker  She has never been diagnosed with pulmonary disease  She does not require the use of supplemental oxygen, inhalers or nebulizers at baseline  Denies sick contacts  Denies bird exposure  Denies recent travel  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  Denies exposures to asbestos, silica or coal     Inpatient consult to Pulmonology  Consult performed by: Abdirizak Klein PA-C  Consult ordered by: Mary Ann Colmenares PA-C          Review of Systems   All other systems reviewed and are negative        Historical Information   Past Medical History:   Diagnosis Date    A-fib [facility])     Aortic insufficiency     Arthritis     L shoulder,fingers    Cardiac disease     valve replacement    Carpal tunnel syndrome, bilateral     Chronic anemia     Chronic diastolic congestive heart failure (HCC) 3/19/2021    Chronic lower back pain     DDD (degenerative disc disease), lumbar     Dropfoot     right    Edema     History of echocardiogram 04/20/2016    EF 75%, Hyperdynamic LVSF  Mild concentric LVH  Normal functioning bioprosthetic AV  Trace MR  Mild pulm htn   HTN (hypertension)     Hyperlipidemia     Hypertension     Inguinal hernia, left     Insomnia     Lymphedema     Parkinson disease (HCC)     Peripheral neuropathy     Phlebitis     Psoriasis     Renal insufficiency     Grade 1    Restless leg syndrome     Rhabdomyolysis 8/23/2016    Right bundle branch block     Senile keratosis     Spinal stenosis     Stasis ulcer of right lower extremity ([address] )     Stroke (cerebrum) ([facility] [address] ) 6/29/2018    Stroke ([facility] [address] )     Supraventricular tachycardia ([facility] [address] )     Vitamin D deficiency      Past Surgical History:   Procedure Laterality Date    AORTIC VALVE REPLACEMENT  11/25/2002    Tissue AVR #21    AORTIC VALVULOPLASTY  11/25/2002    Bovine Pericardial heart valve    BACK SURGERY      CARDIAC CATHETERIZATION  11/22/2002    Sever critical Aortic stenosis  Pulm Htn  Normal coronary arteries   HAND SURGERY      right hand    HYSTERECTOMY      ROTATOR CUFF REPAIR Right     VASCULAR SURGERY      bilateral vein ligation & stripping     Social History   Social History     Substance and Sexual Activity   Alcohol Use Never    Frequency: Never     Social History     Substance and Sexual Activity   Drug Use No     Social History     Tobacco Use   Smoking Status Never Smoker   Smokeless Tobacco Never Used     E-Cigarette/Vaping    E-Cigarette Use Never User      E-Cigarette/Vaping Substances    Nicotine No     THC No     CBD No     Flavoring No     Other No     Unknown No      Occupational History: retired   See HPI    Family History:   Family History   Problem Relation Age of Onset    No Known Problems Mother     No Known Problems Father     No Known Problems Sister     No Known Problems Brother     Hyperlipidemia Son  Hypertension Son     No Known Problems Sister     No Known Problems Brother     Hyperlipidemia Son     Hypertension Son     Hyperlipidemia Son     Hypertension Son        Meds/Allergies   all current active meds have been reviewed, pertinent pulmonary meds have been reviewed and current meds:   Current Facility-Administered Medications   Medication Dose Route Frequency    acetaminophen (TYLENOL) tablet 650 mg  650 mg Oral Q6H PRN    ammonium lactate (LAC-HYDRIN) 12 % lotion   Topical BID    cefTRIAXone (ROCEPHIN) IVPB (premix in dextrose) 1,000 mg 50 mL  1,000 mg Intravenous Q24H    furosemide (LASIX) tablet 80 mg  80 mg Oral Daily    gabapentin (NEURONTIN) capsule 100 mg  100 mg Oral BID    nystatin (MYCOSTATIN) powder   Topical BID    ondansetron (ZOFRAN) injection 4 mg  4 mg Intravenous Q6H PRN    rOPINIRole (REQUIP) tablet 2 mg  2 mg Oral BID    warfarin (COUMADIN) tablet 5 mg  5 mg Oral Daily (warfarin)       Allergies   Allergen Reactions    Baclofen      Patient developed acute encephalopathy on dosing of 10 mg 3 times a day, patient could possibly be tolerant of smaller doses, would advise cautious use in the future or no use at all       Objective   Vitals: Blood pressure 145/85, pulse 68, temperature 97 6 °F (36 4 °C), resp  rate 18, height 4' 10" (1 473 m), weight 60 5 kg (133 lb 6 1 oz), SpO2 98 %, not currently breastfeeding  2L NC,Body mass index is 27 88 kg/m²  Intake/Output Summary (Last 24 hours) at 5/25/2021 1207  Last data filed at 5/25/2021 1156  Gross per 24 hour   Intake 360 ml   Output 660 ml   Net -300 ml     Invasive Devices     Peripheral Intravenous Line            Peripheral IV 05/24/21 Right;Lateral Hand less than 1 day          Drain            External Urinary Catheter less than 1 day                Physical Exam  Vitals signs and nursing note reviewed  Constitutional:       General: She is not in acute distress  Appearance: Normal appearance     HENT: Head: Normocephalic and atraumatic  Right Ear: External ear normal       Left Ear: External ear normal       Nose: Nose normal       Mouth/Throat:      Mouth: Mucous membranes are moist       Pharynx: Oropharynx is clear  Eyes:      Extraocular Movements: Extraocular movements intact  Conjunctiva/sclera: Conjunctivae normal       Pupils: Pupils are equal, round, and reactive to light  Neck:      Musculoskeletal: Normal range of motion and neck supple  No muscular tenderness  Cardiovascular:      Rate and Rhythm: Normal rate and regular rhythm  Pulses: Normal pulses  Heart sounds: No murmur  Pulmonary:      Effort: Pulmonary effort is normal  No respiratory distress  Breath sounds: Rales present  No wheezing  Abdominal:      General: Bowel sounds are normal       Palpations: Abdomen is soft  There is no mass  Tenderness: There is no abdominal tenderness  Hernia: No hernia is present  Musculoskeletal: Normal range of motion  General: Tenderness present  No deformity  Right lower leg: Edema present  Left lower leg: Edema present  Skin:     General: Skin is warm and dry  Neurological:      General: No focal deficit present  Mental Status: She is alert and oriented to person, place, and time  Mental status is at baseline  Psychiatric:         Mood and Affect: Mood normal          Behavior: Behavior normal          Thought Content: Thought content normal          Judgment: Judgment normal          Lab Results:   I have personally reviewed pertinent lab results  , ABG:   Lab Results   Component Value Date    PHART 7 443 05/24/2021    MXG5YSW 33 1 (L) 05/24/2021    PO2ART 158 2 (H) 05/24/2021    [id number] 22 1 05/24/2021    BEART -1 5 05/24/2021    SOURCE Brachial, Left 05/24/2021   , BNP: No results found for: BNP, CBC:   Lab Results   Component Value Date    WBC 6 90 05/24/2021    HGB 11 1 (L) 05/24/2021    HCT 35 8 05/24/2021    MCV 98 05/24/2021  05/24/2021    MCH 30 5 05/24/2021    MCHC 31 0 (L) 05/24/2021    RDW 14 3 05/24/2021    MPV 11 0 05/24/2021    NRBC 0 05/24/2021   , CMP:   Lab Results   Component Value Date    SODIUM 142 05/25/2021    K 4 0 05/25/2021     05/25/2021    CO2 28 05/25/2021    BUN 68 (H) 05/25/2021    CREATININE 1 96 (H) 05/25/2021    CALCIUM 9 3 05/25/2021    EGFR 22 05/25/2021   , PT/INR:   Lab Results   Component Value Date    INR 2 07 (H) 05/25/2021   , Troponin:   Lab Results   Component Value Date    TROPONINI 0 06 (H) 05/25/2021       Imaging Studies: I have personally reviewed pertinent reports  and I have personally reviewed pertinent films in PACS     Chest x-ray 05/24/2021  Mild pulmonary vascular congestion with small left pleural effusion and likely associated compressive atelectasis in left base    EKG, Pathology, and Other Studies: I have personally reviewed pertinent reports  Echocardiogram 04/29/2021  EF 40-45%  Wall thickness mildly increased  Mild concentric hypertrophy  Right ventricle mildly dilated with mildly reduced systolic function  Left atrium mildly dilated  Right atrium mildly dilated  Bioprosthetic aortic valve present  Mean gradient 60 mm Hg  Trace aortic regurgitation  Moderate tricuspid regurgitation  Findings suggestive of moderate to severe pulmonary hypertension  Pulmonary Results (PFTs, PSG): none to reivew    VTE Prophylaxis: Warfarin (Coumadin)    Code Status: Level 3 - DNAR and DNI      Portions of the record may have been created with voice recognition software  Occasional wrong word or "sound a like" substitutions may have occurred due to the inherent limitations of voice recognition software  Read the chart carefully and recognize, using context, where substitutions have occurred

## 2021-05-26 ENCOUNTER — TRANSITIONAL CARE MANAGEMENT (OUTPATIENT)
Dept: FAMILY MEDICINE CLINIC | Facility: CLINIC | Age: 86
End: 2021-05-26

## 2021-05-26 ENCOUNTER — HOSPITAL ENCOUNTER (INPATIENT)
Facility: HOSPITAL | Age: 86
LOS: 6 days | Discharge: NURSING, REHAB OR LONG-TERM CARE | DRG: 558 | End: 2021-06-01
Attending: INTERNAL MEDICINE | Admitting: INTERNAL MEDICINE
Payer: MEDICARE

## 2021-05-26 VITALS
HEIGHT: 58 IN | SYSTOLIC BLOOD PRESSURE: 139 MMHG | TEMPERATURE: 97.5 F | HEART RATE: 95 BPM | WEIGHT: 140.21 LBS | BODY MASS INDEX: 29.43 KG/M2 | RESPIRATION RATE: 17 BRPM | DIASTOLIC BLOOD PRESSURE: 74 MMHG | OXYGEN SATURATION: 100 %

## 2021-05-26 DIAGNOSIS — Z20.822 COVID-19 RULED OUT: ICD-10-CM

## 2021-05-26 DIAGNOSIS — I48.20 ATRIAL FIBRILLATION, CHRONIC (HCC): Primary | ICD-10-CM

## 2021-05-26 LAB
ALBUMIN SERPL BCP-MCNC: 3.7 G/DL (ref 3.5–5)
ALBUMIN SERPL ELPH-MCNC: 4.17 G/DL (ref 3.5–5)
ALBUMIN SERPL ELPH-MCNC: 65.1 % (ref 52–65)
ALP SERPL-CCNC: 98 U/L (ref 46–116)
ALPHA1 GLOB SERPL ELPH-MCNC: 0.47 G/DL (ref 0.1–0.4)
ALPHA1 GLOB SERPL ELPH-MCNC: 7.3 % (ref 2.5–5)
ALPHA2 GLOB SERPL ELPH-MCNC: 0.63 G/DL (ref 0.4–1.2)
ALPHA2 GLOB SERPL ELPH-MCNC: 9.8 % (ref 7–13)
ALT SERPL W P-5'-P-CCNC: 32 U/L (ref 12–78)
ANION GAP SERPL CALCULATED.3IONS-SCNC: 12 MMOL/L (ref 4–13)
AST SERPL W P-5'-P-CCNC: 29 U/L (ref 5–45)
BASOPHILS # BLD AUTO: 0.04 THOUSANDS/ΜL (ref 0–0.1)
BASOPHILS NFR BLD AUTO: 1 % (ref 0–1)
BETA GLOB ABNORMAL SERPL ELPH-MCNC: 0.25 G/DL (ref 0.4–0.8)
BETA1 GLOB SERPL ELPH-MCNC: 3.9 % (ref 5–13)
BETA2 GLOB SERPL ELPH-MCNC: 3.5 % (ref 2–8)
BETA2+GAMMA GLOB SERPL ELPH-MCNC: 0.22 G/DL (ref 0.2–0.5)
BILIRUB SERPL-MCNC: 0.64 MG/DL (ref 0.2–1)
BUN SERPL-MCNC: 64 MG/DL (ref 5–25)
CALCIUM SERPL-MCNC: 8.8 MG/DL (ref 8.3–10.1)
CHLORIDE SERPL-SCNC: 102 MMOL/L (ref 100–108)
CK SERPL-CCNC: 51 U/L (ref 26–192)
CO2 SERPL-SCNC: 27 MMOL/L (ref 21–32)
CREAT SERPL-MCNC: 1.86 MG/DL (ref 0.6–1.3)
EOSINOPHIL # BLD AUTO: 0.14 THOUSAND/ΜL (ref 0–0.61)
EOSINOPHIL NFR BLD AUTO: 2 % (ref 0–6)
ERYTHROCYTE [DISTWIDTH] IN BLOOD BY AUTOMATED COUNT: 14.6 % (ref 11.6–15.1)
GAMMA GLOB ABNORMAL SERPL ELPH-MCNC: 0.67 G/DL (ref 0.5–1.6)
GAMMA GLOB SERPL ELPH-MCNC: 10.4 % (ref 12–22)
GFR SERPL CREATININE-BSD FRML MDRD: 24 ML/MIN/1.73SQ M
GLUCOSE SERPL-MCNC: 91 MG/DL (ref 65–140)
HCT VFR BLD AUTO: 30.6 % (ref 34.8–46.1)
HGB BLD-MCNC: 9.9 G/DL (ref 11.5–15.4)
IGG/ALB SER: 1.87 {RATIO} (ref 1.1–1.8)
IMM GRANULOCYTES # BLD AUTO: 0.02 THOUSAND/UL (ref 0–0.2)
IMM GRANULOCYTES NFR BLD AUTO: 0 % (ref 0–2)
INTERPRETATION UR IFE-IMP: NORMAL
LYMPHOCYTES # BLD AUTO: 1.17 THOUSANDS/ΜL (ref 0.6–4.47)
LYMPHOCYTES NFR BLD AUTO: 16 % (ref 14–44)
M PROTEIN 1 MFR SERPL ELPH: 1.6 %
M PROTEIN 1 SERPL ELPH-MCNC: 0.1 G/DL
MCH RBC QN AUTO: 31.3 PG (ref 26.8–34.3)
MCHC RBC AUTO-ENTMCNC: 32.4 G/DL (ref 31.4–37.4)
MCV RBC AUTO: 97 FL (ref 82–98)
MONOCYTES # BLD AUTO: 0.83 THOUSAND/ΜL (ref 0.17–1.22)
MONOCYTES NFR BLD AUTO: 12 % (ref 4–12)
NEUTROPHILS # BLD AUTO: 4.96 THOUSANDS/ΜL (ref 1.85–7.62)
NEUTS SEG NFR BLD AUTO: 69 % (ref 43–75)
NRBC BLD AUTO-RTO: 0 /100 WBCS
PLATELET # BLD AUTO: 181 THOUSANDS/UL (ref 149–390)
PMV BLD AUTO: 10.7 FL (ref 8.9–12.7)
POTASSIUM SERPL-SCNC: 3.8 MMOL/L (ref 3.5–5.3)
PROT PATTERN SERPL ELPH-IMP: ABNORMAL
PROT SERPL-MCNC: 6.2 G/DL (ref 6.4–8.2)
PROT SERPL-MCNC: 6.4 G/DL (ref 6.4–8.2)
RBC # BLD AUTO: 3.16 MILLION/UL (ref 3.81–5.12)
SARS-COV-2 RNA RESP QL NAA+PROBE: NEGATIVE
SODIUM SERPL-SCNC: 141 MMOL/L (ref 136–145)
WBC # BLD AUTO: 7.16 THOUSAND/UL (ref 4.31–10.16)

## 2021-05-26 PROCEDURE — U0003 INFECTIOUS AGENT DETECTION BY NUCLEIC ACID (DNA OR RNA); SEVERE ACUTE RESPIRATORY SYNDROME CORONAVIRUS 2 (SARS-COV-2) (CORONAVIRUS DISEASE [COVID-19]), AMPLIFIED PROBE TECHNIQUE, MAKING USE OF HIGH THROUGHPUT TECHNOLOGIES AS DESCRIBED BY CMS-2020-01-R: HCPCS | Performed by: PHYSICIAN ASSISTANT

## 2021-05-26 PROCEDURE — 97116 GAIT TRAINING THERAPY: CPT

## 2021-05-26 PROCEDURE — 85025 COMPLETE CBC W/AUTO DIFF WBC: CPT | Performed by: PHYSICIAN ASSISTANT

## 2021-05-26 PROCEDURE — 99232 SBSQ HOSP IP/OBS MODERATE 35: CPT | Performed by: INTERNAL MEDICINE

## 2021-05-26 PROCEDURE — 80053 COMPREHEN METABOLIC PANEL: CPT | Performed by: PHYSICIAN ASSISTANT

## 2021-05-26 PROCEDURE — 99232 SBSQ HOSP IP/OBS MODERATE 35: CPT | Performed by: NURSE PRACTITIONER

## 2021-05-26 PROCEDURE — 97530 THERAPEUTIC ACTIVITIES: CPT

## 2021-05-26 PROCEDURE — 94760 N-INVAS EAR/PLS OXIMETRY 1: CPT

## 2021-05-26 PROCEDURE — U0005 INFEC AGEN DETEC AMPLI PROBE: HCPCS | Performed by: PHYSICIAN ASSISTANT

## 2021-05-26 PROCEDURE — 99238 HOSP IP/OBS DSCHRG MGMT 30/<: CPT | Performed by: PHYSICIAN ASSISTANT

## 2021-05-26 PROCEDURE — 94761 N-INVAS EAR/PLS OXIMETRY MLT: CPT

## 2021-05-26 PROCEDURE — 82550 ASSAY OF CK (CPK): CPT | Performed by: PHYSICIAN ASSISTANT

## 2021-05-26 RX ORDER — TRAMADOL HYDROCHLORIDE 50 MG/1
50 TABLET ORAL 2 TIMES DAILY PRN
Qty: 10 TABLET | Refills: 0 | Status: SHIPPED | OUTPATIENT
Start: 2021-05-26

## 2021-05-26 RX ORDER — AMMONIUM LACTATE 12 G/100G
LOTION TOPICAL 2 TIMES DAILY
Qty: 400 G | Refills: 0
Start: 2021-05-26

## 2021-05-26 RX ORDER — NYSTATIN 100000 [USP'U]/G
POWDER TOPICAL 2 TIMES DAILY
Qty: 15 G | Refills: 0
Start: 2021-05-26

## 2021-05-26 RX ORDER — GABAPENTIN 100 MG/1
100 CAPSULE ORAL 2 TIMES DAILY
Refills: 0
Start: 2021-05-26

## 2021-05-26 RX ADMIN — FUROSEMIDE 80 MG: 80 TABLET ORAL at 08:51

## 2021-05-26 RX ADMIN — Medication: at 08:51

## 2021-05-26 RX ADMIN — GABAPENTIN 100 MG: 100 CAPSULE ORAL at 08:51

## 2021-05-26 RX ADMIN — ROPINIROLE HYDROCHLORIDE 2 MG: 1 TABLET, FILM COATED ORAL at 08:51

## 2021-05-26 RX ADMIN — NYSTATIN: 100000 POWDER TOPICAL at 08:51

## 2021-05-26 NOTE — PROGRESS NOTES
Progress Note - Pulmonary   Rina Miramontes 80 y o  female MRN: [id number]  Unit/Bed#: 421-01 Encounter: [id number]    Assessment/Plan:    1  Acute hypoxic respiratory failure  1  With worsening respiratory distress 5/24/21 requiring BiPAP- now resolved and on room air  2  Titrate oxygen maintain SpO2 greater than or equal to 88%  3  Pulmonary toilet:  Increase activity as tolerated, out of bed as tolerated, cough and deep breathing as encouraged, incentive spirometry q 1 hour  4  No need for BiPAP at this time  Use on prn  5   would recommend home O2 eval prior to discharge  2  Chronic combined systolic and diastolic CHF and s/p AVR 2861 now with AI   1  CXR  05/24/2021 with slightly worsened pulmonary edema and atelectasis in left base  2  Continue home Lasix per primary team   3  Recommend checking NT-BNP prior to further adjustments of diuretics  4  Monitor I/Os and daily weights   5  Recommend low sodium diet and mild fluid restriction   6  Discussed results of most recent echo- recommend that she follow up with Dr Seferino Holloway   3  Pulmonary hypertension  1  Likely WHO group 2 based on history but should patient want to proceed with aggressive diagnostics/interventions would recommend a full work up including serologies, PFTs, V/Q scan, right heart catheterization  2  Continue supplemental O2   4  PRASHANT superimposed on CKD stage 3      1  Nephrology following- treatment per their recommendations   5  Bilateral lower extremity cellulitis   1  ABX per primary team   2  Wound care following    Informal goals of care discussion between patient and myself today  I reviewed her medical history including chronic problems and those more acute problems keeping her in the hospital  Ultimately, patient wishes to go home   She made it clear that she wants to remain a DNI/DNR but should her condition worsen she would still want to come to the hospital  In terms of aggressive diagnostics, she is unsure of what she wants at this time  Recommend more formal goals of care discussion with family present as well and possible referral to palliative care at discharge  Patient is stable from a pulmonary standpoint at this time  Will sign off  Call with questions  Chief Complaint:    "I feel lousy "    Subjective:    Patient was seen examined today  No overnight events reported by nursing staff however patient reports sleeping very poorly last night  When I entered the room today she is seen sleeping on room air no acute distress  She wakes up easily stating that she feels lousy and very tired today  She tells me that her breathing is fine  She reports a chronic cough that she has had for many years and is unchanged acutely  Denies wheeze or chest tightness  No chest pain or palpitations presently  Denies fevers or chills  Her legs remain swollen with increased erythema and extremely tender to touch  Objective:    Vitals: Blood pressure 139/74, pulse 95, temperature 97 5 °F (36 4 °C), resp  rate 17, height 4' 10" (1 473 m), weight 63 6 kg (140 lb 3 4 oz), SpO2 100 %, not currently breastfeeding  room air,Body mass index is 29 3 kg/m²  Intake/Output Summary (Last 24 hours) at 5/26/2021 1208  Last data filed at 5/26/2021 0601  Gross per 24 hour   Intake 200 ml   Output 400 ml   Net -200 ml       Invasive Devices     Peripheral Intravenous Line            Peripheral IV 05/24/21 Right;Lateral Hand 1 day          Drain            External Urinary Catheter 1 day                Physical Exam:     Physical Exam  Vitals signs and nursing note reviewed  Constitutional:       General: She is not in acute distress  Appearance: Normal appearance  HENT:      Head: Normocephalic and atraumatic  Right Ear: External ear normal       Left Ear: External ear normal       Nose: Nose normal       Mouth/Throat:      Mouth: Mucous membranes are moist       Pharynx: Oropharynx is clear     Eyes:      Extraocular Movements: Extraocular movements intact  Conjunctiva/sclera: Conjunctivae normal       Pupils: Pupils are equal, round, and reactive to light  Neck:      Musculoskeletal: Normal range of motion and neck supple  No muscular tenderness  Cardiovascular:      Rate and Rhythm: Normal rate and regular rhythm  Pulses: Normal pulses  Heart sounds: No murmur  Pulmonary:      Effort: Pulmonary effort is normal  No respiratory distress  Breath sounds: No wheezing, rhonchi or rales  Comments: Diminished but otherwise clear to auscultation without rhonchi, rales, wheeze  Abdominal:      General: Bowel sounds are normal       Palpations: Abdomen is soft  There is no mass  Tenderness: There is no abdominal tenderness  Hernia: No hernia is present  Musculoskeletal: Normal range of motion  General: No tenderness or deformity  Right lower leg: Edema present  Left lower leg: Edema present  Skin:     General: Skin is warm and dry  Findings: Erythema (bilateral LLE) present  Neurological:      General: No focal deficit present  Mental Status: She is alert and oriented to person, place, and time  Mental status is at baseline  Psychiatric:         Mood and Affect: Mood normal          Behavior: Behavior normal          Thought Content: Thought content normal          Judgment: Judgment normal          Labs: I have personally reviewed pertinent lab results  , ABG: No results found for: PHART, VRN0XYY, PO2ART, ZUX0ICW, F1MTVHWS, BEART, SOURCE, BNP: No results found for: BNP, CBC:   Lab Results   Component Value Date    WBC 7 16 05/26/2021    HGB 9 9 (L) 05/26/2021    HCT 30 6 (L) 05/26/2021    MCV 97 05/26/2021     05/26/2021    MCH 31 3 05/26/2021    MCHC 32 4 05/26/2021    RDW 14 6 05/26/2021    MPV 10 7 05/26/2021    NRBC 0 05/26/2021   , CMP:   Lab Results   Component Value Date    SODIUM 141 05/26/2021    K 3 8 05/26/2021     05/26/2021    CO2 27 05/26/2021    BUN 64 (H) 05/26/2021    CREATININE 1 86 (H) 05/26/2021    CALCIUM 8 8 05/26/2021    AST 29 05/26/2021    ALT 32 05/26/2021    ALKPHOS 98 05/26/2021    EGFR 24 05/26/2021   , PT/INR: No results found for: PT, INR, Troponin: No results found for: TROPONINI    Imaging and other studies: none to review today

## 2021-05-26 NOTE — PHYSICAL THERAPY NOTE
PHYSICAL THERAPY NOTE          Patient Name: Mary Ann Davila  [id number] Date: 5/26/2021 05/26/21 1059   Note Type   Note Type Treatment   Restrictions/Precautions   Weight Bearing Precautions Per Order No   Other Precautions Multiple lines; Fall Risk;Bed Alarm; Chair Alarm   Cognition   Overall Cognitive Status Impaired   Following Commands Follows one step commands with increased time or repetition   Subjective   Subjective c/o weakness and fatigue  Bed Mobility   Supine to Sit 4  Minimal assistance   Additional items Assist x 1;HOB elevated; Increased time required;Verbal cues   Transfers   Sit to Stand 4  Minimal assistance   Additional items Assist x 1; Increased time required;Verbal cues   Stand to Sit 4  Minimal assistance   Additional items Assist x 1; Armrests; Increased time required;Verbal cues   Stand pivot 4  Minimal assistance   Additional items Verbal cues   Ambulation/Elevation   Gait pattern Excessively slow; Short stride; Foward flexed; Improper Weight shift;Decreased foot clearance   Gait Assistance 4  Minimal assist   Additional items Assist x 1;Verbal cues   Assistive Device Rolling walker   Distance 8'   Balance   Static Sitting Fair +   Dynamic Sitting Fair +   Static Standing Fair -   Dynamic Standing Poor +   Ambulatory Poor +  (RW)   Endurance Deficit   Endurance Deficit Yes   Activity Tolerance   Activity Tolerance Patient limited by fatigue;Patient limited by pain   Assessment   Prognosis Fair   Problem List Decreased strength;Decreased endurance; Impaired balance;Decreased mobility; Impaired judgement;Decreased safety awareness;Pain   Assessment Pt  seen for PT treatment session this date with interventions consisting of  bed mobility, transfers and  gait training w/ emphasis on improving pt's ability to ambulate  Pt  Currently performing bed mobility,  tx and ambulation at (min ) x 1 level of function   The patient's AM-PAC Basic Mobility Inpatient Short Form Raw Score is 15, Standardized Score is 36 97  A standardized score less than 42 9 suggests the patient may benefit from discharge to post-acute rehabilitation services  Please also refer to physical therapy recommendation for safe DC planning  In comparison to previous session, Pt  With no in activity tolerance  Ambulation limited by weakness and fatigue  Excessivley slow ambulation, increasing risk for falls  Pt is in need of continued activity in PT to improve strength balance endurance mobility transfers and ambulation with return to maximize LOF  From PT/mobility standpoint, recommendation at time of d/c would be post acute rehab  in order to promote return to PLOF and independence  Goals   LTG Expiration Date 05/31/21   Plan   Treatment/Interventions Functional transfer training;LE strengthening/ROM; Therapeutic exercise; Endurance training;Bed mobility;Gait training   Progress Slow progress, decreased activity tolerance   Recommendation   PT Discharge Recommendation Post acute rehabilitation services   AM-[facility] Basic Mobility Inpatient   Turning in Bed Without Bedrails 3   Lying on Back to Sitting on Edge of Flat Bed 2   Moving Bed to Chair 3   Standing Up From Chair 3   Walk in Room 2   Climb 3-5 Stairs 2   Basic Mobility Inpatient Raw Score 15   Basic Mobility Standardized Score 36 97   Pt  OOB in chair  with call bell within reach, all lines intact and alarm on at end of PT session  Discussed with  PT today's treatment and patient's current level of function for care coordination

## 2021-05-26 NOTE — PLAN OF CARE
Problem: PHYSICAL THERAPY ADULT  Goal: Performs mobility at highest level of function for planned discharge setting  See evaluation for individualized goals  Description: Treatment/Interventions: Functional transfer training, LE strengthening/ROM, Therapeutic exercise, Endurance training, Bed mobility, Gait training          See flowsheet documentation for full assessment, interventions and recommendations  Outcome: Progressing  Note: Prognosis: Fair  Problem List: Decreased strength, Decreased endurance, Impaired balance, Decreased mobility, Impaired judgement, Decreased safety awareness, Pain  Assessment: Pt  seen for PT treatment session this date with interventions consisting of  bed mobility, transfers and  gait training w/ emphasis on improving pt's ability to ambulate  Pt  Currently performing bed mobility,  tx and ambulation at (min ) x 1 level of function  The patient's AM-PAC Basic Mobility Inpatient Short Form Raw Score is 15, Standardized Score is 36 97  A standardized score less than 42 9 suggests the patient may benefit from discharge to post-acute rehabilitation services  Please also refer to physical therapy recommendation for safe DC planning  In comparison to previous session, Pt  With no in activity tolerance  Ambulation limited by weakness and fatigue  Excessivley slow ambulation, increasing risk for falls  Pt is in need of continued activity in PT to improve strength balance endurance mobility transfers and ambulation with return to maximize LOF  From PT/mobility standpoint, recommendation at time of d/c would be post acute rehab  in order to promote return to PLOF and independence  PT Discharge Recommendation: Post acute rehabilitation services          See flowsheet documentation for full assessment

## 2021-05-26 NOTE — ASSESSMENT & PLAN NOTE
Wt Readings from Last 3 Encounters:   05/26/21 63 6 kg (140 lb 3 4 oz)   03/19/21 68 kg (150 lb)   11/20/20 65 3 kg (144 lb)       Patient with bioprosthetic aortic valve, reduced EF on recent echocardiogram   CXR 5/20 - There is now mild increased vascular prominence suspicious for mild CHF  Patient given an additional IV lasix 40mg x 1 5/21  Renal function stable, restarted home dose lasix of 80 mg po daily 5/23  Concern for volume overload with acute respiratory failure requiring Bipap on 5/24 - received IV lasix 40mg x 1 dose  5/25 - appears euvolemic at present, continue PO Lasix  Patient continued to appear euvolemic and weaned off oxygen on day of discharge

## 2021-05-26 NOTE — CASE MANAGEMENT
Pt is being discharged today  Pt is going to [facility]  I called patient's son Aliza Hayes and notified him that patient is being picked at 3pm to be transported to the [facility]  Pt surjit has a follow up appointment with cardiology  I in basket messaged patient's PCP that patient is going to [facility] at the [facility]  AVS Sent with patient

## 2021-05-26 NOTE — PROGRESS NOTES
NEPHROLOGY PROGRESS NOTE   Herber Pallas 80 y o  female MRN: [id number]  Unit/Bed#: 421-01 Encounter: [id number]    Assessment/Plan:    79 yo female admitted 5/15/21 for weakness being treated for acute hypoxic respiratory failure, chronic combined systolic and diastolic CHF  Renal following for PRASHANT on CKD  1  Acute kidney injury (POA) atop chronic kidney disease  · Presented with sCr 2 46 mg/dL -> 1 86 mg/dL today  Etiology ATN per urine chemistries  Initially treated with volume expansion then diuresed  Okay to continue home diuresis  As far as ATN simply continue provide supportive care and hold lisinopril  Some urinary retention noted this AM requiring ISC- continue to periodically monitor for urinary retention  2  Stage 3b chronic kidney disease with baseline creatinine 1 6-1 8 mg/dL  3  Chronic combined systolic and diastolic congestive heart failure  · On oral diuretics  Does have lower extremity edema  Continue low sodium diet, daily weights, IO    4  Urinary retention  · Required ISC this AM- monitor closely for urinary retention and follow protocol  May require urology evaluation in outpatient setting  5  Bilateral lower extremity cellulitis, venous stasis  · Cellulitis resolved per podiatrist  Not on amlodipine   6  Iron deficiency anemia  · Would benefit from iron infusion in the outpatient setting when not infected  Per chart review- Patient cannot tolerate oral iron   7  Hypertensive nephrosclerosis, likely   · Continue to hold lisinopril      ROS  No physical complaints  States its not her best day  A complete 10 point review of systems have been performed and are otherwise negative         Historical Information   Past Medical History:   Diagnosis Date    A-fib [facility])     Aortic insufficiency     Arthritis     L shoulder,fingers    Cardiac disease     valve replacement    Carpal tunnel syndrome, bilateral     Chronic anemia     Chronic diastolic congestive heart failure ([address] ) 3/19/2021  Chronic lower back pain     DDD (degenerative disc disease), lumbar     Dropfoot     right    Edema     History of echocardiogram 04/20/2016    EF 75%, Hyperdynamic LVSF  Mild concentric LVH  Normal functioning bioprosthetic AV  Trace MR  Mild pulm htn   HTN (hypertension)     Hyperlipidemia     Hypertension     Inguinal hernia, left     Insomnia     Lymphedema     Parkinson disease (HCC)     Peripheral neuropathy     Phlebitis     Psoriasis     Renal insufficiency     Grade 1    Restless leg syndrome     Rhabdomyolysis 8/23/2016    Right bundle branch block     Senile keratosis     Spinal stenosis     Stasis ulcer of right lower extremity ([address] )     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    Stroke ([address] )     Supraventricular tachycardia ([address] )     Vitamin D deficiency      Past Surgical History:   Procedure Laterality Date    AORTIC VALVE REPLACEMENT  11/25/2002    Tissue AVR #21    AORTIC VALVULOPLASTY  11/25/2002    Bovine Pericardial heart valve    BACK SURGERY      CARDIAC CATHETERIZATION  11/22/2002    Sever critical Aortic stenosis  Pulm Htn  Normal coronary arteries      HAND SURGERY      right hand    HYSTERECTOMY      ROTATOR CUFF REPAIR Right     VASCULAR SURGERY      bilateral vein ligation & stripping     Social History   Social History     Substance and Sexual Activity   Alcohol Use Never    Frequency: Never     Social History     Substance and Sexual Activity   Drug Use No     Social History     Tobacco Use   Smoking Status Never Smoker   Smokeless Tobacco Never Used       Family History:   Family History   Problem Relation Age of Onset    No Known Problems Mother     No Known Problems Father     No Known Problems Sister     No Known Problems Brother     Hyperlipidemia Son     Hypertension Son     No Known Problems Sister     No Known Problems Brother     Hyperlipidemia Son     Hypertension Son     Hyperlipidemia Son     Hypertension Son Medications:  Pertinent medications were reviewed  Current Facility-Administered Medications   Medication Dose Route Frequency Provider Last Rate    acetaminophen  650 mg Oral Q6H PRN Atilio Dugan DO      ammonium lactate   Topical BID Floridalma Rapp MD      cefTRIAXone  1,000 mg Intravenous Q24H Margarette Dietz PA-C 1,000 mg (05/25/21 1719)    furosemide  80 mg Oral Daily Cory Boas, PA-C      gabapentin  100 mg Oral BID Floridalma Rapp MD      nystatin   Topical BID Margarette Dietz PA-C      ondansetron  4 mg Intravenous Q6H PRN Atilio Dugan DO      rOPINIRole  2 mg Oral BID Atilio Dugan DO      warfarin  5 mg Oral Daily (warfarin) Floridalma Rapp MD           Allergies   Allergen Reactions    Baclofen      Patient developed acute encephalopathy on dosing of 10 mg 3 times a day, patient could possibly be tolerant of smaller doses, would advise cautious use in the future or no use at all         Vitals:   /74   Pulse 95   Temp 97 5 °F (36 4 °C)   Resp 17   Ht 4' 10" (1 473 m)   Wt 63 6 kg (140 lb 3 4 oz)   LMP  (LMP Unknown)   SpO2 100%   BMI 29 30 kg/m²   Body mass index is 29 3 kg/m²    SpO2: 100 %,   SpO2 Activity: During Exercise,   O2 Device: None (Room air)      Intake/Output Summary (Last 24 hours) at 5/26/2021 1438  Last data filed at 5/26/2021 1418  Gross per 24 hour   Intake 200 ml   Output 700 ml   Net -500 ml     Invasive Devices     Peripheral Intravenous Line            Peripheral IV 05/24/21 Right;Lateral Hand 1 day          Drain            External Urinary Catheter 1 day                Physical Exam  General: conscious, cooperative, in no acute distress, on oxygen, chronically ill appearing   Eyes: conjunctivae pink, anicteric sclerae  ENT: lips and mucous membranes moist  Neck: supple, no JVD, no masses  Chest: diminished breath sounds bilateral, no crackles, ronchus or wheezings  CVS: S1 & S2, normal rate, regular rhythm  Abdomen: soft, non-tender, non-distended, normoactive bowel sounds  Extremities: moderate to severe edema of both legs  Skin: no rash  Neuro: awake, alert, oriented to person      Diagnostic Data:  Lab: I have personally reviewed pertinent lab results  ,   CBC:  Results from last 7 days   Lab Units 05/26/21  0515   WBC Thousand/uL 7 16   HEMOGLOBIN g/dL 9 9*   HEMATOCRIT % 30 6*   PLATELETS Thousands/uL 181      CMP:   Lab Results   Component Value Date    SODIUM 141 05/26/2021    K 3 8 05/26/2021     05/26/2021    CO2 27 05/26/2021    BUN 64 (H) 05/26/2021    CREATININE 1 86 (H) 05/26/2021    CALCIUM 8 8 05/26/2021    AST 29 05/26/2021    ALT 32 05/26/2021    ALKPHOS 98 05/26/2021    EGFR 24 05/26/2021   ,   PT/INR: No results found for: PT, INR,   Magnesium: No components found for: MAG,  Phosphorous: No results found for: PHOS    Microbiology:  @LABRCNTIP,(urinecx:7)@        GRISELDA Valle    Portions of the record may have been created with voice recognition software  Occasional wrong word or "sound a like" substitutions may have occurred due to the inherent limitations of voice recognition software  Read the chart carefully and recognize, using context, where substitutions have occurred

## 2021-05-26 NOTE — ASSESSMENT & PLAN NOTE
In the setting of chronic CHF, pulmonary hypertension  Patient placed on Bipap - needed temporarily evening of 5/24 and overnight  Patient was then weaned off and placed on 2 L O2  On day of discharge the patient did not qualify for oxygen

## 2021-05-26 NOTE — DISCHARGE SUMMARY
[facility] [address]  Discharge- Miriam Ours 7/3/1931, 80 y o  female MRN: [id number]  Unit/Bed#: 389-79 Encounter: [id number]  Primary Care Provider: Ronda Kat DO   Date and time admitted to hospital: 5/15/2021  9:38 AM    * Acute encephalopathy  Assessment & Plan  Acute metabolic encephalopathy, present on admission, as evidenced by increased confusion, visual hallucinations per the family, likely secondary to uremia in the setting of acute kidney injury, cellulitis  CT head 5/25/21 - "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, left parietal lobe and old lacunar infarcts within the basal ganglia  Stable chronic microangiopathic change "    resolved     Chronic combined systolic and diastolic CHF (congestive heart failure) ([facility])  Assessment & Plan  Wt Readings from Last 3 Encounters:   05/26/21 63 6 kg (140 lb 3 4 oz)   03/19/21 68 kg (150 lb)   11/20/20 65 3 kg (144 lb)       Patient with bioprosthetic aortic valve, reduced EF on recent echocardiogram   CXR 5/20 - There is now mild increased vascular prominence suspicious for mild CHF  Patient given an additional IV lasix 40mg x 1 5/21  Renal function stable, restarted home dose lasix of 80 mg po daily 5/23  Concern for volume overload with acute respiratory failure requiring Bipap on 5/24 - received IV lasix 40mg x 1 dose  5/25 - appears euvolemic at present, continue PO Lasix  Patient continued to appear euvolemic and weaned off oxygen on day of discharge      Acute kidney injury superimposed on chronic kidney disease [facility])  Assessment & Plan  Lab Results   Component Value Date    EGFR 24 05/26/2021    EGFR 22 05/25/2021    EGFR 24 05/24/2021    CREATININE 1 86 (H) 05/26/2021    CREATININE 1 96 (H) 05/25/2021    CREATININE 1 86 (H) 05/24/2021     Received IV fluids initially, then noted patient with mild shortness of breath, then patient started on to IV diuresis with some improvement in creatinine  Appreciate nephrology consultation - "Initial high creatinine was probably due to metolazone 2 5, furosemide 80 mg daily and the use of lisinopril 2 5 affecting renal auto regulation  Metolazone should be avoided"  Currently volume status is difficult to   Renal ultrasound - "Technically limited study  Evaluation of the left kidney is markedly limited  The kidneys are slightly small bilaterally  The right kidney demonstrates slightly increased cortical echogenicity without hydronephrosis "    Bilateral lower leg cellulitis  Assessment & Plan  +3 to 4 edema bilateral lower extremities on admission with diffuse erythema  With increasing warmth and low grade fever  IV Rocephin, switched to po Keflex 5/22,  Patient was changed back to IV rocephin for concerns of worsening cellulitis  Podiatry consultation appreciated cellulitis resolved  Patient received 9 day course of antibiotics  No additional antibiotics at discharge  Outpatient follow-up with wound care center as needed      Anemia  Assessment & Plan  Hemoglobin stable, around 10-11    Atrial fibrillation, chronic  Assessment & Plan  Rate controlled  Anticoagulated on coumadin  Acute respiratory failure with hypoxia (HCC)  Assessment & Plan  In the setting of chronic CHF, pulmonary hypertension  Patient placed on Bipap - needed temporarily evening of 5/24 and overnight  Patient was then weaned off and placed on 2 L O2  On day of discharge the patient did not qualify for oxygen  Hyperlipidemia  Assessment & Plan  Statin  held  Restart on discharge with resolution of transamitis     Restless leg syndrome  Assessment & Plan  Continue Requip  Elevated troponin  Assessment & Plan  Non MI troponin elevation  CPK level not significantly elevated  S/P AVR  Assessment & Plan  History of bioprosthetic aortic valve, which is likely failing per recent cardiology notes from 4/2021  Discussed with patient and son at bedside  May be candidate for TAVR vs  Hospice if not interested in surgical intervention  Currently patient is open to all possibilities  Continue outpatient follow-up with Cardiology  Discharging Physician / Practitioner: Jody Rosario PA-C  PCP: Cierra Kwon DO  Admission Date:   Admission Orders (From admission, onward)     Ordered        05/15/21 1219  Inpatient Admission  Once                   Discharge Date: 05/26/21    Resolved Problems  Date Reviewed: 5/26/2021    None          Consultations During Hospital Stay:  · Nephrology  · Pulmonology  · Podiatry    Procedures Performed:   · none    Significant Findings / Test Results:   · CT head 5/25/21  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, left parietal lobe and old lacunar infarcts within the basal ganglia   Stable chronic microangiopathic change  · US Renal/Bladder 5/25/2021  Technically limited study   Evaluation of the left kidney is markedly limited  The kidneys are slightly small bilaterally   The right kidney demonstrates slightly increased cortical echogenicity without hydronephrosis  · Chest x-ray 5/25/21  Small left effusion and left basilar opacity suggestive of subsegmental atelectasis  · Chest x-ray 5/20/21  Stable cardiomegaly  Ami Reap is now mild increased vascular prominence suspicious for mild CHF  Incidental Findings:   · none     Test Results Pending at Discharge (will require follow up):   · none     Outpatient Tests Requested:  · none    Complications:  none    Reason for Admission: PRASHANT    Hospital Course:     Chantel Dorsey is a 80 y o  female patient who originally presented to the hospital on 5/15/2021 due to generalized weakness   The patient has a past medical history of CVA, CHF, atrial fibrillation anticoagulated with Coumadin, aortic insufficiency status post bioprosthetic AVR in 2002, CKD, hypertension, hyperlipidemia, and peripheral edema who is presenting for evaluation of generalized weakness, shakiness, and confusion  The patient is a very poor historian  She reports feeling shaky over the past few weeks  She does report feeling generalized weakness but denies any focal weakness  No speech difficulty, swallowing difficulty, changes, chest pain, shortness of breath, nausea, vomiting, or abdominal pain  Urinary symptoms  She also is seeming more confused  The patient was having some hallucinations this morning which consisted of seeing people in her house that were not really there  The patient last saw Cardiology on April 29th  She was started on lisinopril and metolazone  That note mentioned chronic peripheral edema which was unchanged  Please see above list of diagnoses and related plan for additional information  Condition at Discharge: good     Discharge Day Visit / Exam:     Subjective:    Vitals: Blood Pressure: 139/74 (05/25/21 2238)  Pulse: 95 (05/25/21 2238)  Temperature: 97 5 °F (36 4 °C) (05/25/21 2238)  Temp Source: Temporal (05/24/21 2322)  Respirations: 17 (05/25/21 2238)  Height: 4' 10" (147 3 cm) (05/15/21 0945)  Weight - Scale: 63 6 kg (140 lb 3 4 oz) (05/26/21 0600)  SpO2: 100 % (05/25/21 2238)  Exam:   Physical Exam  Vitals signs and nursing note reviewed  Constitutional:       Appearance: Normal appearance  HENT:      Head: Normocephalic and atraumatic  Cardiovascular:      Rate and Rhythm: Normal rate  Pulmonary:      Effort: Pulmonary effort is normal       Breath sounds: Normal breath sounds  No wheezing or rales  Abdominal:      General: Bowel sounds are normal  There is no distension  Palpations: Abdomen is soft  Skin:     General: Skin is warm and dry  Neurological:      General: No focal deficit present  Mental Status: She is alert  Mental status is at baseline  Discharge instructions/Information to patient and family:   See after visit summary for information provided to patient and family        Provisions for Follow-Up Care:  See after visit summary for information related to follow-up care and any pertinent home health orders  Disposition:     Beatriz Bowers (see below)    For Discharges to [facility]:   · [facility] - Not Applicable to this Patient    Planned Readmission: no     Discharge Statement:  I spent 25 minutes discharging the patient  This time was spent on the day of discharge  I had direct contact with the patient on the day of discharge  Greater than 50% of the total time was spent examining patient, answering all patient questions, arranging and discussing plan of care with patient as well as directly providing post-discharge instructions  Additional time then spent on discharge activities  Discharge Medications:  See after visit summary for reconciled discharge medications provided to patient and family        ** Please Note: This note has been constructed using a voice recognition system **

## 2021-05-26 NOTE — RESPIRATORY THERAPY NOTE
Home Oxygen Qualifying Test       Patient name: Walter Egan        : 7/3/1931   Date of Test:  May 26, 2021  Diagnosis: acute encphalopathy     Home Oxygen Test:    **Medicare Guidelines require item(s) 1-5 on all ambulatory patients or 1 and 2 on non-ambulatory patients  1   Baseline SPO2 on Room Air at rest 97%  2   SPO2 during exercise on Room Air 94%  During exercise monitor SpO2  If SPO2 increases >=89% with ambulation do not add supplemental             oxygen  If <= 88% on room air add O2 via NC and titrate patient  Patient must be ambulated with O2 and titrated to > 88% with exertion  3   SPO2 on Oxygen at rest na % na lpm     4   SPO2 during exercise on Oxygen  na% a liter flow of na lpm     5   Exercise performed:          walking, duration 15 (min), distance 33 (feet)          []  Supplemental Home Oxygen is indicated  [x]  Client does not qualify for home oxygen  Respiratory Additional Notes- pt ambulated with walker, CNA, and resp therapist  At approx 8 ft pt had to use the bedside commode and then resumed walk, pt walk very slow with C/O feet pain       Lucille Urias, RT

## 2021-05-26 NOTE — ASSESSMENT & PLAN NOTE
Acute metabolic encephalopathy, present on admission, as evidenced by increased confusion, visual hallucinations per the family, likely secondary to uremia in the setting of acute kidney injury, cellulitis  CT head 5/25/21 - "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, left parietal lobe and old lacunar infarcts within the basal ganglia    Stable chronic microangiopathic change "    resolved

## 2021-05-26 NOTE — ASSESSMENT & PLAN NOTE
Lab Results   Component Value Date    EGFR 24 05/26/2021    EGFR 22 05/25/2021    EGFR 24 05/24/2021    CREATININE 1 86 (H) 05/26/2021    CREATININE 1 96 (H) 05/25/2021    CREATININE 1 86 (H) 05/24/2021     Received IV fluids initially, then noted patient with mild shortness of breath, then patient started on to IV diuresis with some improvement in creatinine  Appreciate nephrology consultation - "Initial high creatinine was probably due to metolazone 2 5, furosemide 80 mg daily and the use of lisinopril 2 5 affecting renal auto regulation  Metolazone should be avoided"  Currently volume status is difficult to   Renal ultrasound - "Technically limited study  Evaluation of the left kidney is markedly limited  The kidneys are slightly small bilaterally    The right kidney demonstrates slightly increased cortical echogenicity without hydronephrosis "

## 2021-05-26 NOTE — PLAN OF CARE
Problem: Potential for Falls  Goal: Patient will remain free of falls  Description: INTERVENTIONS:  - Assess patient frequently for physical needs  -  Identify cognitive and physical deficits and behaviors that affect risk of falls    -  [place] fall precautions as indicated by assessment   - Educate patient/family on patient safety including physical limitations  - Instruct patient to call for assistance with activity based on assessment  - Modify environment to reduce risk of injury  - Consider OT/PT consult to assist with strengthening/mobility  Outcome: Progressing     Problem: Prexisting or High Potential for Compromised Skin Integrity  Goal: Skin integrity is maintained or improved  Description: INTERVENTIONS:  - Identify patients at risk for skin breakdown  - Assess and monitor skin integrity  - Assess and monitor nutrition and hydration status  - Monitor labs   - Assess for incontinence   - Turn and reposition patient  - Assist with mobility/ambulation  - Relieve pressure over bony prominences  - Avoid friction and shearing  - Provide appropriate hygiene as needed including keeping skin clean and dry  - Evaluate need for skin moisturizer/barrier cream  - Collaborate with interdisciplinary team   - Patient/family teaching  - Consider wound care consult   Outcome: Progressing     Problem: PAIN - ADULT  Goal: Verbalizes/displays adequate comfort level or baseline comfort level  Description: Interventions:  - Encourage patient to monitor pain and request assistance  - Assess pain using appropriate pain scale  - Administer analgesics based on type and severity of pain and evaluate response  - Implement non-pharmacological measures as appropriate and evaluate response  - Consider cultural and social influences on pain and pain management  - Notify physician/advanced practitioner if interventions unsuccessful or patient reports new pain  Outcome: Progressing     Problem: INFECTION - ADULT  Goal: Absence or prevention of progression during hospitalization  Description: INTERVENTIONS:  - Assess and monitor for signs and symptoms of infection  - Monitor lab/diagnostic results  - Monitor all insertion sites, i e  indwelling lines, tubes, and drains  - Monitor endotracheal if appropriate and nasal secretions for changes in amount and color  - [place] appropriate cooling/warming therapies per order  - Administer medications as ordered  - Instruct and encourage patient and family to use good hand hygiene technique  - Identify and instruct in appropriate isolation precautions for identified infection/condition  Outcome: Progressing  Goal: Absence of fever/infection during neutropenic period  Description: INTERVENTIONS:  - Monitor WBC    Outcome: Progressing     Problem: SAFETY ADULT  Goal: Patient will remain free of falls  Description: INTERVENTIONS:  - Assess patient frequently for physical needs  -  Identify cognitive and physical deficits and behaviors that affect risk of falls    -  [place] fall precautions as indicated by assessment   - Educate patient/family on patient safety including physical limitations  - Instruct patient to call for assistance with activity based on assessment  - Modify environment to reduce risk of injury  - Consider OT/PT consult to assist with strengthening/mobility  Outcome: Progressing  Goal: Maintain or return to baseline ADL function  Description: INTERVENTIONS:  -  Assess patient's ability to carry out ADLs; assess patient's baseline for ADL function and identify physical deficits which impact ability to perform ADLs (bathing, care of mouth/teeth, toileting, grooming, dressing, etc )  - Assess/evaluate cause of self-care deficits   - Assess range of motion  - Assess patient's mobility; develop plan if impaired  - Assess patient's need for assistive devices and provide as appropriate  - Encourage maximum independence but intervene and supervise when necessary  - Involve family in performance of ADLs  - Assess for home care needs following discharge   - Consider OT consult to assist with ADL evaluation and planning for discharge  - Provide patient education as appropriate  Outcome: Progressing  Goal: Maintain or return mobility status to optimal level  Description: INTERVENTIONS:  - Assess patient's baseline mobility status (ambulation, transfers, stairs, etc )    - Identify cognitive and physical deficits and behaviors that affect mobility  - Identify mobility aids required to assist with transfers and/or ambulation (gait belt, sit-to-stand, lift, walker, cane, etc )  - [place] fall precautions as indicated by assessment  - Record patient progress and toleration of activity level on Mobility SBAR; progress patient to next Phase/Stage  - Instruct patient to call for assistance with activity based on assessment  - Consider rehabilitation consult to assist with strengthening/weightbearing, etc   Outcome: Progressing     Problem: DISCHARGE PLANNING  Goal: Discharge to home or other facility with appropriate resources  Description: INTERVENTIONS:  - Identify barriers to discharge w/patient and caregiver  - Arrange for needed discharge resources and transportation as appropriate  - Identify discharge learning needs (meds, wound care, etc )  - Arrange for interpretive services to assist at discharge as needed  - Refer to Case Management Department for coordinating discharge planning if the patient needs post-hospital services based on physician/advanced practitioner order or complex needs related to functional status, cognitive ability, or social support system  Outcome: Progressing     Problem: Knowledge Deficit  Goal: Patient/family/caregiver demonstrates understanding of disease process, treatment plan, medications, and discharge instructions  Description: Complete learning assessment and assess knowledge base    Interventions:  - Provide teaching at level of understanding  - Provide teaching via preferred learning methods  Outcome: Progressing     Problem: Nutrition/Hydration-ADULT  Goal: Nutrient/Hydration intake appropriate for improving, restoring or maintaining nutritional needs  Description: Monitor and assess patient's nutrition/hydration status for malnutrition  Collaborate with interdisciplinary team and initiate plan and interventions as ordered  Monitor patient's weight and dietary intake as ordered or per policy  Utilize nutrition screening tool and intervene as necessary  Determine patient's food preferences and provide high-protein, high-caloric foods as appropriate       INTERVENTIONS:  - Monitor oral intake, urinary output, labs, and treatment plans  - Assess nutrition and hydration status and recommend course of action  - Evaluate amount of meals eaten  - Assist patient with eating if necessary   - Allow adequate time for meals  - Recommend/ encourage appropriate diets, oral nutritional supplements, and vitamin/mineral supplements  - Order, calculate, and assess calorie counts as needed  - Recommend, monitor, and adjust tube feedings and TPN/PPN based on assessed needs  - Assess need for intravenous fluids  - Provide specific nutrition/hydration education as appropriate  - Include patient/family/caregiver in decisions related to nutrition  Outcome: Progressing     Problem: NEUROSENSORY - ADULT  Goal: Achieves stable or improved neurological status  Description: INTERVENTIONS  - Monitor and report changes in neurological status  - Monitor vital signs such as temperature, blood pressure, glucose, and any other labs ordered   - Initiate measures to prevent increased intracranial pressure  - Monitor for seizure activity and implement precautions if appropriate      Outcome: Progressing  Goal: Achieves maximal functionality and self care  Description: INTERVENTIONS  - Monitor swallowing and airway patency with patient fatigue and changes in neurological status  - Encourage and assist patient to increase activity and self care     - Encourage visually impaired, hearing impaired and aphasic patients to use assistive/communication devices  Outcome: Progressing     Problem: RESPIRATORY - ADULT  Goal: Achieves optimal ventilation and oxygenation  Description: INTERVENTIONS:  - Assess for changes in respiratory status  - Assess for changes in mentation and behavior  - Position to facilitate oxygenation and minimize respiratory effort  - Oxygen administered by appropriate delivery if ordered  - Initiate smoking cessation education as indicated  - Encourage broncho-pulmonary hygiene including cough, deep breathe, Incentive Spirometry  - Assess the need for suctioning and aspirate as needed  - Assess and instruct to report SOB or any respiratory difficulty  - Respiratory Therapy support as indicated  Outcome: Progressing     Problem: GASTROINTESTINAL - ADULT  Goal: Maintains adequate nutritional intake  Description: INTERVENTIONS:  - Monitor percentage of each meal consumed  - Identify factors contributing to decreased intake, treat as appropriate  - Assist with meals as needed  - Monitor I&O, weight, and lab values if indicated  - Obtain nutrition services referral as needed  Outcome: Progressing     Problem: GENITOURINARY - ADULT  Goal: Maintains or returns to baseline urinary function  Description: INTERVENTIONS:  - Assess urinary function  - Encourage oral fluids to ensure adequate hydration if ordered  - Administer IV fluids as ordered to ensure adequate hydration  - Administer ordered medications as needed  - Offer frequent toileting  - Follow urinary retention protocol if ordered  Outcome: Progressing  Goal: Absence of urinary retention  Description: INTERVENTIONS:  - Assess patients ability to void and empty bladder  - Monitor I/O  - Bladder scan as needed  - Discuss with physician/AP medications to alleviate retention as needed  - Discuss catheterization for long term situations as appropriate  Outcome: Progressing     Problem: METABOLIC, FLUID AND ELECTROLYTES - ADULT  Goal: Electrolytes maintained within normal limits  Description: INTERVENTIONS:  - Monitor labs and assess patient for signs and symptoms of electrolyte imbalances  - Administer electrolyte replacement as ordered  - Monitor response to electrolyte replacements, including repeat lab results as appropriate  - Instruct patient on fluid and nutrition as appropriate  Outcome: Progressing  Goal: Fluid balance maintained  Description: INTERVENTIONS:  - Monitor labs   - Monitor I/O and WT  - Instruct patient on fluid and nutrition as appropriate  - Assess for signs & symptoms of volume excess or deficit  Outcome: Progressing  Goal: Glucose maintained within target range  Description: INTERVENTIONS:  - Monitor Blood Glucose as ordered  - Assess for signs and symptoms of hyperglycemia and hypoglycemia  - Administer ordered medications to maintain glucose within target range  - Assess nutritional intake and initiate nutrition service referral as needed  Outcome: Progressing     Problem: SKIN/TISSUE INTEGRITY - ADULT  Goal: Skin integrity remains intact  Description: INTERVENTIONS  - Identify patients at risk for skin breakdown  - Assess and monitor skin integrity  - Assess and monitor nutrition and hydration status  - Monitor labs (i e  albumin)  - Assess for incontinence   - Turn and reposition patient  - Assist with mobility/ambulation  - Relieve pressure over bony prominences  - Avoid friction and shearing  - Provide appropriate hygiene as needed including keeping skin clean and dry  - Evaluate need for skin moisturizer/barrier cream  - Collaborate with interdisciplinary team (i e  Nutrition, Rehabilitation, etc )   - Patient/family teaching  Outcome: Progressing  Goal: Incision(s), wounds(s) or drain site(s) healing without S/S of infection  Description: INTERVENTIONS  - Assess and document risk factors for skin impairment   - Assess and document dressing, incision, wound bed, drain sites and surrounding tissue  - Consider nutrition services referral as needed  - Oral mucous membranes remain intact  - Provide patient/ family education  Outcome: Progressing  Goal: Oral mucous membranes remain intact  Description: INTERVENTIONS  - Assess oral mucosa and hygiene practices  - Implement preventative oral hygiene regimen  - Implement oral medicated treatments as ordered  - Initiate Nutrition services referral as needed  Outcome: Progressing     Problem: MUSCULOSKELETAL - ADULT  Goal: Maintain or return mobility to safest level of function  Description: INTERVENTIONS:  - Assess patient's ability to carry out ADLs; assess patient's baseline for ADL function and identify physical deficits which impact ability to perform ADLs (bathing, care of mouth/teeth, toileting, grooming, dressing, etc )  - Assess/evaluate cause of self-care deficits   - Assess range of motion  - Assess patient's mobility  - Assess patient's need for assistive devices and provide as appropriate  - Encourage maximum independence but intervene and supervise when necessary  - Involve family in performance of ADLs  - Assess for home care needs following discharge   - Consider OT consult to assist with ADL evaluation and planning for discharge  - Provide patient education as appropriate  Outcome: Progressing  Goal: Maintain proper alignment of affected body part  Description: INTERVENTIONS:  - Support, maintain and protect limb and body alignment  - Provide patient/ family with appropriate education  Outcome: Progressing

## 2021-05-26 NOTE — ASSESSMENT & PLAN NOTE
+3 to 4 edema bilateral lower extremities on admission with diffuse erythema  With increasing warmth and low grade fever  IV Rocephin, switched to po Keflex 5/22,  Patient was changed back to IV rocephin for concerns of worsening cellulitis  Podiatry consultation appreciated cellulitis resolved  Patient received 9 day course of antibiotics   No additional antibiotics at discharge  Outpatient follow-up with wound care center as needed

## 2021-05-27 LAB
HEMOCCULT STL QL: NEGATIVE
HEMOCCULT STL QL: NORMAL
HEMOCCULT STL QL: NORMAL
INR PPP: 2.48 (ref 0.84–1.19)
PROTHROMBIN TIME: 26.5 SECONDS (ref 11.6–14.5)

## 2021-05-27 PROCEDURE — 85610 PROTHROMBIN TIME: CPT | Performed by: INTERNAL MEDICINE

## 2021-05-29 PROCEDURE — U0005 INFEC AGEN DETEC AMPLI PROBE: HCPCS | Performed by: INTERNAL MEDICINE

## 2021-05-29 PROCEDURE — U0003 INFECTIOUS AGENT DETECTION BY NUCLEIC ACID (DNA OR RNA); SEVERE ACUTE RESPIRATORY SYNDROME CORONAVIRUS 2 (SARS-COV-2) (CORONAVIRUS DISEASE [COVID-19]), AMPLIFIED PROBE TECHNIQUE, MAKING USE OF HIGH THROUGHPUT TECHNOLOGIES AS DESCRIBED BY CMS-2020-01-R: HCPCS | Performed by: INTERNAL MEDICINE

## 2021-05-30 LAB — SARS-COV-2 RNA RESP QL NAA+PROBE: NEGATIVE

## 2021-06-01 ENCOUNTER — HOSPITAL ENCOUNTER (INPATIENT)
Facility: HOSPITAL | Age: 86
LOS: 14 days | Discharge: HOME | DRG: 558 | End: 2021-06-15
Attending: INTERNAL MEDICINE | Admitting: INTERNAL MEDICINE
Payer: MEDICARE

## 2021-06-01 DIAGNOSIS — D64.9 ANEMIA, UNSPECIFIED TYPE: ICD-10-CM

## 2021-06-01 DIAGNOSIS — I48.20 ATRIAL FIBRILLATION, CHRONIC (HCC): Primary | ICD-10-CM

## 2021-06-01 DIAGNOSIS — E78.5 HYPERLIPIDEMIA, UNSPECIFIED HYPERLIPIDEMIA TYPE: ICD-10-CM

## 2021-06-01 LAB
INR PPP: 2.72 (ref 0.84–1.19)
PROTHROMBIN TIME: 28.5 SECONDS (ref 11.6–14.5)

## 2021-06-01 PROCEDURE — 85610 PROTHROMBIN TIME: CPT | Performed by: INTERNAL MEDICINE

## 2021-06-03 LAB
ANION GAP SERPL CALCULATED.3IONS-SCNC: 12 MMOL/L (ref 4–13)
BASOPHILS # BLD AUTO: 0.1 THOUSANDS/ΜL (ref 0–0.1)
BASOPHILS NFR BLD AUTO: 1 % (ref 0–2)
BUN SERPL-MCNC: 61 MG/DL (ref 7–25)
CALCIUM SERPL-MCNC: 8.8 MG/DL (ref 8.6–10.5)
CHLORIDE SERPL-SCNC: 96 MMOL/L (ref 98–107)
CO2 SERPL-SCNC: 29 MMOL/L (ref 21–31)
CREAT SERPL-MCNC: 1.56 MG/DL (ref 0.6–1.2)
EOSINOPHIL # BLD AUTO: 0.1 THOUSAND/ΜL (ref 0–0.61)
EOSINOPHIL NFR BLD AUTO: 2 % (ref 0–5)
ERYTHROCYTE [DISTWIDTH] IN BLOOD BY AUTOMATED COUNT: 14.5 % (ref 11.5–14.5)
GFR SERPL CREATININE-BSD FRML MDRD: 29 ML/MIN/1.73SQ M
GLUCOSE P FAST SERPL-MCNC: 82 MG/DL (ref 65–99)
GLUCOSE SERPL-MCNC: 82 MG/DL (ref 65–99)
HCT VFR BLD AUTO: 32.2 % (ref 42–47)
HGB BLD-MCNC: 10.7 G/DL (ref 12–16)
LYMPHOCYTES # BLD AUTO: 1.5 THOUSANDS/ΜL (ref 0.6–4.47)
LYMPHOCYTES NFR BLD AUTO: 20 % (ref 21–51)
MCH RBC QN AUTO: 30.9 PG (ref 26–34)
MCHC RBC AUTO-ENTMCNC: 33.4 G/DL (ref 31–37)
MCV RBC AUTO: 93 FL (ref 81–99)
MONOCYTES # BLD AUTO: 1.2 THOUSAND/ΜL (ref 0.17–1.22)
MONOCYTES NFR BLD AUTO: 16 % (ref 2–12)
NEUTROPHILS # BLD AUTO: 4.6 THOUSANDS/ΜL (ref 1.4–6.5)
NEUTS SEG NFR BLD AUTO: 62 % (ref 42–75)
PLATELET # BLD AUTO: 274 THOUSANDS/UL (ref 149–390)
PMV BLD AUTO: 8.5 FL (ref 8.6–11.7)
POTASSIUM SERPL-SCNC: 3.5 MMOL/L (ref 3.5–5.5)
RBC # BLD AUTO: 3.47 MILLION/UL (ref 3.9–5.2)
SODIUM SERPL-SCNC: 137 MMOL/L (ref 134–143)
WBC # BLD AUTO: 7.4 THOUSAND/UL (ref 4.8–10.8)

## 2021-06-03 PROCEDURE — 80048 BASIC METABOLIC PNL TOTAL CA: CPT | Performed by: INTERNAL MEDICINE

## 2021-06-03 PROCEDURE — 85025 COMPLETE CBC W/AUTO DIFF WBC: CPT | Performed by: INTERNAL MEDICINE

## 2021-06-08 LAB
INR PPP: 2.92 (ref 0.84–1.19)
PROTHROMBIN TIME: 30.1 SECONDS (ref 11.6–14.5)

## 2021-06-08 PROCEDURE — 85610 PROTHROMBIN TIME: CPT | Performed by: INTERNAL MEDICINE

## 2021-06-10 ENCOUNTER — TELEPHONE (OUTPATIENT)
Dept: CARDIOLOGY CLINIC | Facility: CLINIC | Age: 86
End: 2021-06-10

## 2021-06-10 NOTE — TELEPHONE ENCOUNTER
Patients son called and stated Phillip Frank is going into a nursing home and doesn't want any appts  She said if she is having any problems she will give us a call  Appt on 06/16/2021 taken out and no recall is being put in

## 2021-06-14 ENCOUNTER — TELEPHONE (OUTPATIENT)
Dept: OTHER | Facility: OTHER | Age: 86
End: 2021-06-14

## 2021-06-14 NOTE — TELEPHONE ENCOUNTER
Pt will be discharged from nursing home tomorrow  Please call staff to schedule f/u appt with Dr Brianna Carobne

## 2021-06-15 LAB
INR PPP: 3.8 (ref 0.84–1.19)
PROTHROMBIN TIME: 36.9 SECONDS (ref 11.6–14.5)

## 2021-06-15 PROCEDURE — 85610 PROTHROMBIN TIME: CPT | Performed by: INTERNAL MEDICINE

## 2021-06-22 ENCOUNTER — ANTICOAG VISIT (OUTPATIENT)
Dept: FAMILY MEDICINE CLINIC | Facility: CLINIC | Age: 86
End: 2021-06-22

## 2024-03-14 NOTE — PROGRESS NOTES
Now well controlled with amlodipine only.  Has hydralazine to take prn   Pt became unresponsive and family called out for assistance,RRT initiated  Returnrd to bed  Pt has breath sounds and a carotid pulse noted  Placed on 100 % nonrebreather and cardiac monitor  Sinu faustina noted on monitor and pulse ox of 92 noted  Temp 97 3; Resp 14; HR 56; /57;EKG, ABGs  And labwork obtained  Pt now alert and responding verbally  IV dose of Keppra 1000 mg given  Pt to CTscan with monitor and RN, Then transferred to CCU In satisfactory condition as per order  No further distress noted at this time  Pt resting quietly